# Patient Record
Sex: FEMALE | Race: WHITE | Employment: OTHER | URBAN - METROPOLITAN AREA
[De-identification: names, ages, dates, MRNs, and addresses within clinical notes are randomized per-mention and may not be internally consistent; named-entity substitution may affect disease eponyms.]

---

## 2017-01-03 RX ORDER — ATORVASTATIN CALCIUM 40 MG/1
TABLET, FILM COATED ORAL
Qty: 90 TABLET | Refills: 3 | Status: SHIPPED | OUTPATIENT
Start: 2017-01-03 | End: 2017-12-27

## 2017-01-05 ENCOUNTER — NURSE ONLY (OUTPATIENT)
Dept: INTERNAL MEDICINE CLINIC | Facility: CLINIC | Age: 72
End: 2017-01-05

## 2017-01-05 DIAGNOSIS — E53.8 VITAMIN B 12 DEFICIENCY: Primary | ICD-10-CM

## 2017-01-05 PROCEDURE — 96372 THER/PROPH/DIAG INJ SC/IM: CPT | Performed by: INTERNAL MEDICINE

## 2017-01-05 RX ORDER — CYANOCOBALAMIN 1000 UG/ML
1000 INJECTION INTRAMUSCULAR; SUBCUTANEOUS ONCE
Status: COMPLETED | OUTPATIENT
Start: 2017-01-05 | End: 2017-01-05

## 2017-01-05 RX ADMIN — CYANOCOBALAMIN 1000 MCG: 1000 INJECTION INTRAMUSCULAR; SUBCUTANEOUS at 09:33:00

## 2017-02-06 ENCOUNTER — NURSE ONLY (OUTPATIENT)
Dept: INTERNAL MEDICINE CLINIC | Facility: CLINIC | Age: 72
End: 2017-02-06

## 2017-02-06 DIAGNOSIS — E53.8 VITAMIN B 12 DEFICIENCY: Primary | ICD-10-CM

## 2017-02-06 PROCEDURE — 96372 THER/PROPH/DIAG INJ SC/IM: CPT | Performed by: INTERNAL MEDICINE

## 2017-02-06 RX ADMIN — CYANOCOBALAMIN 1000 MCG: 1000 INJECTION INTRAMUSCULAR; SUBCUTANEOUS at 09:14:00

## 2017-02-06 NOTE — PROGRESS NOTES
Name and  verified. B12 administered Right deltoid. Patient tolerated w/o complaint. No adverse reactions noted.

## 2017-03-08 ENCOUNTER — NURSE ONLY (OUTPATIENT)
Dept: INTERNAL MEDICINE CLINIC | Facility: CLINIC | Age: 72
End: 2017-03-08

## 2017-03-08 ENCOUNTER — TELEPHONE (OUTPATIENT)
Dept: INTERNAL MEDICINE CLINIC | Facility: CLINIC | Age: 72
End: 2017-03-08

## 2017-03-08 DIAGNOSIS — M48.061 SPINAL STENOSIS OF LUMBAR REGION: Primary | ICD-10-CM

## 2017-03-08 PROCEDURE — 96372 THER/PROPH/DIAG INJ SC/IM: CPT | Performed by: INTERNAL MEDICINE

## 2017-03-08 RX ADMIN — CYANOCOBALAMIN 1000 MCG: 1000 INJECTION INTRAMUSCULAR; SUBCUTANEOUS at 09:29:00

## 2017-03-08 NOTE — TELEPHONE ENCOUNTER
Routed to Dr. Kierra Snyder. Referral started below but needs additional information and diagnosis. I also don't know how to back date referral date. Do you?

## 2017-03-08 NOTE — TELEPHONE ENCOUNTER
Needs new referral for physical therapy [facility] in [facility]   - would need back dated to 3/7/17 as patient had appt yesterday & advised then referral had   (Dr Bryan Walton has referred before)  Send to Attn:  Sonya Floyd PT fax# [phone]

## 2017-03-08 NOTE — TELEPHONE ENCOUNTER
Faxed referral to [address] [place], [place] [address]. Attention: Gareth Hawley (number below.)

## 2017-03-09 RX ORDER — CHLORTHALIDONE 25 MG/1
TABLET ORAL
Qty: 18 TABLET | Refills: 0 | Status: SHIPPED | OUTPATIENT
Start: 2017-03-09 | End: 2017-05-31

## 2017-03-09 NOTE — TELEPHONE ENCOUNTER
Spoke to pt---we did have a follow up conversation about her BPs---she has not checked them in a while so she will check for 2 weeks and email a copy and we will talk again

## 2017-03-17 RX ORDER — BUTALBITAL, ASPIRIN, AND CAFFEINE 50; 325; 40 MG/1; MG/1; MG/1
CAPSULE ORAL
Qty: 60 CAPSULE | Refills: 1 | COMMUNITY
Start: 2017-03-17 | End: 2017-08-14

## 2017-04-10 ENCOUNTER — NURSE ONLY (OUTPATIENT)
Dept: INTERNAL MEDICINE CLINIC | Facility: CLINIC | Age: 72
End: 2017-04-10

## 2017-04-10 DIAGNOSIS — E53.8 VITAMIN B 12 DEFICIENCY: Primary | ICD-10-CM

## 2017-04-10 PROCEDURE — 96372 THER/PROPH/DIAG INJ SC/IM: CPT | Performed by: INTERNAL MEDICINE

## 2017-04-10 RX ORDER — CYANOCOBALAMIN 1000 UG/ML
1000 INJECTION INTRAMUSCULAR; SUBCUTANEOUS ONCE
Status: COMPLETED | OUTPATIENT
Start: 2017-04-10 | End: 2017-04-10

## 2017-04-10 RX ADMIN — CYANOCOBALAMIN 1000 MCG: 1000 INJECTION INTRAMUSCULAR; SUBCUTANEOUS at 08:59:00

## 2017-05-09 ENCOUNTER — TELEPHONE (OUTPATIENT)
Dept: INTERNAL MEDICINE CLINIC | Facility: CLINIC | Age: 72
End: 2017-05-09

## 2017-05-09 ENCOUNTER — NURSE ONLY (OUTPATIENT)
Dept: INTERNAL MEDICINE CLINIC | Facility: CLINIC | Age: 72
End: 2017-05-09

## 2017-05-09 DIAGNOSIS — E53.8 VITAMIN B 12 DEFICIENCY: Primary | ICD-10-CM

## 2017-05-09 PROCEDURE — 96372 THER/PROPH/DIAG INJ SC/IM: CPT | Performed by: INTERNAL MEDICINE

## 2017-05-09 RX ORDER — CYANOCOBALAMIN 1000 UG/ML
1000 INJECTION INTRAMUSCULAR; SUBCUTANEOUS ONCE
Status: COMPLETED | OUTPATIENT
Start: 2017-05-09 | End: 2017-05-09

## 2017-05-09 RX ADMIN — CYANOCOBALAMIN 1000 MCG: 1000 INJECTION INTRAMUSCULAR; SUBCUTANEOUS at 09:38:00

## 2017-05-09 NOTE — TELEPHONE ENCOUNTER
Pt. Drooped off BP results for [employer]   Ph. # [phone]    Routed to [facility]

## 2017-05-10 NOTE — TELEPHONE ENCOUNTER
Sent goActt - these readings are from 12/2016;  Would like to see BPs since March prior to Dr.K SMITH

## 2017-05-15 NOTE — TELEPHONE ENCOUNTER
[phone]  Pt returning [facility] call.  Pt aware she isn't in today and will return Tuesday  To clinical

## 2017-05-15 NOTE — TELEPHONE ENCOUNTER
Advised pt of Gretchen's [employer] message sent on 5/10 as pt had not read it--verbalized understanding. Pt will bring more recent BP readings to appt with Dr. Nadya Riley this week.

## 2017-05-16 ENCOUNTER — HOSPITAL ENCOUNTER (OUTPATIENT)
Dept: MAMMOGRAPHY | Facility: HOSPITAL | Age: 72
Discharge: HOME | End: 2017-05-16
Attending: SURGERY
Payer: MEDICARE

## 2017-05-16 DIAGNOSIS — R92.2 DENSE BREAST: ICD-10-CM

## 2017-05-16 DIAGNOSIS — N64.4 MASTODYNIA: ICD-10-CM

## 2017-05-16 PROCEDURE — 77062 BREAST TOMOSYNTHESIS BI: CPT | Performed by: SURGERY

## 2017-05-16 PROCEDURE — 77066 DX MAMMO INCL CAD BI: CPT | Performed by: SURGERY

## 2017-05-18 ENCOUNTER — OFFICE VISIT (OUTPATIENT)
Dept: INTERNAL MEDICINE CLINIC | Facility: CLINIC | Age: 72
End: 2017-05-18

## 2017-05-18 ENCOUNTER — TELEPHONE (OUTPATIENT)
Dept: INTERNAL MEDICINE CLINIC | Facility: CLINIC | Age: 72
End: 2017-05-18

## 2017-05-18 VITALS
SYSTOLIC BLOOD PRESSURE: 122 MMHG | TEMPERATURE: 100 F | WEIGHT: 143.81 LBS | HEART RATE: 78 BPM | DIASTOLIC BLOOD PRESSURE: 60 MMHG | BODY MASS INDEX: 26 KG/M2

## 2017-05-18 DIAGNOSIS — I10 ESSENTIAL HYPERTENSION WITH GOAL BLOOD PRESSURE LESS THAN 140/90: ICD-10-CM

## 2017-05-18 DIAGNOSIS — E78.5 HYPERLIPIDEMIA, UNSPECIFIED HYPERLIPIDEMIA TYPE: ICD-10-CM

## 2017-05-18 DIAGNOSIS — N64.4 BREAST PAIN, LEFT: ICD-10-CM

## 2017-05-18 DIAGNOSIS — Z85.42 HISTORY OF UTERINE CANCER: ICD-10-CM

## 2017-05-18 DIAGNOSIS — Z00.00 MEDICARE ANNUAL WELLNESS VISIT, INITIAL: Primary | ICD-10-CM

## 2017-05-18 DIAGNOSIS — K51.211 ULCERATIVE PROCTITIS WITH RECTAL BLEEDING (HCC): ICD-10-CM

## 2017-05-18 DIAGNOSIS — Z00.00 ROUTINE HEALTH MAINTENANCE: ICD-10-CM

## 2017-05-18 DIAGNOSIS — Z86.718 HISTORY OF DVT (DEEP VEIN THROMBOSIS): ICD-10-CM

## 2017-05-18 DIAGNOSIS — M85.80 OSTEOPENIA, UNSPECIFIED LOCATION: ICD-10-CM

## 2017-05-18 PROCEDURE — G0463 HOSPITAL OUTPT CLINIC VISIT: HCPCS | Performed by: INTERNAL MEDICINE

## 2017-05-18 PROCEDURE — 99214 OFFICE O/P EST MOD 30 MIN: CPT | Performed by: INTERNAL MEDICINE

## 2017-05-18 PROCEDURE — G0438 PPPS, INITIAL VISIT: HCPCS | Performed by: INTERNAL MEDICINE

## 2017-05-18 RX ORDER — ALPRAZOLAM 0.25 MG/1
0.25 TABLET ORAL DAILY PRN
Qty: 30 TABLET | Refills: 0 | Status: SHIPPED
Start: 2017-05-18 | End: 2020-02-06

## 2017-05-18 RX ORDER — RANITIDINE 150 MG/1
150 CAPSULE ORAL AS NEEDED
COMMUNITY
End: 2018-11-16 | Stop reason: ALTCHOICE

## 2017-05-18 NOTE — TELEPHONE ENCOUNTER
Dr. Kierra Snyder wrote a written prescription for alprazolam 0.25mg #30 zero refills. Take 1 tablet by mouth daily as needed. Medication [facility] preferred [facility] Patient called and made aware.

## 2017-05-18 NOTE — PROGRESS NOTES
HPI:   Scott Kaufman is a 66year old female presents with the following problems. Patient has hypertension. On therapy. Blood pressure controlled.   She did have a blood pressure in the 124Q systolic range and another blood pressure 305 systolic ra Take 150 mg by mouth as needed for Heartburn. Disp:  Rfl:    ALPRAZolam (XANAX) 0.25 MG Oral Tab Take 1 tablet (0.25 mg total) by mouth daily as needed.  Disp: 30 tablet Rfl: 0   BUTALBITAL-ASPIRIN-CAFFEINE -40 MG Oral Cap TAKE ONE CAPSULE BY MOUTH EV As Directed. Every 5 days. Disp:  Rfl:       Past Medical History   Diagnosis Date   • [facility]   • Other ill-defined conditions(799.89)      TAHBSO MGMT.    • Hyperparathyroidism ([facility] 2004     PAIR OF PARATHYROID REMOVED   • Pneumonia 12/05/2016 02:57 PM                                [facility]                                                         Pathologist:           Law Gutiérrez MD                                                        Specimens:   A) - Colon righ submitted in formalin labeled Joseph, left colon biopsy and   consists of multiple fragments of pink-tan tissue measuring 1.2 x 1.0 x   0.4 cm in aggregate. Submitted entirely in a single cassette B.    Specimen C is submitted in formalin labeled Joseph, r organomegaly or tenderness   RECTAL: Colonoscopy December 2016. Mild to moderate active colitis/proctitis in the rectum biopsy. Managed by gastroneurology. Dr. Mark Pereyra. EXTREMITIES:  no cyanosis, clubbing or edema. NEURO:  Awake and aware.        General Scoring: 3          Depression Screening (PHQ-2/PHQ-9): Over the LAST 2 WEEKS   Little interest or pleasure in doing things (over the last two weeks)?: Not at all    Feeling down, depressed, or hopeless (over the last two weeks)?: Not at all    PHQ-2 SCORE of the week is this?: Correct    What month is it?: Correct    What year is it?: Correct    Recall \"Ball\": Correct    Recall \"Flag\": Correct    Recall \"Tree\":  Incorrect          Kimo Cobos's SCREENING SCHEDULE   Tests on this list are recommend Influenza No orders found for this or any previous visit.  Update Immunization Activity if applicable    Pneumococcal   Orders placed or performed in visit on 09/15/15  -PNEUMOCOCCAL VACC, 13 ROWENA IM    Update Immunization Activity if applicable    Hepat [facility])  On therapy. Followed by gastroenterology. 5. History of uterine cancer  Felt to be without disease. Followed by DR. PALACIOS    6. Osteopenia, unspecified location  On Fosamax. Calcium and vitamin D. Followed by endocrinology at [facility].     7. R

## 2017-05-31 ENCOUNTER — LAB ENCOUNTER (OUTPATIENT)
Dept: LAB | Age: 72
End: 2017-05-31
Attending: INTERNAL MEDICINE
Payer: MEDICARE

## 2017-05-31 DIAGNOSIS — I10 ESSENTIAL HYPERTENSION WITH GOAL BLOOD PRESSURE LESS THAN 140/90: ICD-10-CM

## 2017-05-31 PROCEDURE — 80048 BASIC METABOLIC PNL TOTAL CA: CPT

## 2017-05-31 PROCEDURE — 85025 COMPLETE CBC W/AUTO DIFF WBC: CPT

## 2017-05-31 PROCEDURE — 80061 LIPID PANEL: CPT

## 2017-05-31 PROCEDURE — 36415 COLL VENOUS BLD VENIPUNCTURE: CPT

## 2017-06-01 RX ORDER — CHLORTHALIDONE 25 MG/1
TABLET ORAL
Qty: 18 TABLET | Refills: 3 | Status: SHIPPED | OUTPATIENT
Start: 2017-06-01 | End: 2017-08-15

## 2017-06-06 ENCOUNTER — TELEPHONE (OUTPATIENT)
Dept: INTERNAL MEDICINE CLINIC | Facility: CLINIC | Age: 72
End: 2017-06-06

## 2017-06-06 DIAGNOSIS — R73.9 ELEVATED BLOOD SUGAR: Primary | ICD-10-CM

## 2017-06-06 DIAGNOSIS — D64.9 ANEMIA, UNSPECIFIED: ICD-10-CM

## 2017-06-06 NOTE — TELEPHONE ENCOUNTER
Relayed MD's message to patient---verbalized understanding  Pt states she has had very little rectal bleeding    To Dr. Gary Ratliff

## 2017-06-06 NOTE — TELEPHONE ENCOUNTER
Please notify patient that her labs show that she has a mild anemia. Not bad but hemoglobin a little bit low. This may be the result of some bleeding that she may have from her ulcerative colitis.   Please ask her if she has had any recent rectal bleeding

## 2017-06-07 ENCOUNTER — LAB ENCOUNTER (OUTPATIENT)
Dept: LAB | Age: 72
End: 2017-06-07
Attending: INTERNAL MEDICINE
Payer: MEDICARE

## 2017-06-07 DIAGNOSIS — D64.9 ANEMIA, UNSPECIFIED: ICD-10-CM

## 2017-06-07 DIAGNOSIS — R73.9 ELEVATED BLOOD SUGAR: ICD-10-CM

## 2017-06-07 PROCEDURE — 83540 ASSAY OF IRON: CPT

## 2017-06-07 PROCEDURE — 84466 ASSAY OF TRANSFERRIN: CPT

## 2017-06-07 PROCEDURE — 83036 HEMOGLOBIN GLYCOSYLATED A1C: CPT

## 2017-06-07 PROCEDURE — 36415 COLL VENOUS BLD VENIPUNCTURE: CPT

## 2017-06-07 PROCEDURE — 85025 COMPLETE CBC W/AUTO DIFF WBC: CPT

## 2017-06-07 PROCEDURE — 82728 ASSAY OF FERRITIN: CPT

## 2017-06-07 PROCEDURE — 82607 VITAMIN B-12: CPT

## 2017-06-09 ENCOUNTER — NURSE ONLY (OUTPATIENT)
Dept: INTERNAL MEDICINE CLINIC | Facility: CLINIC | Age: 72
End: 2017-06-09

## 2017-06-09 ENCOUNTER — TELEPHONE (OUTPATIENT)
Dept: INTERNAL MEDICINE CLINIC | Facility: CLINIC | Age: 72
End: 2017-06-09

## 2017-06-09 DIAGNOSIS — E53.8 VITAMIN B12 DEFICIENCY: Primary | ICD-10-CM

## 2017-06-09 DIAGNOSIS — D64.9 ANEMIA, UNSPECIFIED: Primary | ICD-10-CM

## 2017-06-09 PROCEDURE — 96372 THER/PROPH/DIAG INJ SC/IM: CPT | Performed by: INTERNAL MEDICINE

## 2017-06-09 RX ADMIN — CYANOCOBALAMIN 1000 MCG: 1000 INJECTION INTRAMUSCULAR; SUBCUTANEOUS at 09:18:00

## 2017-06-09 NOTE — PROGRESS NOTES
Pt presents for vitamin B12 injection. Full name and date of birth verified. Vitamin B12 1000mcg administered IM to right deltoid. Tolerated well.

## 2017-07-18 ENCOUNTER — NURSE ONLY (OUTPATIENT)
Dept: INTERNAL MEDICINE CLINIC | Facility: CLINIC | Age: 72
End: 2017-07-18

## 2017-07-18 ENCOUNTER — LAB ENCOUNTER (OUTPATIENT)
Dept: LAB | Age: 72
End: 2017-07-18
Attending: INTERNAL MEDICINE
Payer: MEDICARE

## 2017-07-18 DIAGNOSIS — D64.9 ANEMIA, UNSPECIFIED: ICD-10-CM

## 2017-07-18 LAB
BASOPHILS # BLD: 0 K/UL (ref 0–0.2)
BASOPHILS NFR BLD: 1 %
EOSINOPHIL # BLD: 0.3 K/UL (ref 0–0.7)
EOSINOPHIL NFR BLD: 5 %
ERYTHROCYTE [DISTWIDTH] IN BLOOD BY AUTOMATED COUNT: 12.6 % (ref 11–15)
HCT VFR BLD AUTO: 35.4 % (ref 35–48)
HGB BLD-MCNC: 12 G/DL (ref 12–16)
LYMPHOCYTES # BLD: 1.4 K/UL (ref 1–4)
LYMPHOCYTES NFR BLD: 22 %
MCH RBC QN AUTO: 33.6 PG (ref 27–32)
MCHC RBC AUTO-ENTMCNC: 33.9 G/DL (ref 32–37)
MCV RBC AUTO: 99 FL (ref 80–100)
MONOCYTES # BLD: 0.8 K/UL (ref 0–1)
MONOCYTES NFR BLD: 13 %
NEUTROPHILS # BLD AUTO: 3.8 K/UL (ref 1.8–7.7)
NEUTROPHILS NFR BLD: 60 %
PLATELET # BLD AUTO: 212 K/UL (ref 140–400)
PMV BLD AUTO: 8.3 FL (ref 7.4–10.3)
RBC # BLD AUTO: 3.57 M/UL (ref 3.7–5.4)
WBC # BLD AUTO: 6.3 K/UL (ref 4–11)

## 2017-07-18 PROCEDURE — 85025 COMPLETE CBC W/AUTO DIFF WBC: CPT

## 2017-07-18 PROCEDURE — 96372 THER/PROPH/DIAG INJ SC/IM: CPT | Performed by: INTERNAL MEDICINE

## 2017-07-18 PROCEDURE — 36415 COLL VENOUS BLD VENIPUNCTURE: CPT

## 2017-07-18 RX ADMIN — CYANOCOBALAMIN 1000 MCG: 1000 INJECTION INTRAMUSCULAR; SUBCUTANEOUS at 09:09:00

## 2017-07-20 ENCOUNTER — TELEPHONE (OUTPATIENT)
Dept: INTERNAL MEDICINE CLINIC | Facility: CLINIC | Age: 72
End: 2017-07-20

## 2017-07-20 NOTE — TELEPHONE ENCOUNTER
Pt notified of results and recommendations. Verbalizes understanding and denies questions at this time.

## 2017-08-10 ENCOUNTER — OFFICE VISIT (OUTPATIENT)
Dept: PULMONOLOGY | Facility: CLINIC | Age: 72
End: 2017-08-10

## 2017-08-10 VITALS
RESPIRATION RATE: 18 BRPM | HEIGHT: 62.5 IN | HEART RATE: 56 BPM | WEIGHT: 144.63 LBS | SYSTOLIC BLOOD PRESSURE: 159 MMHG | OXYGEN SATURATION: 98 % | BODY MASS INDEX: 25.95 KG/M2 | DIASTOLIC BLOOD PRESSURE: 80 MMHG

## 2017-08-10 DIAGNOSIS — J47.9 BRONCHIECTASIS WITHOUT COMPLICATION (HCC): Primary | ICD-10-CM

## 2017-08-10 PROCEDURE — G0463 HOSPITAL OUTPT CLINIC VISIT: HCPCS | Performed by: INTERNAL MEDICINE

## 2017-08-10 PROCEDURE — 99213 OFFICE O/P EST LOW 20 MIN: CPT | Performed by: INTERNAL MEDICINE

## 2017-08-10 RX ORDER — ALBUTEROL SULFATE 90 UG/1
2 AEROSOL, METERED RESPIRATORY (INHALATION) EVERY 6 HOURS PRN
Qty: 1 INHALER | Refills: 5 | Status: SHIPPED | OUTPATIENT
Start: 2017-08-10 | End: 2018-08-10

## 2017-08-10 NOTE — PROGRESS NOTES
The patient is 22-year-old female who I know well from prior evaluation of comes in now for follow-up. In general, she is doing well although she has mild cough. She has bronchiectasis.   She has recently completed a course of cefadroxil for left toe infe

## 2017-08-14 ENCOUNTER — APPOINTMENT (OUTPATIENT)
Dept: LAB | Age: 72
End: 2017-08-14
Attending: INTERNAL MEDICINE
Payer: MEDICARE

## 2017-08-14 ENCOUNTER — OFFICE VISIT (OUTPATIENT)
Dept: INTERNAL MEDICINE CLINIC | Facility: CLINIC | Age: 72
End: 2017-08-14

## 2017-08-14 VITALS
TEMPERATURE: 98 F | HEIGHT: 62.5 IN | BODY MASS INDEX: 25.69 KG/M2 | HEART RATE: 56 BPM | DIASTOLIC BLOOD PRESSURE: 68 MMHG | WEIGHT: 143.19 LBS | OXYGEN SATURATION: 96 % | SYSTOLIC BLOOD PRESSURE: 176 MMHG

## 2017-08-14 DIAGNOSIS — E53.8 B12 DEFICIENCY: ICD-10-CM

## 2017-08-14 DIAGNOSIS — R00.1 BRADYCARDIA: ICD-10-CM

## 2017-08-14 DIAGNOSIS — Z96.9 PRESENCE OF RETAINED HARDWARE: ICD-10-CM

## 2017-08-14 DIAGNOSIS — I10 ESSENTIAL HYPERTENSION WITH GOAL BLOOD PRESSURE LESS THAN 140/90: ICD-10-CM

## 2017-08-14 DIAGNOSIS — I10 ESSENTIAL HYPERTENSION WITH GOAL BLOOD PRESSURE LESS THAN 140/90: Primary | ICD-10-CM

## 2017-08-14 LAB
ALBUMIN SERPL BCP-MCNC: 4.4 G/DL (ref 3.5–4.8)
ALBUMIN/GLOB SERPL: 1.4 {RATIO} (ref 1–2)
ALP SERPL-CCNC: 85 U/L (ref 32–100)
ALT SERPL-CCNC: 21 U/L (ref 14–54)
ANION GAP SERPL CALC-SCNC: 9 MMOL/L (ref 0–18)
AST SERPL-CCNC: 27 U/L (ref 15–41)
BILIRUB SERPL-MCNC: 0.8 MG/DL (ref 0.3–1.2)
BUN SERPL-MCNC: 15 MG/DL (ref 8–20)
BUN/CREAT SERPL: 18.8 (ref 10–20)
CALCIUM SERPL-MCNC: 9.9 MG/DL (ref 8.5–10.5)
CHLORIDE SERPL-SCNC: 99 MMOL/L (ref 95–110)
CO2 SERPL-SCNC: 24 MMOL/L (ref 22–32)
CREAT SERPL-MCNC: 0.8 MG/DL (ref 0.5–1.5)
GLOBULIN PLAS-MCNC: 3.2 G/DL (ref 2.5–3.7)
GLUCOSE SERPL-MCNC: 97 MG/DL (ref 70–99)
OSMOLALITY UR CALC.SUM OF ELEC: 275 MOSM/KG (ref 275–295)
POTASSIUM SERPL-SCNC: 4.1 MMOL/L (ref 3.3–5.1)
PROT SERPL-MCNC: 7.6 G/DL (ref 5.9–8.4)
SODIUM SERPL-SCNC: 132 MMOL/L (ref 136–144)

## 2017-08-14 PROCEDURE — 99214 OFFICE O/P EST MOD 30 MIN: CPT | Performed by: INTERNAL MEDICINE

## 2017-08-14 PROCEDURE — G0463 HOSPITAL OUTPT CLINIC VISIT: HCPCS | Performed by: INTERNAL MEDICINE

## 2017-08-14 PROCEDURE — 80053 COMPREHEN METABOLIC PANEL: CPT

## 2017-08-14 PROCEDURE — 96372 THER/PROPH/DIAG INJ SC/IM: CPT | Performed by: INTERNAL MEDICINE

## 2017-08-14 PROCEDURE — 36415 COLL VENOUS BLD VENIPUNCTURE: CPT

## 2017-08-14 RX ORDER — BUTALBITAL, ASPIRIN, AND CAFFEINE 50; 325; 40 MG/1; MG/1; MG/1
CAPSULE ORAL
Qty: 60 CAPSULE | Refills: 1 | OUTPATIENT
Start: 2017-08-14 | End: 2017-08-14

## 2017-08-14 RX ORDER — BUTALBITAL, ASPIRIN, AND CAFFEINE 50; 325; 40 MG/1; MG/1; MG/1
CAPSULE ORAL
Qty: 60 CAPSULE | Refills: 1 | Status: SHIPPED | OUTPATIENT
Start: 2017-08-14 | End: 2017-09-19

## 2017-08-14 RX ADMIN — CYANOCOBALAMIN 1000 MCG: 1000 INJECTION INTRAMUSCULAR; SUBCUTANEOUS at 12:45:00

## 2017-08-14 NOTE — PROGRESS NOTES
HPI:   Jw Li is a 74year old female presents with the following problems. Patient here for preop physical.  She will have the surgery for hardware removal first metatarsal left foot under local sedation by Dr. Cleveland Daniel.   Patient relat anticoagulation. Wt Readings from Last 3 Encounters:  08/14/17 : 143 lb 3.2 oz (65 kg)  08/10/17 : 144 lb 9.6 oz (65.6 kg)  06/06/17 : 143 lb (64.9 kg)    Body mass index is 25.77 kg/m².        Current Outpatient Prescriptions:  butalbital-aspirin-caffei Multiple Vitamin (MULTI-VITAMINS) Oral Tab Take 1 tablet by mouth daily with breakfast. Disp:  Rfl:    sulfaSALAzine (AZULFIDINE) 500 MG Oral Tab Take 2 tablets by mouth 3 (three) times daily.    Disp:  Rfl:       Past Medical History:   Diagnosis Date orders placed or performed in visit on 07/18/17  -CBC W/ DIFFERENTIAL   Result Value Ref Range   WBC 6.3 4.0 - 11.0 K/UL   RBC 3.57 (L) 3.70 - 5.40 M/UL   HGB 12.0 12.0 - 16.0 g/dL   HCT 35.4 35.0 - 48.0 %   MCV 99.0 80.0 - 100.0 fL   MCH 33.6 (H) 27.0 - 3 2017 benign. LUNGS:  clear to auscultation. Effort normal.  Nonlabored. Respiratory rate about 14. CARDIO:  RRR without murmur.    S1 and S2 normal  GI:  good BS's. no masses, organomegaly or tenderness   MUSCULOSKELETAL:  back is not tender, no joint sw

## 2017-08-15 ENCOUNTER — TELEPHONE (OUTPATIENT)
Dept: INTERNAL MEDICINE CLINIC | Facility: CLINIC | Age: 72
End: 2017-08-15

## 2017-08-15 DIAGNOSIS — I10 ESSENTIAL HYPERTENSION: Primary | ICD-10-CM

## 2017-08-15 RX ORDER — CHLORTHALIDONE 25 MG/1
TABLET ORAL
Qty: 18 TABLET | Refills: 3 | COMMUNITY
Start: 2017-08-15 | End: 2017-09-19

## 2017-08-15 NOTE — TELEPHONE ENCOUNTER
Please let patient know that her history is from yesterday showed that her sodium count is just a little bit low. This may be contributed to by the chlorthalidone. Please ask her if she drinks any more than 2 quarts of water a day.   Sometimes this amount

## 2017-08-15 NOTE — TELEPHONE ENCOUNTER
Called patient with Dr Paulino Estrada message. Patient expressed understanding. She gave me her blood pressure readings. Last night 170/70 and this morning 162/67. To Dr. Milton Bernstein.

## 2017-08-15 NOTE — TELEPHONE ENCOUNTER
Discussed with patient. I feel patient is at low risk for her proposed foot surgery under local.  Reviewed blood pressures. Patient's pulse last night was 68 this morning 57. We will go ahead and send Dr. Kwong Job clearance.   I have asked patient violeta

## 2017-09-01 ENCOUNTER — APPOINTMENT (OUTPATIENT)
Dept: LAB | Age: 72
End: 2017-09-01
Attending: INTERNAL MEDICINE
Payer: MEDICARE

## 2017-09-01 ENCOUNTER — NURSE ONLY (OUTPATIENT)
Dept: INTERNAL MEDICINE CLINIC | Facility: CLINIC | Age: 72
End: 2017-09-01

## 2017-09-01 VITALS — SYSTOLIC BLOOD PRESSURE: 158 MMHG | DIASTOLIC BLOOD PRESSURE: 72 MMHG | HEART RATE: 64 BPM

## 2017-09-01 DIAGNOSIS — I10 ESSENTIAL HYPERTENSION: Primary | ICD-10-CM

## 2017-09-01 DIAGNOSIS — I10 ESSENTIAL HYPERTENSION: ICD-10-CM

## 2017-09-01 LAB
ANION GAP SERPL CALC-SCNC: 9 MMOL/L (ref 0–18)
BUN SERPL-MCNC: 18 MG/DL (ref 8–20)
BUN/CREAT SERPL: 19.8 (ref 10–20)
CALCIUM SERPL-MCNC: 9.3 MG/DL (ref 8.5–10.5)
CHLORIDE SERPL-SCNC: 99 MMOL/L (ref 95–110)
CO2 SERPL-SCNC: 24 MMOL/L (ref 22–32)
CREAT SERPL-MCNC: 0.91 MG/DL (ref 0.5–1.5)
GLUCOSE SERPL-MCNC: 99 MG/DL (ref 70–99)
OSMOLALITY UR CALC.SUM OF ELEC: 276 MOSM/KG (ref 275–295)
POTASSIUM SERPL-SCNC: 3.9 MMOL/L (ref 3.3–5.1)
SODIUM SERPL-SCNC: 132 MMOL/L (ref 136–144)

## 2017-09-01 PROCEDURE — G0463 HOSPITAL OUTPT CLINIC VISIT: HCPCS

## 2017-09-01 PROCEDURE — 36415 COLL VENOUS BLD VENIPUNCTURE: CPT

## 2017-09-01 PROCEDURE — 80048 BASIC METABOLIC PNL TOTAL CA: CPT

## 2017-09-05 ENCOUNTER — TELEPHONE (OUTPATIENT)
Dept: INTERNAL MEDICINE CLINIC | Facility: CLINIC | Age: 72
End: 2017-09-05

## 2017-09-05 RX ORDER — AMLODIPINE BESYLATE 5 MG/1
5 TABLET ORAL DAILY
Qty: 30 TABLET | Refills: 11 | Status: SHIPPED | OUTPATIENT
Start: 2017-09-05 | End: 2017-11-27

## 2017-09-05 NOTE — TELEPHONE ENCOUNTER
Relayed MD message to patient. Verified patients pharmacy. FU appt scheduled for 9/19/2017. Patient repeated medication changed back, patient verbalized understanding.  Amlodipine sent to [place].

## 2017-09-05 NOTE — TELEPHONE ENCOUNTER
Please notify patient that her electrolytes came out fairly good. Sodium is still little bit low. This is not due to her sodium intake orally but he has something to do with how her kidneys concentrate urine.   What I would like to do is have her go back

## 2017-09-19 ENCOUNTER — APPOINTMENT (OUTPATIENT)
Dept: LAB | Age: 72
End: 2017-09-19
Attending: INTERNAL MEDICINE
Payer: MEDICARE

## 2017-09-19 ENCOUNTER — OFFICE VISIT (OUTPATIENT)
Dept: INTERNAL MEDICINE CLINIC | Facility: CLINIC | Age: 72
End: 2017-09-19

## 2017-09-19 ENCOUNTER — TELEPHONE (OUTPATIENT)
Dept: INTERNAL MEDICINE CLINIC | Facility: CLINIC | Age: 72
End: 2017-09-19

## 2017-09-19 VITALS
DIASTOLIC BLOOD PRESSURE: 64 MMHG | BODY MASS INDEX: 26.09 KG/M2 | TEMPERATURE: 99 F | HEIGHT: 62 IN | WEIGHT: 141.81 LBS | SYSTOLIC BLOOD PRESSURE: 126 MMHG | HEART RATE: 60 BPM

## 2017-09-19 DIAGNOSIS — I10 ESSENTIAL HYPERTENSION: Primary | ICD-10-CM

## 2017-09-19 DIAGNOSIS — Z23 FLU VACCINE NEED: ICD-10-CM

## 2017-09-19 DIAGNOSIS — I10 ESSENTIAL HYPERTENSION: ICD-10-CM

## 2017-09-19 DIAGNOSIS — E55.9 VITAMIN D DEFICIENCY: ICD-10-CM

## 2017-09-19 DIAGNOSIS — E87.1 HYPONATREMIA: ICD-10-CM

## 2017-09-19 DIAGNOSIS — Z00.00 ROUTINE HEALTH MAINTENANCE: ICD-10-CM

## 2017-09-19 PROCEDURE — 36415 COLL VENOUS BLD VENIPUNCTURE: CPT

## 2017-09-19 PROCEDURE — 80048 BASIC METABOLIC PNL TOTAL CA: CPT

## 2017-09-19 PROCEDURE — G0008 ADMIN INFLUENZA VIRUS VAC: HCPCS | Performed by: INTERNAL MEDICINE

## 2017-09-19 PROCEDURE — 99214 OFFICE O/P EST MOD 30 MIN: CPT | Performed by: INTERNAL MEDICINE

## 2017-09-19 PROCEDURE — 96372 THER/PROPH/DIAG INJ SC/IM: CPT | Performed by: INTERNAL MEDICINE

## 2017-09-19 PROCEDURE — G0463 HOSPITAL OUTPT CLINIC VISIT: HCPCS | Performed by: INTERNAL MEDICINE

## 2017-09-19 PROCEDURE — 90653 IIV ADJUVANT VACCINE IM: CPT | Performed by: INTERNAL MEDICINE

## 2017-09-19 RX ADMIN — CYANOCOBALAMIN 1000 MCG: 1000 INJECTION INTRAMUSCULAR; SUBCUTANEOUS at 09:23:00

## 2017-09-19 NOTE — PROGRESS NOTES
HPI:   Aaron Russo is a 74year old female presents with the following problems. Patient feels well. She went through her foot surgery very well. This was done by Dr. Lisette Sauceda. She is tolerating the amlodipine well.   Her repeat blood pressure wa External Cream Apply 1 Application topically as needed. Disp:  Rfl: 1   Alendronate Sodium (FOSAMAX) 70 MG Oral Tab Take 1 tablet by mouth once a week. Disp:  Rfl: 3   aspirin 81 MG Oral Tab Take 1 tablet by mouth daily.  Disp:  Rfl:    Calcium Citrate-Vi HOSPITALIZED   • Spinal stenosis    • Spinal stenosis 4/19/2016   • Tonsillitis    • [facility]   • Uterine cancer [facility])       Past Surgical History:  No date: APPENDECTOMY  12/5/2016: COLONOSCOPY N/A      Comment: Procedure: COLONOSCOPY denies dysuria or frequency  MUSCULOSKELETAL: Occasional back pain for which he takes ibuprofen intermittently. NEURO:  denies headaches or dizziness.   Occasional headache for which she takes Fiorinal.  PSYCHE:  denies depression or anxiety      EXAM:   B

## 2017-09-19 NOTE — TELEPHONE ENCOUNTER
Called patient and relayed DR. MARK message - verbalized understanding.  Transferred to [address] [place] [address] to schedule sabrina with Bhavani Reyna in 3 weeks for BP check

## 2017-09-19 NOTE — TELEPHONE ENCOUNTER
Please let patient know that her electrolytes from today show sodium 133. Improved. Normal is around 135 and greater. I think based on today's blood pressure she can stop the chlorthalidone.   Please ask her to seek [facility] for blood pressure check in 3 w

## 2017-10-12 ENCOUNTER — TELEPHONE (OUTPATIENT)
Dept: INTERNAL MEDICINE CLINIC | Facility: CLINIC | Age: 72
End: 2017-10-12

## 2017-10-12 ENCOUNTER — APPOINTMENT (OUTPATIENT)
Dept: LAB | Age: 72
End: 2017-10-12
Attending: INTERNAL MEDICINE
Payer: MEDICARE

## 2017-10-12 ENCOUNTER — NURSE ONLY (OUTPATIENT)
Dept: INTERNAL MEDICINE CLINIC | Facility: CLINIC | Age: 72
End: 2017-10-12

## 2017-10-12 DIAGNOSIS — I10 ESSENTIAL HYPERTENSION: Primary | ICD-10-CM

## 2017-10-12 DIAGNOSIS — I10 ESSENTIAL HYPERTENSION: ICD-10-CM

## 2017-10-12 PROCEDURE — 80048 BASIC METABOLIC PNL TOTAL CA: CPT

## 2017-10-12 PROCEDURE — 36415 COLL VENOUS BLD VENIPUNCTURE: CPT

## 2017-10-12 NOTE — PROGRESS NOTES
Pt here for follow up BP check  She has not monitored her BP since discontinuing chlorthalidone;   She has resumed going to the gym MWF for weights/aerobic exercise  She feels well today    BP as charted  TLC - exercising  ROS neg c/o HA,CP,SOB, GI, (no c

## 2017-10-19 ENCOUNTER — NURSE ONLY (OUTPATIENT)
Dept: INTERNAL MEDICINE CLINIC | Facility: CLINIC | Age: 72
End: 2017-10-19

## 2017-10-19 ENCOUNTER — TELEPHONE (OUTPATIENT)
Dept: INTERNAL MEDICINE CLINIC | Facility: CLINIC | Age: 72
End: 2017-10-19

## 2017-10-19 DIAGNOSIS — M48.061 SPINAL STENOSIS OF LUMBAR REGION WITHOUT NEUROGENIC CLAUDICATION: Primary | ICD-10-CM

## 2017-10-19 PROCEDURE — 96372 THER/PROPH/DIAG INJ SC/IM: CPT | Performed by: INTERNAL MEDICINE

## 2017-10-19 RX ADMIN — CYANOCOBALAMIN 1000 MCG: 1000 INJECTION INTRAMUSCULAR; SUBCUTANEOUS at 10:56:00

## 2017-10-30 RX ORDER — LOSARTAN POTASSIUM 100 MG/1
TABLET ORAL
Qty: 90 TABLET | Refills: 3 | Status: SHIPPED | OUTPATIENT
Start: 2017-10-30 | End: 2018-10-15

## 2017-11-02 RX ORDER — PROPRANOLOL HYDROCHLORIDE 80 MG/1
TABLET ORAL
Qty: 180 TABLET | Refills: 3 | Status: SHIPPED | OUTPATIENT
Start: 2017-11-02 | End: 2018-11-28

## 2017-11-09 ENCOUNTER — TELEPHONE (OUTPATIENT)
Dept: INTERNAL MEDICINE CLINIC | Facility: CLINIC | Age: 72
End: 2017-11-09

## 2017-11-09 DIAGNOSIS — M48.00 SPINAL STENOSIS, UNSPECIFIED SPINAL REGION: Primary | ICD-10-CM

## 2017-11-09 NOTE — TELEPHONE ENCOUNTER
Referral faxed to [facility]-fax confirmation received. Called patient and notified.

## 2017-11-09 NOTE — TELEPHONE ENCOUNTER
Pt called - was seen yesterday for physical therapy & is due for a new referral   Please send to the [facility]#[phone]

## 2017-11-14 ENCOUNTER — OFFICE VISIT (OUTPATIENT)
Dept: INTERNAL MEDICINE CLINIC | Facility: CLINIC | Age: 72
End: 2017-11-14

## 2017-11-14 VITALS
BODY MASS INDEX: 26.46 KG/M2 | TEMPERATURE: 98 F | HEART RATE: 59 BPM | SYSTOLIC BLOOD PRESSURE: 152 MMHG | OXYGEN SATURATION: 98 % | DIASTOLIC BLOOD PRESSURE: 70 MMHG | WEIGHT: 143.81 LBS | HEIGHT: 62 IN

## 2017-11-14 DIAGNOSIS — I10 ESSENTIAL HYPERTENSION: Primary | ICD-10-CM

## 2017-11-14 DIAGNOSIS — E87.1 HYPONATREMIA: ICD-10-CM

## 2017-11-14 DIAGNOSIS — Z00.00 ROUTINE HEALTH MAINTENANCE: ICD-10-CM

## 2017-11-14 PROCEDURE — G0463 HOSPITAL OUTPT CLINIC VISIT: HCPCS | Performed by: INTERNAL MEDICINE

## 2017-11-14 PROCEDURE — 99214 OFFICE O/P EST MOD 30 MIN: CPT | Performed by: INTERNAL MEDICINE

## 2017-11-14 RX ORDER — AMLODIPINE BESYLATE 10 MG/1
10 TABLET ORAL DAILY
Qty: 30 TABLET | Refills: 12 | Status: SHIPPED | OUTPATIENT
Start: 2017-11-14 | End: 2018-12-12

## 2017-11-14 NOTE — PROGRESS NOTES
Kusum Alexis is a 74year old female. HPI:   Patient presents with: Follow - Up: 6 month visit        Patient feels well. She has no complaints. No chest pain or shortness of breath. She is here for blood pressure check.   Blood pressure repeat 1 Application topically as needed. Disp:  Rfl: 1   Alendronate Sodium (FOSAMAX) 70 MG Oral Tab Take 1 tablet by mouth once a week. Disp:  Rfl: 3   aspirin 81 MG Oral Tab Take 1 tablet by mouth daily.  Disp:  Rfl:    Calcium Citrate-Vitamin D (CALCIUM CITRAT stenosis    • Spinal stenosis 4/19/2016   • Tonsillitis    • Ulcerative colitis ([facility] 1976   • [facility]       Social History:  Smoking status: Never Smoker                                                              Smokeless tobacco: Never Us plan.    The patient is asked to call Thursday with her blood pressure results. We will then decide on next steps. Lorin Wallace MD  11/14/2017  12:30 PM

## 2017-11-16 ENCOUNTER — TELEPHONE (OUTPATIENT)
Dept: INTERNAL MEDICINE CLINIC | Facility: CLINIC | Age: 72
End: 2017-11-16

## 2017-11-16 NOTE — TELEPHONE ENCOUNTER
Called patient and Relayed MD's message to patient---verbalized understanding. An RX for amlodipine 10mg was already sent via eRx on 11/14/17, patient confirmed that she received a call from pharmacy that RX was ready for .  She will use up her 5mg t

## 2017-11-16 NOTE — TELEPHONE ENCOUNTER
Please notify patient that I received her blood pressure readings. I think as we discussed in our office visit recently she should increase the amlodipine from 5 mg to 10 mg. She can take 2 tablets in the morning.   Please make an appointment to see [facility]

## 2017-11-16 NOTE — TELEPHONE ENCOUNTER
[phone]  Pt was told by Dr Matias Armas to call in 3 readings  BP readings   153/70  158/72  149/67  To clinical

## 2017-11-20 ENCOUNTER — NURSE ONLY (OUTPATIENT)
Dept: INTERNAL MEDICINE CLINIC | Facility: CLINIC | Age: 72
End: 2017-11-20

## 2017-11-20 PROCEDURE — 96372 THER/PROPH/DIAG INJ SC/IM: CPT | Performed by: INTERNAL MEDICINE

## 2017-11-20 RX ADMIN — CYANOCOBALAMIN 1000 MCG: 1000 INJECTION INTRAMUSCULAR; SUBCUTANEOUS at 09:13:00

## 2017-11-20 NOTE — PROGRESS NOTES
Patient presents for Vitamin B12 injection. Patient's full name and  verified. Order verified. Vitamin B12 administered to right deltoid per patient's preference. Patient tolerated the procedure well. No adverse reactions noted at this time.

## 2017-11-27 ENCOUNTER — NURSE ONLY (OUTPATIENT)
Dept: INTERNAL MEDICINE CLINIC | Facility: CLINIC | Age: 72
End: 2017-11-27

## 2017-11-27 VITALS — DIASTOLIC BLOOD PRESSURE: 58 MMHG | HEART RATE: 57 BPM | SYSTOLIC BLOOD PRESSURE: 138 MMHG

## 2017-11-27 DIAGNOSIS — I10 ESSENTIAL HYPERTENSION: Primary | ICD-10-CM

## 2017-11-27 PROCEDURE — G0463 HOSPITAL OUTPT CLINIC VISIT: HCPCS | Performed by: INTERNAL MEDICINE

## 2017-11-27 NOTE — PROGRESS NOTES
Pt here for BP follow up;   Amlodipine dose was increased to 10 mg daily  Pt feels well, no complaints at this time with the dose increase.   Ankle is sore as she fell this weekend putting up holiday decorations    BP second check 138/58  ROS neg c/o HA,CP,

## 2017-12-13 ENCOUNTER — TELEPHONE (OUTPATIENT)
Dept: PULMONOLOGY | Facility: CLINIC | Age: 72
End: 2017-12-13

## 2017-12-13 RX ORDER — LEVOFLOXACIN 500 MG/1
500 TABLET, FILM COATED ORAL DAILY
Qty: 10 TABLET | Refills: 0 | Status: SHIPPED | OUTPATIENT
Start: 2017-12-13 | End: 2018-03-06

## 2017-12-13 NOTE — TELEPHONE ENCOUNTER
Pts /Basil calling for pt 100 plus fever started today along with a cough and having trouble breathing, just started the inhaler. Pls call /Basil at:  [phone], or pt at:[phone], thanks.

## 2017-12-13 NOTE — TELEPHONE ENCOUNTER
Pt c/o productive cough (color of sputum unknown) for a couple months now worsening, a little SOB when coughing, fever 100.1. Pt denies any other symptoms. Pt is able to speak in clear and complete sentences.   Telephone order from Dr. Ferdinand Beckwith with read eugene

## 2017-12-18 ENCOUNTER — OFFICE VISIT (OUTPATIENT)
Dept: INTERNAL MEDICINE CLINIC | Facility: CLINIC | Age: 72
End: 2017-12-18

## 2017-12-18 ENCOUNTER — LAB ENCOUNTER (OUTPATIENT)
Dept: LAB | Facility: HOSPITAL | Age: 72
End: 2017-12-18
Attending: INTERNAL MEDICINE
Payer: MEDICARE

## 2017-12-18 ENCOUNTER — HOSPITAL ENCOUNTER (OUTPATIENT)
Dept: GENERAL RADIOLOGY | Facility: HOSPITAL | Age: 72
Discharge: HOME | End: 2017-12-18
Attending: INTERNAL MEDICINE | Admitting: INTERNAL MEDICINE
Payer: MEDICARE

## 2017-12-18 ENCOUNTER — TELEPHONE (OUTPATIENT)
Dept: INTERNAL MEDICINE CLINIC | Facility: CLINIC | Age: 72
End: 2017-12-18

## 2017-12-18 VITALS
WEIGHT: 134 LBS | SYSTOLIC BLOOD PRESSURE: 130 MMHG | HEART RATE: 68 BPM | TEMPERATURE: 98 F | BODY MASS INDEX: 24.66 KG/M2 | DIASTOLIC BLOOD PRESSURE: 66 MMHG | HEIGHT: 62 IN

## 2017-12-18 DIAGNOSIS — R05.9 COUGH: ICD-10-CM

## 2017-12-18 DIAGNOSIS — R21 RASH: ICD-10-CM

## 2017-12-18 DIAGNOSIS — I10 ESSENTIAL HYPERTENSION: ICD-10-CM

## 2017-12-18 DIAGNOSIS — R55 NEAR SYNCOPE: ICD-10-CM

## 2017-12-18 DIAGNOSIS — I10 ESSENTIAL HYPERTENSION: Primary | ICD-10-CM

## 2017-12-18 DIAGNOSIS — R05.9 COUGH: Primary | ICD-10-CM

## 2017-12-18 PROCEDURE — 85025 COMPLETE CBC W/AUTO DIFF WBC: CPT

## 2017-12-18 PROCEDURE — 71020 XR CHEST PA + LAT CHEST (CPT=71020): CPT | Performed by: INTERNAL MEDICINE

## 2017-12-18 PROCEDURE — 80048 BASIC METABOLIC PNL TOTAL CA: CPT

## 2017-12-18 PROCEDURE — G0463 HOSPITAL OUTPT CLINIC VISIT: HCPCS | Performed by: INTERNAL MEDICINE

## 2017-12-18 PROCEDURE — 36415 COLL VENOUS BLD VENIPUNCTURE: CPT

## 2017-12-18 PROCEDURE — 99214 OFFICE O/P EST MOD 30 MIN: CPT | Performed by: INTERNAL MEDICINE

## 2017-12-18 RX ORDER — PREDNISONE 20 MG/1
TABLET ORAL
Qty: 8 TABLET | Refills: 0 | Status: SHIPPED | OUTPATIENT
Start: 2017-12-18 | End: 2018-03-06

## 2017-12-18 NOTE — TELEPHONE ENCOUNTER
Please notify patient that I communicated with Dr. Baljit Vargas. We can certainly continue stop the Levaquin. Take Benadryl 25 mg every 6 hours as needed for itchiness from the rash.   If the cough is troublesome which I know it is we can call in a short course

## 2017-12-18 NOTE — TELEPHONE ENCOUNTER
Called patient and relayed DR. MARK message - verbalized understanding . Patient will stay with OTC  Cough syrup.  RX for prednisone sent to pharmacy

## 2017-12-18 NOTE — PROGRESS NOTES
Ute Unger is a 74year old female. HPI:   Patient presents with:  Rash: has a uri was started on levaquin by Dr. Monse Singleton and last night broke out in rash       Last Wednesday patient started to have a cough. Productive.   She had a fever of about 100 Heartburn. Take two tablets every day  Disp:  Rfl:    ALPRAZolam (XANAX) 0.25 MG Oral Tab Take 1 tablet (0.25 mg total) by mouth daily as needed. Disp: 30 tablet Rfl: 0   ATORVASTATIN CALCIUM 40 MG Oral Tab TAKE 1 TABLET BY MOUTH NIGHTLY.  Disp: 90 tablet R MIGRAINE    • Neuroma 2007    2 LT, HALLUX RIGIDUS LT, PER. NG.   • Other ill-defined conditions(799.89)     TAHBSO MGMT.    • Other ill-defined conditions(799.89)     CHRONIC LEFT CYSTIC PELVIC MASS   • Ovarian cyst     REMOVED PER NG.   • Painful breasts steps.  She does have albuterol inhaler that was also given last Wednesday. - XR CHEST PA + LAT CHEST (CPT=69885); Future  - CBC WITH DIFFERENTIAL WITH PLATELET; Future  - BASIC METABOLIC PANEL (8); Future    2.  Essential hypertension  Blood pressure unde

## 2017-12-18 NOTE — TELEPHONE ENCOUNTER
Please let patient know that her chest x-ray did not show any pneumonia. Her electrolytes show maybe a little bit of dehydration from not eating.   Nothing critical.

## 2017-12-18 NOTE — TELEPHONE ENCOUNTER
Kailey Castrejonugh, your approach certainly seems reasonable to me. If the cough is really bothering her, could use some Tussionex, short course prednisone or if syndrome lingers could substitute azithromycin.

## 2017-12-19 NOTE — TELEPHONE ENCOUNTER
This is a follow-up message from yesterday. There were 2 telephone dosages. Please  asked patient to go for repeat electrolytes sometime next week. As per prior message electrolytes showed a little bit of dehydration from not eating.   I do not think [facility]

## 2017-12-21 ENCOUNTER — NURSE ONLY (OUTPATIENT)
Dept: INTERNAL MEDICINE CLINIC | Facility: CLINIC | Age: 72
End: 2017-12-21

## 2017-12-21 DIAGNOSIS — E53.8 VITAMIN B 12 DEFICIENCY: Primary | ICD-10-CM

## 2017-12-21 PROCEDURE — 96372 THER/PROPH/DIAG INJ SC/IM: CPT | Performed by: INTERNAL MEDICINE

## 2017-12-21 RX ADMIN — CYANOCOBALAMIN 1000 MCG: 1000 INJECTION INTRAMUSCULAR; SUBCUTANEOUS at 09:27:00

## 2017-12-21 NOTE — TELEPHONE ENCOUNTER
Please notify patient  I am happy to hear that. She can go for repeat BMP that I placed to recheck her electrolytes that were a little bit off sometime next week. I do not think she has to do at this week.

## 2017-12-28 RX ORDER — ATORVASTATIN CALCIUM 40 MG/1
TABLET, FILM COATED ORAL
Qty: 90 TABLET | Refills: 3 | Status: SHIPPED | OUTPATIENT
Start: 2017-12-28 | End: 2019-01-03

## 2018-01-02 ENCOUNTER — TELEPHONE (OUTPATIENT)
Dept: INTERNAL MEDICINE CLINIC | Facility: CLINIC | Age: 73
End: 2018-01-02

## 2018-01-02 ENCOUNTER — APPOINTMENT (OUTPATIENT)
Dept: LAB | Age: 73
End: 2018-01-02
Attending: INTERNAL MEDICINE
Payer: MEDICARE

## 2018-01-02 DIAGNOSIS — I10 ESSENTIAL HYPERTENSION: ICD-10-CM

## 2018-01-02 LAB
ANION GAP SERPL CALC-SCNC: 8 MMOL/L (ref 0–18)
BUN SERPL-MCNC: 12 MG/DL (ref 8–20)
BUN/CREAT SERPL: 12.6 (ref 10–20)
CALCIUM SERPL-MCNC: 9.2 MG/DL (ref 8.5–10.5)
CHLORIDE SERPL-SCNC: 105 MMOL/L (ref 95–110)
CO2 SERPL-SCNC: 25 MMOL/L (ref 22–32)
CREAT SERPL-MCNC: 0.95 MG/DL (ref 0.5–1.5)
GLUCOSE SERPL-MCNC: 83 MG/DL (ref 70–99)
OSMOLALITY UR CALC.SUM OF ELEC: 285 MOSM/KG (ref 275–295)
POTASSIUM SERPL-SCNC: 4.1 MMOL/L (ref 3.3–5.1)
SODIUM SERPL-SCNC: 138 MMOL/L (ref 136–144)

## 2018-01-02 PROCEDURE — 80048 BASIC METABOLIC PNL TOTAL CA: CPT

## 2018-01-02 PROCEDURE — 36415 COLL VENOUS BLD VENIPUNCTURE: CPT

## 2018-01-02 NOTE — TELEPHONE ENCOUNTER
Please let patient know that I am very happy with her recent electrolytes. Sodium now normal.  Kidney function acceptable. Continue current medications. Will repeat labs with her next routine visit.

## 2018-01-05 NOTE — TELEPHONE ENCOUNTER
Pt notified labs good  - electrolytes  Sodium  Kidney function acceptable   CPM  Will repeat at next routine visit/ DR MARK

## 2018-01-23 ENCOUNTER — NURSE ONLY (OUTPATIENT)
Dept: INTERNAL MEDICINE CLINIC | Facility: CLINIC | Age: 73
End: 2018-01-23

## 2018-01-23 DIAGNOSIS — E53.8 VITAMIN B 12 DEFICIENCY: Primary | ICD-10-CM

## 2018-01-23 PROCEDURE — 96372 THER/PROPH/DIAG INJ SC/IM: CPT | Performed by: INTERNAL MEDICINE

## 2018-01-23 RX ADMIN — CYANOCOBALAMIN 1000 MCG: 1000 INJECTION INTRAMUSCULAR; SUBCUTANEOUS at 09:43:00

## 2018-02-23 ENCOUNTER — NURSE ONLY (OUTPATIENT)
Dept: INTERNAL MEDICINE CLINIC | Facility: CLINIC | Age: 73
End: 2018-02-23

## 2018-02-23 DIAGNOSIS — E53.8 VITAMIN B12 DEFICIENCY: Primary | ICD-10-CM

## 2018-02-23 PROCEDURE — 96372 THER/PROPH/DIAG INJ SC/IM: CPT | Performed by: INTERNAL MEDICINE

## 2018-02-23 RX ADMIN — CYANOCOBALAMIN 1000 MCG: 1000 INJECTION INTRAMUSCULAR; SUBCUTANEOUS at 09:05:00

## 2018-02-23 NOTE — PROGRESS NOTES
Pt presents for Vitamin B12 injection. Full name and  verified. Vitamin B12 1000mcg administered IM to right deltoid. Tolerated well.

## 2018-03-06 ENCOUNTER — TELEPHONE (OUTPATIENT)
Dept: INTERNAL MEDICINE CLINIC | Facility: CLINIC | Age: 73
End: 2018-03-06

## 2018-03-06 ENCOUNTER — OFFICE VISIT (OUTPATIENT)
Dept: INTERNAL MEDICINE CLINIC | Facility: CLINIC | Age: 73
End: 2018-03-06

## 2018-03-06 ENCOUNTER — LAB ENCOUNTER (OUTPATIENT)
Dept: LAB | Age: 73
End: 2018-03-06
Attending: INTERNAL MEDICINE
Payer: MEDICARE

## 2018-03-06 ENCOUNTER — HOSPITAL ENCOUNTER (OUTPATIENT)
Dept: GENERAL RADIOLOGY | Age: 73
Discharge: HOME | End: 2018-03-06
Attending: INTERNAL MEDICINE | Admitting: INTERNAL MEDICINE
Payer: MEDICARE

## 2018-03-06 VITALS
BODY MASS INDEX: 25.8 KG/M2 | DIASTOLIC BLOOD PRESSURE: 82 MMHG | OXYGEN SATURATION: 97 % | HEART RATE: 78 BPM | TEMPERATURE: 98 F | HEIGHT: 62 IN | WEIGHT: 140.19 LBS | SYSTOLIC BLOOD PRESSURE: 142 MMHG

## 2018-03-06 DIAGNOSIS — I10 ESSENTIAL HYPERTENSION: ICD-10-CM

## 2018-03-06 DIAGNOSIS — I10 ESSENTIAL HYPERTENSION: Primary | ICD-10-CM

## 2018-03-06 DIAGNOSIS — R05.9 COUGH: ICD-10-CM

## 2018-03-06 DIAGNOSIS — E78.5 HYPERLIPIDEMIA, UNSPECIFIED HYPERLIPIDEMIA TYPE: ICD-10-CM

## 2018-03-06 DIAGNOSIS — Z00.00 ROUTINE HEALTH MAINTENANCE: ICD-10-CM

## 2018-03-06 DIAGNOSIS — N64.4 BREAST PAIN, LEFT: ICD-10-CM

## 2018-03-06 LAB
ALBUMIN SERPL BCP-MCNC: 4.1 G/DL (ref 3.5–4.8)
ALBUMIN/GLOB SERPL: 1.4 {RATIO} (ref 1–2)
ALP SERPL-CCNC: 72 U/L (ref 32–100)
ALT SERPL-CCNC: 24 U/L (ref 14–54)
ANION GAP SERPL CALC-SCNC: 9 MMOL/L (ref 0–18)
AST SERPL-CCNC: 28 U/L (ref 15–41)
BASOPHILS # BLD: 0 K/UL (ref 0–0.2)
BASOPHILS NFR BLD: 1 %
BILIRUB SERPL-MCNC: 0.6 MG/DL (ref 0.3–1.2)
BUN SERPL-MCNC: 14 MG/DL (ref 8–20)
BUN/CREAT SERPL: 16.9 (ref 10–20)
CALCIUM SERPL-MCNC: 9.4 MG/DL (ref 8.5–10.5)
CHLORIDE SERPL-SCNC: 102 MMOL/L (ref 95–110)
CHOLEST SERPL-MCNC: 154 MG/DL (ref 110–200)
CO2 SERPL-SCNC: 25 MMOL/L (ref 22–32)
CREAT SERPL-MCNC: 0.83 MG/DL (ref 0.5–1.5)
EOSINOPHIL # BLD: 0.3 K/UL (ref 0–0.7)
EOSINOPHIL NFR BLD: 6 %
ERYTHROCYTE [DISTWIDTH] IN BLOOD BY AUTOMATED COUNT: 13 % (ref 11–15)
GLOBULIN PLAS-MCNC: 2.9 G/DL (ref 2.5–3.7)
GLUCOSE SERPL-MCNC: 93 MG/DL (ref 70–99)
HCT VFR BLD AUTO: 35 % (ref 35–48)
HDLC SERPL-MCNC: 56 MG/DL
HGB BLD-MCNC: 11.9 G/DL (ref 12–16)
LDLC SERPL CALC-MCNC: 79 MG/DL (ref 0–99)
LYMPHOCYTES # BLD: 1.3 K/UL (ref 1–4)
LYMPHOCYTES NFR BLD: 25 %
MCH RBC QN AUTO: 33 PG (ref 27–32)
MCHC RBC AUTO-ENTMCNC: 34 G/DL (ref 32–37)
MCV RBC AUTO: 97.2 FL (ref 80–100)
MONOCYTES # BLD: 0.5 K/UL (ref 0–1)
MONOCYTES NFR BLD: 9 %
NEUTROPHILS # BLD AUTO: 3 K/UL (ref 1.8–7.7)
NEUTROPHILS NFR BLD: 59 %
NONHDLC SERPL-MCNC: 98 MG/DL
OSMOLALITY UR CALC.SUM OF ELEC: 282 MOSM/KG (ref 275–295)
PATIENT FASTING: YES
PLATELET # BLD AUTO: 191 K/UL (ref 140–400)
PMV BLD AUTO: 8.5 FL (ref 7.4–10.3)
POTASSIUM SERPL-SCNC: 4.3 MMOL/L (ref 3.3–5.1)
PROT SERPL-MCNC: 7 G/DL (ref 5.9–8.4)
RBC # BLD AUTO: 3.6 M/UL (ref 3.7–5.4)
SODIUM SERPL-SCNC: 136 MMOL/L (ref 136–144)
TRIGL SERPL-MCNC: 95 MG/DL (ref 1–149)
TSH SERPL-ACNC: 2.09 UIU/ML (ref 0.45–5.33)
WBC # BLD AUTO: 5 K/UL (ref 4–11)

## 2018-03-06 PROCEDURE — 84443 ASSAY THYROID STIM HORMONE: CPT

## 2018-03-06 PROCEDURE — 80061 LIPID PANEL: CPT

## 2018-03-06 PROCEDURE — 85025 COMPLETE CBC W/AUTO DIFF WBC: CPT

## 2018-03-06 PROCEDURE — 80053 COMPREHEN METABOLIC PANEL: CPT

## 2018-03-06 PROCEDURE — 36415 COLL VENOUS BLD VENIPUNCTURE: CPT

## 2018-03-06 PROCEDURE — G0463 HOSPITAL OUTPT CLINIC VISIT: HCPCS | Performed by: INTERNAL MEDICINE

## 2018-03-06 PROCEDURE — 71046 X-RAY EXAM CHEST 2 VIEWS: CPT | Performed by: INTERNAL MEDICINE

## 2018-03-06 PROCEDURE — 99214 OFFICE O/P EST MOD 30 MIN: CPT | Performed by: INTERNAL MEDICINE

## 2018-03-06 RX ORDER — BUTALBITAL, ASPIRIN, AND CAFFEINE 50; 325; 40 MG/1; MG/1; MG/1
1 CAPSULE ORAL AS NEEDED
Refills: 1 | COMMUNITY
Start: 2018-02-01 | End: 2018-06-18

## 2018-03-06 RX ORDER — AZITHROMYCIN 250 MG/1
TABLET, FILM COATED ORAL
Qty: 6 TABLET | Refills: 0 | Status: SHIPPED | OUTPATIENT
Start: 2018-03-06 | End: 2018-09-04

## 2018-03-06 NOTE — PROGRESS NOTES
HPI:   Heladio Koo is a 74year old female presents with the following problems. Patient generally feels well. She has had a cough since December. It never really went away. Her cough is congested.   Maybe a little bit a yellow tinge with mucus kg)    Body mass index is 25.64 kg/m². Current Outpatient Prescriptions:  azithromycin 250 MG Oral Tab Take two tablets by mouth today, then one daily. Disp: 6 tablet Rfl: 0   ATORVASTATIN 40 MG Oral Tab TAKE 1 TABLET BY MOUTH NIGHTLY.  Disp: 90 table Disp:  Rfl:    butalbital-aspirin-caffeine -40 MG Oral Cap Take 1 capsule by mouth as needed.  Disp:  Rfl: 1      Past Medical History:   Diagnosis Date   • Arthritis    • Back pain    • Cough 9/12/2011   • Essential hypertension    • Family history Results for orders placed or performed in visit on 13/41/55  -BASIC METABOLIC PANEL (8)   Result Value Ref Range   Glucose 83 70 - 99 mg/dL   Sodium 138 136 - 144 mmol/L   Potassium 4.1 3.3 - 5.1 mmol/L   Chloride 105 95 - 110 mmol/L   CO2 25 22 - 32 Carotid pulses 1+ B/L. S1 and S2 normal  GI:  good BS's. no masses, organomegaly or tenderness   RECTAL: His colonoscopy December 2016  EXTREMITIES:  no cyanosis, clubbing or edema. NEURO:  Awake and aware. ASSESSMENT AND PLAN:         1.  Essential h

## 2018-03-07 NOTE — TELEPHONE ENCOUNTER
Please notify patient that her chest x-ray came out clear. No pneumonia. Also her labs came out satisfactory. Sodium count good. Hemoglobin count just attentive appoint low which I do not think is very significant. We will recheck this in 6 months.   I

## 2018-03-16 RX ORDER — ALBUTEROL SULFATE 90 UG/1
2 AEROSOL, METERED RESPIRATORY (INHALATION) DAILY
Qty: 1 INHALER | Refills: 0 | Status: SHIPPED | OUTPATIENT
Start: 2018-03-16

## 2018-03-16 NOTE — TELEPHONE ENCOUNTER
Pt reported \"I don't feel badly at all. I just still have this cough\". Reported ongoing cough and stated \"sometimes I cough up a little bit of clearish phlegm and sometimes it's a little yellow\" . . . \"Sometimes I don't cough anything up at all\".  Kale Wasserman

## 2018-03-16 NOTE — TELEPHONE ENCOUNTER
Message relayed to patient who verbalized understanding. Nothing further at this time.       High Drug Interaction Alert:  Drug/Drug   High  Drug-Drug: Propranolol HCl and Albuterol Sulfate HFA   Pharmacologic effects of Sympathomimetics may be decreased by

## 2018-03-16 NOTE — TELEPHONE ENCOUNTER
Pt. Is calling to follow up with Dr. Hussein Harris she still has a cough pt.  Finished her z-pack  Please advise  Ph. # [phone]    Routed to clinical

## 2018-03-16 NOTE — TELEPHONE ENCOUNTER
Looked up some information from the past and I see that Dr. Fiordaliza Monte at one time ordered some albuterol inhaler. This may not be that idea. We can call in Albuterol inhaler. HFA.   She can take 2 puffs daily to see if that can get up and open up some of the

## 2018-03-22 ENCOUNTER — NURSE ONLY (OUTPATIENT)
Dept: INTERNAL MEDICINE CLINIC | Facility: CLINIC | Age: 73
End: 2018-03-22

## 2018-03-22 DIAGNOSIS — E53.9 VITAMIN B DEFICIENCY: Primary | ICD-10-CM

## 2018-03-22 PROCEDURE — 96372 THER/PROPH/DIAG INJ SC/IM: CPT | Performed by: INTERNAL MEDICINE

## 2018-03-22 RX ADMIN — CYANOCOBALAMIN 1000 MCG: 1000 INJECTION INTRAMUSCULAR; SUBCUTANEOUS at 09:30:00

## 2018-04-24 ENCOUNTER — NURSE ONLY (OUTPATIENT)
Dept: INTERNAL MEDICINE CLINIC | Facility: CLINIC | Age: 73
End: 2018-04-24

## 2018-04-24 ENCOUNTER — TELEPHONE (OUTPATIENT)
Dept: INTERNAL MEDICINE CLINIC | Facility: CLINIC | Age: 73
End: 2018-04-24

## 2018-04-24 DIAGNOSIS — M48.061 SPINAL STENOSIS OF LUMBAR REGION WITHOUT NEUROGENIC CLAUDICATION: Primary | ICD-10-CM

## 2018-04-24 DIAGNOSIS — E53.9 VITAMIN B DEFICIENCY: Primary | ICD-10-CM

## 2018-04-24 PROCEDURE — 96372 THER/PROPH/DIAG INJ SC/IM: CPT | Performed by: INTERNAL MEDICINE

## 2018-04-24 RX ADMIN — CYANOCOBALAMIN 1000 MCG: 1000 INJECTION INTRAMUSCULAR; SUBCUTANEOUS at 09:48:00

## 2018-04-24 NOTE — TELEPHONE ENCOUNTER
Pt in office for B12. Pt requested new order for PT. Received VO from Dr. Edi Boyle given to patient.

## 2018-05-21 NOTE — H&P
[facility]    PATIENT'S NAME: Cely Jah   ATTENDING PHYSICIAN: Velma Davidson MD   PATIENT ACCOUNT#:   [id number]    LOCATION:    MEDICAL RECORD #:   [id number]       YOB: 1945  ADMISSION DATE:       05/24/2018    HISTORY A

## 2018-05-22 ENCOUNTER — NURSE ONLY (OUTPATIENT)
Dept: INTERNAL MEDICINE CLINIC | Facility: CLINIC | Age: 73
End: 2018-05-22

## 2018-05-22 DIAGNOSIS — E53.8 VITAMIN B12 DEFICIENCY: Primary | ICD-10-CM

## 2018-05-22 PROCEDURE — 96372 THER/PROPH/DIAG INJ SC/IM: CPT | Performed by: INTERNAL MEDICINE

## 2018-05-22 RX ADMIN — CYANOCOBALAMIN 1000 MCG: 1000 INJECTION INTRAMUSCULAR; SUBCUTANEOUS at 09:39:00

## 2018-05-24 ENCOUNTER — ANESTHESIA (OUTPATIENT)
Dept: ENDOSCOPY | Facility: HOSPITAL | Age: 73
End: 2018-05-24
Payer: MEDICARE

## 2018-05-24 ENCOUNTER — HOSPITAL ENCOUNTER (OUTPATIENT)
Facility: HOSPITAL | Age: 73
Setting detail: HOSPITAL OUTPATIENT SURGERY
Discharge: HOME | End: 2018-05-24
Attending: SPECIALIST | Admitting: SPECIALIST
Payer: MEDICARE

## 2018-05-24 ENCOUNTER — SURGERY (OUTPATIENT)
Age: 73
End: 2018-05-24

## 2018-05-24 ENCOUNTER — ANESTHESIA EVENT (OUTPATIENT)
Dept: ENDOSCOPY | Facility: HOSPITAL | Age: 73
End: 2018-05-24
Payer: MEDICARE

## 2018-05-24 VITALS
HEIGHT: 62 IN | OXYGEN SATURATION: 97 % | DIASTOLIC BLOOD PRESSURE: 55 MMHG | WEIGHT: 135 LBS | HEART RATE: 58 BPM | RESPIRATION RATE: 15 BRPM | BODY MASS INDEX: 24.84 KG/M2 | SYSTOLIC BLOOD PRESSURE: 111 MMHG

## 2018-05-24 DIAGNOSIS — Z80.0 FAMILY HX OF COLON CANCER: ICD-10-CM

## 2018-05-24 DIAGNOSIS — K51.00 ULCERATIVE COLITIS, UNIVERSAL (HCC): ICD-10-CM

## 2018-05-24 PROCEDURE — 0DBE8ZX EXCISION OF LARGE INTESTINE, VIA NATURAL OR ARTIFICIAL OPENING ENDOSCOPIC, DIAGNOSTIC: ICD-10-PCS | Performed by: SPECIALIST

## 2018-05-24 PROCEDURE — 0DBN8ZX EXCISION OF SIGMOID COLON, VIA NATURAL OR ARTIFICIAL OPENING ENDOSCOPIC, DIAGNOSTIC: ICD-10-PCS | Performed by: SPECIALIST

## 2018-05-24 PROCEDURE — 88305 TISSUE EXAM BY PATHOLOGIST: CPT | Performed by: SPECIALIST

## 2018-05-24 RX ORDER — SODIUM CHLORIDE, SODIUM LACTATE, POTASSIUM CHLORIDE, CALCIUM CHLORIDE 600; 310; 30; 20 MG/100ML; MG/100ML; MG/100ML; MG/100ML
INJECTION, SOLUTION INTRAVENOUS CONTINUOUS
Status: DISCONTINUED | OUTPATIENT
Start: 2018-05-24 | End: 2018-05-24

## 2018-05-24 RX ORDER — LIDOCAINE HYDROCHLORIDE 10 MG/ML
INJECTION, SOLUTION EPIDURAL; INFILTRATION; INTRACAUDAL; PERINEURAL AS NEEDED
Status: DISCONTINUED | OUTPATIENT
Start: 2018-05-24 | End: 2018-05-24 | Stop reason: SURG

## 2018-05-24 RX ORDER — NALOXONE HYDROCHLORIDE 0.4 MG/ML
80 INJECTION, SOLUTION INTRAMUSCULAR; INTRAVENOUS; SUBCUTANEOUS AS NEEDED
Status: DISCONTINUED | OUTPATIENT
Start: 2018-05-24 | End: 2018-05-24

## 2018-05-24 RX ADMIN — SODIUM CHLORIDE, SODIUM LACTATE, POTASSIUM CHLORIDE, CALCIUM CHLORIDE: 600; 310; 30; 20 INJECTION, SOLUTION INTRAVENOUS at 08:38:00

## 2018-05-24 RX ADMIN — SODIUM CHLORIDE, SODIUM LACTATE, POTASSIUM CHLORIDE, CALCIUM CHLORIDE: 600; 310; 30; 20 INJECTION, SOLUTION INTRAVENOUS at 08:03:00

## 2018-05-24 RX ADMIN — LIDOCAINE HYDROCHLORIDE 50 MG: 10 INJECTION, SOLUTION EPIDURAL; INFILTRATION; INTRACAUDAL; PERINEURAL at 08:05:00

## 2018-05-24 NOTE — OPERATIVE REPORT
[facility]    PATIENT'S NAME: Shanda Babatunde   ATTENDING PHYSICIAN: Velma Parekh MD   OPERATING PHYSICIAN: Ace Carlisle.  Tereza Parekh MD   PATIENT ACCOUNT#:   [id number]    LOCATION:  [facility] ENDO POOL ROOM 7 [facility] 10  MEDICAL RECORD #:   [id number]

## 2018-05-24 NOTE — ANESTHESIA PREPROCEDURE EVALUATION
Anesthesia PreOp Note    HPI:     Aaron Russo is a 74year old female who presents for preoperative consultation requested by: Marcin Higgins MD    Date of Surgery: 5/24/2018    Procedure(s):  COLONOSCOPY  Indication: Ulcerative colitis, univers • Pneumonia due to organism    • Pulmonary embolism [facility])    • Spinal stenosis    • Spinal stenosis 4/19/2016   • Tonsillitis    • Ulcerative colitis ([facility]   • Uterine cancer [facility])        Past Surgical History:  No date: APPENDECTOMY  12/5/2016: COL Linda Poor) 0.25 MG Oral Tab Take 1 tablet (0.25 mg total) by mouth daily as needed. Disp: 30 tablet Rfl: 0 5/7/2018   triamcinolone acetonide 0.1 % External Cream Apply 1 Application topically as needed.    Disp:  Rfl: 1 Taking   Alendronate Sodium (FOSAMAX) 7 History  Social History   Marital status: Single  Spouse name: N/A    Years of education: N/A  Number of children: N/A     Occupational History  None on file     Social History Main Topics   Smoking status: Never Smoker    Smokeless tobacco: Never Used analgesics  Informed Consent Plan and Risks Discussed With:  Patient and spouse  Discussed plan with:  CRNA      I have informed Heladio Koo  of the nature of the anesthetic plan, benefits, risks, major complications, and any alternative forms of anes

## 2018-05-24 NOTE — ANESTHESIA POSTPROCEDURE EVALUATION
Patient: Kody La    Procedure Summary     Date:  05/24/18 Room / Location:  [facility] ENDOSCOPY 01 / [facility] ENDOSCOPY    Anesthesia Start:  0803 Anesthesia Stop:  1537    Procedure:  COLONOSCOPY (N/A ) Diagnosis:       Ul[facility], universal [facility]

## 2018-05-25 ENCOUNTER — HOSPITAL ENCOUNTER (OUTPATIENT)
Dept: MAMMOGRAPHY | Facility: HOSPITAL | Age: 73
Discharge: HOME | End: 2018-05-25
Attending: SURGERY
Payer: MEDICARE

## 2018-05-25 DIAGNOSIS — N64.4 MASTODYNIA: ICD-10-CM

## 2018-05-25 DIAGNOSIS — R92.2 DENSE BREAST: ICD-10-CM

## 2018-05-25 PROCEDURE — 77066 DX MAMMO INCL CAD BI: CPT | Performed by: SURGERY

## 2018-05-25 PROCEDURE — 77062 BREAST TOMOSYNTHESIS BI: CPT | Performed by: SURGERY

## 2018-06-18 RX ORDER — BUTALBITAL, ASPIRIN, AND CAFFEINE 50; 325; 40 MG/1; MG/1; MG/1
1 CAPSULE ORAL EVERY 6 HOURS PRN
Qty: 30 CAPSULE | Refills: 1 | COMMUNITY
Start: 2018-06-18 | End: 2018-09-04

## 2018-06-18 NOTE — TELEPHONE ENCOUNTER
Pt is asking for refill on butalbital-aspirin-caffeine -40 MG Oral Cap. Pt states that the pharmacy said they called last week to get the refill, but there are no encounters for such a call.  Pt has been out of the medication for a week and a half now

## 2018-06-18 NOTE — TELEPHONE ENCOUNTER
Okay to refill medications as requested. Quantity #30.  1 refill. 1 every 6 hours as needed for headache.

## 2018-06-21 ENCOUNTER — NURSE ONLY (OUTPATIENT)
Dept: INTERNAL MEDICINE CLINIC | Facility: CLINIC | Age: 73
End: 2018-06-21

## 2018-06-21 DIAGNOSIS — E53.8 VITAMIN B12 DEFICIENCY: Primary | ICD-10-CM

## 2018-06-21 PROCEDURE — 96372 THER/PROPH/DIAG INJ SC/IM: CPT | Performed by: INTERNAL MEDICINE

## 2018-06-21 RX ADMIN — CYANOCOBALAMIN 1000 MCG: 1000 INJECTION INTRAMUSCULAR; SUBCUTANEOUS at 09:44:00

## 2018-06-21 NOTE — PROGRESS NOTES
Patient presents today for monthly Vitamin B 12 injection. Patient's full name and  verified. Order verified. Vitamin b12 administered to patient's Right deltoid per patient's preference. Patient tolerated the procedure well.  No adverse reactions note

## 2018-07-24 ENCOUNTER — NURSE ONLY (OUTPATIENT)
Dept: INTERNAL MEDICINE CLINIC | Facility: CLINIC | Age: 73
End: 2018-07-24
Payer: MEDICARE

## 2018-07-24 DIAGNOSIS — E53.8 VITAMIN B12 DEFICIENCY: Primary | ICD-10-CM

## 2018-07-24 PROCEDURE — 96372 THER/PROPH/DIAG INJ SC/IM: CPT | Performed by: INTERNAL MEDICINE

## 2018-07-24 RX ADMIN — CYANOCOBALAMIN 1000 MCG: 1000 INJECTION INTRAMUSCULAR; SUBCUTANEOUS at 09:58:00

## 2018-07-24 NOTE — PROGRESS NOTES
Name and  verified. B12 administered right deltoid. Pt tolerated w/o complaint. No adverse reactions noted.

## 2018-08-09 ENCOUNTER — OFFICE VISIT (OUTPATIENT)
Dept: PULMONOLOGY | Facility: CLINIC | Age: 73
End: 2018-08-09
Payer: MEDICARE

## 2018-08-09 VITALS
HEIGHT: 60 IN | RESPIRATION RATE: 18 BRPM | BODY MASS INDEX: 28.07 KG/M2 | WEIGHT: 143 LBS | SYSTOLIC BLOOD PRESSURE: 136 MMHG | HEART RATE: 57 BPM | OXYGEN SATURATION: 97 % | DIASTOLIC BLOOD PRESSURE: 85 MMHG

## 2018-08-09 DIAGNOSIS — J47.9 BRONCHIECTASIS WITHOUT COMPLICATION (HCC): Primary | ICD-10-CM

## 2018-08-09 PROCEDURE — 99213 OFFICE O/P EST LOW 20 MIN: CPT | Performed by: INTERNAL MEDICINE

## 2018-08-09 PROCEDURE — G0463 HOSPITAL OUTPT CLINIC VISIT: HCPCS | Performed by: INTERNAL MEDICINE

## 2018-08-09 NOTE — PROGRESS NOTES
The patient is 58-year-old female who I know well from prior evaluation comes in now for follow-up. In general, she is doing well. She notes that her cough is minimal.  Her blood pressure is better controlled.   She does have some acid reflux and we discu

## 2018-08-23 ENCOUNTER — NURSE ONLY (OUTPATIENT)
Dept: INTERNAL MEDICINE CLINIC | Facility: CLINIC | Age: 73
End: 2018-08-23
Payer: MEDICARE

## 2018-08-23 PROCEDURE — 96372 THER/PROPH/DIAG INJ SC/IM: CPT | Performed by: INTERNAL MEDICINE

## 2018-08-23 RX ADMIN — CYANOCOBALAMIN 1000 MCG: 1000 INJECTION INTRAMUSCULAR; SUBCUTANEOUS at 09:33:00

## 2018-09-04 ENCOUNTER — OFFICE VISIT (OUTPATIENT)
Dept: INTERNAL MEDICINE CLINIC | Facility: CLINIC | Age: 73
End: 2018-09-04
Payer: MEDICARE

## 2018-09-04 ENCOUNTER — TELEPHONE (OUTPATIENT)
Dept: INTERNAL MEDICINE CLINIC | Facility: CLINIC | Age: 73
End: 2018-09-04

## 2018-09-04 ENCOUNTER — LAB ENCOUNTER (OUTPATIENT)
Dept: LAB | Age: 73
End: 2018-09-04
Attending: INTERNAL MEDICINE
Payer: MEDICARE

## 2018-09-04 VITALS
HEIGHT: 62 IN | OXYGEN SATURATION: 99 % | DIASTOLIC BLOOD PRESSURE: 60 MMHG | TEMPERATURE: 99 F | HEART RATE: 59 BPM | SYSTOLIC BLOOD PRESSURE: 144 MMHG | WEIGHT: 142 LBS | BODY MASS INDEX: 26.13 KG/M2

## 2018-09-04 DIAGNOSIS — E78.5 HYPERLIPIDEMIA, UNSPECIFIED HYPERLIPIDEMIA TYPE: ICD-10-CM

## 2018-09-04 DIAGNOSIS — R07.89 CHEST DISCOMFORT: ICD-10-CM

## 2018-09-04 DIAGNOSIS — Z87.19 HISTORY OF ULCERATIVE COLITIS: ICD-10-CM

## 2018-09-04 DIAGNOSIS — I10 ESSENTIAL HYPERTENSION: Primary | ICD-10-CM

## 2018-09-04 DIAGNOSIS — R12 HEARTBURN: ICD-10-CM

## 2018-09-04 DIAGNOSIS — D64.9 ANEMIA, UNSPECIFIED TYPE: ICD-10-CM

## 2018-09-04 DIAGNOSIS — Z00.00 ROUTINE HEALTH MAINTENANCE: ICD-10-CM

## 2018-09-04 LAB
ANION GAP SERPL CALC-SCNC: 8 MMOL/L (ref 0–18)
BASOPHILS # BLD: 0 K/UL (ref 0–0.2)
BASOPHILS NFR BLD: 1 %
BUN SERPL-MCNC: 15 MG/DL (ref 8–20)
BUN/CREAT SERPL: 19.2 (ref 10–20)
CALCIUM SERPL-MCNC: 9.8 MG/DL (ref 8.5–10.5)
CHLORIDE SERPL-SCNC: 104 MMOL/L (ref 95–110)
CO2 SERPL-SCNC: 26 MMOL/L (ref 22–32)
CREAT SERPL-MCNC: 0.78 MG/DL (ref 0.5–1.5)
EOSINOPHIL # BLD: 0.3 K/UL (ref 0–0.7)
EOSINOPHIL NFR BLD: 5 %
ERYTHROCYTE [DISTWIDTH] IN BLOOD BY AUTOMATED COUNT: 12.4 % (ref 11–15)
FERRITIN SERPL IA-MCNC: 96 NG/ML (ref 11–307)
GLUCOSE SERPL-MCNC: 93 MG/DL (ref 70–99)
HCT VFR BLD AUTO: 38.1 % (ref 35–48)
HGB BLD-MCNC: 12.4 G/DL (ref 12–16)
IRON SATN MFR SERPL: 27 % (ref 15–50)
IRON SERPL-MCNC: 93 MCG/DL (ref 28–170)
LYMPHOCYTES # BLD: 1.3 K/UL (ref 1–4)
LYMPHOCYTES NFR BLD: 24 %
MCH RBC QN AUTO: 32.1 PG (ref 27–32)
MCHC RBC AUTO-ENTMCNC: 32.5 G/DL (ref 32–37)
MCV RBC AUTO: 98.6 FL (ref 80–100)
MONOCYTES # BLD: 0.6 K/UL (ref 0–1)
MONOCYTES NFR BLD: 11 %
NEUTROPHILS # BLD AUTO: 3.2 K/UL (ref 1.8–7.7)
NEUTROPHILS NFR BLD: 59 %
OSMOLALITY UR CALC.SUM OF ELEC: 287 MOSM/KG (ref 275–295)
PLATELET # BLD AUTO: 188 K/UL (ref 140–400)
PMV BLD AUTO: 8.7 FL (ref 7.4–10.3)
POTASSIUM SERPL-SCNC: 4.3 MMOL/L (ref 3.3–5.1)
RBC # BLD AUTO: 3.86 M/UL (ref 3.7–5.4)
SODIUM SERPL-SCNC: 138 MMOL/L (ref 136–144)
TIBC SERPL-MCNC: 348 MCG/DL (ref 228–428)
TRANSFERRIN SERPL-MCNC: 264 MG/DL (ref 192–382)
VIT B12 SERPL-MCNC: 908 PG/ML (ref 181–914)
WBC # BLD AUTO: 5.4 K/UL (ref 4–11)

## 2018-09-04 PROCEDURE — 83540 ASSAY OF IRON: CPT

## 2018-09-04 PROCEDURE — G0463 HOSPITAL OUTPT CLINIC VISIT: HCPCS | Performed by: INTERNAL MEDICINE

## 2018-09-04 PROCEDURE — 84466 ASSAY OF TRANSFERRIN: CPT

## 2018-09-04 PROCEDURE — 36415 COLL VENOUS BLD VENIPUNCTURE: CPT

## 2018-09-04 PROCEDURE — 93010 ELECTROCARDIOGRAM REPORT: CPT | Performed by: INTERNAL MEDICINE

## 2018-09-04 PROCEDURE — 82728 ASSAY OF FERRITIN: CPT

## 2018-09-04 PROCEDURE — 85025 COMPLETE CBC W/AUTO DIFF WBC: CPT

## 2018-09-04 PROCEDURE — 93005 ELECTROCARDIOGRAM TRACING: CPT | Performed by: INTERNAL MEDICINE

## 2018-09-04 PROCEDURE — 99214 OFFICE O/P EST MOD 30 MIN: CPT | Performed by: INTERNAL MEDICINE

## 2018-09-04 PROCEDURE — 80048 BASIC METABOLIC PNL TOTAL CA: CPT

## 2018-09-04 PROCEDURE — 82607 VITAMIN B-12: CPT

## 2018-09-04 RX ORDER — OMEPRAZOLE 20 MG/1
20 CAPSULE, DELAYED RELEASE ORAL
Refills: 0 | COMMUNITY
Start: 2018-09-04

## 2018-09-04 RX ORDER — BUTALBITAL, ASPIRIN, AND CAFFEINE 50; 325; 40 MG/1; MG/1; MG/1
1 CAPSULE ORAL EVERY 6 HOURS PRN
Qty: 30 CAPSULE | Refills: 1 | Status: SHIPPED | OUTPATIENT
Start: 2018-09-04 | End: 2019-05-17

## 2018-09-04 NOTE — PROGRESS NOTES
HPI:   Jonathan George is a 74year old female presents with the following problems. Patient generally feels well. She has hypertension. Blood pressure a little bit elevated when I retook her blood pressure.   She has been having some extra salt ove needs any cardiac workup. Wt Readings from Last 3 Encounters:  09/04/18 : 142 lb (64.4 kg)  08/09/18 : 143 lb (64.9 kg)  06/05/18 : 135 lb (61.2 kg)    Body mass index is 25.97 kg/m².        Current Outpatient Prescriptions:  butalbital-aspirin-caffeine sulfaSALAzine (AZULFIDINE) 500 MG Oral Tab Take 2 tablets by mouth 3 (three) times daily.    Disp:  Rfl:       Past Medical History:   Diagnosis Date   • Arthritis    • Back pain    • Cough 9/12/2011   • Essential hypertension    • Family history of colon Problem Relation Age of Onset   • Cancer Father 68     COLON, CAUSE OF DEATH   • Cancer Mother 59     COLON, 2nd primary 80   • Cancer Maternal Cousin Male 61     prostate/patient denies    • Cancer Self 62     uterine   • Prostate Cancer Maternal Uncle predominantly preservation of the glandular architecture, scattered   circumscribed mildly hyperplastic lymphoid cell aggregates, and focal   minimal acute inflammatory infiltrates present within the superficial   lamina propria.   · No evidence of fungal o submitted in cassette B1. (al)   Juan J Izquierdo M.D./[facility] REMOVED]    Interpretation Benign       Social History:   Smoking status: Never Smoker                                                              Smokeless tobacco: Never Used will recheck CBC along with getting vitamin B12 and iron levels. - CBC WITH DIFFERENTIAL WITH PLATELET; Future  - BASIC METABOLIC PANEL (8); Future  - FERRITIN; Future  - IRON AND TIBC; Future  - VITAMIN B12 WITH REFLEX TO MMA; Future    3.  Chest discomfo

## 2018-09-21 ENCOUNTER — TELEPHONE (OUTPATIENT)
Dept: INTERNAL MEDICINE CLINIC | Facility: CLINIC | Age: 73
End: 2018-09-21

## 2018-09-21 RX ORDER — BUTALBITAL, ASPIRIN, AND CAFFEINE 50; 325; 40 MG/1; MG/1; MG/1
1 CAPSULE ORAL EVERY 6 HOURS PRN
Qty: 30 CAPSULE | Refills: 1 | Status: CANCELLED | OUTPATIENT
Start: 2018-09-21

## 2018-09-21 NOTE — TELEPHONE ENCOUNTER
[employer] faxed a PA for: [employer]     Fax.  # [phone]    Routed to Rx placed in Purple folder

## 2018-09-24 ENCOUNTER — HOSPITAL ENCOUNTER (OUTPATIENT)
Dept: CT IMAGING | Age: 73
Discharge: HOME | End: 2018-09-24
Attending: INTERNAL MEDICINE

## 2018-09-24 ENCOUNTER — TELEPHONE (OUTPATIENT)
Dept: INTERNAL MEDICINE CLINIC | Facility: CLINIC | Age: 73
End: 2018-09-24

## 2018-09-24 DIAGNOSIS — Z13.9 SPECIAL SCREENING: ICD-10-CM

## 2018-09-24 NOTE — TELEPHONE ENCOUNTER
Pt. schedule her appt.  For a heart screening and wants to know if he is having a cholesterol screening as well please advise  Ph. # [phone]   Routed to clinical

## 2018-09-24 NOTE — TELEPHONE ENCOUNTER
Called patient to get more information. She states she scheduled her heart screening today (CT calcium scoring) and they called her back to ask if she also wanted the cholesterol screening. They told her they usually do it together.   She is not quite sure

## 2018-09-24 NOTE — PROGRESS NOTES
Pt seen at [facility], [facility] for CTHS:  PRELIMINARY SCORE=86.3  LZ=102/68    Cholestec labs as follows:  QR=548  HDL=56  LDL=86  UJ=679  GLUCOSE=90; non-fasting    All results and risk factors discussed with patient; all questions and concerns addressed.   Edu

## 2018-09-25 ENCOUNTER — OFFICE VISIT (OUTPATIENT)
Dept: INTERNAL MEDICINE CLINIC | Facility: CLINIC | Age: 73
End: 2018-09-25
Payer: MEDICARE

## 2018-09-25 VITALS
RESPIRATION RATE: 14 BRPM | HEIGHT: 62 IN | BODY MASS INDEX: 26.5 KG/M2 | DIASTOLIC BLOOD PRESSURE: 80 MMHG | SYSTOLIC BLOOD PRESSURE: 144 MMHG | HEART RATE: 80 BPM | WEIGHT: 144 LBS

## 2018-09-25 DIAGNOSIS — I25.84 CORONARY ARTERY CALCIFICATION: ICD-10-CM

## 2018-09-25 DIAGNOSIS — I25.10 CORONARY ARTERY CALCIFICATION: ICD-10-CM

## 2018-09-25 DIAGNOSIS — E78.5 HYPERLIPIDEMIA, UNSPECIFIED HYPERLIPIDEMIA TYPE: ICD-10-CM

## 2018-09-25 DIAGNOSIS — Z00.00 ROUTINE HEALTH MAINTENANCE: ICD-10-CM

## 2018-09-25 DIAGNOSIS — R12 HEARTBURN: ICD-10-CM

## 2018-09-25 DIAGNOSIS — I10 ESSENTIAL HYPERTENSION: Primary | ICD-10-CM

## 2018-09-25 DIAGNOSIS — I70.0 AORTIC CALCIFICATION (HCC): ICD-10-CM

## 2018-09-25 PROCEDURE — 96372 THER/PROPH/DIAG INJ SC/IM: CPT | Performed by: INTERNAL MEDICINE

## 2018-09-25 PROCEDURE — G0008 ADMIN INFLUENZA VIRUS VAC: HCPCS | Performed by: INTERNAL MEDICINE

## 2018-09-25 PROCEDURE — 90653 IIV ADJUVANT VACCINE IM: CPT | Performed by: INTERNAL MEDICINE

## 2018-09-25 PROCEDURE — 99214 OFFICE O/P EST MOD 30 MIN: CPT | Performed by: INTERNAL MEDICINE

## 2018-09-25 PROCEDURE — G0463 HOSPITAL OUTPT CLINIC VISIT: HCPCS | Performed by: INTERNAL MEDICINE

## 2018-09-25 RX ORDER — HYDROCHLOROTHIAZIDE 12.5 MG/1
TABLET ORAL
Qty: 30 TABLET | Refills: 3 | Status: SHIPPED | OUTPATIENT
Start: 2018-09-25 | End: 2019-05-24

## 2018-09-25 RX ADMIN — CYANOCOBALAMIN 1000 MCG: 1000 INJECTION INTRAMUSCULAR; SUBCUTANEOUS at 10:51:00

## 2018-09-25 NOTE — PROGRESS NOTES
HPI:   Nguyễn Medrano is a 74year old female presents with the following problems. Patient feels well. She did recently have her coronary artery calcium score. Her score was in the 80s. I discussed this with her.   Goal will be to get her blood pre (PRILOSEC) 20 MG Oral Capsule Delayed Release Take 1 capsule (20 mg total) by mouth every morning before breakfast. Disp:  Rfl: 0   Albuterol Sulfate  (90 Base) MCG/ACT Inhalation Aero Soln Inhale 2 puffs into the lungs daily.  Disp: 1 Inhaler Rfl: 0 Family history of colon cancer  No date:  0      Comment:  PARA ZERO  0863,[id number]: H/O foot surgery      Comment:  LEFT FOOT SURGERY, PINS PLACED IN TOE  2013: H/O oral surgery      Comment:  GUM/MOUTH SURGERY  2009: Hallux rigidus      Comment:  LEFT HYSTERECTOMY  2004?: OTHER SURGICAL HISTORY      Comment:  Parathyroid removal  08/2017: OTHER SURGICAL HISTORY      Comment:  left foot surgery plate removed  No date: TONSILLECTOMY   Family History   Problem Relation Age of Onset   • Cancer Father 68 3. 2 1.8 - 7.7 K/UL    Lymphocyte Absolute 1.3 1.0 - 4.0 K/UL    Monocyte Absolute 0.6 0.0 - 1.0 K/UL    Eosinophil Absolute 0.3 0.0 - 0.7 K/UL    Basophil Absolute 0.0 0.0 - 0.2 K/UL      Social History:   Social History    Tobacco Use      Smoking status: Routine health maintenance  Flu vaccine given today. Patient waiting for official recommendation in regards to Shingrix and people on immunosuppressive therapy. 6. Heartburn  Much better on Prilosec. Asymptomatic. Will take Prilosec for 2 months.

## 2018-10-08 RX ORDER — ATORVASTATIN CALCIUM 40 MG/1
40 TABLET, FILM COATED ORAL
Qty: 90 TABLET | Refills: 3 | OUTPATIENT
Start: 2018-10-08

## 2018-10-16 ENCOUNTER — APPOINTMENT (OUTPATIENT)
Dept: LAB | Age: 73
End: 2018-10-16
Attending: INTERNAL MEDICINE
Payer: MEDICARE

## 2018-10-16 ENCOUNTER — OFFICE VISIT (OUTPATIENT)
Dept: INTERNAL MEDICINE CLINIC | Facility: CLINIC | Age: 73
End: 2018-10-16
Payer: MEDICARE

## 2018-10-16 ENCOUNTER — TELEPHONE (OUTPATIENT)
Dept: INTERNAL MEDICINE CLINIC | Facility: CLINIC | Age: 73
End: 2018-10-16

## 2018-10-16 VITALS
DIASTOLIC BLOOD PRESSURE: 84 MMHG | TEMPERATURE: 98 F | BODY MASS INDEX: 26.9 KG/M2 | HEIGHT: 62 IN | HEART RATE: 67 BPM | SYSTOLIC BLOOD PRESSURE: 150 MMHG | OXYGEN SATURATION: 98 % | WEIGHT: 146.19 LBS

## 2018-10-16 DIAGNOSIS — I70.0 AORTIC CALCIFICATION (HCC): ICD-10-CM

## 2018-10-16 DIAGNOSIS — Z00.00 ROUTINE HEALTH MAINTENANCE: ICD-10-CM

## 2018-10-16 DIAGNOSIS — I10 ESSENTIAL HYPERTENSION: Primary | ICD-10-CM

## 2018-10-16 DIAGNOSIS — I25.84 CORONARY ARTERY CALCIFICATION: ICD-10-CM

## 2018-10-16 DIAGNOSIS — I10 ESSENTIAL HYPERTENSION: ICD-10-CM

## 2018-10-16 DIAGNOSIS — I25.10 CORONARY ARTERY CALCIFICATION: ICD-10-CM

## 2018-10-16 PROCEDURE — G0463 HOSPITAL OUTPT CLINIC VISIT: HCPCS | Performed by: INTERNAL MEDICINE

## 2018-10-16 PROCEDURE — 99214 OFFICE O/P EST MOD 30 MIN: CPT | Performed by: INTERNAL MEDICINE

## 2018-10-16 PROCEDURE — 36415 COLL VENOUS BLD VENIPUNCTURE: CPT

## 2018-10-16 PROCEDURE — 80048 BASIC METABOLIC PNL TOTAL CA: CPT

## 2018-10-16 RX ORDER — LOSARTAN POTASSIUM 100 MG/1
100 TABLET ORAL
Qty: 90 TABLET | Refills: 3 | Status: SHIPPED | OUTPATIENT
Start: 2018-10-16 | End: 2019-10-01 | Stop reason: RX

## 2018-10-16 NOTE — TELEPHONE ENCOUNTER
Please let patient know her electrolytes came out fairly decent. Sodium count just a little bit low but nothing to worry about.   However rather than make any changes in her blood pressure medication I would advise that we just wait till she sees Dr. Joel Almeida

## 2018-10-16 NOTE — PROGRESS NOTES
HPI:   Joselito Iglesias is a 74year old female presents with the following problems. Patient feels well. She has no acute complaints. Blood pressure still is remaining in the 301G-447T systolic range.   She is on a beta-blocker, amlodipine, losartan Oral Cap Take 150 mg by mouth as needed for Heartburn. Take two tablets every day  Disp:  Rfl:    ALPRAZolam (XANAX) 0.25 MG Oral Tab Take 1 tablet (0.25 mg total) by mouth daily as needed.  Disp: 30 tablet Rfl: 0   triamcinolone acetonide 0.1 % External Cr MIGRAINE    • Neuroma 2007    2 LT, HALLUX RIGIDUS LT, PER. NG.   • Osteoarthritis    • Other ill-defined conditions(799.89)     TAHBSO MGMT. • Other ill-defined conditions(799.89)     CHRONIC LEFT CYSTIC PELVIC MASS   • Ovarian cyst     REMOVED PER NG. - 18 mmol/L    Calculated Osmolality 287 275 - 295 mOsm/kg    GFR, Non- >60 >=60    GFR, -American >60 >=60   FERRITIN   Result Value Ref Range    Ferritin 96 11 - 307 ng/mL   IRON AND TIBC   Result Value Ref Range    Iron 93 28 - 17 (Oral)   Ht 5' 2\" (1.575 m)   Wt 146 lb 3.2 oz (66.3 kg)   SpO2 98%   BMI 26.74 kg/m²     GENERAL:  well developed, well nourished. in no apparent distress  SKIN:  no rashes. no suspicious lesions in areas examined.   HEENT:  Atraumatic.  pharynx without

## 2018-10-17 NOTE — TELEPHONE ENCOUNTER
To DR MARK  Pt notified that electrolytes came out  Fairly decent  Na a little low but nothing to worry about  Would rather not make any changes in b/p meds until pt sees Dr Gauri Muir and she can determine next steps in tx of b/p     Pt states has appt with DR Rodolfo Ramirez

## 2018-10-19 ENCOUNTER — HOSPITAL ENCOUNTER (OUTPATIENT)
Dept: ULTRASOUND IMAGING | Age: 73
Discharge: HOME | End: 2018-10-19
Attending: INTERNAL MEDICINE

## 2018-10-19 ENCOUNTER — TELEPHONE (OUTPATIENT)
Dept: INTERNAL MEDICINE CLINIC | Facility: CLINIC | Age: 73
End: 2018-10-19

## 2018-10-19 DIAGNOSIS — I65.23 CAROTID STENOSIS, BILATERAL: Primary | ICD-10-CM

## 2018-10-19 DIAGNOSIS — E04.2 MULTIPLE THYROID NODULES: ICD-10-CM

## 2018-10-19 DIAGNOSIS — Z13.9 SCREENING PROCEDURE: ICD-10-CM

## 2018-10-19 NOTE — PROGRESS NOTES
Pt seen at [facility], [facility] for [address] PRELIMINARY SCORE= AAA screening=normal, Carotids R=1(normal), L=2 (<50%)  PQ=767/60  Cholestec labs as follows:declined.  Had labs 9/24/18  TC=  HDL=  LDL=  TG=  GLUCOSE=  All results and risk factors discussed

## 2018-10-19 NOTE — TELEPHONE ENCOUNTER
Please let patient know that her testing that she had on her carotid arteries and abdominal aortic came out fairly good. I copied her result and placed on cart. Her internal carotid arteries have some mild plaque present.   Also her aorta has some mild pl

## 2018-10-22 NOTE — TELEPHONE ENCOUNTER
Patient called back. Relayed Dr Kathy Avalos message to her. She verbalized understanding. Gave her the phone number for central scheduling.

## 2018-10-25 ENCOUNTER — NURSE ONLY (OUTPATIENT)
Dept: INTERNAL MEDICINE CLINIC | Facility: CLINIC | Age: 73
End: 2018-10-25
Payer: MEDICARE

## 2018-10-25 DIAGNOSIS — E53.8 B12 DEFICIENCY: Primary | ICD-10-CM

## 2018-10-25 PROCEDURE — 96372 THER/PROPH/DIAG INJ SC/IM: CPT | Performed by: INTERNAL MEDICINE

## 2018-10-25 RX ADMIN — CYANOCOBALAMIN 1000 MCG: 1000 INJECTION INTRAMUSCULAR; SUBCUTANEOUS at 09:34:00

## 2018-10-29 ENCOUNTER — HOSPITAL ENCOUNTER (OUTPATIENT)
Dept: ULTRASOUND IMAGING | Facility: HOSPITAL | Age: 73
Discharge: HOME | End: 2018-10-29
Attending: INTERNAL MEDICINE
Payer: MEDICARE

## 2018-10-29 DIAGNOSIS — I65.23 CAROTID STENOSIS, BILATERAL: ICD-10-CM

## 2018-10-29 DIAGNOSIS — E04.2 MULTIPLE THYROID NODULES: ICD-10-CM

## 2018-10-29 PROCEDURE — 76536 US EXAM OF HEAD AND NECK: CPT | Performed by: INTERNAL MEDICINE

## 2018-10-29 PROCEDURE — 93880 EXTRACRANIAL BILAT STUDY: CPT | Performed by: INTERNAL MEDICINE

## 2018-11-01 ENCOUNTER — TELEPHONE (OUTPATIENT)
Dept: INTERNAL MEDICINE CLINIC | Facility: CLINIC | Age: 73
End: 2018-11-01

## 2018-11-01 NOTE — TELEPHONE ENCOUNTER
Please let patient know that her thyroid ultrasound showed 2 nodules in her right thyroid lobe. One looks benign. The other one is solid but relatively small and the radiologist is recommending a six-month follow-up.   On the left side there are 2 nodules

## 2018-11-02 NOTE — TELEPHONE ENCOUNTER
Called and Relayed MD's message to patient. She verbalized back understanding. She will call Dr. Temo Jackman and make a f.u appt. Copy of results mailed to patient.

## 2018-11-08 NOTE — TELEPHONE ENCOUNTER
Faxed copy o eport to Dr. Melissa Hoffmann. Advised patient we do not have pictures. She was advised to call hospital for copies of disk.

## 2018-11-16 ENCOUNTER — OFFICE VISIT (OUTPATIENT)
Dept: INTERNAL MEDICINE CLINIC | Facility: CLINIC | Age: 73
End: 2018-11-16
Payer: MEDICARE

## 2018-11-16 VITALS
HEIGHT: 62 IN | TEMPERATURE: 98 F | SYSTOLIC BLOOD PRESSURE: 122 MMHG | WEIGHT: 139.13 LBS | HEART RATE: 58 BPM | BODY MASS INDEX: 25.6 KG/M2 | DIASTOLIC BLOOD PRESSURE: 70 MMHG | OXYGEN SATURATION: 97 %

## 2018-11-16 DIAGNOSIS — I25.84 CORONARY ARTERY CALCIFICATION: ICD-10-CM

## 2018-11-16 DIAGNOSIS — I65.23 CAROTID STENOSIS, BILATERAL: ICD-10-CM

## 2018-11-16 DIAGNOSIS — I25.10 CORONARY ARTERY CALCIFICATION: ICD-10-CM

## 2018-11-16 DIAGNOSIS — I10 ESSENTIAL HYPERTENSION: Primary | ICD-10-CM

## 2018-11-16 DIAGNOSIS — Z00.00 ROUTINE HEALTH MAINTENANCE: ICD-10-CM

## 2018-11-16 DIAGNOSIS — E04.2 MULTIPLE THYROID NODULES: ICD-10-CM

## 2018-11-16 DIAGNOSIS — E78.5 HYPERLIPIDEMIA, UNSPECIFIED HYPERLIPIDEMIA TYPE: ICD-10-CM

## 2018-11-16 PROBLEM — R93.1 AGATSTON CORONARY ARTERY CALCIUM SCORE LESS THAN 100: Status: ACTIVE | Noted: 2018-11-16

## 2018-11-16 PROBLEM — I65.22 LEFT CAROTID ARTERY STENOSIS: Status: ACTIVE | Noted: 2018-11-16

## 2018-11-16 PROCEDURE — 99214 OFFICE O/P EST MOD 30 MIN: CPT | Performed by: INTERNAL MEDICINE

## 2018-11-16 PROCEDURE — G0463 HOSPITAL OUTPT CLINIC VISIT: HCPCS | Performed by: INTERNAL MEDICINE

## 2018-11-16 NOTE — PROGRESS NOTES
HPI:   Erick Deleon is a 74year old female presents with the following problems. Patient has hypertension. Blood pressure under good control now today.   Patient will see Dr. Mikal Wills for her opinion on proper treatment of her hypertension since it h Disp: 30 capsule Rfl: 1   omeprazole (PRILOSEC) 20 MG Oral Capsule Delayed Release Take 1 capsule (20 mg total) by mouth every morning before breakfast. Disp:  Rfl: 0   Albuterol Sulfate  (90 Base) MCG/ACT Inhalation Aero Soln Inhale 2 puffs into th GUM/MOUTH SURGERY   • Hallux rigidus 2009    LEFT FOOT, YURY PROCEDURE, 1ST LEFT METARSOPHALANGEAL JOINT   • Heart palpitations 2012    HEART MONITOR AUGUST 2012   • High blood pressure    • High cholesterol    • History of DVT (deep vein thrombosis) (L) 136 - 144 mmol/L    Potassium 4.3 3.3 - 5.1 mmol/L    Chloride 102 95 - 110 mmol/L    CO2 24 22 - 32 mmol/L    BUN 15 8 - 20 mg/dL    Creatinine 0.84 0.50 - 1.50 mg/dL    Calcium, Total 9.1 8.5 - 10.5 mg/dL    BUN/CREA Ratio 17.9 10.0 - 20.0    Anion G artery stenosis. Will follow. On aspirin and statin. Will repeat in 1 year. 3. Multiple thyroid nodules  Awaiting Dr. Fowler Nurse recommendations. Patient had thyroid ultrasound at [address] [place] [address] yesterday.     4. Coronary artery calcification  Patient with co

## 2018-11-26 ENCOUNTER — TELEPHONE (OUTPATIENT)
Dept: INTERNAL MEDICINE CLINIC | Facility: CLINIC | Age: 73
End: 2018-11-26

## 2018-11-26 NOTE — TELEPHONE ENCOUNTER
Please let patient know that I researched the Shingrix vaccine for her. There seems to be no contraindications for her. I printed a information sheet and placed on cart that we can mail to her unless she is Heladio gotten up from a prior office visit.   On

## 2018-11-27 ENCOUNTER — NURSE ONLY (OUTPATIENT)
Dept: INTERNAL MEDICINE CLINIC | Facility: CLINIC | Age: 73
End: 2018-11-27
Payer: MEDICARE

## 2018-11-27 DIAGNOSIS — D51.0 PERNICIOUS ANEMIA: ICD-10-CM

## 2018-11-27 PROCEDURE — 96372 THER/PROPH/DIAG INJ SC/IM: CPT | Performed by: INTERNAL MEDICINE

## 2018-11-27 RX ADMIN — CYANOCOBALAMIN 1000 MCG: 1000 INJECTION INTRAMUSCULAR; SUBCUTANEOUS at 09:42:00

## 2018-11-29 RX ORDER — PROPRANOLOL HYDROCHLORIDE 80 MG/1
TABLET ORAL
Qty: 180 TABLET | Refills: 1 | Status: SHIPPED | OUTPATIENT
Start: 2018-11-29 | End: 2019-05-24

## 2018-12-12 RX ORDER — AMLODIPINE BESYLATE 10 MG/1
TABLET ORAL
Qty: 90 TABLET | Refills: 3 | Status: SHIPPED | OUTPATIENT
Start: 2018-12-12 | End: 2019-11-21

## 2018-12-12 NOTE — TELEPHONE ENCOUNTER
Per records BP has been continuously elevated. Per lov \"1. Essential hypertension  Blood pressure under good control today. Will await Dr. Ely Machado recommendations\".     To Dr. Altaf Bustillos: okay to refill amlodipine?

## 2018-12-12 NOTE — TELEPHONE ENCOUNTER
Okay to refill as requested with prn refills.  She did see Dr. Brandy Mike who agreed with all your medications

## 2018-12-28 ENCOUNTER — NURSE ONLY (OUTPATIENT)
Dept: INTERNAL MEDICINE CLINIC | Facility: CLINIC | Age: 73
End: 2018-12-28
Payer: MEDICARE

## 2018-12-28 DIAGNOSIS — D51.0 PERNICIOUS ANEMIA: Primary | ICD-10-CM

## 2018-12-28 DIAGNOSIS — E53.8 VITAMIN B12 DEFICIENCY: ICD-10-CM

## 2018-12-28 PROCEDURE — 96372 THER/PROPH/DIAG INJ SC/IM: CPT | Performed by: INTERNAL MEDICINE

## 2018-12-28 RX ADMIN — CYANOCOBALAMIN 1000 MCG: 1000 INJECTION INTRAMUSCULAR; SUBCUTANEOUS at 09:53:00

## 2019-01-04 RX ORDER — ATORVASTATIN CALCIUM 40 MG/1
TABLET, FILM COATED ORAL
Qty: 90 TABLET | Refills: 3 | Status: SHIPPED | OUTPATIENT
Start: 2019-01-04 | End: 2019-12-23

## 2019-02-05 ENCOUNTER — NURSE ONLY (OUTPATIENT)
Dept: INTERNAL MEDICINE CLINIC | Facility: CLINIC | Age: 74
End: 2019-02-05
Payer: MEDICARE

## 2019-02-05 DIAGNOSIS — E53.8 VITAMIN B12 DEFICIENCY: Primary | ICD-10-CM

## 2019-02-05 PROCEDURE — 96372 THER/PROPH/DIAG INJ SC/IM: CPT | Performed by: INTERNAL MEDICINE

## 2019-02-05 RX ADMIN — CYANOCOBALAMIN 1000 MCG: 1000 INJECTION INTRAMUSCULAR; SUBCUTANEOUS at 09:31:00

## 2019-03-07 ENCOUNTER — NURSE ONLY (OUTPATIENT)
Dept: INTERNAL MEDICINE CLINIC | Facility: CLINIC | Age: 74
End: 2019-03-07
Payer: MEDICARE

## 2019-03-07 DIAGNOSIS — E53.8 VITAMIN B12 DEFICIENCY: Primary | ICD-10-CM

## 2019-03-07 PROCEDURE — 96372 THER/PROPH/DIAG INJ SC/IM: CPT | Performed by: INTERNAL MEDICINE

## 2019-03-07 RX ADMIN — CYANOCOBALAMIN 1000 MCG: 1000 INJECTION INTRAMUSCULAR; SUBCUTANEOUS at 09:42:00

## 2019-04-09 ENCOUNTER — NURSE ONLY (OUTPATIENT)
Dept: INTERNAL MEDICINE CLINIC | Facility: CLINIC | Age: 74
End: 2019-04-09
Payer: MEDICARE

## 2019-04-09 DIAGNOSIS — E53.8 B12 DEFICIENCY: Primary | ICD-10-CM

## 2019-04-09 PROCEDURE — 96372 THER/PROPH/DIAG INJ SC/IM: CPT | Performed by: INTERNAL MEDICINE

## 2019-04-09 RX ADMIN — CYANOCOBALAMIN 1000 MCG: 1000 INJECTION INTRAMUSCULAR; SUBCUTANEOUS at 09:52:00

## 2019-05-09 ENCOUNTER — NURSE ONLY (OUTPATIENT)
Dept: INTERNAL MEDICINE CLINIC | Facility: CLINIC | Age: 74
End: 2019-05-09
Payer: MEDICARE

## 2019-05-09 ENCOUNTER — LAB ENCOUNTER (OUTPATIENT)
Dept: LAB | Age: 74
End: 2019-05-09
Attending: INTERNAL MEDICINE
Payer: MEDICARE

## 2019-05-09 DIAGNOSIS — K51.90: Primary | ICD-10-CM

## 2019-05-09 DIAGNOSIS — E53.9 B-COMPLEX DEFICIENCY: ICD-10-CM

## 2019-05-09 PROCEDURE — 36415 COLL VENOUS BLD VENIPUNCTURE: CPT

## 2019-05-09 PROCEDURE — 85025 COMPLETE CBC W/AUTO DIFF WBC: CPT

## 2019-05-09 PROCEDURE — 80053 COMPREHEN METABOLIC PANEL: CPT

## 2019-05-09 PROCEDURE — 96372 THER/PROPH/DIAG INJ SC/IM: CPT | Performed by: INTERNAL MEDICINE

## 2019-05-09 RX ADMIN — CYANOCOBALAMIN 1000 MCG: 1000 INJECTION INTRAMUSCULAR; SUBCUTANEOUS at 09:42:00

## 2019-05-09 NOTE — PROGRESS NOTES
Patient presents for monthly vitamin b12 injection. Vitamin b12 administered IM to right deltoid. Patient tolerated injection well.

## 2019-05-17 ENCOUNTER — LAB ENCOUNTER (OUTPATIENT)
Dept: LAB | Age: 74
End: 2019-05-17
Attending: INTERNAL MEDICINE
Payer: MEDICARE

## 2019-05-17 ENCOUNTER — TELEPHONE (OUTPATIENT)
Dept: INTERNAL MEDICINE CLINIC | Facility: CLINIC | Age: 74
End: 2019-05-17

## 2019-05-17 ENCOUNTER — OFFICE VISIT (OUTPATIENT)
Dept: INTERNAL MEDICINE CLINIC | Facility: CLINIC | Age: 74
End: 2019-05-17
Payer: MEDICARE

## 2019-05-17 VITALS
WEIGHT: 140 LBS | HEIGHT: 62 IN | TEMPERATURE: 97 F | OXYGEN SATURATION: 97 % | BODY MASS INDEX: 25.76 KG/M2 | HEART RATE: 56 BPM | SYSTOLIC BLOOD PRESSURE: 128 MMHG | DIASTOLIC BLOOD PRESSURE: 72 MMHG

## 2019-05-17 DIAGNOSIS — M81.8 OTHER OSTEOPOROSIS WITHOUT CURRENT PATHOLOGICAL FRACTURE: ICD-10-CM

## 2019-05-17 DIAGNOSIS — I25.10 CORONARY ARTERY CALCIFICATION: ICD-10-CM

## 2019-05-17 DIAGNOSIS — I65.23 CAROTID STENOSIS, BILATERAL: ICD-10-CM

## 2019-05-17 DIAGNOSIS — I10 ESSENTIAL HYPERTENSION: Primary | ICD-10-CM

## 2019-05-17 DIAGNOSIS — E78.5 HYPERLIPIDEMIA, UNSPECIFIED HYPERLIPIDEMIA TYPE: ICD-10-CM

## 2019-05-17 DIAGNOSIS — E55.9 VITAMIN D DEFICIENCY: ICD-10-CM

## 2019-05-17 DIAGNOSIS — E04.2 MULTIPLE THYROID NODULES: ICD-10-CM

## 2019-05-17 DIAGNOSIS — I10 ESSENTIAL HYPERTENSION: ICD-10-CM

## 2019-05-17 DIAGNOSIS — D64.9 ANEMIA, UNSPECIFIED TYPE: ICD-10-CM

## 2019-05-17 DIAGNOSIS — I25.84 CORONARY ARTERY CALCIFICATION: ICD-10-CM

## 2019-05-17 PROCEDURE — 99214 OFFICE O/P EST MOD 30 MIN: CPT | Performed by: INTERNAL MEDICINE

## 2019-05-17 PROCEDURE — G0463 HOSPITAL OUTPT CLINIC VISIT: HCPCS | Performed by: INTERNAL MEDICINE

## 2019-05-17 PROCEDURE — 81003 URINALYSIS AUTO W/O SCOPE: CPT | Performed by: INTERNAL MEDICINE

## 2019-05-17 PROCEDURE — 82306 VITAMIN D 25 HYDROXY: CPT

## 2019-05-17 PROCEDURE — 36415 COLL VENOUS BLD VENIPUNCTURE: CPT

## 2019-05-17 PROCEDURE — 80061 LIPID PANEL: CPT

## 2019-05-17 PROCEDURE — 85025 COMPLETE CBC W/AUTO DIFF WBC: CPT

## 2019-05-17 RX ORDER — BUTALBITAL, ASPIRIN, AND CAFFEINE 50; 325; 40 MG/1; MG/1; MG/1
1 CAPSULE ORAL EVERY 6 HOURS PRN
Qty: 30 CAPSULE | Refills: 1 | Status: SHIPPED | OUTPATIENT
Start: 2019-05-17 | End: 2019-11-12

## 2019-05-17 NOTE — TELEPHONE ENCOUNTER
Pt notified that labs are good / Dr Carole CHAUDHARI pending but anticipate that it will be normal  Will call if it is not

## 2019-05-17 NOTE — PROGRESS NOTES
HPI:   Joselito Iglesias is a 74year old female presents with the following problems. Patient feels generally well. For about 1 week she has had some intermittent left upper quadrant abdominal pain. Not very severe. Comes and goes.   Not related to 25.61 kg/m². Current Outpatient Medications:  hydrocortisone 2.5 % External Ointment Apply 1 Application topically. Disp:  Rfl: 0   AQUAPHOR External Ointment Apply topically as needed for Dry Skin.  Disp:  Rfl:    butalbital-aspirin-caffeine -4 Tab Take 1 tablet by mouth daily. Disp:  Rfl:    Multiple Vitamin (MULTI-VITAMINS) Oral Tab Take 1 tablet by mouth daily with breakfast. Disp:  Rfl:    sulfaSALAzine (AZULFIDINE) 500 MG Oral Tab Take 2 tablets by mouth 3 (three) times daily.    Disp:  Rfl: SURGICAL HISTORY  2004?     Parathyroid removal   • OTHER SURGICAL HISTORY  08/2017    left foot surgery plate removed   • TONSILLECTOMY        Family History   Problem Relation Age of Onset   • Cancer Father 68        COLON, CAUSE OF DEATH   • Cancer Mothe x10(3) uL    Immature Granulocyte Absolute 0.02 0.00 - 1.00 x10(3) uL    Neutrophil % 60.3 %    Lymphocyte % 21.2 %    Monocyte % 11.4 %    Eosinophil % 5.9 %    Basophil % 0.8 %    Immature Granulocyte % 0.4 %      Social History:   Social History    [facility] aware.     ASSESSMENT AND PLAN:         1. Essential hypertension  Blood pressure under good control. Follow-up in 6 months.   - CBC WITH DIFFERENTIAL WITH PLATELET; Future  - LIPID PANEL;  Future  - URINALYSIS WITH CULTURE REFLEX  - VITAMIN D, 25-HYDROXY;

## 2019-05-17 NOTE — TELEPHONE ENCOUNTER
Please let patient know that her blood count now is good. Hemoglobin good. Cholesterol good. Vitamin D still pending and anticipate that will be normal.  We will call if it is not.

## 2019-05-24 RX ORDER — PROPRANOLOL HYDROCHLORIDE 80 MG/1
TABLET ORAL
Qty: 180 TABLET | Refills: 3 | Status: SHIPPED | OUTPATIENT
Start: 2019-05-24 | End: 2020-05-18

## 2019-05-24 RX ORDER — HYDROCHLOROTHIAZIDE 12.5 MG/1
TABLET ORAL
Qty: 30 TABLET | Refills: 3 | Status: SHIPPED | OUTPATIENT
Start: 2019-05-24 | End: 2020-03-20

## 2019-05-24 NOTE — TELEPHONE ENCOUNTER
To Dr. Wilian Tesfaye-----    High drug warning with propanolol and albuterol. Please advise. rx's pended.

## 2019-06-17 ENCOUNTER — NURSE ONLY (OUTPATIENT)
Dept: INTERNAL MEDICINE CLINIC | Facility: CLINIC | Age: 74
End: 2019-06-17
Payer: MEDICARE

## 2019-06-17 DIAGNOSIS — E53.9 B-COMPLEX DEFICIENCY: ICD-10-CM

## 2019-06-17 PROCEDURE — 96372 THER/PROPH/DIAG INJ SC/IM: CPT | Performed by: INTERNAL MEDICINE

## 2019-06-17 RX ADMIN — CYANOCOBALAMIN 1000 MCG: 1000 INJECTION INTRAMUSCULAR; SUBCUTANEOUS at 09:25:00

## 2019-06-18 ENCOUNTER — HOSPITAL ENCOUNTER (OUTPATIENT)
Dept: MAMMOGRAPHY | Facility: HOSPITAL | Age: 74
Discharge: HOME | End: 2019-06-18
Attending: SURGERY
Payer: MEDICARE

## 2019-06-18 DIAGNOSIS — Z12.39 SCREENING BREAST EXAMINATION: ICD-10-CM

## 2019-06-18 PROCEDURE — 77063 BREAST TOMOSYNTHESIS BI: CPT | Performed by: SURGERY

## 2019-06-18 PROCEDURE — 77067 SCR MAMMO BI INCL CAD: CPT | Performed by: SURGERY

## 2019-06-25 PROBLEM — M25.471 RIGHT ANKLE SWELLING: Status: ACTIVE | Noted: 2019-06-25

## 2019-07-17 ENCOUNTER — NURSE ONLY (OUTPATIENT)
Dept: INTERNAL MEDICINE CLINIC | Facility: CLINIC | Age: 74
End: 2019-07-17
Payer: MEDICARE

## 2019-07-17 ENCOUNTER — TELEPHONE (OUTPATIENT)
Dept: INTERNAL MEDICINE CLINIC | Facility: CLINIC | Age: 74
End: 2019-07-17

## 2019-07-17 DIAGNOSIS — I72.3 ILIAC ARTERY ANEURYSM (HCC): ICD-10-CM

## 2019-07-17 DIAGNOSIS — R19.03 RIGHT LOWER QUADRANT ABDOMINAL MASS: Primary | ICD-10-CM

## 2019-07-17 DIAGNOSIS — E53.8 VITAMIN B12 DEFICIENCY: ICD-10-CM

## 2019-07-17 PROCEDURE — 96372 THER/PROPH/DIAG INJ SC/IM: CPT | Performed by: INTERNAL MEDICINE

## 2019-07-17 RX ADMIN — CYANOCOBALAMIN 1000 MCG: 1000 INJECTION INTRAMUSCULAR; SUBCUTANEOUS at 09:14:00

## 2019-07-17 NOTE — TELEPHONE ENCOUNTER
Patient presented today for a B12 injection with nurse. At this visit, patient mentioned that she had x-rays of the lumbar spine done recently at [facility]. She did bring a copy of the report with her.  She states that the doctor who read the exam,

## 2019-07-17 NOTE — PROGRESS NOTES
Patient presents for monthly b12 injection. Patient name and  verified. Order active in [facility], verified last dose given 19. Patient tolerated injection well.

## 2019-07-19 NOTE — TELEPHONE ENCOUNTER
Please let patient know that I did get the reports she left off about the calcified mass in the pelvis that could represent a fibroid or an iliac aneurysm.   I did check with radiology for proper test and it would be a CT of pelvis with contrast.  I placed

## 2019-07-22 NOTE — TELEPHONE ENCOUNTER
I spoke with patient and relayed Dr. Lugenia Cowden message. She verbalized understanding. I provided her with the name of the test and the number for central scheduling. She verbalized understanding.

## 2019-07-30 ENCOUNTER — TELEPHONE (OUTPATIENT)
Dept: INTERNAL MEDICINE CLINIC | Facility: CLINIC | Age: 74
End: 2019-07-30

## 2019-07-30 ENCOUNTER — HOSPITAL ENCOUNTER (OUTPATIENT)
Dept: CT IMAGING | Facility: HOSPITAL | Age: 74
Discharge: HOME | End: 2019-07-30
Attending: INTERNAL MEDICINE
Payer: MEDICARE

## 2019-07-30 DIAGNOSIS — R19.03 RIGHT LOWER QUADRANT ABDOMINAL MASS: ICD-10-CM

## 2019-07-30 DIAGNOSIS — I72.3 ILIAC ARTERY ANEURYSM (HCC): ICD-10-CM

## 2019-07-30 LAB — CREAT BLD-MCNC: 0.8 MG/DL (ref 0.55–1.02)

## 2019-07-30 PROCEDURE — 82565 ASSAY OF CREATININE: CPT

## 2019-07-30 PROCEDURE — 74177 CT ABD & PELVIS W/CONTRAST: CPT | Performed by: INTERNAL MEDICINE

## 2019-07-30 NOTE — TELEPHONE ENCOUNTER
Discussed with patient results of CT abdomen. Has a benign cystic structure that has been there since 2012.   Also it has been felt by Dr. Nena Jerome patient's gynecological oncologist.  Polo Deleon to have started after patient had her hysterectomy for her uterine

## 2019-08-02 ENCOUNTER — TELEPHONE (OUTPATIENT)
Dept: INTERNAL MEDICINE CLINIC | Facility: CLINIC | Age: 74
End: 2019-08-02

## 2019-08-02 NOTE — TELEPHONE ENCOUNTER
Pt wants to see ENT because right ear bothers her sometimes. Sometimes when she sleeps on the right side she notices that she can't hear out of that ear well for a few hours. Denies pain or tenderness. Noted that this problems seems to come and go.  Dr. Chaim Castleman h

## 2019-08-02 NOTE — TELEPHONE ENCOUNTER
Called patient and relayed DRParrish Message - address and # given for DR. Ramirez Brochure [phone] [address] [place] [address]- verbalized understanding

## 2019-08-08 ENCOUNTER — OFFICE VISIT (OUTPATIENT)
Dept: PULMONOLOGY | Facility: CLINIC | Age: 74
End: 2019-08-08
Payer: MEDICARE

## 2019-08-08 VITALS
BODY MASS INDEX: 26 KG/M2 | WEIGHT: 140 LBS | SYSTOLIC BLOOD PRESSURE: 148 MMHG | RESPIRATION RATE: 18 BRPM | OXYGEN SATURATION: 99 % | DIASTOLIC BLOOD PRESSURE: 72 MMHG | HEART RATE: 54 BPM

## 2019-08-08 DIAGNOSIS — J47.9 BRONCHIECTASIS WITHOUT COMPLICATION (HCC): Primary | ICD-10-CM

## 2019-08-08 PROCEDURE — 99213 OFFICE O/P EST LOW 20 MIN: CPT | Performed by: INTERNAL MEDICINE

## 2019-08-08 PROCEDURE — G0463 HOSPITAL OUTPT CLINIC VISIT: HCPCS | Performed by: INTERNAL MEDICINE

## 2019-08-08 NOTE — PROGRESS NOTES
The patient is 77-year-old female who I know well from prior evaluation of comes in now for follow-up. She has mild chronic cough with bronchiectasis but otherwise is doing okay.   Her acid reflux is good and her blood pressure is just borderline today but

## 2019-08-15 ENCOUNTER — NURSE ONLY (OUTPATIENT)
Dept: INTERNAL MEDICINE CLINIC | Facility: CLINIC | Age: 74
End: 2019-08-15
Payer: MEDICARE

## 2019-08-15 DIAGNOSIS — E53.8 VITAMIN B12 DEFICIENCY: ICD-10-CM

## 2019-08-15 PROCEDURE — 96372 THER/PROPH/DIAG INJ SC/IM: CPT | Performed by: INTERNAL MEDICINE

## 2019-08-15 RX ADMIN — CYANOCOBALAMIN 1000 MCG: 1000 INJECTION INTRAMUSCULAR; SUBCUTANEOUS at 09:09:00

## 2019-08-15 NOTE — PROGRESS NOTES
Patient presents for monthly b12 injection. Patient name and  verified. Order active in [facility], last given 19. Patient tolerated injection well.

## 2019-09-16 ENCOUNTER — NURSE ONLY (OUTPATIENT)
Dept: INTERNAL MEDICINE CLINIC | Facility: CLINIC | Age: 74
End: 2019-09-16
Payer: MEDICARE

## 2019-09-16 DIAGNOSIS — E53.8 VITAMIN B12 DEFICIENCY: Primary | ICD-10-CM

## 2019-09-16 PROCEDURE — 96372 THER/PROPH/DIAG INJ SC/IM: CPT | Performed by: INTERNAL MEDICINE

## 2019-09-16 RX ADMIN — CYANOCOBALAMIN 1000 MCG: 1000 INJECTION INTRAMUSCULAR; SUBCUTANEOUS at 09:18:00

## 2019-10-01 ENCOUNTER — TELEPHONE (OUTPATIENT)
Dept: INTERNAL MEDICINE CLINIC | Facility: CLINIC | Age: 74
End: 2019-10-01

## 2019-10-01 RX ORDER — TELMISARTAN 80 MG/1
80 TABLET ORAL DAILY
Qty: 30 TABLET | Refills: 11 | Status: SHIPPED | OUTPATIENT
Start: 2019-10-01 | End: 2019-11-07 | Stop reason: RX

## 2019-10-01 NOTE — TELEPHONE ENCOUNTER
Pt is calling the pharmacy called her and told her to contact Dr Granados July to get a replacement for Losartan because the medication is being recalled  Please call pt [phone]      Tasked to nursing

## 2019-10-01 NOTE — TELEPHONE ENCOUNTER
Spoke to pt - we discussed recall;  Pt does have dc product; She is willing to trial telmisartan 80 mg daily; Pt will send BP journal to us in [employer] in several weeks so we can check efficacy; She will call if she has any ADRs.

## 2019-10-16 ENCOUNTER — NURSE ONLY (OUTPATIENT)
Dept: INTERNAL MEDICINE CLINIC | Facility: CLINIC | Age: 74
End: 2019-10-16
Payer: MEDICARE

## 2019-10-16 DIAGNOSIS — E53.8 VITAMIN B12 DEFICIENCY: Primary | ICD-10-CM

## 2019-10-16 PROCEDURE — 96372 THER/PROPH/DIAG INJ SC/IM: CPT | Performed by: INTERNAL MEDICINE

## 2019-10-16 RX ADMIN — CYANOCOBALAMIN 1000 MCG: 1000 INJECTION INTRAMUSCULAR; SUBCUTANEOUS at 09:12:00

## 2019-10-16 NOTE — PROGRESS NOTES
Patient presents for monthly vit B12. Name/ and order verified. Injection administered to RT deltoid, IM. Tolerated well.

## 2019-10-17 RX ORDER — LOSARTAN POTASSIUM 100 MG/1
TABLET ORAL
Qty: 90 TABLET | Refills: 2 | OUTPATIENT
Start: 2019-10-17

## 2019-10-17 NOTE — TELEPHONE ENCOUNTER
Refill request has failed the Ambulatory Medication Refill Standing Order and is routed to the primary physician to review the following:    Requested Prescriptions     Refused Prescriptions Disp Refills   • LOSARTAN 100 MG Oral Tab [Pharmacy Med Name: [facility]

## 2019-11-07 ENCOUNTER — TELEPHONE (OUTPATIENT)
Dept: INTERNAL MEDICINE CLINIC | Facility: CLINIC | Age: 74
End: 2019-11-07

## 2019-11-07 RX ORDER — LOSARTAN POTASSIUM 100 MG/1
100 TABLET ORAL DAILY
Qty: 90 TABLET | Refills: 3 | Status: SHIPPED | OUTPATIENT
Start: 2019-11-07 | End: 2020-02-13

## 2019-11-07 NOTE — TELEPHONE ENCOUNTER
Torey Gordon requesting refill of Losartan 100 mg  Losartan is no longer on backorder pt wants to switch back    Tasked to rx

## 2019-11-12 ENCOUNTER — LAB ENCOUNTER (OUTPATIENT)
Dept: LAB | Age: 74
End: 2019-11-12
Attending: INTERNAL MEDICINE
Payer: MEDICARE

## 2019-11-12 ENCOUNTER — OFFICE VISIT (OUTPATIENT)
Dept: INTERNAL MEDICINE CLINIC | Facility: CLINIC | Age: 74
End: 2019-11-12
Payer: MEDICARE

## 2019-11-12 ENCOUNTER — TELEPHONE (OUTPATIENT)
Dept: INTERNAL MEDICINE CLINIC | Facility: CLINIC | Age: 74
End: 2019-11-12

## 2019-11-12 VITALS
DIASTOLIC BLOOD PRESSURE: 70 MMHG | TEMPERATURE: 98 F | HEART RATE: 57 BPM | OXYGEN SATURATION: 97 % | WEIGHT: 144.19 LBS | SYSTOLIC BLOOD PRESSURE: 124 MMHG | BODY MASS INDEX: 26 KG/M2

## 2019-11-12 DIAGNOSIS — R51.9 HEADACHE DISORDER: ICD-10-CM

## 2019-11-12 DIAGNOSIS — R10.12 CHRONIC LUQ PAIN: ICD-10-CM

## 2019-11-12 DIAGNOSIS — I10 ESSENTIAL HYPERTENSION: Primary | ICD-10-CM

## 2019-11-12 DIAGNOSIS — E04.2 MULTIPLE THYROID NODULES: ICD-10-CM

## 2019-11-12 DIAGNOSIS — I73.00 RAYNAUD'S PHENOMENON WITHOUT GANGRENE: ICD-10-CM

## 2019-11-12 DIAGNOSIS — I65.23 CAROTID STENOSIS, BILATERAL: ICD-10-CM

## 2019-11-12 DIAGNOSIS — M81.8 OTHER OSTEOPOROSIS WITHOUT CURRENT PATHOLOGICAL FRACTURE: ICD-10-CM

## 2019-11-12 DIAGNOSIS — G89.29 CHRONIC LUQ PAIN: ICD-10-CM

## 2019-11-12 DIAGNOSIS — I10 ESSENTIAL HYPERTENSION: ICD-10-CM

## 2019-11-12 DIAGNOSIS — Z23 FLU VACCINE NEED: ICD-10-CM

## 2019-11-12 PROCEDURE — 85025 COMPLETE CBC W/AUTO DIFF WBC: CPT

## 2019-11-12 PROCEDURE — 99215 OFFICE O/P EST HI 40 MIN: CPT | Performed by: INTERNAL MEDICINE

## 2019-11-12 PROCEDURE — 36415 COLL VENOUS BLD VENIPUNCTURE: CPT

## 2019-11-12 PROCEDURE — 80053 COMPREHEN METABOLIC PANEL: CPT

## 2019-11-12 PROCEDURE — G0463 HOSPITAL OUTPT CLINIC VISIT: HCPCS | Performed by: INTERNAL MEDICINE

## 2019-11-12 PROCEDURE — 96372 THER/PROPH/DIAG INJ SC/IM: CPT | Performed by: INTERNAL MEDICINE

## 2019-11-12 RX ORDER — BUTALBITAL, ASPIRIN, AND CAFFEINE 50; 325; 40 MG/1; MG/1; MG/1
1 CAPSULE ORAL EVERY 6 HOURS PRN
Qty: 30 CAPSULE | Refills: 1 | Status: SHIPPED | OUTPATIENT
Start: 2019-11-12 | End: 2020-08-14

## 2019-11-12 RX ADMIN — CYANOCOBALAMIN 1000 MCG: 1000 INJECTION INTRAMUSCULAR; SUBCUTANEOUS at 10:14:00

## 2019-11-12 NOTE — PROGRESS NOTES
HPI:   Clark Haas is a 66year old female presents with the following problems. Patient generally feels well. Her blood pressure is well controlled. She did see Dr. Aleja Real June 25, 2019. Breast exam was done.   She had a mammogram June 18, 2 MOUTH TWICE DAILY 180 tablet 3   • hydrocortisone 2.5 % External Ointment Apply 1 Application topically. 0   • AQUAPHOR External Ointment Apply topically as needed for Dry Skin.      • ATORVASTATIN 40 MG Oral Tab TAKE ONE TABLET BY MOUTH AT BEDTIME  90 tab Essential hypertension    • Family history of colon cancer 10/21/2014   •  0     PARA ZERO   • H/O foot surgery ,    LEFT FOOT SURGERY, PINS PLACED IN TOE   • H/O oral surgery     GUM/MOUTH SURGERY   • Hallux rigidus 2009    LEFT FOOT, V • Kidney Disease Neg         UROLITHIASIS     Results for orders placed or performed during the hospital encounter of 07/30/19   POCT CREATININE   Result Value Ref Range    ISTAT Creatinine 0.80 0.55 - 1.02 mg/dL    GFR, African-American 83 >=60    GFR, discomfort on palpation left upper quadrant. RECTAL: Last colonoscopy May 24, 2018  MUSCULOSKELETAL:  back is not tender, no joint swelling  EXTREMITIES:  no cyanosis, clubbing or edema. NEURO:  Awake and aware. ASSESSMENT AND PLAN:         1.  Lucinda

## 2019-11-12 NOTE — TELEPHONE ENCOUNTER
Please let patient know that I thought her labs look good. One kidney number was just minimally low. I do not think that is very significant for now. We will see what her CT of abdomen and pelvis shows when results are available.   Continue current medic

## 2019-11-21 RX ORDER — AMLODIPINE BESYLATE 10 MG/1
TABLET ORAL
Qty: 90 TABLET | Refills: 3 | Status: SHIPPED | OUTPATIENT
Start: 2019-11-21 | End: 2020-11-09

## 2019-12-03 ENCOUNTER — HOSPITAL ENCOUNTER (OUTPATIENT)
Dept: CT IMAGING | Facility: HOSPITAL | Age: 74
Discharge: HOME | End: 2019-12-03
Attending: INTERNAL MEDICINE
Payer: MEDICARE

## 2019-12-03 ENCOUNTER — HOSPITAL ENCOUNTER (OUTPATIENT)
Dept: ULTRASOUND IMAGING | Facility: HOSPITAL | Age: 74
Discharge: HOME | End: 2019-12-03
Attending: INTERNAL MEDICINE
Payer: MEDICARE

## 2019-12-03 ENCOUNTER — TELEPHONE (OUTPATIENT)
Dept: INTERNAL MEDICINE CLINIC | Facility: CLINIC | Age: 74
End: 2019-12-03

## 2019-12-03 DIAGNOSIS — R10.12 CHRONIC LUQ PAIN: ICD-10-CM

## 2019-12-03 DIAGNOSIS — I65.23 CAROTID STENOSIS, BILATERAL: ICD-10-CM

## 2019-12-03 DIAGNOSIS — G89.29 CHRONIC LUQ PAIN: ICD-10-CM

## 2019-12-03 PROCEDURE — 74177 CT ABD & PELVIS W/CONTRAST: CPT | Performed by: INTERNAL MEDICINE

## 2019-12-03 PROCEDURE — 93880 EXTRACRANIAL BILAT STUDY: CPT | Performed by: INTERNAL MEDICINE

## 2019-12-04 NOTE — TELEPHONE ENCOUNTER
Please let patient know that I thought her carotid Doppler test came out satisfactory. She has a narrowing of about 50 to 69% on the left carotid artery area. She had this in the past October 2018 and it looks to be the same.   For now nothing else needs

## 2019-12-05 NOTE — TELEPHONE ENCOUNTER
To Dr. Lucrecia Guzman - your message relayed to pt who verbalized understanding. Dr. Nataliya Sparks has retired - [facility] for pt to see Dr. Gisele Serrano?

## 2019-12-05 NOTE — TELEPHONE ENCOUNTER
Spoke to patient and relayed MD message. Patient verbalized understanding and agrees with plan. Contact info provided:  Corey Pitts M.D.   Gastroenterology  [address]  Phone: [phone]

## 2019-12-17 ENCOUNTER — NURSE ONLY (OUTPATIENT)
Dept: INTERNAL MEDICINE CLINIC | Facility: CLINIC | Age: 74
End: 2019-12-17
Payer: MEDICARE

## 2019-12-17 DIAGNOSIS — E53.8 VITAMIN B12 DEFICIENCY: Primary | ICD-10-CM

## 2019-12-17 PROCEDURE — 96372 THER/PROPH/DIAG INJ SC/IM: CPT | Performed by: INTERNAL MEDICINE

## 2019-12-17 RX ADMIN — CYANOCOBALAMIN 1000 MCG: 1000 INJECTION INTRAMUSCULAR; SUBCUTANEOUS at 09:38:00

## 2019-12-17 NOTE — PROGRESS NOTES
Patient present for Vitamin B12 injection. Patient's name and date of birth verified. Injection well tolerated to right deltoid with no adverse reactions noted.

## 2019-12-21 RX ORDER — SULFASALAZINE 500 MG/1
TABLET ORAL
Qty: 180 TABLET | Refills: 1 | Status: SHIPPED | OUTPATIENT
Start: 2019-12-21 | End: 2020-02-27

## 2019-12-21 NOTE — TELEPHONE ENCOUNTER
Please discuss with patient I refilled her sulfasalazine. Ask her to follow up with a new gastroenterologist since Dr. Merribeth Cowden retired. I would refer to Dr. Mago Skinner. He can review treatment and decide if current treatment should be continued.

## 2019-12-23 RX ORDER — ATORVASTATIN CALCIUM 40 MG/1
TABLET, FILM COATED ORAL
Qty: 90 TABLET | Refills: 3 | Status: SHIPPED | OUTPATIENT
Start: 2019-12-23 | End: 2020-12-24

## 2019-12-23 NOTE — TELEPHONE ENCOUNTER
Called and Relayed MD's message to patient---verbalized understanding  Contact info provided for Dr. Jorge Hamilton. Pt also has Dr. Branyd Slater in mind. She will f/u for future refills.

## 2020-01-16 ENCOUNTER — NURSE ONLY (OUTPATIENT)
Dept: INTERNAL MEDICINE CLINIC | Facility: CLINIC | Age: 75
End: 2020-01-16
Payer: MEDICARE

## 2020-01-16 DIAGNOSIS — E53.8 VITAMIN B12 DEFICIENCY: Primary | ICD-10-CM

## 2020-01-16 PROCEDURE — 96372 THER/PROPH/DIAG INJ SC/IM: CPT | Performed by: INTERNAL MEDICINE

## 2020-01-16 RX ADMIN — CYANOCOBALAMIN 1000 MCG: 1000 INJECTION INTRAMUSCULAR; SUBCUTANEOUS at 09:39:00

## 2020-01-16 NOTE — PROGRESS NOTES
Patient presents for monthly vitamin B12 IM injection. Name/ and order verified. Injection administered to R deltoid, tolerated well.

## 2020-01-20 ENCOUNTER — TELEPHONE (OUTPATIENT)
Dept: INTERNAL MEDICINE CLINIC | Facility: CLINIC | Age: 75
End: 2020-01-20

## 2020-01-20 ENCOUNTER — OFFICE VISIT (OUTPATIENT)
Dept: ORTHOPEDICS CLINIC | Facility: CLINIC | Age: 75
End: 2020-01-20
Payer: MEDICARE

## 2020-01-20 ENCOUNTER — HOSPITAL ENCOUNTER (OUTPATIENT)
Dept: GENERAL RADIOLOGY | Facility: HOSPITAL | Age: 75
Discharge: HOME | End: 2020-01-20
Attending: ORTHOPAEDIC SURGERY
Payer: MEDICARE

## 2020-01-20 ENCOUNTER — TELEPHONE (OUTPATIENT)
Dept: ORTHOPEDICS CLINIC | Facility: CLINIC | Age: 75
End: 2020-01-20

## 2020-01-20 ENCOUNTER — HOSPITAL ENCOUNTER (OUTPATIENT)
Dept: GENERAL RADIOLOGY | Facility: HOSPITAL | Age: 75
Discharge: HOME | End: 2020-01-20
Attending: ORTHOPAEDIC SURGERY | Admitting: ORTHOPAEDIC SURGERY
Payer: MEDICARE

## 2020-01-20 DIAGNOSIS — Z47.89 ORTHOPEDIC AFTERCARE: Primary | ICD-10-CM

## 2020-01-20 DIAGNOSIS — Z47.89 ORTHOPEDIC AFTERCARE: ICD-10-CM

## 2020-01-20 DIAGNOSIS — S60.222A CONTUSION OF LEFT HAND, INITIAL ENCOUNTER: ICD-10-CM

## 2020-01-20 DIAGNOSIS — S82.034A CLOSED NONDISPLACED TRANSVERSE FRACTURE OF RIGHT PATELLA, INITIAL ENCOUNTER: ICD-10-CM

## 2020-01-20 DIAGNOSIS — M25.532 LEFT WRIST PAIN: ICD-10-CM

## 2020-01-20 PROCEDURE — 73130 X-RAY EXAM OF HAND: CPT | Performed by: ORTHOPAEDIC SURGERY

## 2020-01-20 PROCEDURE — 73560 X-RAY EXAM OF KNEE 1 OR 2: CPT | Performed by: ORTHOPAEDIC SURGERY

## 2020-01-20 PROCEDURE — 27520 TREAT KNEECAP FRACTURE: CPT | Performed by: ORTHOPAEDIC SURGERY

## 2020-01-20 PROCEDURE — L3908 WHO COCK-UP NONMOLDE PRE OTS: HCPCS | Performed by: ORTHOPAEDIC SURGERY

## 2020-01-20 PROCEDURE — 99205 OFFICE O/P NEW HI 60 MIN: CPT | Performed by: ORTHOPAEDIC SURGERY

## 2020-01-20 RX ORDER — HYDROCODONE BITARTRATE AND ACETAMINOPHEN 5; 325 MG/1; MG/1
1 TABLET ORAL
COMMUNITY
Start: 2020-01-17 | End: 2020-01-20

## 2020-01-20 NOTE — TELEPHONE ENCOUNTER
Patient was in [place] when this occurred  Patient fracture of right patella, closed non displaced transverse fractor. Also closed fractures of left hand. Looking for an ortho recommendation. Please call spouse, Jona Ortiz at [phone].

## 2020-01-20 NOTE — TELEPHONE ENCOUNTER
I would refer her to Dr. Federica Trejo, Dr. Dimitrios Sultana and Dr. Deja Pettit. They are all in the same group. Please ask her when this happened. What was the circumstances. What hospital did she go to.   Is she now in a cast of some sort for her p

## 2020-01-20 NOTE — TELEPHONE ENCOUNTER
Please make follow-up call to see if she is able to make arrangements to see orthopedics. Also it would be good to get her records from her hospital visit as it relates to her fractures.   She can call the hospital and discussed with medical records and ha

## 2020-01-20 NOTE — TELEPHONE ENCOUNTER
As FYI to DR. MARK - called spouse per hipaa and relayed DR. MARK message ( names and number for ortho given)  It happened 1/17 /20 at 10 am , patient was walking on side walk , her foot got caught and she fell on sidewalk.  She has temporary splint on knee and and

## 2020-01-20 NOTE — TELEPHONE ENCOUNTER
Per spouse pt has patella fracture and hand fractures, was seen out [place] state ED, spouse asking for appt today or tomorrow. Please advise thank you.

## 2020-01-20 NOTE — TELEPHONE ENCOUNTER
Spoke with spouse, Ila verified. Pt fell on a sidewalk and injured her hand and knee. Cast to arm/wrist and knee immobilizer in place. Acute visit booked today. Pt does not have imaging discs, only paper reports. Will bring all paperwork provided.

## 2020-01-21 NOTE — TELEPHONE ENCOUNTER
LMTCB for   Also attempted to reach patient on cell, no answer    Upon review of [facility], patient did have appt today with Dr Mccall Killer

## 2020-01-21 NOTE — H&P
NURSING INTAKE COMMENTS: Patient presents with:  Consult: Stts that she tripped on a cobblestone and fell friday 1/17. Was seen at an ER in [facility]) xray of left hand and right knee were taken but they were given no CDs or reports.   Noti Migraine     OCCULAR MIGRAINE    • Neuroma 2007    2 LT, HALLUX RIGIDUS LT, PER. NG.   • Osteoarthritis    • Other ill-defined conditions(799.89)     TAHBSO MGMT.    • Other ill-defined conditions(799.89)     CHRONIC LEFT CYSTIC PELVIC MASS   • Ovarian cyst morning before breakfast.  0   • Albuterol Sulfate  (90 Base) MCG/ACT Inhalation Aero Soln Inhale 2 puffs into the lungs daily. (Patient taking differently: Inhale 2 puffs into the lungs daily as needed.  ) 1 Inhaler 0   • triamcinolone acetonide 0. DVT/PE    Social History    Occupational History      Not on file    Tobacco Use      Smoking status: Never Smoker      Smokeless tobacco: Never Used    Substance and Sexual Activity      Alcohol use:  Yes        Alcohol/week: 4.0 standard drinks        Typ metacarpals. She no tenderness to the ulnar styloid distal radius. She is able to make a fist and extend all digits. Neurological: Sensation intact light touch throughout left hand and right lower extremity.     Imaging: X-rays of the left hand and right x-rays of the right knee. Advised patient if there is any displacement of her patella fracture that may require surgery. Follow Up: Return in about 1 week (around 1/27/2020).     Patient seen and evaluated initially by Alex Carrillo PA-C and then with

## 2020-01-24 NOTE — TELEPHONE ENCOUNTER
Noted. Patient no showed 6/4 follow up for anxiety.   RICHARD-7 SCORE 5/18/2015 2/5/2018 4/10/2019   Total Score 4 - -   Total Score - 1 7     Please advise on refill.     Radhika Katz, RN, BSN, PHN

## 2020-01-24 NOTE — TELEPHONE ENCOUNTER
Relayed MD's message to patient---verbalized understanding. She states she will call medical records at [place] [address]. Pt states she saw Dr. Caridad Combs on Monday. He confirmed everything. Brace for R kneecap and L hand.  F/u appt to take XR again this Tuesday 1/28

## 2020-01-28 ENCOUNTER — HOSPITAL ENCOUNTER (OUTPATIENT)
Dept: GENERAL RADIOLOGY | Facility: HOSPITAL | Age: 75
Discharge: HOME | End: 2020-01-28
Attending: ORTHOPAEDIC SURGERY
Payer: MEDICARE

## 2020-01-28 ENCOUNTER — OFFICE VISIT (OUTPATIENT)
Dept: ORTHOPEDICS CLINIC | Facility: CLINIC | Age: 75
End: 2020-01-28
Payer: MEDICARE

## 2020-01-28 DIAGNOSIS — M48.061 SPINAL STENOSIS OF LUMBAR REGION, UNSPECIFIED WHETHER NEUROGENIC CLAUDICATION PRESENT: ICD-10-CM

## 2020-01-28 DIAGNOSIS — Z47.89 ORTHOPEDIC AFTERCARE: ICD-10-CM

## 2020-01-28 DIAGNOSIS — S60.222A CONTUSION OF LEFT HAND, INITIAL ENCOUNTER: ICD-10-CM

## 2020-01-28 DIAGNOSIS — S82.034A CLOSED NONDISPLACED TRANSVERSE FRACTURE OF RIGHT PATELLA, INITIAL ENCOUNTER: ICD-10-CM

## 2020-01-28 DIAGNOSIS — Z47.89 ORTHOPEDIC AFTERCARE: Primary | ICD-10-CM

## 2020-01-28 PROCEDURE — 73560 X-RAY EXAM OF KNEE 1 OR 2: CPT | Performed by: ORTHOPAEDIC SURGERY

## 2020-01-28 PROCEDURE — G0463 HOSPITAL OUTPT CLINIC VISIT: HCPCS | Performed by: ORTHOPAEDIC SURGERY

## 2020-01-28 PROCEDURE — 73130 X-RAY EXAM OF HAND: CPT | Performed by: ORTHOPAEDIC SURGERY

## 2020-01-28 PROCEDURE — 99024 POSTOP FOLLOW-UP VISIT: CPT | Performed by: ORTHOPAEDIC SURGERY

## 2020-01-28 RX ORDER — TELMISARTAN 80 MG/1
80 TABLET ORAL DAILY
COMMUNITY
Start: 2020-01-26 | End: 2020-09-16

## 2020-01-28 NOTE — PROGRESS NOTES
NURSING INTAKE COMMENTS: Patient presents with: Follow - Up: left hand and right knee -- Rates pain 3/10 in hand and 5/10 in knee. Hand has some numbness and tingling. Right handed mostly.        HPI: This 66year old female presents today with her  • Migraine 2012    OCCULAR MIGRAINE   • Migraine     OCCULAR MIGRAINE    • Neuroma 2007    2 LT, HALLUX RIGIDUS LT, PER. NG.   • Osteoarthritis    • Other ill-defined conditions(799.89)     TAHBSO MGMT.    • Other ill-defined conditions(799.89)     CHRONI MG Oral Capsule Delayed Release Take 1 capsule (20 mg total) by mouth every morning before breakfast.  0   • Albuterol Sulfate  (90 Base) MCG/ACT Inhalation Aero Soln Inhale 2 puffs into the lungs daily.  (Patient taking differently: Inhale 2 puffs i History      Not on file    Tobacco Use      Smoking status: Never Smoker      Smokeless tobacco: Never Used    Substance and Sexual Activity      Alcohol use:  Yes        Alcohol/week: 4.0 standard drinks        Types: 4 Glasses of wine per week        Com the [facility]. She was normal to motor and sensory function of the fingers of the left hand.     Imaging: X-rays of the left hand showed nondisplaced fracture left fifth metacarpal and I did not appreciate fractures of the fourth and third despite her tendernes osseous structures are demineralized.   The base of the 5th metacarpal is irregular in contour with suspicion for an underlying nondisplaced transverse fracture, which is difficult to evaluate due to overlapping osseous structures on this exam.   A similar Value Date    GLU 88 11/12/2019    BUN 26 (H) 11/12/2019    CREATSERUM 0.97 11/12/2019    GFRNAA 57 (L) 11/12/2019    GFRAA 66 11/12/2019        Assessment and Plan:  Diagnoses and all orders for this visit:    Orthopedic aftercare  -     XR HAND (MIN 3 VI

## 2020-01-30 ENCOUNTER — HOSPITAL ENCOUNTER (OUTPATIENT)
Dept: ULTRASOUND IMAGING | Facility: HOSPITAL | Age: 75
Discharge: HOME | End: 2020-01-30
Attending: INTERNAL MEDICINE
Payer: MEDICARE

## 2020-01-30 ENCOUNTER — TELEPHONE (OUTPATIENT)
Dept: INTERNAL MEDICINE CLINIC | Facility: CLINIC | Age: 75
End: 2020-01-30

## 2020-01-30 DIAGNOSIS — M79.89 PAIN AND SWELLING OF RIGHT LOWER LEG: ICD-10-CM

## 2020-01-30 DIAGNOSIS — M79.89 PAIN AND SWELLING OF RIGHT LOWER LEG: Primary | ICD-10-CM

## 2020-01-30 DIAGNOSIS — M79.661 PAIN AND SWELLING OF RIGHT LOWER LEG: Primary | ICD-10-CM

## 2020-01-30 DIAGNOSIS — M79.661 PAIN AND SWELLING OF RIGHT LOWER LEG: ICD-10-CM

## 2020-01-30 PROCEDURE — 93971 EXTREMITY STUDY: CPT | Performed by: INTERNAL MEDICINE

## 2020-01-30 NOTE — TELEPHONE ENCOUNTER
Discussed with Dr. Cathy Pena early this morning. Discussed with patient. She is walking around even though she has a knee immobilizer.   For this reason Dr. Cathy Pena felt DVT prophylaxis was not indicated since patient is up and around and she was recently

## 2020-01-31 NOTE — TELEPHONE ENCOUNTER
Additional note. Patient has negative venous Doppler 10 days after being placed in immobilizer. Patient is up and about. Per Dr. Emery Lawson DVT prophylaxis usually not indicated with this type of injury. Patient is weightbearing.   I did discuss with cassie

## 2020-01-31 NOTE — TELEPHONE ENCOUNTER
Spoke to patient and relayed MD message. Patient verbalized understanding. Patient denies any inciting event prior to developing blood clot in lung (does not recall to be sitting for long period of time, etc).

## 2020-01-31 NOTE — TELEPHONE ENCOUNTER
Please call patient and let her know that I forgot one question to ask her yesterday when we are talking about her venous Doppler results that came out very good and her blood clot issue that she had back in 1976.   I forgot to ask her if back when she did

## 2020-02-06 ENCOUNTER — LAB ENCOUNTER (OUTPATIENT)
Dept: LAB | Age: 75
End: 2020-02-06
Attending: INTERNAL MEDICINE
Payer: MEDICARE

## 2020-02-06 ENCOUNTER — HOSPITAL ENCOUNTER (OUTPATIENT)
Dept: ULTRASOUND IMAGING | Facility: HOSPITAL | Age: 75
Discharge: HOME | End: 2020-02-06
Attending: INTERNAL MEDICINE | Admitting: INTERNAL MEDICINE
Payer: MEDICARE

## 2020-02-06 ENCOUNTER — OFFICE VISIT (OUTPATIENT)
Dept: INTERNAL MEDICINE CLINIC | Facility: CLINIC | Age: 75
End: 2020-02-06
Payer: MEDICARE

## 2020-02-06 VITALS
WEIGHT: 141 LBS | HEIGHT: 62 IN | HEART RATE: 50 BPM | RESPIRATION RATE: 16 BRPM | TEMPERATURE: 98 F | SYSTOLIC BLOOD PRESSURE: 132 MMHG | OXYGEN SATURATION: 98 % | DIASTOLIC BLOOD PRESSURE: 68 MMHG | BODY MASS INDEX: 25.95 KG/M2

## 2020-02-06 DIAGNOSIS — M79.661 PAIN AND SWELLING OF LOWER LEG, RIGHT: ICD-10-CM

## 2020-02-06 DIAGNOSIS — M79.89 PAIN AND SWELLING OF LOWER LEG, RIGHT: ICD-10-CM

## 2020-02-06 DIAGNOSIS — S62.307A CLOSED FRACTURE OF FIFTH METACARPAL BONE OF LEFT HAND, UNSPECIFIED FRACTURE MORPHOLOGY, INITIAL ENCOUNTER: ICD-10-CM

## 2020-02-06 DIAGNOSIS — I10 ESSENTIAL HYPERTENSION: ICD-10-CM

## 2020-02-06 DIAGNOSIS — S82.001A CLOSED NONDISPLACED FRACTURE OF RIGHT PATELLA, UNSPECIFIED FRACTURE MORPHOLOGY, INITIAL ENCOUNTER: Primary | ICD-10-CM

## 2020-02-06 DIAGNOSIS — M54.41 CHRONIC MIDLINE LOW BACK PAIN WITH RIGHT-SIDED SCIATICA: ICD-10-CM

## 2020-02-06 DIAGNOSIS — R00.1 BRADYCARDIA: ICD-10-CM

## 2020-02-06 DIAGNOSIS — Z86.711 HISTORY OF PULMONARY EMBOLUS (PE): ICD-10-CM

## 2020-02-06 DIAGNOSIS — G89.29 CHRONIC MIDLINE LOW BACK PAIN WITH RIGHT-SIDED SCIATICA: ICD-10-CM

## 2020-02-06 LAB
ANION GAP SERPL CALC-SCNC: 6 MMOL/L (ref 0–18)
BASOPHILS # BLD AUTO: 0.03 X10(3) UL (ref 0–0.2)
BASOPHILS NFR BLD AUTO: 0.5 %
BUN BLD-MCNC: 35 MG/DL (ref 7–18)
BUN/CREAT SERPL: 28.9 (ref 10–20)
CALCIUM BLD-MCNC: 10.3 MG/DL (ref 8.5–10.1)
CHLORIDE SERPL-SCNC: 107 MMOL/L (ref 98–112)
CO2 SERPL-SCNC: 24 MMOL/L (ref 21–32)
CREAT BLD-MCNC: 1.21 MG/DL (ref 0.55–1.02)
DEPRECATED RDW RBC AUTO: 44.7 FL (ref 35.1–46.3)
EOSINOPHIL # BLD AUTO: 0.35 X10(3) UL (ref 0–0.7)
EOSINOPHIL NFR BLD AUTO: 5.6 %
ERYTHROCYTE [DISTWIDTH] IN BLOOD BY AUTOMATED COUNT: 12.3 % (ref 11–15)
GLUCOSE BLD-MCNC: 111 MG/DL (ref 70–99)
HCT VFR BLD AUTO: 34.5 % (ref 35–48)
HGB BLD-MCNC: 11.3 G/DL (ref 12–16)
IMM GRANULOCYTES # BLD AUTO: 0.02 X10(3) UL (ref 0–1)
IMM GRANULOCYTES NFR BLD: 0.3 %
LYMPHOCYTES # BLD AUTO: 1.28 X10(3) UL (ref 1–4)
LYMPHOCYTES NFR BLD AUTO: 20.6 %
MCH RBC QN AUTO: 32.2 PG (ref 26–34)
MCHC RBC AUTO-ENTMCNC: 32.8 G/DL (ref 31–37)
MCV RBC AUTO: 98.3 FL (ref 80–100)
MONOCYTES # BLD AUTO: 0.81 X10(3) UL (ref 0.1–1)
MONOCYTES NFR BLD AUTO: 13 %
NEUTROPHILS # BLD AUTO: 3.72 X10 (3) UL (ref 1.5–7.7)
NEUTROPHILS # BLD AUTO: 3.72 X10(3) UL (ref 1.5–7.7)
NEUTROPHILS NFR BLD AUTO: 60 %
OSMOLALITY SERPL CALC.SUM OF ELEC: 293 MOSM/KG (ref 275–295)
PATIENT FASTING Y/N/NP: YES
PLATELET # BLD AUTO: 230 10(3)UL (ref 150–450)
POTASSIUM SERPL-SCNC: 4.8 MMOL/L (ref 3.5–5.1)
RBC # BLD AUTO: 3.51 X10(6)UL (ref 3.8–5.3)
SODIUM SERPL-SCNC: 137 MMOL/L (ref 136–145)
WBC # BLD AUTO: 6.2 X10(3) UL (ref 4–11)

## 2020-02-06 PROCEDURE — G0463 HOSPITAL OUTPT CLINIC VISIT: HCPCS | Performed by: INTERNAL MEDICINE

## 2020-02-06 PROCEDURE — 93971 EXTREMITY STUDY: CPT | Performed by: INTERNAL MEDICINE

## 2020-02-06 PROCEDURE — 80048 BASIC METABOLIC PNL TOTAL CA: CPT

## 2020-02-06 PROCEDURE — 36415 COLL VENOUS BLD VENIPUNCTURE: CPT

## 2020-02-06 PROCEDURE — 85025 COMPLETE CBC W/AUTO DIFF WBC: CPT

## 2020-02-06 PROCEDURE — 99215 OFFICE O/P EST HI 40 MIN: CPT | Performed by: INTERNAL MEDICINE

## 2020-02-06 NOTE — PROGRESS NOTES
Eulene Aase is a 66year old female. HPI:   Patient presents with:  ER F/U: 1/17/20 at [facility] for fracture to left hand and right patella.  Pain 2/10 to right leg, left hand, and back     Patient fell in [facility] on a [place] tablets three times a day 180 tablet 1   • AMLODIPINE BESYLATE 10 MG Oral Tab TAKE ONE TABLET BY MOUTH ONE TIME DAILY  90 tablet 3   • butalbital-aspirin-caffeine -40 MG Oral Cap Take 1 capsule by mouth every 6 (six) hours as needed.  30 capsule 1   •  0     PARA ZERO   • H/O foot surgery ,    LEFT FOOT SURGERY, PINS PLACED IN TOE   • H/O oral surgery 2013    GUM/MOUTH SURGERY   • Hallux rigidus 2009    LEFT FOOT, YURY PROCEDURE, 1ST LEFT METARSOPHALANGEAL JOINT   • Heart palpitations nourished, in no apparent distress  SKIN:  no rashes , no suspicious lesions  HEENT: atraumatic. Pharynx normal without exudate. EYES:  PERRL. Sclera anicteric. NECK:  Supple,  no adenopathy,  thyroid normal  LUNGS:  clear to auscultation.   Effort nor lower leg, right  We will recheck venous Doppler.  - US VENOUS DOPPLER LEG RIGHT - DIAG IMG (CPT=93971); Future    5.  Closed fracture of fifth metacarpal bone of left hand, unspecified fracture morphology, initial encounter  Patient in a left wrist and noguera

## 2020-02-07 ENCOUNTER — TELEPHONE (OUTPATIENT)
Dept: INTERNAL MEDICINE CLINIC | Facility: CLINIC | Age: 75
End: 2020-02-07

## 2020-02-07 NOTE — TELEPHONE ENCOUNTER
Please advise - called patient who states insurance does not cover Eliquis  ( at the dosage) pharmacist told patient that MD needs to call insurance . So patient did not get it , what should she do now ? Can something else be prescribed ?

## 2020-02-07 NOTE — TELEPHONE ENCOUNTER
Pt is calling to speak with a nurse, Pt states Dr Savannah Sanchez has prescribed her some medication and the pharmacy is informing her that the insurance is not covering the medication.      Best call back: [phone]

## 2020-02-07 NOTE — TELEPHONE ENCOUNTER
Spoke to [facility] and pt - pt has deductible;  Med is covered;   She will start Eliquis today

## 2020-02-07 NOTE — TELEPHONE ENCOUNTER
Ayad Godinez, please advise on next steps. We could use Xarelto 15 mg twice a day for 21 days then thereafter 20 mg.

## 2020-02-10 ENCOUNTER — APPOINTMENT (OUTPATIENT)
Dept: GENERAL RADIOLOGY | Facility: HOSPITAL | Age: 75
End: 2020-02-10
Attending: EMERGENCY MEDICINE
Payer: MEDICARE

## 2020-02-10 ENCOUNTER — HOSPITAL ENCOUNTER (EMERGENCY)
Facility: HOSPITAL | Age: 75
Discharge: HOME | End: 2020-02-10
Attending: EMERGENCY MEDICINE
Payer: MEDICARE

## 2020-02-10 VITALS
OXYGEN SATURATION: 96 % | SYSTOLIC BLOOD PRESSURE: 160 MMHG | BODY MASS INDEX: 23.92 KG/M2 | TEMPERATURE: 97 F | DIASTOLIC BLOOD PRESSURE: 64 MMHG | HEART RATE: 56 BPM | WEIGHT: 130 LBS | RESPIRATION RATE: 17 BRPM | HEIGHT: 62 IN

## 2020-02-10 DIAGNOSIS — S42.215A CLOSED NONDISPLACED FRACTURE OF SURGICAL NECK OF LEFT HUMERUS, UNSPECIFIED FRACTURE MORPHOLOGY, INITIAL ENCOUNTER: ICD-10-CM

## 2020-02-10 DIAGNOSIS — S42.034A CLOSED NONDISPLACED FRACTURE OF ACROMIAL END OF RIGHT CLAVICLE, INITIAL ENCOUNTER: Primary | ICD-10-CM

## 2020-02-10 PROCEDURE — 23500 CLTX CLAVICULAR FX W/O MNPJ: CPT

## 2020-02-10 PROCEDURE — 73030 X-RAY EXAM OF SHOULDER: CPT | Performed by: EMERGENCY MEDICINE

## 2020-02-10 PROCEDURE — 99284 EMERGENCY DEPT VISIT MOD MDM: CPT

## 2020-02-10 RX ORDER — HYDROCODONE BITARTRATE AND ACETAMINOPHEN 5; 325 MG/1; MG/1
1 TABLET ORAL ONCE
Status: DISCONTINUED | OUTPATIENT
Start: 2020-02-10 | End: 2020-02-10

## 2020-02-10 RX ORDER — HYDROCODONE BITARTRATE AND ACETAMINOPHEN 5; 325 MG/1; MG/1
1 TABLET ORAL ONCE
Status: COMPLETED | OUTPATIENT
Start: 2020-02-10 | End: 2020-02-10

## 2020-02-10 RX ORDER — HYDROCODONE BITARTRATE AND ACETAMINOPHEN 5; 325 MG/1; MG/1
1-2 TABLET ORAL EVERY 6 HOURS PRN
Qty: 16 TABLET | Refills: 0 | Status: SHIPPED | OUTPATIENT
Start: 2020-02-10 | End: 2020-03-12 | Stop reason: ALTCHOICE

## 2020-02-10 NOTE — ED PROVIDER NOTES
Patient Seen in: [facility] AND [facility] Emergency Department      History   Patient presents with:  Fall    Stated Complaint:     HPI    66year old female with multiple medical problems including hypertension, hyperparathyroidism, history of DVT with acute D • Other ill-defined conditions(799.89)     CHRONIC LEFT CYSTIC PELVIC MASS   • Ovarian cyst     REMOVED PER NG.   • Painful breasts 10/21/2014   • Pneumonia 2011    HOSPITALIZED   • Pneumonia due to organism    • Pulmonary embolism [facility])    • Spinal sten and reactive to light. Neck:      Musculoskeletal: Full passive range of motion without pain, normal range of motion and neck supple. Normal range of motion. No neck rigidity. Cardiovascular:      Rate and Rhythm: Normal rate and regular rhythm.       H 2/10/2020  CONCLUSION:  1. Acute appearing mildly displaced comminuted fracture of the distal clavicle with a nondisplaced vertical component laterally, and mildly displaced horizontal component of the distal 1/3 as discussed above.   Mild degenerative narr fracture of acromial end of right clavicle, initial encounter  (primary encounter diagnosis)  Closed nondisplaced fracture of surgical neck of left humerus, unspecified fracture morphology, initial encounter    Disposition:  Discharge  2/10/2020  6:19 pm

## 2020-02-10 NOTE — CM/SW NOTE
[facility] liaison called referral placed. Pt. Lives alone with  who cares for her. Private duty caregiver list also given PRN. Please call patient's  Doroteo Kraus for first appointment [phone].

## 2020-02-11 ENCOUNTER — HOSPITAL ENCOUNTER (OUTPATIENT)
Dept: GENERAL RADIOLOGY | Facility: HOSPITAL | Age: 75
Discharge: HOME | End: 2020-02-11
Attending: ORTHOPAEDIC SURGERY
Payer: MEDICARE

## 2020-02-11 ENCOUNTER — OFFICE VISIT (OUTPATIENT)
Dept: ORTHOPEDICS CLINIC | Facility: CLINIC | Age: 75
End: 2020-02-11
Payer: MEDICARE

## 2020-02-11 VITALS — WEIGHT: 130 LBS | BODY MASS INDEX: 23.92 KG/M2 | HEIGHT: 62 IN

## 2020-02-11 DIAGNOSIS — S42.034A NONDISPLACED FRACTURE OF LATERAL END OF RIGHT CLAVICLE, INITIAL ENCOUNTER FOR CLOSED FRACTURE: ICD-10-CM
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
## 2020-02-11 DIAGNOSIS — Z47.89 ORTHOPEDIC AFTERCARE: ICD-10-CM
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
## 2020-02-11 DIAGNOSIS — S42.202A CLOSED FRACTURE OF PROXIMAL END OF LEFT HUMERUS, UNSPECIFIED FRACTURE MORPHOLOGY, INITIAL ENCOUNTER: ICD-10-CM

## 2020-02-11 DIAGNOSIS — S60.222A CONTUSION OF LEFT HAND, INITIAL ENCOUNTER: ICD-10-CM

## 2020-02-11 DIAGNOSIS — Z47.89 ORTHOPEDIC AFTERCARE: Primary | ICD-10-CM

## 2020-02-11 DIAGNOSIS — S82.034A CLOSED NONDISPLACED TRANSVERSE FRACTURE OF RIGHT PATELLA, INITIAL ENCOUNTER: ICD-10-CM

## 2020-02-11 PROCEDURE — 23600 CLTX PROX HUMRL FX W/O MNPJ: CPT | Performed by: ORTHOPAEDIC SURGERY

## 2020-02-11 PROCEDURE — 73110 X-RAY EXAM OF WRIST: CPT | Performed by: ORTHOPAEDIC SURGERY

## 2020-02-11 PROCEDURE — 73560 X-RAY EXAM OF KNEE 1 OR 2: CPT | Performed by: ORTHOPAEDIC SURGERY

## 2020-02-11 PROCEDURE — 23500 CLTX CLAVICULAR FX W/O MNPJ: CPT | Performed by: ORTHOPAEDIC SURGERY

## 2020-02-11 NOTE — HOME CARE LIAISON
Confirmed with patient and  spouse, patient is agreeable to [address] [place] brochure provided with contact information. All questions addressed and answered.

## 2020-02-11 NOTE — PROGRESS NOTES
NURSING INTAKE COMMENTS: Patient presents with:  Fracture: R knee and L wrist f/u - she fell yesterday in the house while commimg down the stairs - she was in ER and has x-rays in the system - was told she has fx in bilateral humerus - she has pain all ove ill-defined conditions(799.89)     CHRONIC LEFT CYSTIC PELVIC MASS   • Ovarian cyst     REMOVED PER NG.   • Painful breasts 10/21/2014   • Pneumonia 2011    HOSPITALIZED   • Pneumonia due to organism    • Pulmonary embolism [facility])    • Spinal stenosis    • Skin.     • omeprazole (PRILOSEC) 20 MG Oral Capsule Delayed Release Take 1 capsule (20 mg total) by mouth every morning before breakfast.  0   • Albuterol Sulfate  (90 Base) MCG/ACT Inhalation Aero Soln Inhale 2 puffs into the lungs daily.  (Patient Smokeless tobacco: Never Used    Substance and Sexual Activity      Alcohol use:  Yes        Alcohol/week: 4.0 standard drinks        Types: 4 Glasses of wine per week        Comment: 4 glass of wine per week      Drug use: No      Sexual activity: Not on and lower extremities. No numbness or tingling. Imaging: X-ray of the right shoulder shows the non-displaced oblique fracture of the distal clavicle. X-ray of the left shoulder shows the nondisplaced surgical neck humerus fracture.     X-ray of the l is mild quadriceps enthesopathy. The bones and joint spaces are otherwise intact. There is no dislocation. SOFT TISSUES: There is mild soft tissue swelling anterior to the patella. EFFUSION: There is a small knee joint effusion. OTHER: Negative. suspicion for an underlying nondisplaced transverse fracture, which is difficult to evaluate due to overlapping osseous structures on this exam.   A similar finding was described on the outside radiographs. There is no associated periosteal reaction.   The views were obtained. FINDINGS:  BONES: Minimally impacted slightly comminuted nondisplaced fracture of the surgical neck of the proximal left humerus. Intact glenohumeral joint. Mild degenerative narrowing of the AC joint. SOFT TISSUES: Negative.  No vi 2/6/2020  PROCEDURE: US VENOUS DOPPLER LEG RIGHT-DIAG IMG (FMD=98361)  COMPARISON: [address] LEG RIGHT-DIAG IMG (ZXI=84598), 1/30/2020, 11:08.   INDICATIONS: Pain in right lower leg  TECHNIQUE: Color duplex Doppler venous Visualized portions of the great and small saphenous, posterior tibial, and peroneal veins appear normal.   THROMBI: None visible. COMPRESSIBILITY: Normal. OTHER: Negative. CONCLUSION: Normal examination.      Dictated by (CST): MD jcarlos Ricketts will follow-up in 2 to 3 weeks for x-rays of all 4 fractures. We may be able to stop the knee immobilizer and hand splint at that point.     Follow Up: Return in about 2 weeks (around 2/25/2020) for xrays right clavicle, left sholder, left hand and right p

## 2020-02-12 ENCOUNTER — TELEPHONE (OUTPATIENT)
Dept: ORTHOPEDICS CLINIC | Facility: CLINIC | Age: 75
End: 2020-02-12

## 2020-02-12 ENCOUNTER — TELEPHONE (OUTPATIENT)
Dept: INTERNAL MEDICINE CLINIC | Facility: CLINIC | Age: 75
End: 2020-02-12

## 2020-02-12 NOTE — TELEPHONE ENCOUNTER
Goldy @  :    [facility]. PT, OT , HH aid with start seeing pt.      Best call back: [phone]

## 2020-02-13 ENCOUNTER — LAB ENCOUNTER (OUTPATIENT)
Dept: LAB | Age: 75
End: 2020-02-13
Attending: INTERNAL MEDICINE
Payer: MEDICARE

## 2020-02-13 ENCOUNTER — OFFICE VISIT (OUTPATIENT)
Dept: INTERNAL MEDICINE CLINIC | Facility: CLINIC | Age: 75
End: 2020-02-13
Payer: MEDICARE

## 2020-02-13 VITALS
DIASTOLIC BLOOD PRESSURE: 70 MMHG | SYSTOLIC BLOOD PRESSURE: 140 MMHG | RESPIRATION RATE: 16 BRPM | TEMPERATURE: 98 F | OXYGEN SATURATION: 98 % | WEIGHT: 139 LBS | BODY MASS INDEX: 25.58 KG/M2 | HEART RATE: 56 BPM | HEIGHT: 62 IN

## 2020-02-13 DIAGNOSIS — R94.4 DECREASED GFR: ICD-10-CM

## 2020-02-13 DIAGNOSIS — D64.9 ANEMIA, UNSPECIFIED TYPE: ICD-10-CM

## 2020-02-13 DIAGNOSIS — S42.002A FRACTURE OF UNSPECIFIED PART OF LEFT CLAVICLE, INITIAL ENCOUNTER FOR CLOSED FRACTURE: ICD-10-CM

## 2020-02-13 DIAGNOSIS — I10 ESSENTIAL HYPERTENSION: ICD-10-CM

## 2020-02-13 DIAGNOSIS — I82.4Z1 ACUTE DEEP VEIN THROMBOSIS (DVT) OF DISTAL END OF RIGHT LOWER EXTREMITY (HCC): ICD-10-CM

## 2020-02-13 DIAGNOSIS — S42.215A CLOSED NONDISPLACED FRACTURE OF SURGICAL NECK OF LEFT HUMERUS, UNSPECIFIED FRACTURE MORPHOLOGY, INITIAL ENCOUNTER: Primary | ICD-10-CM

## 2020-02-13 DIAGNOSIS — Z86.711 HISTORY OF PULMONARY EMBOLUS (PE): ICD-10-CM

## 2020-02-13 LAB
ANION GAP SERPL CALC-SCNC: 5 MMOL/L (ref 0–18)
BASOPHILS # BLD AUTO: 0.04 X10(3) UL (ref 0–0.2)
BASOPHILS NFR BLD AUTO: 0.4 %
BUN BLD-MCNC: 23 MG/DL (ref 7–18)
BUN/CREAT SERPL: 19.7 (ref 10–20)
CALCIUM BLD-MCNC: 9.9 MG/DL (ref 8.5–10.1)
CHLORIDE SERPL-SCNC: 104 MMOL/L (ref 98–112)
CO2 SERPL-SCNC: 25 MMOL/L (ref 21–32)
CREAT BLD-MCNC: 1.17 MG/DL (ref 0.55–1.02)
DEPRECATED RDW RBC AUTO: 45.1 FL (ref 35.1–46.3)
EOSINOPHIL # BLD AUTO: 0.3 X10(3) UL (ref 0–0.7)
EOSINOPHIL NFR BLD AUTO: 3.3 %
ERYTHROCYTE [DISTWIDTH] IN BLOOD BY AUTOMATED COUNT: 12.6 % (ref 11–15)
GLUCOSE BLD-MCNC: 114 MG/DL (ref 70–99)
HCT VFR BLD AUTO: 33.2 % (ref 35–48)
HGB BLD-MCNC: 11 G/DL (ref 12–16)
IMM GRANULOCYTES # BLD AUTO: 0.02 X10(3) UL (ref 0–1)
IMM GRANULOCYTES NFR BLD: 0.2 %
LYMPHOCYTES # BLD AUTO: 1.19 X10(3) UL (ref 1–4)
LYMPHOCYTES NFR BLD AUTO: 13.3 %
MCH RBC QN AUTO: 32.4 PG (ref 26–34)
MCHC RBC AUTO-ENTMCNC: 33.1 G/DL (ref 31–37)
MCV RBC AUTO: 97.9 FL (ref 80–100)
MONOCYTES # BLD AUTO: 0.93 X10(3) UL (ref 0.1–1)
MONOCYTES NFR BLD AUTO: 10.4 %
NEUTROPHILS # BLD AUTO: 6.5 X10 (3) UL (ref 1.5–7.7)
NEUTROPHILS # BLD AUTO: 6.5 X10(3) UL (ref 1.5–7.7)
NEUTROPHILS NFR BLD AUTO: 72.4 %
OSMOLALITY SERPL CALC.SUM OF ELEC: 283 MOSM/KG (ref 275–295)
PATIENT FASTING Y/N/NP: YES
PLATELET # BLD AUTO: 253 10(3)UL (ref 150–450)
POTASSIUM SERPL-SCNC: 4.5 MMOL/L (ref 3.5–5.1)
RBC # BLD AUTO: 3.39 X10(6)UL (ref 3.8–5.3)
SODIUM SERPL-SCNC: 134 MMOL/L (ref 136–145)
WBC # BLD AUTO: 9 X10(3) UL (ref 4–11)

## 2020-02-13 PROCEDURE — 80048 BASIC METABOLIC PNL TOTAL CA: CPT

## 2020-02-13 PROCEDURE — 85025 COMPLETE CBC W/AUTO DIFF WBC: CPT

## 2020-02-13 PROCEDURE — 36415 COLL VENOUS BLD VENIPUNCTURE: CPT

## 2020-02-13 PROCEDURE — G0463 HOSPITAL OUTPT CLINIC VISIT: HCPCS | Performed by: INTERNAL MEDICINE

## 2020-02-13 PROCEDURE — 99215 OFFICE O/P EST HI 40 MIN: CPT | Performed by: INTERNAL MEDICINE

## 2020-02-13 NOTE — PROGRESS NOTES
Heladio Koo is a 66year old female. HPI:   Patient presents with:  ER F/U: 2/10/20 for fracture of right clavicle and left humerus. Pain 7/10 to shoulders. Patient was at home. She was walking down some stairs.   Her stairs when about 13-14 sta of breath. She does have hypertension. She does have osteoporosis. She is up-to-date with her visit with Dr. Michelle Leigh. She saw Dr. Michelle Leigh October 20 9019. She has a history of osteopenia and osteoporosis.   Status post subtotal parathyroidectomy fo breakfast.  0   • Albuterol Sulfate  (90 Base) MCG/ACT Inhalation Aero Soln Inhale 2 puffs into the lungs daily.  (Patient taking differently: Inhale 2 puffs into the lungs daily as needed.  ) 1 Inhaler 0   • triamcinolone acetonide 0.1 % External Cr PER. NG.   • Osteoarthritis    • Other ill-defined conditions(799.89)     TAHBSO MGMT.    • Other ill-defined conditions(799.89)     CHRONIC LEFT CYSTIC PELVIC MASS   • Ovarian cyst     REMOVED PER NG.   • Painful breasts 10/21/2014   • Pneumonia 2011    HO surgical neck of left humerus, unspecified fracture morphology, initial encounter  Patient in a sling. She follows with Dr. Hans Alvarado. 2. Fracture of unspecified part of left clavicle, initial encounter for closed fracture  Patient in a sling.   She foll

## 2020-02-13 NOTE — TELEPHONE ENCOUNTER
S/w Harrison Mccullough, relayed Dr Oriana Persaud msg. She clarified that Dr Hans Alvarado was not even the Dr that ordered the PT/OT. She taught the pt's  how to help with home care and navigating stairs in the home.    I read over Dr Oriana Persaud last office note, and PT

## 2020-02-14 ENCOUNTER — TELEPHONE (OUTPATIENT)
Dept: INTERNAL MEDICINE CLINIC | Facility: CLINIC | Age: 75
End: 2020-02-14

## 2020-02-14 NOTE — TELEPHONE ENCOUNTER
Noted-- spoke with Raji Way and informed her no changes to parameters- still call if HR is below 60 so we can assess consistency of this, even if patient is asymptomatic, she verbalizes understanding and agrees to do so.

## 2020-02-14 NOTE — TELEPHONE ENCOUNTER
Message noted. I am satisfied with those readings. I am satisfied with the pulse rate at 56. Blood pressure 120/62. That is okay for Ruchi Sheridan. As long as she is asymptomatic. I would leave her current medications the same. Even the propranolol.

## 2020-02-14 NOTE — TELEPHONE ENCOUNTER
Chapin Martinez OT from [facility] calling to inform Dr. Frank Van that patient pulse 56 today     Best number to contact Chapin Martinez at  [phone]

## 2020-02-14 NOTE — TELEPHONE ENCOUNTER
Spoke with Genet Garcia OT from [facility] and gave OK for plan of care per protocol, Genet Garcia verbalized understanding.      FYI to Dr. Cat South

## 2020-02-14 NOTE — TELEPHONE ENCOUNTER
Keesha/OT/[facility] requesting verbal order for start of OT services  Beg today  1 week 1, 3 week 2, 2 week 1  Tasked to nursing

## 2020-02-14 NOTE — TELEPHONE ENCOUNTER
Message noted. I think if heart rate is less than 53 consistently I would consider cutting back on patient's propranolol.

## 2020-02-14 NOTE — TELEPHONE ENCOUNTER
I spoke with OT Crow Gonsalez and relayed Dr. Carissa Aguilar message. She verbalized understanding.  She asks if Dr. Adeola Portillo can give her a new parameter like call if HR is less than 52 bpm.

## 2020-02-14 NOTE — TELEPHONE ENCOUNTER
Spoke with Cristiano Light OT from [facility], patient's heart rate today was 56 BP was 128/62. Cristiano Light reports patient was asymptomatic-- no fatigue, dizziness or lightheadedness.  I reviewed in chart that patient is on propranolol and it looks like from past OV patie

## 2020-02-17 ENCOUNTER — TELEPHONE (OUTPATIENT)
Dept: ORTHOPEDICS CLINIC | Facility: CLINIC | Age: 75
End: 2020-02-17

## 2020-02-17 DIAGNOSIS — S82.034A CLOSED NONDISPLACED TRANSVERSE FRACTURE OF RIGHT PATELLA, INITIAL ENCOUNTER: Primary | ICD-10-CM

## 2020-02-17 NOTE — TELEPHONE ENCOUNTER
Per pt's , needing another immobilizer for right leg. The one that is currently being used, Velcro is not working. Please advise.

## 2020-02-17 NOTE — TELEPHONE ENCOUNTER
Pt's  called [employer] but their computer were down. Back logged for one week. They were unable to find the order. Pt is using immobilizer given in [place]. Is pt able to go elsewhere or does pt need to wait.   Please call to advise

## 2020-02-17 NOTE — TELEPHONE ENCOUNTER
Sathish Wheatley requesting call back, asking if active range of motion for either R or L shoulder is ok?  if not active range motion is passive ok? Is there any limitation on active range of motion for elbows or below?  Also needs clarification on slings for arm, i

## 2020-02-17 NOTE — TELEPHONE ENCOUNTER
S/w Lorenza Bender and informed him  placed order for knee immobilizer and will fax order to [facility] in [place]. Gave him phone # to call and schedule appt to get. He verbalized understanding. Order , demo, and insurance faxed.

## 2020-02-17 NOTE — TELEPHONE ENCOUNTER
Estevan Garvin RN and her VM was confidential so left detailed VM per [facility] orders.  Advised to CB if any q's

## 2020-02-17 NOTE — TELEPHONE ENCOUNTER
Aury Villareal and asked if something other than the slings can be used to support the patients shoulders to the trunk, but allow her elbows to be free to help her with ROM and adaptive equipment. S/w Dr. Ginger Simon and he doesn't want to change at this time.  Sh

## 2020-02-17 NOTE — TELEPHONE ENCOUNTER
Advise no ROM to bilateral shoulders. She may do acive ROM to bilateral elbows and wrist.  Slings on when up out of bed or chair. In the bed and chair she should be in beach-chair position with pillows supporting bilateral arms under the elbow.

## 2020-02-18 ENCOUNTER — NURSE ONLY (OUTPATIENT)
Dept: INTERNAL MEDICINE CLINIC | Facility: CLINIC | Age: 75
End: 2020-02-18
Payer: MEDICARE

## 2020-02-18 DIAGNOSIS — D51.0 PERNICIOUS ANEMIA: ICD-10-CM

## 2020-02-18 PROCEDURE — 96372 THER/PROPH/DIAG INJ SC/IM: CPT | Performed by: INTERNAL MEDICINE

## 2020-02-18 RX ADMIN — CYANOCOBALAMIN 1000 MCG: 1000 INJECTION INTRAMUSCULAR; SUBCUTANEOUS at 09:13:00

## 2020-02-20 RX ORDER — SULFASALAZINE 500 MG/1
TABLET ORAL
Qty: 180 TABLET | Refills: 0 | OUTPATIENT
Start: 2020-02-20

## 2020-02-20 NOTE — TELEPHONE ENCOUNTER
Current refill request refused due to refill is either a duplicate request or has active refills at the pharmacy. Check previous templates.     Requested Prescriptions     Refused Prescriptions Disp Refills   • sulfaSALAzine 500 MG Oral Tab [Pharmacy Med N

## 2020-02-25 ENCOUNTER — TELEPHONE (OUTPATIENT)
Dept: ORTHOPEDICS CLINIC | Facility: CLINIC | Age: 75
End: 2020-02-25

## 2020-02-25 ENCOUNTER — TELEPHONE (OUTPATIENT)
Dept: INTERNAL MEDICINE CLINIC | Facility: CLINIC | Age: 75
End: 2020-02-25

## 2020-02-25 RX ORDER — FOLIC ACID 1 MG/1
1 TABLET ORAL DAILY
Qty: 90 TABLET | Refills: 3 | Status: SHIPPED | OUTPATIENT
Start: 2020-02-25 | End: 2020-02-27

## 2020-02-25 NOTE — TELEPHONE ENCOUNTER
Called angelique and informed raimundo we rc'd fax but RH out of office til next wk. Will be faxed back once he is back in office.

## 2020-02-25 NOTE — TELEPHONE ENCOUNTER
Per Ren Costa to get in contact with Dr. Duane Cristobal regarding order. . States they have faxed over letter of medial necessity, is needing it signed by doctor to be able to order knee brace.   Fax number [phone]

## 2020-02-25 NOTE — TELEPHONE ENCOUNTER
Pt has no refills left on folic acid 1mg   -  Previously prescribed by Dr Isauro Robertson who is no longer in practice   - pt has not established with Dr Darrell Colunga yet & needs refill of her folic acid   Pt does not have any left & finished the 3 day courtesy supply - please refill today

## 2020-02-27 ENCOUNTER — LAB ENCOUNTER (OUTPATIENT)
Dept: LAB | Age: 75
End: 2020-02-27
Attending: INTERNAL MEDICINE
Payer: MEDICARE

## 2020-02-27 ENCOUNTER — OFFICE VISIT (OUTPATIENT)
Dept: INTERNAL MEDICINE CLINIC | Facility: CLINIC | Age: 75
End: 2020-02-27
Payer: MEDICARE

## 2020-02-27 VITALS
HEIGHT: 62 IN | WEIGHT: 139 LBS | OXYGEN SATURATION: 98 % | TEMPERATURE: 98 F | HEART RATE: 62 BPM | SYSTOLIC BLOOD PRESSURE: 160 MMHG | DIASTOLIC BLOOD PRESSURE: 80 MMHG | BODY MASS INDEX: 25.58 KG/M2

## 2020-02-27 DIAGNOSIS — S82.001A CLOSED NONDISPLACED FRACTURE OF RIGHT PATELLA, UNSPECIFIED FRACTURE MORPHOLOGY, INITIAL ENCOUNTER: ICD-10-CM

## 2020-02-27 DIAGNOSIS — R94.4 DECREASED GFR: ICD-10-CM

## 2020-02-27 DIAGNOSIS — S42.001D CLOSED DISPLACED FRACTURE OF RIGHT CLAVICLE WITH ROUTINE HEALING, UNSPECIFIED PART OF CLAVICLE, SUBSEQUENT ENCOUNTER: ICD-10-CM

## 2020-02-27 DIAGNOSIS — S42.215A CLOSED NONDISPLACED FRACTURE OF SURGICAL NECK OF LEFT HUMERUS, UNSPECIFIED FRACTURE MORPHOLOGY, INITIAL ENCOUNTER: Primary | ICD-10-CM

## 2020-02-27 DIAGNOSIS — F41.9 ANXIETY: ICD-10-CM

## 2020-02-27 DIAGNOSIS — D64.9 ANEMIA, UNSPECIFIED TYPE: ICD-10-CM

## 2020-02-27 DIAGNOSIS — I10 ESSENTIAL HYPERTENSION: ICD-10-CM

## 2020-02-27 DIAGNOSIS — I82.4Z1 ACUTE DEEP VEIN THROMBOSIS (DVT) OF DISTAL END OF RIGHT LOWER EXTREMITY (HCC): ICD-10-CM

## 2020-02-27 DIAGNOSIS — Z87.19 HISTORY OF CHRONIC ULCERATIVE COLITIS: ICD-10-CM

## 2020-02-27 LAB
ANION GAP SERPL CALC-SCNC: 5 MMOL/L (ref 0–18)
BASOPHILS # BLD AUTO: 0.04 X10(3) UL (ref 0–0.2)
BASOPHILS NFR BLD AUTO: 0.4 %
BUN BLD-MCNC: 33 MG/DL (ref 7–18)
BUN/CREAT SERPL: 35.1 (ref 10–20)
CALCIUM BLD-MCNC: 9.9 MG/DL (ref 8.5–10.1)
CHLORIDE SERPL-SCNC: 106 MMOL/L (ref 98–112)
CO2 SERPL-SCNC: 27 MMOL/L (ref 21–32)
CREAT BLD-MCNC: 0.94 MG/DL (ref 0.55–1.02)
DEPRECATED HBV CORE AB SER IA-ACNC: 129.3 NG/ML (ref 18–340)
DEPRECATED RDW RBC AUTO: 45.3 FL (ref 35.1–46.3)
EOSINOPHIL # BLD AUTO: 0.37 X10(3) UL (ref 0–0.7)
EOSINOPHIL NFR BLD AUTO: 3.7 %
ERYTHROCYTE [DISTWIDTH] IN BLOOD BY AUTOMATED COUNT: 12.6 % (ref 11–15)
GLUCOSE BLD-MCNC: 100 MG/DL (ref 70–99)
HCT VFR BLD AUTO: 34.1 % (ref 35–48)
HGB BLD-MCNC: 11.2 G/DL (ref 12–16)
IMM GRANULOCYTES # BLD AUTO: 0.05 X10(3) UL (ref 0–1)
IMM GRANULOCYTES NFR BLD: 0.5 %
IRON SATURATION: 14 % (ref 15–50)
IRON SERPL-MCNC: 61 UG/DL (ref 50–170)
LYMPHOCYTES # BLD AUTO: 1.43 X10(3) UL (ref 1–4)
LYMPHOCYTES NFR BLD AUTO: 14.1 %
MCH RBC QN AUTO: 32.6 PG (ref 26–34)
MCHC RBC AUTO-ENTMCNC: 32.8 G/DL (ref 31–37)
MCV RBC AUTO: 99.1 FL (ref 80–100)
MONOCYTES # BLD AUTO: 0.87 X10(3) UL (ref 0.1–1)
MONOCYTES NFR BLD AUTO: 8.6 %
NEUTROPHILS # BLD AUTO: 7.35 X10 (3) UL (ref 1.5–7.7)
NEUTROPHILS # BLD AUTO: 7.35 X10(3) UL (ref 1.5–7.7)
NEUTROPHILS NFR BLD AUTO: 72.7 %
OSMOLALITY SERPL CALC.SUM OF ELEC: 293 MOSM/KG (ref 275–295)
PATIENT FASTING Y/N/NP: YES
PLATELET # BLD AUTO: 295 10(3)UL (ref 150–450)
POTASSIUM SERPL-SCNC: 4.6 MMOL/L (ref 3.5–5.1)
RBC # BLD AUTO: 3.44 X10(6)UL (ref 3.8–5.3)
SODIUM SERPL-SCNC: 138 MMOL/L (ref 136–145)
TOTAL IRON BINDING CAPACITY: 422 UG/DL (ref 240–450)
TRANSFERRIN SERPL-MCNC: 283 MG/DL (ref 200–360)
VIT B12 SERPL-MCNC: 1333 PG/ML (ref 193–986)
WBC # BLD AUTO: 10.1 X10(3) UL (ref 4–11)

## 2020-02-27 PROCEDURE — 85025 COMPLETE CBC W/AUTO DIFF WBC: CPT

## 2020-02-27 PROCEDURE — 82607 VITAMIN B-12: CPT

## 2020-02-27 PROCEDURE — 82728 ASSAY OF FERRITIN: CPT

## 2020-02-27 PROCEDURE — 80048 BASIC METABOLIC PNL TOTAL CA: CPT

## 2020-02-27 PROCEDURE — 83540 ASSAY OF IRON: CPT

## 2020-02-27 PROCEDURE — 36415 COLL VENOUS BLD VENIPUNCTURE: CPT

## 2020-02-27 PROCEDURE — 99214 OFFICE O/P EST MOD 30 MIN: CPT | Performed by: INTERNAL MEDICINE

## 2020-02-27 PROCEDURE — 84466 ASSAY OF TRANSFERRIN: CPT

## 2020-02-27 PROCEDURE — G0463 HOSPITAL OUTPT CLINIC VISIT: HCPCS | Performed by: INTERNAL MEDICINE

## 2020-02-27 RX ORDER — FOLIC ACID 1 MG/1
1 TABLET ORAL DAILY
Qty: 90 TABLET | Refills: 3 | Status: SHIPPED | OUTPATIENT
Start: 2020-02-27 | End: 2020-04-08

## 2020-02-27 RX ORDER — ALPRAZOLAM 0.25 MG/1
TABLET ORAL
Qty: 20 TABLET | Refills: 1 | Status: SHIPPED | OUTPATIENT
Start: 2020-02-27 | End: 2020-08-14

## 2020-02-27 RX ORDER — SULFASALAZINE 500 MG/1
TABLET ORAL
Qty: 180 TABLET | Refills: 1 | Status: SHIPPED | OUTPATIENT
Start: 2020-02-27 | End: 2020-04-08

## 2020-02-27 NOTE — PROGRESS NOTES
Kody La is a 66year old female. HPI:   Patient presents with: Follow - Up: Falls,      Patient returns for follow-up. She has both arms in a sling.   She seems to be recovering fairly well from her right clavicle fracture and left humerus fra Doppler at that time to make sure that the below the knee DVT is resolved. I will then decide how long patient should be on Eliquis.       Current Outpatient Medications   Medication Sig Dispense Refill   • sulfaSALAzine (AZULFIDINE) 500 MG Oral Tab Take t days.     • Multiple Vitamin (MULTI-VITAMINS) Oral Tab Take 1 tablet by mouth daily with breakfast.     • HYDROcodone-acetaminophen 5-325 MG Oral Tab Take 1-2 tablets by mouth every 6 (six) hours as needed for Pain.  (Patient not taking: Reported on 2/27/20 Used    Alcohol use:  Yes      Alcohol/week: 4.0 standard drinks      Types: 4 Glasses of wine per week      Comment: 4 glass of wine per week    Drug use: No       REVIEW OF SYSTEMS:   GENERAL HEALTH:  feels well otherwise  RESPIRATORY:  Voices no shortnes Decreased GFR  We will update labs. - BASIC METABOLIC PANEL (8); Future    6. Essential hypertension  Blood pressure at home seems to be at goal.  Will continue current medication    7.  Closed nondisplaced fracture of right patella, unspecified fracture m

## 2020-02-28 ENCOUNTER — TELEPHONE (OUTPATIENT)
Dept: INTERNAL MEDICINE CLINIC | Facility: CLINIC | Age: 75
End: 2020-02-28

## 2020-02-28 RX ORDER — SULFASALAZINE 500 MG/1
TABLET ORAL
Qty: 180 TABLET | Refills: 0 | OUTPATIENT
Start: 2020-02-28

## 2020-02-28 NOTE — TELEPHONE ENCOUNTER
[facility] Message Below to Dr. Lela Florez

## 2020-02-28 NOTE — TELEPHONE ENCOUNTER
Please let patient know that I thought her labs came out good. Her kidney function is relatively stable. I am hoping that she can keep up with her fluids and have at least 1-1/2 quarts up to 2 quarts of fluid a day to keep her self hydrated.   Her iron le

## 2020-02-28 NOTE — TELEPHONE ENCOUNTER
Belem Boothe / Cindi  Calling for verbal order to for OT  2 sessions per week for 2 weeks  Please call with preferred way to receive B/P Readings, [phone]

## 2020-02-28 NOTE — TELEPHONE ENCOUNTER
Keesha/occupational therapist/[facility] called    Vitals out of parameters     Pulse was 58   No other symptoms     - Dr Henry Simon wanted to be notified   when under 61

## 2020-03-02 ENCOUNTER — HOSPITAL ENCOUNTER (OUTPATIENT)
Dept: GENERAL RADIOLOGY | Facility: HOSPITAL | Age: 75
Discharge: HOME | End: 2020-03-02
Attending: ORTHOPAEDIC SURGERY
Payer: MEDICARE

## 2020-03-02 ENCOUNTER — OFFICE VISIT (OUTPATIENT)
Dept: ORTHOPEDICS CLINIC | Facility: CLINIC | Age: 75
End: 2020-03-02
Payer: MEDICARE

## 2020-03-02 VITALS — BODY MASS INDEX: 25.58 KG/M2 | HEIGHT: 62 IN | WEIGHT: 139 LBS

## 2020-03-02 DIAGNOSIS — S42.202D CLOSED FRACTURE OF PROXIMAL END OF LEFT HUMERUS WITH ROUTINE HEALING, UNSPECIFIED FRACTURE MORPHOLOGY, SUBSEQUENT ENCOUNTER: ICD-10-CM

## 2020-03-02 DIAGNOSIS — Z47.89 ORTHOPEDIC AFTERCARE: ICD-10-CM

## 2020-03-02 DIAGNOSIS — S82.034D CLOSED NONDISPLACED TRANSVERSE FRACTURE OF RIGHT PATELLA WITH ROUTINE HEALING, SUBSEQUENT ENCOUNTER: ICD-10-CM

## 2020-03-02 DIAGNOSIS — Z47.89 ORTHOPEDIC AFTERCARE: Primary | ICD-10-CM

## 2020-03-02 DIAGNOSIS — S42.034D CLOSED NONDISPLACED FRACTURE OF ACROMIAL END OF RIGHT CLAVICLE WITH ROUTINE HEALING, SUBSEQUENT ENCOUNTER: ICD-10-CM

## 2020-03-02 DIAGNOSIS — S60.222D CONTUSION OF LEFT HAND, SUBSEQUENT ENCOUNTER: ICD-10-CM

## 2020-03-02 PROCEDURE — 73130 X-RAY EXAM OF HAND: CPT | Performed by: ORTHOPAEDIC SURGERY

## 2020-03-02 PROCEDURE — 73000 X-RAY EXAM OF COLLAR BONE: CPT | Performed by: ORTHOPAEDIC SURGERY

## 2020-03-02 PROCEDURE — 99024 POSTOP FOLLOW-UP VISIT: CPT | Performed by: PHYSICIAN ASSISTANT

## 2020-03-02 PROCEDURE — 99214 OFFICE O/P EST MOD 30 MIN: CPT | Performed by: ORTHOPAEDIC SURGERY

## 2020-03-02 PROCEDURE — G0463 HOSPITAL OUTPT CLINIC VISIT: HCPCS | Performed by: PHYSICIAN ASSISTANT

## 2020-03-02 PROCEDURE — 73030 X-RAY EXAM OF SHOULDER: CPT | Performed by: ORTHOPAEDIC SURGERY

## 2020-03-02 PROCEDURE — 73560 X-RAY EXAM OF KNEE 1 OR 2: CPT | Performed by: ORTHOPAEDIC SURGERY

## 2020-03-02 PROCEDURE — G0463 HOSPITAL OUTPT CLINIC VISIT: HCPCS | Performed by: ORTHOPAEDIC SURGERY

## 2020-03-02 NOTE — PROGRESS NOTES
NURSING INTAKE COMMENTS: Patient presents with:  Fracture: L shoulder, L hand, R patella  and R clavicle f/u - states she is okay - has pain rated as 4-10/10 on and off , no numbness or tingling       HPI: This 66year old female presents today for follow- • Migraine 2012    OCCULAR MIGRAINE   • Migraine     OCCULAR MIGRAINE    • Neuroma 2007    2 LT, HALLUX RIGIDUS LT, PER. NG.   • Osteoarthritis    • Other ill-defined conditions(799.89)     TAHBSO MGMT.    • Other ill-defined conditions(799.89)     CHRON 6 (six) hours as needed.  (Patient not taking: Reported on 2/27/2020 ) 30 capsule 1   • hydrochlorothiazide 12.5 MG Oral Tab take 1 tABLET BY MOUTH ON MONDAY, WEDNESDAY AND FRIDAY 30 tablet 3   • PROPRANOLOL HCL 80 MG Oral Tab TAKE ONE TABLET BY MOUTH TWICE denies    • Cancer Self 60        uterine   • Prostate Cancer Maternal Uncle         age unknown/ at 80   • Kidney Disease Neg         UROLITHIASIS     No family Hx of DVT/PE    Social History    Occupational History      Not on file    Tobacco Use Right knee she has some slight discomfort to the patella. However she is able to actively flex and extend the knee without difficulty. Neurological: Neurologically and Vascularly intact throughout bilateral upper extremities and right lower extremity. 2/10/2020  PROCEDURE: XR SHOULDER, COMPLETE (MIN 2 VIEWS), RIGHT (CPT=73030)  COMPARISON: [facility], XR CHEST PA + LAT CHEST (MZX=74853), 3/06/2018, 10:26. INDICATIONS: Post fall today, bilateral shoulder pain  TECHNIQUE: 3 views were obtained.    FINDINGS:  BON 2/11/2020 at 9:55     Approved by (CST):  Khadar Huertas MD on 2/11/2020 at 9:57          Us Venous Doppler Leg Right - Diag Img (cpt=93971)    Result Date: 2/6/2020  PROCEDURE: US VENOUS DOPPLER LEG RIGHT-DIAG IMG (CPT=93971)  COMPARISON: [facility]  -     Cancel: XR PATELLA RIGHT  (CPT=73560); Future  -     XR CLAVICLE, COMPLETE, RIGHT (CPT=73000); Future  -     XR HAND (MIN 3 VIEWS), LEFT (CPT=73130); Future  -     XR SHOULDER, COMPLETE (MIN 2 VIEWS), LEFT (CPT=73030);  Future    Closed non

## 2020-03-04 RX ORDER — HYDROCHLOROTHIAZIDE 12.5 MG/1
TABLET ORAL
Qty: 30 TABLET | Refills: 3 | OUTPATIENT
Start: 2020-03-04

## 2020-03-04 NOTE — TELEPHONE ENCOUNTER
1 year prescribed 5/24/19    Current refill request refused due to refill is either a duplicate request or has active refills at the pharmacy. Check previous templates.     Requested Prescriptions     Refused Prescriptions Disp Refills   • hydrochlorothiaz

## 2020-03-12 ENCOUNTER — OFFICE VISIT (OUTPATIENT)
Dept: INTERNAL MEDICINE CLINIC | Facility: CLINIC | Age: 75
End: 2020-03-12
Payer: MEDICARE

## 2020-03-12 ENCOUNTER — LAB ENCOUNTER (OUTPATIENT)
Dept: LAB | Age: 75
End: 2020-03-12
Attending: INTERNAL MEDICINE
Payer: MEDICARE

## 2020-03-12 VITALS
HEART RATE: 58 BPM | SYSTOLIC BLOOD PRESSURE: 106 MMHG | DIASTOLIC BLOOD PRESSURE: 50 MMHG | TEMPERATURE: 98 F | OXYGEN SATURATION: 98 %

## 2020-03-12 DIAGNOSIS — I82.4Z1 ACUTE DEEP VEIN THROMBOSIS (DVT) OF DISTAL END OF RIGHT LOWER EXTREMITY (HCC): ICD-10-CM

## 2020-03-12 DIAGNOSIS — S42.001D CLOSED DISPLACED FRACTURE OF RIGHT CLAVICLE WITH ROUTINE HEALING, UNSPECIFIED PART OF CLAVICLE, SUBSEQUENT ENCOUNTER: ICD-10-CM

## 2020-03-12 DIAGNOSIS — I10 ESSENTIAL HYPERTENSION: ICD-10-CM

## 2020-03-12 DIAGNOSIS — S42.215A CLOSED NONDISPLACED FRACTURE OF SURGICAL NECK OF LEFT HUMERUS, UNSPECIFIED FRACTURE MORPHOLOGY, INITIAL ENCOUNTER: Primary | ICD-10-CM

## 2020-03-12 DIAGNOSIS — D64.9 ANEMIA, UNSPECIFIED TYPE: ICD-10-CM

## 2020-03-12 DIAGNOSIS — R94.4 DECREASED GFR: ICD-10-CM

## 2020-03-12 LAB
ANION GAP SERPL CALC-SCNC: 7 MMOL/L (ref 0–18)
BASOPHILS # BLD AUTO: 0.04 X10(3) UL (ref 0–0.2)
BASOPHILS NFR BLD AUTO: 0.5 %
BUN BLD-MCNC: 31 MG/DL (ref 7–18)
BUN/CREAT SERPL: 28.4 (ref 10–20)
CALCIUM BLD-MCNC: 9.9 MG/DL (ref 8.5–10.1)
CHLORIDE SERPL-SCNC: 106 MMOL/L (ref 98–112)
CO2 SERPL-SCNC: 23 MMOL/L (ref 21–32)
CREAT BLD-MCNC: 1.09 MG/DL (ref 0.55–1.02)
DEPRECATED RDW RBC AUTO: 46.5 FL (ref 35.1–46.3)
EOSINOPHIL # BLD AUTO: 0.39 X10(3) UL (ref 0–0.7)
EOSINOPHIL NFR BLD AUTO: 4.5 %
ERYTHROCYTE [DISTWIDTH] IN BLOOD BY AUTOMATED COUNT: 12.6 % (ref 11–15)
GLUCOSE BLD-MCNC: 81 MG/DL (ref 70–99)
HCT VFR BLD AUTO: 36.6 % (ref 35–48)
HGB BLD-MCNC: 11.7 G/DL (ref 12–16)
IMM GRANULOCYTES # BLD AUTO: 0.04 X10(3) UL (ref 0–1)
IMM GRANULOCYTES NFR BLD: 0.5 %
LYMPHOCYTES # BLD AUTO: 1.55 X10(3) UL (ref 1–4)
LYMPHOCYTES NFR BLD AUTO: 18 %
MCH RBC QN AUTO: 32.1 PG (ref 26–34)
MCHC RBC AUTO-ENTMCNC: 32 G/DL (ref 31–37)
MCV RBC AUTO: 100.3 FL (ref 80–100)
MONOCYTES # BLD AUTO: 1.04 X10(3) UL (ref 0.1–1)
MONOCYTES NFR BLD AUTO: 12.1 %
NEUTROPHILS # BLD AUTO: 5.56 X10 (3) UL (ref 1.5–7.7)
NEUTROPHILS # BLD AUTO: 5.56 X10(3) UL (ref 1.5–7.7)
NEUTROPHILS NFR BLD AUTO: 64.4 %
OSMOLALITY SERPL CALC.SUM OF ELEC: 288 MOSM/KG (ref 275–295)
PATIENT FASTING Y/N/NP: NO
PLATELET # BLD AUTO: 294 10(3)UL (ref 150–450)
POTASSIUM SERPL-SCNC: 4.2 MMOL/L (ref 3.5–5.1)
RBC # BLD AUTO: 3.65 X10(6)UL (ref 3.8–5.3)
SODIUM SERPL-SCNC: 136 MMOL/L (ref 136–145)
WBC # BLD AUTO: 8.6 X10(3) UL (ref 4–11)

## 2020-03-12 PROCEDURE — G0463 HOSPITAL OUTPT CLINIC VISIT: HCPCS | Performed by: INTERNAL MEDICINE

## 2020-03-12 PROCEDURE — 36415 COLL VENOUS BLD VENIPUNCTURE: CPT

## 2020-03-12 PROCEDURE — 80048 BASIC METABOLIC PNL TOTAL CA: CPT

## 2020-03-12 PROCEDURE — 85025 COMPLETE CBC W/AUTO DIFF WBC: CPT

## 2020-03-12 PROCEDURE — 99214 OFFICE O/P EST MOD 30 MIN: CPT | Performed by: INTERNAL MEDICINE

## 2020-03-12 NOTE — PROGRESS NOTES
Christy Toledo is a 66year old female. HPI:   Patient presents with:  Checkup: 2 week     Patient doing well. She will be seeing Dr. Cathy Pena next week. She has no new musculoskeletal complaints. No lower extremity swelling.   She is known to have a ONE TIME DAILY  90 tablet 3   • butalbital-aspirin-caffeine -40 MG Oral Cap Take 1 capsule by mouth every 6 (six) hours as needed.  30 capsule 1   • hydrochlorothiazide 12.5 MG Oral Tab take 1 tABLET BY MOUTH ON MONDAY, WEDNESDAY AND FRIDAY 30 tablet High cholesterol    • History of DVT (deep vein thrombosis)    • Hypercholesterolemia    • Hyperparathyroidism ([address] 2004    PAIR OF PARATHYROID REMOVED   • Hyperparathyroidism ([address]     PAIR OF PARATHYROID REMOVED    • Hypertension    • Migraine 2012    O tenderness  EXTREMITIES : no cyanosis, clubbing or edema. Right lower extremity and an immobilizer. Calf nontender. ASSESSMENT AND PLAN:     1.  Closed nondisplaced fracture of surgical neck of left humerus, unspecified fracture morphology, initial enc

## 2020-03-13 ENCOUNTER — TELEPHONE (OUTPATIENT)
Dept: INTERNAL MEDICINE CLINIC | Facility: CLINIC | Age: 75
End: 2020-03-13

## 2020-03-13 NOTE — TELEPHONE ENCOUNTER
Please notify patient that her blood count is good. Just minimal anemia. Her kidney level is still a little bit low may be just a very minimal amount lower than last labs but in range of what she has been before.    With her blood pressure being so good y

## 2020-03-13 NOTE — TELEPHONE ENCOUNTER
Dr Vlad Kelly, patient states occupation therapy was there this morning and took B/P 132/70 prior to her therapy as an [place]. Patient verbalized understanding of your message with no further questions noted.

## 2020-03-17 ENCOUNTER — HOSPITAL ENCOUNTER (OUTPATIENT)
Dept: GENERAL RADIOLOGY | Facility: HOSPITAL | Age: 75
Discharge: HOME | End: 2020-03-17
Attending: ORTHOPAEDIC SURGERY
Payer: MEDICARE

## 2020-03-17 ENCOUNTER — TELEPHONE (OUTPATIENT)
Dept: INTERNAL MEDICINE CLINIC | Facility: CLINIC | Age: 75
End: 2020-03-17

## 2020-03-17 ENCOUNTER — OFFICE VISIT (OUTPATIENT)
Dept: ORTHOPEDICS CLINIC | Facility: CLINIC | Age: 75
End: 2020-03-17
Payer: MEDICARE

## 2020-03-17 DIAGNOSIS — Z47.89 ORTHOPEDIC AFTERCARE: ICD-10-CM

## 2020-03-17 DIAGNOSIS — S42.202D CLOSED FRACTURE OF PROXIMAL END OF LEFT HUMERUS WITH ROUTINE HEALING, UNSPECIFIED FRACTURE MORPHOLOGY, SUBSEQUENT ENCOUNTER: ICD-10-CM

## 2020-03-17 DIAGNOSIS — S42.034D CLOSED NONDISPLACED FRACTURE OF ACROMIAL END OF RIGHT CLAVICLE WITH ROUTINE HEALING, SUBSEQUENT ENCOUNTER: ICD-10-CM

## 2020-03-17 DIAGNOSIS — S60.222D CONTUSION OF LEFT HAND, SUBSEQUENT ENCOUNTER: ICD-10-CM

## 2020-03-17 DIAGNOSIS — S82.034D CLOSED NONDISPLACED TRANSVERSE FRACTURE OF RIGHT PATELLA WITH ROUTINE HEALING, SUBSEQUENT ENCOUNTER: Primary | ICD-10-CM

## 2020-03-17 PROCEDURE — 73000 X-RAY EXAM OF COLLAR BONE: CPT | Performed by: ORTHOPAEDIC SURGERY

## 2020-03-17 PROCEDURE — G0463 HOSPITAL OUTPT CLINIC VISIT: HCPCS | Performed by: ORTHOPAEDIC SURGERY

## 2020-03-17 PROCEDURE — 99024 POSTOP FOLLOW-UP VISIT: CPT | Performed by: ORTHOPAEDIC SURGERY

## 2020-03-17 PROCEDURE — 73030 X-RAY EXAM OF SHOULDER: CPT | Performed by: ORTHOPAEDIC SURGERY

## 2020-03-17 PROCEDURE — 73560 X-RAY EXAM OF KNEE 1 OR 2: CPT | Performed by: ORTHOPAEDIC SURGERY

## 2020-03-17 PROCEDURE — L1832 KO ADJ JNT POS R SUP PRE CST: HCPCS | Performed by: ORTHOPAEDIC SURGERY

## 2020-03-17 NOTE — PROGRESS NOTES
NURSING INTAKE COMMENTS: Patient presents with:  Fracture: L shoulder, L hand, R patella, R clavicle f/u - she still has discomfort with certain movements       HPI: This 66year old female presents today about 2 months after right patella and left hand fr Ulcerative colitis ([facility] [address] 1976   • Uterine cancer ([facility] [address] 03/2002     Past Surgical History:   Procedure Laterality Date   • APPENDECTOMY     • COLONOSCOPY N/A 5/24/2018    Performed by Maddy Franz MD at [address] [place] [address] ENDOSCOPY   • COLONOSCOPY N/A 12/5/2016 needed.  ) 1 Inhaler 0   • triamcinolone acetonide 0.1 % External Cream Apply 1 Application topically as needed. 1   • Alendronate Sodium (FOSAMAX) 70 MG Oral Tab Take 1 tablet by mouth once a week.   3   • Calcium Citrate-Vitamin D (CALCIUM CITRATE + D3 vision  HEENT: denies new nasal congestion, sinus pain or ST  LUNGS: denies shortness of breath  CARDIOVASCULAR: denies chest pain  GI: no hematemesis, no worsening heartburn, no diarrhea  : no dysuria, no blood in urine, no difficulty urinating, no inco patella fracture appear to be completely healed. The left proximal humerus fracture also appear to be completely healed. There was abundant callus for the right distal clavicle and the and no x-rays were taken of the hand today.       Xr Knee (1 Or 2 View (euc=[id number])    Result Date: 3/2/2020  PROCEDURE: XR HAND (MIN 3 VIEWS), LEFT (CPT=73130)  COMPARISON: [facility], [facility] 2nd Floor, XR HAND (MIN 3 VIEWS), LEFT (CPT=73130), 1/28/2020, 8:35.   INDICATIONS: Follow up left hand 5th MC fracture Arvin Montague MD on 3/02/2020 at 10:26     Approved by (CST): Arvin Montague MD on 3/02/2020 at 10:29             Lab Results   Component Value Date    WBC 8.6 03/12/2020    HGB 11.7 (L) 03/12/2020    .0 03/12/2020      Lab Results   Component Valu

## 2020-03-17 NOTE — TELEPHONE ENCOUNTER
Noted. We can tell her I was able to read Dr. Manfred Gardner consult note and know she does not have to wear her arm sleeves. It does appear she still needs her brace but is able to flex her leg 90%. So she still should continue the Eliquis she is on.

## 2020-03-18 ENCOUNTER — TELEPHONE (OUTPATIENT)
Dept: INTERNAL MEDICINE CLINIC | Facility: CLINIC | Age: 75
End: 2020-03-18

## 2020-03-18 NOTE — TELEPHONE ENCOUNTER
Message noted. I think that now her arm slings are off, I am wondering if she can take 3 BP'S over next three days and call in results. We can then use those numbers to decide next steps.

## 2020-03-18 NOTE — TELEPHONE ENCOUNTER
Estevan Garvin from [facility] and relayed DR. MARK message - she verbalized understanding and will relay this message to patient

## 2020-03-18 NOTE — TELEPHONE ENCOUNTER
[facility] from [facility]. is calling to inform Dr. Moe Skinner that pt.'s systolic BP is out of parameter 146/68 pt.  Has no symptoms ph. # [phone] routed to clinical

## 2020-03-20 RX ORDER — HYDROCHLOROTHIAZIDE 12.5 MG/1
TABLET ORAL
Qty: 30 TABLET | Refills: 3 | Status: SHIPPED | OUTPATIENT
Start: 2020-03-20 | End: 2020-03-20

## 2020-03-20 RX ORDER — HYDROCHLOROTHIAZIDE 12.5 MG/1
TABLET ORAL
Qty: 30 TABLET | Refills: 3 | Status: SHIPPED | OUTPATIENT
Start: 2020-03-20 | End: 2020-12-06

## 2020-03-20 NOTE — TELEPHONE ENCOUNTER
Patient calling back as her BP's have been increasing and was advised to restart Hctz per Dr. Bayron Walton    Refill request is for a maintenance medication and has met the criteria specified in the Ambulatory Medication Refill Standing Order for eligibility, vi

## 2020-03-20 NOTE — TELEPHONE ENCOUNTER
Per TE 3/13/2020    \"With her blood pressure being so good yesterday in the office I think it might be wise to stop her hydrochlorothiazide.   I believe there is some him to stop the hydrochlorothiazide thinking that her blood pressure will not increase as

## 2020-03-24 NOTE — TELEPHONE ENCOUNTER
Patient is calling with B/P readings    3/18  146/68  3/20  136/70  3/21  150/73 1 minute later 140/69  3/22  127/61  3/24  175/72 30 min later  156/72    Tasked to nursing

## 2020-04-08 PROBLEM — Z80.0 FAMILY HISTORY OF COLON CANCER: Status: ACTIVE | Noted: 2020-04-08

## 2020-04-11 ENCOUNTER — TELEPHONE (OUTPATIENT)
Dept: INTERNAL MEDICINE CLINIC | Facility: CLINIC | Age: 75
End: 2020-04-11

## 2020-04-11 NOTE — TELEPHONE ENCOUNTER
Contacted pt. For screening. She prefers not to come in at this time. She has agreed and consented to a telephone visit on Monday in [place] of an office visit.

## 2020-04-12 NOTE — TELEPHONE ENCOUNTER
[address] desk. I spoke to patient. We discussed telephone consultation versus video visit. I told her it might be beneficial to do a video visit. She will download my [facility] from [facility]. I told her we would call her may be around 8:00 or so to do the registration process and then nursing can call her 15 minutes before her 10:00 appointment. Thank you.

## 2020-04-13 ENCOUNTER — TELEPHONE (OUTPATIENT)
Dept: INTERNAL MEDICINE CLINIC | Facility: CLINIC | Age: 75
End: 2020-04-13

## 2020-04-13 DIAGNOSIS — I10 ESSENTIAL HYPERTENSION: Primary | ICD-10-CM

## 2020-04-13 DIAGNOSIS — D64.9 ANEMIA, UNSPECIFIED TYPE: ICD-10-CM

## 2020-04-13 DIAGNOSIS — R94.4 DECREASED GFR: ICD-10-CM

## 2020-04-13 DIAGNOSIS — I82.4Z1 ACUTE DEEP VEIN THROMBOSIS (DVT) OF DISTAL END OF RIGHT LOWER EXTREMITY (HCC): ICD-10-CM

## 2020-04-13 DIAGNOSIS — Z87.19 HISTORY OF CHRONIC ULCERATIVE COLITIS: ICD-10-CM

## 2020-04-13 DIAGNOSIS — M81.8 OTHER OSTEOPOROSIS WITHOUT CURRENT PATHOLOGICAL FRACTURE: ICD-10-CM

## 2020-04-13 PROCEDURE — 99442 PHONE E/M BY PHYS 11-20 MIN: CPT | Performed by: INTERNAL MEDICINE

## 2020-04-13 NOTE — TELEPHONE ENCOUNTER
Virtual Telephone Check-In    Hector Mcknight verbally consents to a Virtual/Telephone Check-In visit on 04/13/20. Patient understands and accepts financial responsibility for any deductible, co-insurance and/or co-pays associated with this service. eventually also repeat electrolytes for her decreased GFR and CBC for her anemia. Impression:  Hypertension–controlled    History of ulcerative colitis. Also family history of colon cancer.   Mother had colon cancer on 2 separate occasions in 2 separate

## 2020-04-13 NOTE — TELEPHONE ENCOUNTER
Please schedule regular office visit appointment for 1 month.   ( I completed telephone consultation today)

## 2020-04-20 ENCOUNTER — HOSPITAL ENCOUNTER (OUTPATIENT)
Dept: GENERAL RADIOLOGY | Facility: HOSPITAL | Age: 75
Discharge: HOME | End: 2020-04-20
Attending: ORTHOPAEDIC SURGERY
Payer: MEDICARE

## 2020-04-20 ENCOUNTER — OFFICE VISIT (OUTPATIENT)
Dept: ORTHOPEDICS CLINIC | Facility: CLINIC | Age: 75
End: 2020-04-20
Payer: MEDICARE

## 2020-04-20 VITALS — BODY MASS INDEX: 25.58 KG/M2 | WEIGHT: 139 LBS | HEIGHT: 62 IN

## 2020-04-20 DIAGNOSIS — S82.034D CLOSED NONDISPLACED TRANSVERSE FRACTURE OF RIGHT PATELLA WITH ROUTINE HEALING, SUBSEQUENT ENCOUNTER: ICD-10-CM

## 2020-04-20 DIAGNOSIS — Z47.89 ORTHOPEDIC AFTERCARE: ICD-10-CM

## 2020-04-20 DIAGNOSIS — Z47.89 ORTHOPEDIC AFTERCARE: Primary | ICD-10-CM

## 2020-04-20 DIAGNOSIS — S42.202D CLOSED FRACTURE OF PROXIMAL END OF LEFT HUMERUS WITH ROUTINE HEALING, UNSPECIFIED FRACTURE MORPHOLOGY, SUBSEQUENT ENCOUNTER: ICD-10-CM

## 2020-04-20 DIAGNOSIS — S42.034D CLOSED NONDISPLACED FRACTURE OF ACROMIAL END OF RIGHT CLAVICLE WITH ROUTINE HEALING, SUBSEQUENT ENCOUNTER: ICD-10-CM

## 2020-04-20 DIAGNOSIS — S60.222D CONTUSION OF LEFT HAND, SUBSEQUENT ENCOUNTER: ICD-10-CM

## 2020-04-20 PROCEDURE — G0463 HOSPITAL OUTPT CLINIC VISIT: HCPCS | Performed by: ORTHOPAEDIC SURGERY

## 2020-04-20 PROCEDURE — 73030 X-RAY EXAM OF SHOULDER: CPT | Performed by: ORTHOPAEDIC SURGERY

## 2020-04-20 PROCEDURE — G0463 HOSPITAL OUTPT CLINIC VISIT: HCPCS | Performed by: PHYSICIAN ASSISTANT

## 2020-04-20 PROCEDURE — 99214 OFFICE O/P EST MOD 30 MIN: CPT | Performed by: ORTHOPAEDIC SURGERY

## 2020-04-20 PROCEDURE — 73130 X-RAY EXAM OF HAND: CPT | Performed by: ORTHOPAEDIC SURGERY

## 2020-04-20 PROCEDURE — 99024 POSTOP FOLLOW-UP VISIT: CPT | Performed by: PHYSICIAN ASSISTANT

## 2020-04-20 PROCEDURE — 73560 X-RAY EXAM OF KNEE 1 OR 2: CPT | Performed by: ORTHOPAEDIC SURGERY

## 2020-04-20 NOTE — PROGRESS NOTES
NURSING INTAKE COMMENTS: Patient presents with:  Fracture: L shoulder, L hand, R patella, R Clavicle F/u. Per patient reporsts some discomfort on L shoulder and R clavicle.        HPI: This 66year old female presents today for follow-up 3 months after righ Hypercholesterolemia    • Hyperparathyroidism ([facility] [address] 2004    PAIR OF PARATHYROID REMOVED   • Hyperparathyroidism ([facility] [address]     PAIR OF PARATHYROID REMOVED    • Hypertension    • Migraine 2012    OCCULAR MIGRAINE   • Migraine     OCCULAR MIGRAINE    • Neuroma 20 TIME DAILY  90 tablet 3   • butalbital-aspirin-caffeine -40 MG Oral Cap Take 1 capsule by mouth every 6 (six) hours as needed.  30 capsule 1   • PROPRANOLOL HCL 80 MG Oral Tab TAKE ONE TABLET BY MOUTH TWICE DAILY 180 tablet 3   • hydrocortisone 2.5 % UROLITHIASIS     No family Hx of DVT/PE    Social History    Occupational History      Not on file    Tobacco Use      Smoking status: Never Smoker      Smokeless tobacco: Never Used    Substance and Sexual Activity      Alcohol use:  Yes        Alcohol motion was from 0 to 95 degrees. There is no crepitus    Right shoulder had full forward flexion full abduction full internal rotation. There is no clicking or pain along the [facility] joint.   She no tenderness along the clavicle both to the lateral and medial pain    Plan: Advised patient continue to work on range of motion activities to bilateral upper extremities. She may do light strengthening. For the right knee opened her brace from 0 to 130 degrees.   Advised patient may slowly discontinue the use of her

## 2020-04-21 ENCOUNTER — TELEPHONE (OUTPATIENT)
Dept: INTERNAL MEDICINE CLINIC | Facility: CLINIC | Age: 75
End: 2020-04-21

## 2020-04-21 DIAGNOSIS — S62.307A CLOSED FRACTURE OF FIFTH METACARPAL BONE OF LEFT HAND, UNSPECIFIED FRACTURE MORPHOLOGY, INITIAL ENCOUNTER: Primary | ICD-10-CM

## 2020-04-21 DIAGNOSIS — I82.4Z1 ACUTE DEEP VEIN THROMBOSIS (DVT) OF DISTAL END OF RIGHT LOWER EXTREMITY (HCC): Primary | ICD-10-CM

## 2020-04-21 DIAGNOSIS — I10 ESSENTIAL HYPERTENSION: ICD-10-CM

## 2020-04-21 NOTE — TELEPHONE ENCOUNTER
Hi Dr. Radha Avila. This is a general question about management of a distal DVT and not an official consultation for [employer] fractured her kneecap and has been under the care of orthopedics.   Her fracture occurred January 17 when she was seen at an o little bit more mobile another month or so of the Eliquis may be reasonable and a venous Doppler more closer to the end of a 4-month treatment.   There may not be any specific recommendation other than a definite period of 3-month treatment but I thought I

## 2020-04-21 NOTE — TELEPHONE ENCOUNTER
Discussed with patient. Considering options I have asked her to get a venous Doppler May 4. This will be a couple days before her 3-month anniversary of being on Eliquis May 6. This way we can make sure the distal DVT is resolved.   Patient is more mobil

## 2020-04-21 NOTE — TELEPHONE ENCOUNTER
MD Joyce Sandhu MD   Caller: Unspecified (Today, 10:11 AM)             Dr Luz Maria Welsh,   3 mo is usually what I do unless there is a good reason to extent it further. If duplex neg, I would stop at 3 mo and encourage more mobility.  IF mobility i

## 2020-04-21 NOTE — TELEPHONE ENCOUNTER
Patient saw Dr Abimael Payne yesterday   Calling to give update to Dr Paula Plasencia    Re: her knee  Pt can stop wearing brace but if she does any amount of walking she should wear it  Possibility of a torn meniscus on the inside to the left of her knee cap - if

## 2020-04-23 NOTE — TELEPHONE ENCOUNTER
Please let patient know that when she goes for her leg U/S I also place order for her labs to check her blood count and kidney numbers. I neglected to tell her this when I spoke to her earlier this week.

## 2020-05-04 ENCOUNTER — LAB ENCOUNTER (OUTPATIENT)
Dept: LAB | Facility: HOSPITAL | Age: 75
End: 2020-05-04
Attending: INTERNAL MEDICINE
Payer: MEDICARE

## 2020-05-04 ENCOUNTER — HOSPITAL ENCOUNTER (OUTPATIENT)
Dept: ULTRASOUND IMAGING | Facility: HOSPITAL | Age: 75
Discharge: HOME | End: 2020-05-04
Attending: INTERNAL MEDICINE
Payer: MEDICARE

## 2020-05-04 DIAGNOSIS — I10 ESSENTIAL HYPERTENSION: ICD-10-CM

## 2020-05-04 DIAGNOSIS — I82.4Z1 ACUTE DEEP VEIN THROMBOSIS (DVT) OF DISTAL END OF RIGHT LOWER EXTREMITY (HCC): ICD-10-CM

## 2020-05-04 PROCEDURE — 85025 COMPLETE CBC W/AUTO DIFF WBC: CPT

## 2020-05-04 PROCEDURE — 80048 BASIC METABOLIC PNL TOTAL CA: CPT

## 2020-05-04 PROCEDURE — 93971 EXTREMITY STUDY: CPT | Performed by: INTERNAL MEDICINE

## 2020-05-04 PROCEDURE — 36415 COLL VENOUS BLD VENIPUNCTURE: CPT

## 2020-05-12 ENCOUNTER — OFFICE VISIT (OUTPATIENT)
Dept: INTERNAL MEDICINE CLINIC | Facility: CLINIC | Age: 75
End: 2020-05-12
Payer: MEDICARE

## 2020-05-12 VITALS
HEART RATE: 60 BPM | BODY MASS INDEX: 25.95 KG/M2 | HEIGHT: 62 IN | DIASTOLIC BLOOD PRESSURE: 70 MMHG | OXYGEN SATURATION: 98 % | WEIGHT: 141 LBS | TEMPERATURE: 98 F | SYSTOLIC BLOOD PRESSURE: 138 MMHG

## 2020-05-12 DIAGNOSIS — I82.4Z1 ACUTE DEEP VEIN THROMBOSIS (DVT) OF DISTAL END OF RIGHT LOWER EXTREMITY (HCC): ICD-10-CM

## 2020-05-12 DIAGNOSIS — Z87.19 HISTORY OF CHRONIC ULCERATIVE COLITIS: ICD-10-CM

## 2020-05-12 DIAGNOSIS — S42.215A CLOSED NONDISPLACED FRACTURE OF SURGICAL NECK OF LEFT HUMERUS, UNSPECIFIED FRACTURE MORPHOLOGY, INITIAL ENCOUNTER: ICD-10-CM

## 2020-05-12 DIAGNOSIS — D64.9 ANEMIA, UNSPECIFIED TYPE: ICD-10-CM

## 2020-05-12 DIAGNOSIS — R94.4 DECREASED GFR: ICD-10-CM

## 2020-05-12 DIAGNOSIS — S82.001A CLOSED NONDISPLACED FRACTURE OF RIGHT PATELLA, UNSPECIFIED FRACTURE MORPHOLOGY, INITIAL ENCOUNTER: ICD-10-CM

## 2020-05-12 DIAGNOSIS — I10 ESSENTIAL HYPERTENSION: Primary | ICD-10-CM

## 2020-05-12 DIAGNOSIS — S42.001D CLOSED DISPLACED FRACTURE OF RIGHT CLAVICLE WITH ROUTINE HEALING, UNSPECIFIED PART OF CLAVICLE, SUBSEQUENT ENCOUNTER: ICD-10-CM

## 2020-05-12 PROCEDURE — 99214 OFFICE O/P EST MOD 30 MIN: CPT | Performed by: INTERNAL MEDICINE

## 2020-05-12 PROCEDURE — G0463 HOSPITAL OUTPT CLINIC VISIT: HCPCS | Performed by: INTERNAL MEDICINE

## 2020-05-12 NOTE — PROGRESS NOTES
Vinicio Gómez is a 66year old female. HPI:   Patient presents with: Follow - Up: 1 month F/U  Medication Question: vitamin B 12 patient took B 12 1000mcg daily instead injection     Patient doing well.   She has no unusual complaints for today's visit hysterectomy for history of uterine cancer.   Current Outpatient Medications   Medication Sig Dispense Refill   • sulfaSALAzine (AZULFIDINE) 500 MG Oral Tab Take two tablets three times a day 180 tablet 11   • folic acid 1 MG Oral Tab Take 1 tablet (1 mg to 1      Past Medical History:   Diagnosis Date   • Arthritis    • Back pain    • Cough 2011   • Essential hypertension    • Family history of colon cancer 10/21/2014   •  0     PARA ZERO   • H/O foot surgery ,    LEFT FOOT SURGERY, PI /70 (BP Location: Right arm, Patient Position: Sitting, Cuff Size: adult)   Pulse 60   Temp 97.8 °F (36.6 °C) (Oral)   Ht 5' 2\" (1.575 m)   Wt 141 lb (64 kg)   SpO2 98%   BMI 25.79 kg/m²     GENERAL:  well developed, well nourished, in no apparent Patient follows with orthopedics. She is now more mobile. Follow-up in 3 months.   Patient will continue to take her oral vitamin B12 to limit her coming into the medical offices every month for her vitamin B12 injection which she has had for many years

## 2020-05-18 RX ORDER — PROPRANOLOL HYDROCHLORIDE 80 MG/1
TABLET ORAL
Qty: 180 TABLET | Refills: 3 | Status: SHIPPED | OUTPATIENT
Start: 2020-05-18 | End: 2021-05-12

## 2020-05-26 RX ORDER — ASPIRIN 81 MG/1
81 TABLET ORAL DAILY
Qty: 1 TABLET | Refills: 0 | COMMUNITY
Start: 2020-05-12 | End: 2021-04-08

## 2020-05-26 RX ORDER — APIXABAN 5 MG/1
TABLET, FILM COATED ORAL
Qty: 60 TABLET | Refills: 0 | OUTPATIENT
Start: 2020-05-26

## 2020-05-26 NOTE — TELEPHONE ENCOUNTER
Per 5/12/20 office visit note:  Acute deep vein thrombosis (DVT) of distal end of right lower extremity ([address]  Resolved. Patient to look for any signs of recurrent DVT.   She had a distal DVT and therefore completed 3 months of anticoagulation with Eliquis

## 2020-05-27 ENCOUNTER — OFFICE VISIT (OUTPATIENT)
Dept: PHYSICAL THERAPY | Facility: HOSPITAL | Age: 75
End: 2020-05-27
Attending: PHYSICIAN ASSISTANT
Payer: MEDICARE

## 2020-05-27 DIAGNOSIS — S60.222D CONTUSION OF LEFT HAND, SUBSEQUENT ENCOUNTER: ICD-10-CM

## 2020-05-27 DIAGNOSIS — S42.034D CLOSED NONDISPLACED FRACTURE OF ACROMIAL END OF RIGHT CLAVICLE WITH ROUTINE HEALING, SUBSEQUENT ENCOUNTER: ICD-10-CM

## 2020-05-27 DIAGNOSIS — S42.202D CLOSED FRACTURE OF PROXIMAL END OF LEFT HUMERUS WITH ROUTINE HEALING, UNSPECIFIED FRACTURE MORPHOLOGY, SUBSEQUENT ENCOUNTER: ICD-10-CM

## 2020-05-27 DIAGNOSIS — S82.034D CLOSED NONDISPLACED TRANSVERSE FRACTURE OF RIGHT PATELLA WITH ROUTINE HEALING, SUBSEQUENT ENCOUNTER: ICD-10-CM

## 2020-05-27 PROCEDURE — 97163 PT EVAL HIGH COMPLEX 45 MIN: CPT

## 2020-05-27 PROCEDURE — 97110 THERAPEUTIC EXERCISES: CPT

## 2020-05-27 NOTE — PROGRESS NOTES
SHOULDER EVALUATION:   Referring Physician: Dr. Trena Melendez  Diagnosis: Closed nondisplaced transverse fracture of right patella with routine healing, subsequent encounter (T41.198V)  Closed nondisplaced fracture of acromial end of right clavicle with routi month ago as well so that she could start bending her knee. Pt is allowed to lift 8# (gallon of milk). Pt doesn't do that much right now though because it hurts her shoulder/clavicle and hand. Pt is R-hand dominant, but she always eats with her L hand.  Pt shoulder pain, R knee pain and L hand pain. The results of the objective tests and measures show limited ROM due to fractures as well as limited overall function.   Functional deficits include but are not limited to difficulty with ADL's, difficulty with am Response:   Pt education was provided on exam findings, treatment diagnosis, treatment plan, expectations, and prognosis.  Pt was also provided recommendations for possible soreness after evaluation, modalities as needed [ice/heat] and importance of remaini limitation: None  Rehab Potential:good      Patient/Family/Caregiver was advised of these findings, precautions, and treatment options and has agreed to actively participate in planning and for this course of care.     Thank you for your referral. Please co

## 2020-05-29 NOTE — TELEPHONE ENCOUNTER
----- Message from Rubén Zeng PT sent at 5/29/2020 12:08 PM CDT -----  Regarding: RE: OT order  [id number] [address]. Yes we would need a referral for OT. Thank you and have a great weekend!   Lynette Spears  ----- Message -----  From: Kayla Dyer PA-C  Se

## 2020-06-02 ENCOUNTER — TELEPHONE (OUTPATIENT)
Dept: PHYSICAL THERAPY | Facility: HOSPITAL | Age: 75
End: 2020-06-02

## 2020-06-03 ENCOUNTER — OFFICE VISIT (OUTPATIENT)
Dept: PHYSICAL THERAPY | Facility: HOSPITAL | Age: 75
End: 2020-06-03
Attending: PHYSICIAN ASSISTANT
Payer: MEDICARE

## 2020-06-03 PROCEDURE — 97110 THERAPEUTIC EXERCISES: CPT

## 2020-06-03 PROCEDURE — 97140 MANUAL THERAPY 1/> REGIONS: CPT

## 2020-06-03 NOTE — PROGRESS NOTES
Dx: Closed nondisplaced transverse fracture of right patella with routine healing, subsequent encounter (C77.165O)  Closed nondisplaced fracture of acromial end of right clavicle with routine healing, subsequent encounter (S42.034D)  Contusion of left hand knee to improve ROM and strengthening  Date: 6/3/2020  TX#: 2/16 Date:                 TX#: 3/ Date:                 TX#: 4/ Date:                 TX#: 5/ Date:    Tx#: 6/   Manual:  STM to R knee and surrounding tissues  Gentle ROM with end range stretchin

## 2020-06-05 ENCOUNTER — OFFICE VISIT (OUTPATIENT)
Dept: PHYSICAL THERAPY | Facility: HOSPITAL | Age: 75
End: 2020-06-05
Attending: PHYSICIAN ASSISTANT
Payer: MEDICARE

## 2020-06-05 PROCEDURE — 97140 MANUAL THERAPY 1/> REGIONS: CPT

## 2020-06-05 PROCEDURE — 97110 THERAPEUTIC EXERCISES: CPT

## 2020-06-05 NOTE — PROGRESS NOTES
Dx: Closed nondisplaced transverse fracture of right patella with routine healing, subsequent encounter (B45.989M)  Closed nondisplaced fracture of acromial end of right clavicle with routine healing, subsequent encounter (S42.034D)  Contusion of left hand PT for B shoulders and R knee to improve ROM and strengthening  Date: 6/3/2020  TX#: 2/16 Date: 6/5/2020               TX#: 3/16 Date:                 TX#: 4/ Date:                 TX#: 5/ Date:    Tx#: 6/   Manual:  STM to R knee and surrounding tissues  G

## 2020-06-09 ENCOUNTER — OFFICE VISIT (OUTPATIENT)
Dept: PHYSICAL THERAPY | Facility: HOSPITAL | Age: 75
End: 2020-06-09
Attending: PHYSICIAN ASSISTANT
Payer: MEDICARE

## 2020-06-09 PROCEDURE — 97110 THERAPEUTIC EXERCISES: CPT

## 2020-06-09 PROCEDURE — 97140 MANUAL THERAPY 1/> REGIONS: CPT

## 2020-06-09 NOTE — PROGRESS NOTES
Dx: Closed nondisplaced transverse fracture of right patella with routine healing, subsequent encounter (K95.066X)  Closed nondisplaced fracture of acromial end of right clavicle with routine healing, subsequent encounter (S42.034D)  Contusion of left hand display improved balance and be able to perform SLS for 30 seconds on B LE's to assist with ADL's.      Plan: Cont PT for B shoulders and R knee to improve ROM and strengthening  Date: 6/3/2020  TX#: 2/16 Date: 6/5/2020               TX#: 3/16 Date: 6/9/202

## 2020-06-12 ENCOUNTER — OFFICE VISIT (OUTPATIENT)
Dept: PHYSICAL THERAPY | Facility: HOSPITAL | Age: 75
End: 2020-06-12
Attending: PHYSICIAN ASSISTANT
Payer: MEDICARE

## 2020-06-12 PROCEDURE — 97110 THERAPEUTIC EXERCISES: CPT

## 2020-06-12 PROCEDURE — 97140 MANUAL THERAPY 1/> REGIONS: CPT

## 2020-06-12 NOTE — PROGRESS NOTES
Dx: Closed nondisplaced transverse fracture of right patella with routine healing, subsequent encounter (X05.378A)  Closed nondisplaced fracture of acromial end of right clavicle with routine healing, subsequent encounter (S42.804D)  Contusion of left hand strengthening  Date: 6/3/2020  TX#: 2/16 Date: 6/5/2020               TX#: 3/16 Date: 6/9/2020               TX#: 4/16 Date: 6/12/2020                TX#: 5/16 Date:    Tx#: 6/   Manual:  STM to R knee and surrounding tissues  Gentle ROM with end range stre

## 2020-06-16 ENCOUNTER — OFFICE VISIT (OUTPATIENT)
Dept: OCCUPATIONAL MEDICINE | Facility: HOSPITAL | Age: 75
End: 2020-06-16
Attending: PHYSICIAN ASSISTANT
Payer: MEDICARE

## 2020-06-16 ENCOUNTER — OFFICE VISIT (OUTPATIENT)
Dept: PHYSICAL THERAPY | Facility: HOSPITAL | Age: 75
End: 2020-06-16
Attending: PHYSICIAN ASSISTANT
Payer: MEDICARE

## 2020-06-16 DIAGNOSIS — S62.307A CLOSED FRACTURE OF FIFTH METACARPAL BONE OF LEFT HAND, UNSPECIFIED FRACTURE MORPHOLOGY, INITIAL ENCOUNTER: ICD-10-CM

## 2020-06-16 PROCEDURE — 97110 THERAPEUTIC EXERCISES: CPT

## 2020-06-16 PROCEDURE — 97166 OT EVAL MOD COMPLEX 45 MIN: CPT | Performed by: OCCUPATIONAL THERAPIST

## 2020-06-16 PROCEDURE — 97140 MANUAL THERAPY 1/> REGIONS: CPT

## 2020-06-16 PROCEDURE — 97110 THERAPEUTIC EXERCISES: CPT | Performed by: OCCUPATIONAL THERAPIST

## 2020-06-16 NOTE — PROGRESS NOTES
OCCUPATIONAL THERAPY UPPER EXTREMITY EVALUATION:   Referring Physician: Dr. Ignacio Ellsworth  Date of onset: 01/17/20  Diagnosis: Left hand fifth MCP closed fracture Date of Service: 1/4/2019       PATIENT SUMMARY:   Ceh Hwang is a 66year old y/o female wh decreased MMT in shoulder abduction and wrist flexion/extension. Digit AROM is WNL however she complains of some tightness. She has some tenderness along distal ECU tendon. Left  and pinch strength is weak as well. Given the above noted deficits in ROM, 3          Average   : 42 42 47 45    27 33 32 30      2 pt Pinch:                 3 pt Pinch: 14       10         Lateral Pinch: 12       10             Today's Treatment: Patient education provided on diagnosis, POC and HEP.  Pt was instructed in treatment and while under my care.         X______________________________________________ Date________________  Certification From: 90/52/81       To: 08/16/20

## 2020-06-16 NOTE — PROGRESS NOTES
Dx: Closed nondisplaced transverse fracture of right patella with routine healing, subsequent encounter (B80.131F)  Closed nondisplaced fracture of acromial end of right clavicle with routine healing, subsequent encounter (S42.824D)  Contusion of left hand good balance and proper gait mechanics with min difficulty. Pt will be able to lift light-mod weights/objects with proper mechanics and min to no pain within 6 weeks.    Pt will display improved balance and be able to perform SLS for 30 seconds on B LE's

## 2020-06-19 ENCOUNTER — OFFICE VISIT (OUTPATIENT)
Dept: PHYSICAL THERAPY | Facility: HOSPITAL | Age: 75
End: 2020-06-19
Attending: PHYSICIAN ASSISTANT
Payer: MEDICARE

## 2020-06-19 PROCEDURE — 97140 MANUAL THERAPY 1/> REGIONS: CPT

## 2020-06-19 PROCEDURE — 97110 THERAPEUTIC EXERCISES: CPT

## 2020-06-19 NOTE — PROGRESS NOTES
Dx: Closed nondisplaced transverse fracture of right patella with routine healing, subsequent encounter (T18.828R)  Closed nondisplaced fracture of acromial end of right clavicle with routine healing, subsequent encounter (S42.034D)  Contusion of left hand and be able to perform SLS for 30 seconds on B LE's to assist with ADL's.      Plan: Cont PT for B shoulders and R knee to improve ROM and strengthening  Date: 6/9/2020               TX#: 4/16 Date: 6/12/2020           TX#: 5/16 Date: 6/16/2020  Tx#: 6/16 6

## 2020-06-24 ENCOUNTER — OFFICE VISIT (OUTPATIENT)
Dept: OCCUPATIONAL MEDICINE | Facility: HOSPITAL | Age: 75
End: 2020-06-24
Attending: PHYSICIAN ASSISTANT
Payer: MEDICARE

## 2020-06-24 ENCOUNTER — OFFICE VISIT (OUTPATIENT)
Dept: PHYSICAL THERAPY | Facility: HOSPITAL | Age: 75
End: 2020-06-24
Attending: PHYSICIAN ASSISTANT
Payer: MEDICARE

## 2020-06-24 PROCEDURE — 97110 THERAPEUTIC EXERCISES: CPT

## 2020-06-24 PROCEDURE — 97140 MANUAL THERAPY 1/> REGIONS: CPT

## 2020-06-24 PROCEDURE — 97140 MANUAL THERAPY 1/> REGIONS: CPT | Performed by: OCCUPATIONAL THERAPIST

## 2020-06-24 PROCEDURE — 97110 THERAPEUTIC EXERCISES: CPT | Performed by: OCCUPATIONAL THERAPIST

## 2020-06-24 NOTE — PROGRESS NOTES
.Dx:      Left hand fifth MCP closed fracture     Authorized # of Visits:  8         Next MD visit: none scheduled  Fall Risk: standard         Precautions: n/a           Medication Changes since last visit?: No  Subjective: Patient reports no change in progress achieved in OT. Patient will demonstrate increase in left  strength to at least 35 lbs for ease in vacuuming. Patient will demonstrate increase in left three point pinch to at least 13 lbs for ease in opening jars.   Patient will demonstrate

## 2020-06-24 NOTE — PROGRESS NOTES
Dx: Closed nondisplaced transverse fracture of right patella with routine healing, subsequent encounter (C04.948N)  Closed nondisplaced fracture of acromial end of right clavicle with routine healing, subsequent encounter (S42.380D)  Contusion of left hand B LE's to assist with ADL's.      Plan: Cont PT for B shoulders and R knee to improve ROM and strengthening  Date: 6/12/2020           TX#: 5/16 Date: 6/16/2020  Tx#: 6/16 6/19/2020  Tx: 7/16 6/24/2020  Tx: 8/16   Manual:  STM to biceps B  GH distraction wi

## 2020-06-25 ENCOUNTER — OFFICE VISIT (OUTPATIENT)
Dept: PHYSICAL THERAPY | Facility: HOSPITAL | Age: 75
End: 2020-06-25
Attending: PHYSICIAN ASSISTANT
Payer: MEDICARE

## 2020-06-25 PROCEDURE — 97140 MANUAL THERAPY 1/> REGIONS: CPT

## 2020-06-25 PROCEDURE — 97110 THERAPEUTIC EXERCISES: CPT

## 2020-06-25 NOTE — PROGRESS NOTES
Dx: Closed nondisplaced transverse fracture of right patella with routine healing, subsequent encounter (E31.255Q)  Closed nondisplaced fracture of acromial end of right clavicle with routine healing, subsequent encounter (S42.034D)  Contusion of left hand assist with ADL's.      Plan: Cont PT for B shoulders and R knee to improve ROM and strengthening  Date: 6/16/2020  Tx#: 6/16 6/19/2020  Tx: 7/16 6/24/2020  Tx: 8/16 6/25/2020  Tx: 9/16   Manual:  STM to R knee and surrounding tissues  Gentle ROM with end r

## 2020-06-29 ENCOUNTER — OFFICE VISIT (OUTPATIENT)
Dept: OCCUPATIONAL MEDICINE | Facility: HOSPITAL | Age: 75
End: 2020-06-29
Attending: PHYSICIAN ASSISTANT
Payer: MEDICARE

## 2020-06-29 ENCOUNTER — OFFICE VISIT (OUTPATIENT)
Dept: PHYSICAL THERAPY | Facility: HOSPITAL | Age: 75
End: 2020-06-29
Attending: PHYSICIAN ASSISTANT
Payer: MEDICARE

## 2020-06-29 PROCEDURE — 97110 THERAPEUTIC EXERCISES: CPT

## 2020-06-29 PROCEDURE — 97110 THERAPEUTIC EXERCISES: CPT | Performed by: OCCUPATIONAL THERAPIST

## 2020-06-29 PROCEDURE — 97140 MANUAL THERAPY 1/> REGIONS: CPT | Performed by: OCCUPATIONAL THERAPIST

## 2020-06-29 PROCEDURE — 97140 MANUAL THERAPY 1/> REGIONS: CPT

## 2020-06-29 NOTE — PROGRESS NOTES
.Dx:      Left hand fifth MCP closed fracture     Authorized # of Visits:  8         Next MD visit: none scheduled  Fall Risk: standard         Precautions: n/a           Medication Changes since last visit?: No  Subjective: \"I'm able to use my left noguera least 13 lbs for ease in opening jars. Patient will demonstrate increase in left wrist FALCON by at least 15 degrees for ease in dressing. Patient will demonstrate increase in left wrist flexion/extension to 5/5 strength.       Plan:Continue to focus on pain

## 2020-06-29 NOTE — PROGRESS NOTES
Dx: Closed nondisplaced transverse fracture of right patella with routine healing, subsequent encounter (A33.819G)  Closed nondisplaced fracture of acromial end of right clavicle with routine healing, subsequent encounter (S42.034D)  Contusion of left hand able to lift light-mod weights/objects with proper mechanics and min to no pain within 6 weeks. Pt will display improved balance and be able to perform SLS for 30 seconds on B LE's to assist with ADL's.      Plan: Cont PT for B shoulders and R knee to imp

## 2020-07-02 ENCOUNTER — OFFICE VISIT (OUTPATIENT)
Dept: PHYSICAL THERAPY | Facility: HOSPITAL | Age: 75
End: 2020-07-02
Attending: PHYSICIAN ASSISTANT
Payer: MEDICARE

## 2020-07-02 ENCOUNTER — OFFICE VISIT (OUTPATIENT)
Dept: OCCUPATIONAL MEDICINE | Facility: HOSPITAL | Age: 75
End: 2020-07-02
Attending: PHYSICIAN ASSISTANT
Payer: MEDICARE

## 2020-07-02 PROCEDURE — 97110 THERAPEUTIC EXERCISES: CPT | Performed by: OCCUPATIONAL THERAPIST

## 2020-07-02 PROCEDURE — 97140 MANUAL THERAPY 1/> REGIONS: CPT

## 2020-07-02 PROCEDURE — 97110 THERAPEUTIC EXERCISES: CPT

## 2020-07-02 PROCEDURE — 97140 MANUAL THERAPY 1/> REGIONS: CPT | Performed by: OCCUPATIONAL THERAPIST

## 2020-07-02 NOTE — PROGRESS NOTES
Dx: Closed nondisplaced transverse fracture of right patella with routine healing, subsequent encounter (R97.559H)  Closed nondisplaced fracture of acromial end of right clavicle with routine healing, subsequent encounter (S42.034D)  Contusion of left hand Cont PT for B shoulders and R knee to improve ROM and strengthening  6/24/2020  Tx: 8/16 6/25/2020  Tx: 9/16 6/29/2020  Tx: 10/16 7/2/2020  Tx: 11/16   Manual:  STM to R knee and surrounding tissues  Gentle ROM with end range stretching mostly into flexion

## 2020-07-02 NOTE — PROGRESS NOTES
.Dx:      Left hand fifth MCP closed fracture     Authorized # of Visits:  8         Next MD visit: none scheduled  Fall Risk: standard         Precautions: n/a           Medication Changes since last visit?: No  Subjective: Patient reports increase pain session. Goals:   Pt complaints of pain in left hand will decrease at worst to 1/10. Pt will be independent and compliant with comprehensive HEP to maintain progress achieved in OT.   Patient will demonstrate increase in left  strength to at least

## 2020-07-06 ENCOUNTER — OFFICE VISIT (OUTPATIENT)
Dept: OCCUPATIONAL MEDICINE | Facility: HOSPITAL | Age: 75
End: 2020-07-06
Attending: PHYSICIAN ASSISTANT
Payer: MEDICARE

## 2020-07-06 ENCOUNTER — HOSPITAL ENCOUNTER (OUTPATIENT)
Dept: MAMMOGRAPHY | Facility: HOSPITAL | Age: 75
Discharge: HOME | End: 2020-07-06
Attending: SURGERY
Payer: MEDICARE

## 2020-07-06 ENCOUNTER — OFFICE VISIT (OUTPATIENT)
Dept: PHYSICAL THERAPY | Facility: HOSPITAL | Age: 75
End: 2020-07-06
Attending: PHYSICIAN ASSISTANT
Payer: MEDICARE

## 2020-07-06 DIAGNOSIS — Z12.31 SCREENING MAMMOGRAM FOR HIGH-RISK PATIENT: ICD-10-CM

## 2020-07-06 PROCEDURE — 77063 BREAST TOMOSYNTHESIS BI: CPT | Performed by: SURGERY

## 2020-07-06 PROCEDURE — 77067 SCR MAMMO BI INCL CAD: CPT | Performed by: SURGERY

## 2020-07-06 PROCEDURE — 97110 THERAPEUTIC EXERCISES: CPT | Performed by: OCCUPATIONAL THERAPIST

## 2020-07-06 PROCEDURE — 97140 MANUAL THERAPY 1/> REGIONS: CPT | Performed by: OCCUPATIONAL THERAPIST

## 2020-07-06 PROCEDURE — 97140 MANUAL THERAPY 1/> REGIONS: CPT

## 2020-07-06 PROCEDURE — 97110 THERAPEUTIC EXERCISES: CPT

## 2020-07-06 NOTE — PROGRESS NOTES
Dx: Closed nondisplaced transverse fracture of right patella with routine healing, subsequent encounter (U02.348P)  Closed nondisplaced fracture of acromial end of right clavicle with routine healing, subsequent encounter (S42.034D)  Contusion of left hand able to perform SLS for 30 seconds on B LE's to assist with ADL's.      Plan: Cont PT for B shoulders and R knee to improve ROM and strengthening  6/25/2020  Tx: 9/16 6/29/2020  Tx: 10/16 7/2/2020  Tx: 11/16 7/6/2020  Tx: 12/16   Manual:  STM to biceps B  G

## 2020-07-06 NOTE — PROGRESS NOTES
.Dx:      Left hand fifth MCP closed fracture     Authorized # of Visits:  8         Next MD visit: none scheduled  Fall Risk: standard         Precautions: n/a           Medication Changes since last visit?: No  Subjective: \"I woke up this morning with Assessment:Patient brought in her therapy putty. Reviewed hand therapy putty exercises. Patient having difficulty with FDS/FDP resistive exercises. Issued as HEP    Goals:   Pt complaints of pain in left hand will decrease at worst to 1/10.   Pt will be ind

## 2020-07-09 ENCOUNTER — OFFICE VISIT (OUTPATIENT)
Dept: PHYSICAL THERAPY | Facility: HOSPITAL | Age: 75
End: 2020-07-09
Attending: PHYSICIAN ASSISTANT
Payer: MEDICARE

## 2020-07-09 ENCOUNTER — OFFICE VISIT (OUTPATIENT)
Dept: OCCUPATIONAL MEDICINE | Facility: HOSPITAL | Age: 75
End: 2020-07-09
Attending: PHYSICIAN ASSISTANT
Payer: MEDICARE

## 2020-07-09 PROCEDURE — 97140 MANUAL THERAPY 1/> REGIONS: CPT

## 2020-07-09 PROCEDURE — 97140 MANUAL THERAPY 1/> REGIONS: CPT | Performed by: OCCUPATIONAL THERAPIST

## 2020-07-09 PROCEDURE — 97110 THERAPEUTIC EXERCISES: CPT

## 2020-07-09 PROCEDURE — 97110 THERAPEUTIC EXERCISES: CPT | Performed by: OCCUPATIONAL THERAPIST

## 2020-07-09 NOTE — PROGRESS NOTES
.Dx:      Left hand fifth MCP closed fracture     Authorized # of Visits:  8         Next MD visit: none scheduled  Fall Risk: standard         Precautions: n/a           Medication Changes since last visit?: No  Subjective: Patient reports pain along ul twist and pull         HEP instruction                                        Therapeutic Activity                                       Neuromuscular Re-education                                                             Modalities                    Mo

## 2020-07-09 NOTE — PROGRESS NOTES
Dx: Closed nondisplaced transverse fracture of right patella with routine healing, subsequent encounter (H91.911F)  Closed nondisplaced fracture of acromial end of right clavicle with routine healing, subsequent encounter (S42.034D)  Contusion of left hand display improved balance and be able to perform SLS for 30 seconds on B LE's to assist with ADL's.      Plan: Cont PT for B shoulders and R knee to improve ROM and strengthening  6/29/2020  Tx: 10/16 7/2/2020  Tx: 11/16 7/6/2020  Tx: 12/16 7/9/2020  Tx: 13/

## 2020-07-13 ENCOUNTER — APPOINTMENT (OUTPATIENT)
Dept: PHYSICAL THERAPY | Facility: HOSPITAL | Age: 75
End: 2020-07-13
Attending: PHYSICIAN ASSISTANT
Payer: MEDICARE

## 2020-07-13 ENCOUNTER — APPOINTMENT (OUTPATIENT)
Dept: OCCUPATIONAL MEDICINE | Facility: HOSPITAL | Age: 75
End: 2020-07-13
Attending: PHYSICIAN ASSISTANT
Payer: MEDICARE

## 2020-07-15 ENCOUNTER — OFFICE VISIT (OUTPATIENT)
Dept: PHYSICAL THERAPY | Facility: HOSPITAL | Age: 75
End: 2020-07-15
Attending: PHYSICIAN ASSISTANT
Payer: MEDICARE

## 2020-07-15 PROCEDURE — 97110 THERAPEUTIC EXERCISES: CPT

## 2020-07-15 PROCEDURE — 97140 MANUAL THERAPY 1/> REGIONS: CPT

## 2020-07-16 ENCOUNTER — OFFICE VISIT (OUTPATIENT)
Dept: PHYSICAL THERAPY | Facility: HOSPITAL | Age: 75
End: 2020-07-16
Attending: PHYSICIAN ASSISTANT
Payer: MEDICARE

## 2020-07-16 ENCOUNTER — OFFICE VISIT (OUTPATIENT)
Dept: OCCUPATIONAL MEDICINE | Facility: HOSPITAL | Age: 75
End: 2020-07-16
Attending: PHYSICIAN ASSISTANT
Payer: MEDICARE

## 2020-07-16 PROCEDURE — 97140 MANUAL THERAPY 1/> REGIONS: CPT | Performed by: OCCUPATIONAL THERAPIST

## 2020-07-16 PROCEDURE — 97140 MANUAL THERAPY 1/> REGIONS: CPT

## 2020-07-16 PROCEDURE — 97110 THERAPEUTIC EXERCISES: CPT | Performed by: OCCUPATIONAL THERAPIST

## 2020-07-16 PROCEDURE — 97110 THERAPEUTIC EXERCISES: CPT

## 2020-07-16 NOTE — PROGRESS NOTES
Dx: Closed nondisplaced transverse fracture of right patella with routine healing, subsequent encounter (W87.070B)  Closed nondisplaced fracture of acromial end of right clavicle with routine healing, subsequent encounter (S42.816D)  Contusion of left hand light-mod weights/objects with proper mechanics and min to no pain within 6 weeks. Pt will display improved balance and be able to perform SLS for 30 seconds on B LE's to assist with ADL's.      Plan: Cont PT for B shoulders and R knee to improve ROM and

## 2020-07-16 NOTE — PROGRESS NOTES
.Dx:      Left hand fifth MCP closed fracture     Authorized # of Visits:  8         Next MD visit: none scheduled  Fall Risk: standard         Precautions: n/a           Medication Changes since last visit?: No  Subjective:\"I was trying to open a jar t 25#, 35 large pegs  Power  25#, 50 large pegs  Green putty, two point pinch, abd/add, DIP/FDP pinches  Green putty, two point pinch, abd/add, DIP/FDP pinches  Green putty, two point pinch, abd/add, DIP/FDP pinches              Yellow Tband wrist flex/e

## 2020-07-23 ENCOUNTER — OFFICE VISIT (OUTPATIENT)
Dept: PHYSICAL THERAPY | Facility: HOSPITAL | Age: 75
End: 2020-07-23
Attending: INTERNAL MEDICINE
Payer: MEDICARE

## 2020-07-23 ENCOUNTER — OFFICE VISIT (OUTPATIENT)
Dept: OCCUPATIONAL MEDICINE | Facility: HOSPITAL | Age: 75
End: 2020-07-23
Attending: PHYSICIAN ASSISTANT
Payer: MEDICARE

## 2020-07-23 PROCEDURE — 97110 THERAPEUTIC EXERCISES: CPT

## 2020-07-23 PROCEDURE — 97110 THERAPEUTIC EXERCISES: CPT | Performed by: OCCUPATIONAL THERAPIST

## 2020-07-23 PROCEDURE — 97018 PARAFFIN BATH THERAPY: CPT | Performed by: OCCUPATIONAL THERAPIST

## 2020-07-23 PROCEDURE — 97140 MANUAL THERAPY 1/> REGIONS: CPT

## 2020-07-23 PROCEDURE — 97140 MANUAL THERAPY 1/> REGIONS: CPT | Performed by: OCCUPATIONAL THERAPIST

## 2020-07-23 NOTE — PROGRESS NOTES
.Dx:      Left hand fifth MCP closed fracture     Authorized # of Visits:  8         Next MD visit: none scheduled  Fall Risk: standard         Precautions: n/a           Medication Changes since last visit?: No  Subjective: \"The pain occurs when I  extension, abd/add  10 min  Power  25#, 35 large pegs  Power  25#, 50 large pegs  Green putty, two point pinch, abd/add, DIP/FDP pinches  Green putty, two point pinch, abd/add, DIP/FDP pinches  Green putty, two point pinch, abd/add, DIP/FDP pinches Left key pinch 10 lbc  Right three point pinch 10 lbs     Left three point pinch 7 lbs      Strength ; MMT  Right wrist  flexion:  5/5 Right wrist extension: 5/5  Left wrist flexion: 5/5  Left wrist         Goals:     Pt complaints of pain in left noguera

## 2020-07-23 NOTE — PROGRESS NOTES
Dx: Closed nondisplaced transverse fracture of right patella with routine healing, subsequent encounter (J54.343G)  Closed nondisplaced fracture of acromial end of right clavicle with routine healing, subsequent encounter (S42.665D)  Contusion of left hand awareness during functional tasks to assist with shoulder mechanics. - met     Pt will be independent with HEP within 6 weeks to assist with pain management and continue to improve function.  - met for current program and pt has good compliance   Pt will de Manual:  STM to biceps B  GH distraction with oscillations B - grd II  Tibiofemoral distraction - grd II Manual:  STM to biceps B  DTM to deltoid and biceps with focus on the L   GH distraction with oscillations B - grd II   TherEx:  Pulleys into flexion -

## 2020-07-27 ENCOUNTER — OFFICE VISIT (OUTPATIENT)
Dept: PHYSICAL THERAPY | Facility: HOSPITAL | Age: 75
End: 2020-07-27
Attending: INTERNAL MEDICINE
Payer: MEDICARE

## 2020-07-27 ENCOUNTER — OFFICE VISIT (OUTPATIENT)
Dept: OCCUPATIONAL MEDICINE | Facility: HOSPITAL | Age: 75
End: 2020-07-27
Attending: PHYSICIAN ASSISTANT
Payer: MEDICARE

## 2020-07-27 PROCEDURE — 97110 THERAPEUTIC EXERCISES: CPT | Performed by: OCCUPATIONAL THERAPIST

## 2020-07-27 PROCEDURE — 97140 MANUAL THERAPY 1/> REGIONS: CPT | Performed by: OCCUPATIONAL THERAPIST

## 2020-07-27 PROCEDURE — 97140 MANUAL THERAPY 1/> REGIONS: CPT

## 2020-07-27 PROCEDURE — 97110 THERAPEUTIC EXERCISES: CPT

## 2020-07-27 NOTE — PROGRESS NOTES
Dx:   Left hand fifth MCP closed fracture        Authorized # of Visits:  4 additional   Next MD visit: none scheduled  Fall Risk: standard         Precautions: n/a           Medication Changes since last visit?: No  Subjective:\" I get the pain in my ha vacuuming. ...(ongoing)  Patient will demonstrate increase in left three point pinch to at least 13 lbs for ease in opening jars. ...(ongoing)  Patient will demonstrate increase in left wrist FALCON by at least 15 degrees for ease in dressing. ...(made progress

## 2020-07-27 NOTE — PROGRESS NOTES
Dx: Closed nondisplaced transverse fracture of right patella with routine healing, subsequent encounter (X66.440Y)  Closed nondisplaced fracture of acromial end of right clavicle with routine healing, subsequent encounter (S42.034D)  Contusion of left hand functional tasks and ADL's within 6 weeks. - progressing  Pt will be able to ambulate on even and uneven surfaces with good balance and proper gait mechanics with min difficulty.  - progressing   Pt will be able to lift light-mod weights/objects with proper hip abd - 12 B    HEP: quad sets, hip abd and add isometrics, saq's, mini squats    Charges: Manual - 1, TherEx - 2     Total Timed Treatment: 45 min    Total Treatment Time: 45 min

## 2020-07-28 ENCOUNTER — OFFICE VISIT (OUTPATIENT)
Dept: PHYSICAL THERAPY | Facility: HOSPITAL | Age: 75
End: 2020-07-28
Attending: PHYSICIAN ASSISTANT
Payer: MEDICARE

## 2020-07-28 PROCEDURE — 97140 MANUAL THERAPY 1/> REGIONS: CPT

## 2020-07-28 PROCEDURE — 97110 THERAPEUTIC EXERCISES: CPT

## 2020-07-28 NOTE — PROGRESS NOTES
Dx: Closed nondisplaced transverse fracture of right patella with routine healing, subsequent encounter (P01.789N)  Closed nondisplaced fracture of acromial end of right clavicle with routine healing, subsequent encounter (S42.034D)  Contusion of left hand even and uneven surfaces with good balance and proper gait mechanics with min difficulty. - progressing   Pt will be able to lift light-mod weights/objects with proper mechanics and min to no pain within 6 weeks.  - progressing  Pt will display improved bal saq's, mini squats    Charges: Manual - 1, TherEx - 2     Total Timed Treatment: 45 min    Total Treatment Time: 45 min

## 2020-08-05 ENCOUNTER — OFFICE VISIT (OUTPATIENT)
Dept: PHYSICAL THERAPY | Facility: HOSPITAL | Age: 75
End: 2020-08-05
Attending: INTERNAL MEDICINE
Payer: MEDICARE

## 2020-08-05 ENCOUNTER — OFFICE VISIT (OUTPATIENT)
Dept: OCCUPATIONAL MEDICINE | Facility: HOSPITAL | Age: 75
End: 2020-08-05
Attending: PHYSICIAN ASSISTANT
Payer: MEDICARE

## 2020-08-05 PROCEDURE — 97110 THERAPEUTIC EXERCISES: CPT | Performed by: OCCUPATIONAL THERAPIST

## 2020-08-05 PROCEDURE — 97140 MANUAL THERAPY 1/> REGIONS: CPT | Performed by: OCCUPATIONAL THERAPIST

## 2020-08-05 PROCEDURE — 97140 MANUAL THERAPY 1/> REGIONS: CPT

## 2020-08-05 PROCEDURE — 97110 THERAPEUTIC EXERCISES: CPT

## 2020-08-05 NOTE — PROGRESS NOTES
Dx:   Left hand fifth MCP closed fracture        Authorized # of Visits:  4 additional   Next MD visit: none scheduled  Fall Risk: standard         Precautions: n/a           Medication Changes since last visit?: No  Subjective:\"My hand has been aching will decrease at worst to 1/10. Emelyn Spar .(made progress toward)  Pt will be independent and compliant with comprehensive HEP to maintain progress achieved in OT. ...(ongoing)  Patient will demonstrate increase in left  strength to at least 35 lbs for ease in va

## 2020-08-05 NOTE — PROGRESS NOTES
Dx: Closed nondisplaced transverse fracture of right patella with routine healing, subsequent encounter (S42.773J)  Closed nondisplaced fracture of acromial end of right clavicle with routine healing, subsequent encounter (S42.215D)  Contusion of left hand light-mod weights/objects with proper mechanics and min to no pain within 6 weeks.  - progressing  Pt will display improved balance and be able to perform SLS for 30 seconds on B LE's to assist with ADL's. - not met      Plan: Continue skilled Physical Ther

## 2020-08-10 ENCOUNTER — OFFICE VISIT (OUTPATIENT)
Dept: PHYSICAL THERAPY | Facility: HOSPITAL | Age: 75
End: 2020-08-10
Attending: INTERNAL MEDICINE
Payer: MEDICARE

## 2020-08-10 ENCOUNTER — OFFICE VISIT (OUTPATIENT)
Dept: OCCUPATIONAL MEDICINE | Facility: HOSPITAL | Age: 75
End: 2020-08-10
Attending: PHYSICIAN ASSISTANT
Payer: MEDICARE

## 2020-08-10 PROCEDURE — 97110 THERAPEUTIC EXERCISES: CPT

## 2020-08-10 PROCEDURE — 97110 THERAPEUTIC EXERCISES: CPT | Performed by: OCCUPATIONAL THERAPIST

## 2020-08-10 PROCEDURE — 97140 MANUAL THERAPY 1/> REGIONS: CPT

## 2020-08-10 PROCEDURE — 97140 MANUAL THERAPY 1/> REGIONS: CPT | Performed by: OCCUPATIONAL THERAPIST

## 2020-08-10 NOTE — PROGRESS NOTES
Dx: Closed nondisplaced transverse fracture of right patella with routine healing, subsequent encounter (Q46.158P)  Closed nondisplaced fracture of acromial end of right clavicle with routine healing, subsequent encounter (S42.174D)  Contusion of left hand progressing  Pt will display improved balance and be able to perform SLS for 30 seconds on B LE's to assist with ADL's. - not met      Plan: Continue skilled Physical Therapy 2 x/week or a total of 10 visits over a 90 day period.  Treatment will include: ma Treatment Time: 45 min

## 2020-08-10 NOTE — PROGRESS NOTES
Dx:   Left hand fifth MCP closed fracture        Authorized # of Visits:  4 additional   Next MD visit: none scheduled  Fall Risk: standard         Precautions: n/a           Medication Changes since last visit?: No  Subjective:Patient reports less pain Assessment: Patient reported minimal pain in ulnar hand and wrist during session today. Goals:   Pt complaints of pain in left hand will decrease at worst to 1/10. Becky Harmon .(made progress toward)  Pt will be independent and compliant with comprehensive H

## 2020-08-13 ENCOUNTER — OFFICE VISIT (OUTPATIENT)
Dept: OCCUPATIONAL MEDICINE | Facility: HOSPITAL | Age: 75
End: 2020-08-13
Attending: PHYSICIAN ASSISTANT
Payer: MEDICARE

## 2020-08-13 ENCOUNTER — APPOINTMENT (OUTPATIENT)
Dept: PHYSICAL THERAPY | Facility: HOSPITAL | Age: 75
End: 2020-08-13
Attending: INTERNAL MEDICINE
Payer: MEDICARE

## 2020-08-13 ENCOUNTER — OFFICE VISIT (OUTPATIENT)
Dept: PULMONOLOGY | Facility: CLINIC | Age: 75
End: 2020-08-13
Payer: MEDICARE

## 2020-08-13 VITALS
BODY MASS INDEX: 26.13 KG/M2 | HEART RATE: 56 BPM | OXYGEN SATURATION: 99 % | WEIGHT: 142 LBS | SYSTOLIC BLOOD PRESSURE: 148 MMHG | HEIGHT: 62 IN | DIASTOLIC BLOOD PRESSURE: 76 MMHG

## 2020-08-13 DIAGNOSIS — J47.9 BRONCHIECTASIS WITHOUT COMPLICATION (HCC): Primary | ICD-10-CM

## 2020-08-13 PROCEDURE — 97110 THERAPEUTIC EXERCISES: CPT

## 2020-08-13 PROCEDURE — 97140 MANUAL THERAPY 1/> REGIONS: CPT

## 2020-08-13 PROCEDURE — 97110 THERAPEUTIC EXERCISES: CPT | Performed by: OCCUPATIONAL THERAPIST

## 2020-08-13 PROCEDURE — 99213 OFFICE O/P EST LOW 20 MIN: CPT | Performed by: INTERNAL MEDICINE

## 2020-08-13 PROCEDURE — 97140 MANUAL THERAPY 1/> REGIONS: CPT | Performed by: OCCUPATIONAL THERAPIST

## 2020-08-13 PROCEDURE — G0463 HOSPITAL OUTPT CLINIC VISIT: HCPCS | Performed by: INTERNAL MEDICINE

## 2020-08-13 NOTE — PROGRESS NOTES
Dx: Closed nondisplaced transverse fracture of right patella with routine healing, subsequent encounter (V15.290G)  Closed nondisplaced fracture of acromial end of right clavicle with routine healing, subsequent encounter (S42.034D)  Contusion of left hand mechanics with min difficulty. - progressing   Pt will be able to lift light-mod weights/objects with proper mechanics and min to no pain within 6 weeks.  - progressing  Pt will display improved balance and be able to perform SLS for 30 seconds on B LE's to in step with use of mirror for feedback - 15   Bridges - 5   HEP: quad sets, hip abd and add isometrics, saq's, mini squats    Charges: Manual - 1, TherEx - 2     Total Timed Treatment: 45 min    Total Treatment Time: 45 min

## 2020-08-13 NOTE — PROGRESS NOTES
The patient is a 64-year-old female who I know well from prior evaluation comes in now for follow-up she notes that she is doing well and she is not coughing and there is no dyspnea. Her blood pressure still borderline elevated.   Uses Prilosec for GERD in

## 2020-08-13 NOTE — PROGRESS NOTES
Dx:   Left hand fifth MCP closed fracture        Authorized # of Visits:  4 additional   Next MD visit: none scheduled  Fall Risk: standard         Precautions: n/a           Medication Changes since last visit?: No  Subjective: \"My wrist was feeling fi lumbricals x 20  ntrinsic hand PRE exercises with rubber band x 20, lumbricals x 20  ntrinsic hand PRE exercises with rubber band x 20, lumbricals x 20           HEP instruction                         intrinsic rubber band exercises x 10, 3 x day    apply objective measurements. Measurements:  Left wrist AROM  Flexion: 75 degrees, Extension: 70 degrees, UD 40 degrees, RD 15 degrees.     Right  strength: 35,32,40  AVG 36 lbs  :    Left  strength: 25,30,31 AV lbs  Right key pinch  14 lbs

## 2020-08-14 ENCOUNTER — OFFICE VISIT (OUTPATIENT)
Dept: INTERNAL MEDICINE CLINIC | Facility: CLINIC | Age: 75
End: 2020-08-14
Payer: MEDICARE

## 2020-08-14 ENCOUNTER — TELEPHONE (OUTPATIENT)
Dept: INTERNAL MEDICINE CLINIC | Facility: CLINIC | Age: 75
End: 2020-08-14

## 2020-08-14 ENCOUNTER — LAB ENCOUNTER (OUTPATIENT)
Dept: LAB | Age: 75
End: 2020-08-14
Attending: INTERNAL MEDICINE
Payer: MEDICARE

## 2020-08-14 VITALS
WEIGHT: 145 LBS | DIASTOLIC BLOOD PRESSURE: 60 MMHG | SYSTOLIC BLOOD PRESSURE: 136 MMHG | HEART RATE: 57 BPM | TEMPERATURE: 98 F | BODY MASS INDEX: 26.68 KG/M2 | OXYGEN SATURATION: 100 % | HEIGHT: 62 IN

## 2020-08-14 DIAGNOSIS — Z87.19 HISTORY OF CHRONIC ULCERATIVE COLITIS: ICD-10-CM

## 2020-08-14 DIAGNOSIS — I10 ESSENTIAL HYPERTENSION: Primary | ICD-10-CM

## 2020-08-14 DIAGNOSIS — M81.0 OSTEOPOROSIS, UNSPECIFIED OSTEOPOROSIS TYPE, UNSPECIFIED PATHOLOGICAL FRACTURE PRESENCE: ICD-10-CM

## 2020-08-14 DIAGNOSIS — M54.50 CHRONIC MIDLINE LOW BACK PAIN, UNSPECIFIED WHETHER SCIATICA PRESENT: ICD-10-CM

## 2020-08-14 DIAGNOSIS — I10 ESSENTIAL HYPERTENSION: ICD-10-CM

## 2020-08-14 DIAGNOSIS — Z00.00 ROUTINE HEALTH MAINTENANCE: ICD-10-CM

## 2020-08-14 DIAGNOSIS — S82.001A CLOSED NONDISPLACED FRACTURE OF RIGHT PATELLA, UNSPECIFIED FRACTURE MORPHOLOGY, INITIAL ENCOUNTER: ICD-10-CM

## 2020-08-14 DIAGNOSIS — S42.001D CLOSED DISPLACED FRACTURE OF RIGHT CLAVICLE WITH ROUTINE HEALING, UNSPECIFIED PART OF CLAVICLE, SUBSEQUENT ENCOUNTER: ICD-10-CM

## 2020-08-14 DIAGNOSIS — G89.29 CHRONIC MIDLINE LOW BACK PAIN, UNSPECIFIED WHETHER SCIATICA PRESENT: ICD-10-CM

## 2020-08-14 DIAGNOSIS — S42.215A CLOSED NONDISPLACED FRACTURE OF SURGICAL NECK OF LEFT HUMERUS, UNSPECIFIED FRACTURE MORPHOLOGY, INITIAL ENCOUNTER: ICD-10-CM

## 2020-08-14 DIAGNOSIS — D64.9 ANEMIA, UNSPECIFIED TYPE: ICD-10-CM

## 2020-08-14 DIAGNOSIS — I82.4Z1 ACUTE DEEP VEIN THROMBOSIS (DVT) OF DISTAL END OF RIGHT LOWER EXTREMITY (HCC): ICD-10-CM

## 2020-08-14 DIAGNOSIS — D64.9 ANEMIA, UNSPECIFIED TYPE: Primary | ICD-10-CM

## 2020-08-14 LAB
ALBUMIN SERPL-MCNC: 3.9 G/DL (ref 3.4–5)
ALBUMIN/GLOB SERPL: 1 {RATIO} (ref 1–2)
ALP LIVER SERPL-CCNC: 90 U/L (ref 55–142)
ALT SERPL-CCNC: 21 U/L (ref 13–56)
ANION GAP SERPL CALC-SCNC: 5 MMOL/L (ref 0–18)
AST SERPL-CCNC: 19 U/L (ref 15–37)
BASOPHILS # BLD AUTO: 0.04 X10(3) UL (ref 0–0.2)
BASOPHILS NFR BLD AUTO: 0.6 %
BILIRUB SERPL-MCNC: 0.4 MG/DL (ref 0.1–2)
BUN BLD-MCNC: 28 MG/DL (ref 7–18)
BUN/CREAT SERPL: 32.9 (ref 10–20)
CALCIUM BLD-MCNC: 9.3 MG/DL (ref 8.5–10.1)
CHLORIDE SERPL-SCNC: 105 MMOL/L (ref 98–112)
CHOLEST SMN-MCNC: 170 MG/DL (ref ?–200)
CO2 SERPL-SCNC: 27 MMOL/L (ref 21–32)
CREAT BLD-MCNC: 0.85 MG/DL (ref 0.55–1.02)
DEPRECATED HBV CORE AB SER IA-ACNC: 83.2 NG/ML (ref 18–340)
DEPRECATED RDW RBC AUTO: 43.6 FL (ref 35.1–46.3)
EOSINOPHIL # BLD AUTO: 0.37 X10(3) UL (ref 0–0.7)
EOSINOPHIL NFR BLD AUTO: 5.2 %
ERYTHROCYTE [DISTWIDTH] IN BLOOD BY AUTOMATED COUNT: 12.1 % (ref 11–15)
GLOBULIN PLAS-MCNC: 3.8 G/DL (ref 2.8–4.4)
GLUCOSE BLD-MCNC: 99 MG/DL (ref 70–99)
HCT VFR BLD AUTO: 34.1 % (ref 35–48)
HDLC SERPL-MCNC: 72 MG/DL (ref 40–59)
HGB BLD-MCNC: 11.1 G/DL (ref 12–16)
IMM GRANULOCYTES # BLD AUTO: 0.01 X10(3) UL (ref 0–1)
IMM GRANULOCYTES NFR BLD: 0.1 %
IRON SATURATION: 24 % (ref 15–50)
IRON SERPL-MCNC: 87 UG/DL (ref 50–170)
LDLC SERPL CALC-MCNC: 79 MG/DL (ref ?–100)
LYMPHOCYTES # BLD AUTO: 1.2 X10(3) UL (ref 1–4)
LYMPHOCYTES NFR BLD AUTO: 17 %
M PROTEIN MFR SERPL ELPH: 7.7 G/DL (ref 6.4–8.2)
MCH RBC QN AUTO: 32.1 PG (ref 26–34)
MCHC RBC AUTO-ENTMCNC: 32.6 G/DL (ref 31–37)
MCV RBC AUTO: 98.6 FL (ref 80–100)
MONOCYTES # BLD AUTO: 0.82 X10(3) UL (ref 0.1–1)
MONOCYTES NFR BLD AUTO: 11.6 %
NEUTROPHILS # BLD AUTO: 4.61 X10 (3) UL (ref 1.5–7.7)
NEUTROPHILS # BLD AUTO: 4.61 X10(3) UL (ref 1.5–7.7)
NEUTROPHILS NFR BLD AUTO: 65.5 %
NONHDLC SERPL-MCNC: 98 MG/DL (ref ?–130)
OSMOLALITY SERPL CALC.SUM OF ELEC: 290 MOSM/KG (ref 275–295)
PATIENT FASTING Y/N/NP: NO
PATIENT FASTING Y/N/NP: NO
PLATELET # BLD AUTO: 221 10(3)UL (ref 150–450)
POTASSIUM SERPL-SCNC: 4.5 MMOL/L (ref 3.5–5.1)
RBC # BLD AUTO: 3.46 X10(6)UL (ref 3.8–5.3)
SODIUM SERPL-SCNC: 137 MMOL/L (ref 136–145)
TOTAL IRON BINDING CAPACITY: 362 UG/DL (ref 240–450)
TRANSFERRIN SERPL-MCNC: 243 MG/DL (ref 200–360)
TRIGL SERPL-MCNC: 93 MG/DL (ref 30–149)
VLDLC SERPL CALC-MCNC: 19 MG/DL (ref 0–30)
WBC # BLD AUTO: 7.1 X10(3) UL (ref 4–11)

## 2020-08-14 PROCEDURE — 80053 COMPREHEN METABOLIC PANEL: CPT

## 2020-08-14 PROCEDURE — 99214 OFFICE O/P EST MOD 30 MIN: CPT | Performed by: INTERNAL MEDICINE

## 2020-08-14 PROCEDURE — 80061 LIPID PANEL: CPT

## 2020-08-14 PROCEDURE — G0463 HOSPITAL OUTPT CLINIC VISIT: HCPCS | Performed by: INTERNAL MEDICINE

## 2020-08-14 PROCEDURE — 83540 ASSAY OF IRON: CPT

## 2020-08-14 PROCEDURE — 85025 COMPLETE CBC W/AUTO DIFF WBC: CPT

## 2020-08-14 PROCEDURE — 84466 ASSAY OF TRANSFERRIN: CPT

## 2020-08-14 PROCEDURE — 82728 ASSAY OF FERRITIN: CPT

## 2020-08-14 PROCEDURE — 36415 COLL VENOUS BLD VENIPUNCTURE: CPT

## 2020-08-14 RX ORDER — BUTALBITAL, ASPIRIN, AND CAFFEINE 50; 325; 40 MG/1; MG/1; MG/1
1 CAPSULE ORAL EVERY 6 HOURS PRN
Qty: 30 CAPSULE | Refills: 1 | Status: SHIPPED | OUTPATIENT
Start: 2020-08-14 | End: 2021-06-22

## 2020-08-14 RX ORDER — ALPRAZOLAM 0.25 MG/1
TABLET ORAL
Qty: 20 TABLET | Refills: 1 | Status: SHIPPED | OUTPATIENT
Start: 2020-08-14 | End: 2021-06-16

## 2020-08-14 NOTE — PROGRESS NOTES
Bipin Best is a 66year old female. HPI:   Patient presents with: Follow - Up: OT and PT     Patient feels well. She has no unusual complaints. She did wish a refill on Fiorinal.  She has been using this for many years.   It is the only medicine times a day 180 tablet 11   • folic acid 1 MG Oral Tab Take 1 tablet (1 mg total) by mouth daily.  90 tablet 3   • hydrochlorothiazide 12.5 MG Oral Tab take 1 tABLET BY MOUTH ON MONDAY, WEDNESDAY AND FRIDAY 30 tablet 3   • Telmisartan 80 MG Oral Tab Take 80 cholesterol    • History of DVT (deep vein thrombosis)    • Hypercholesterolemia    • Hyperparathyroidism ([address] 2004    PAIR OF PARATHYROID REMOVED   • Hyperparathyroidism ([address]     PAIR OF PARATHYROID REMOVED    • Hypertension    • Migraine 2012    Amparo  normal  CARDIO:  RRR without murmur. S1 and S2 normal.  I do not hear any carotid bruits  GI:  good BS's,  no masses,   HSM or tenderness  EXTREMITIES : no cyanosis, clubbing or edema    ASSESSMENT AND PLAN:     1.  Essential hypertension  Blood pressure lb (64 kg)    Body mass index is 26.52 kg/m².      Current Outpatient Medications   Medication Sig Dispense Refill   • ALPRAZolam (XANAX) 0.25 MG Oral Tab Take one a day as needed for anxiety 20 tablet 1   • butalbital-aspirin-caffeine -40 MG Oral Cap Solution Inject 1 mL into the muscle every 30 (thirty) days.         Past Medical History:   Diagnosis Date   • Arthritis    • Back pain    • Cough 2011   • Essential hypertension    • Family history of colon cancer 10/21/2014   •  0     PARA ZE Cancer Maternal Cousin Male 61        prostate/patient denies    • Cancer Self 60        uterine   • Prostate Cancer Maternal Uncle         age unknown/ at 80   • Kidney Disease Neg         UROLITHIASIS   • Breast Cancer Neg    • Ovarian Cancer Neg Drug use: No         REVIEW OF SYSTEMS:       EXAM:   /60 (BP Location: Right arm, Patient Position: Sitting, Cuff Size: adult)   Pulse 57   Temp 98.1 °F (36.7 °C) (Temporal)   Ht 5' 2\" (1.575 m)   Wt 145 lb (65.8 kg)   SpO2 100%   BMI 26.52 kg/m²

## 2020-08-14 NOTE — TELEPHONE ENCOUNTER
Please let patient know that I thought her labs were good. Electrolytes good. Kidney numbers good. Just one number called BUN slightly elevated. I am not concerned about this. She does have a mild anemia but it is very mild.   She has had this intermit

## 2020-08-19 ENCOUNTER — OFFICE VISIT (OUTPATIENT)
Dept: PHYSICAL THERAPY | Facility: HOSPITAL | Age: 75
End: 2020-08-19
Attending: INTERNAL MEDICINE
Payer: MEDICARE

## 2020-08-19 PROCEDURE — 97140 MANUAL THERAPY 1/> REGIONS: CPT

## 2020-08-19 PROCEDURE — 97110 THERAPEUTIC EXERCISES: CPT

## 2020-08-19 NOTE — PROGRESS NOTES
Dx: Closed nondisplaced transverse fracture of right patella with routine healing, subsequent encounter (X83.128C)  Closed nondisplaced fracture of acromial end of right clavicle with routine healing, subsequent encounter (S42.034D)  Contusion of left hand shoulder mechanics. - met     Pt will be independent with HEP within 6 weeks to assist with pain management and continue to improve function.  - met for current program and pt has good compliance   Pt will demonstrate decreased pain to <2/10 during function 3\" holds  Leg press (40#) pain-free range - 3x20  Quadruped child's pose stretch for lats and knee flex - 5x15\"  Step-ups onto 6 in step with use of mirror for feedback - 15   Bridges - 5 TherEx:  UBE, level 2 - 5 mins (fwd/bkwd)  Supine chest press with

## 2020-08-20 ENCOUNTER — APPOINTMENT (OUTPATIENT)
Dept: PHYSICAL THERAPY | Facility: HOSPITAL | Age: 75
End: 2020-08-20
Attending: INTERNAL MEDICINE
Payer: MEDICARE

## 2020-08-21 ENCOUNTER — OFFICE VISIT (OUTPATIENT)
Dept: PHYSICAL THERAPY | Facility: HOSPITAL | Age: 75
End: 2020-08-21
Attending: ORTHOPAEDIC SURGERY
Payer: MEDICARE

## 2020-08-21 PROCEDURE — 97110 THERAPEUTIC EXERCISES: CPT

## 2020-08-21 PROCEDURE — 97140 MANUAL THERAPY 1/> REGIONS: CPT

## 2020-08-21 NOTE — PROGRESS NOTES
Dx: Closed nondisplaced transverse fracture of right patella with routine healing, subsequent encounter (L34.685U)  Closed nondisplaced fracture of acromial end of right clavicle with routine healing, subsequent encounter (S42.034D)  Contusion of left hand weeks. - progressing  Pt will be able to ambulate on even and uneven surfaces with good balance and proper gait mechanics with min difficulty.  - progressing   Pt will be able to lift light-mod weights/objects with proper mechanics and min to no pain within 2x15  Serratus punch with ball on the wall - 10x5\" B  TherEx:  UBE, level 2.5 - 7 mins (fwd)  Supine chest press with 5# weights - 2x15  Bicep curl with punch out and then eccentric lower with 3# weights B shoulders seated - 2x15 B   Push-ups on wall - 2x

## 2020-08-24 ENCOUNTER — OFFICE VISIT (OUTPATIENT)
Dept: PHYSICAL THERAPY | Facility: HOSPITAL | Age: 75
End: 2020-08-24
Attending: INTERNAL MEDICINE
Payer: MEDICARE

## 2020-08-24 PROCEDURE — 97110 THERAPEUTIC EXERCISES: CPT

## 2020-08-24 PROCEDURE — 97140 MANUAL THERAPY 1/> REGIONS: CPT

## 2020-08-24 NOTE — PROGRESS NOTES
Dx: Closed nondisplaced transverse fracture of right patella with routine healing, subsequent encounter (G95.289R)  Closed nondisplaced fracture of acromial end of right clavicle with routine healing, subsequent encounter (S42.034D)  Contusion of left hand uneven surfaces with good balance and proper gait mechanics with min difficulty. - progressing   Pt will be able to lift light-mod weights/objects with proper mechanics and min to no pain within 6 weeks.  - progressing  Pt will display improved balance and mins (fwd)  Supine chest press with 5# weights - 2x15  Bicep curl with punch out and then eccentric lower with 3# weights B shoulders seated - 2x15 B   Push-ups on wall - HEP  Serratus punch with 5# weight supine - 2x15 B   D2 flex with red theraband - 15

## 2020-08-27 ENCOUNTER — OFFICE VISIT (OUTPATIENT)
Dept: PHYSICAL THERAPY | Facility: HOSPITAL | Age: 75
End: 2020-08-27
Attending: INTERNAL MEDICINE
Payer: MEDICARE

## 2020-08-27 PROCEDURE — 97110 THERAPEUTIC EXERCISES: CPT

## 2020-08-27 PROCEDURE — 97140 MANUAL THERAPY 1/> REGIONS: CPT

## 2020-08-27 NOTE — PROGRESS NOTES
Dx: Closed nondisplaced transverse fracture of right patella with routine healing, subsequent encounter (R04.025H)  Closed nondisplaced fracture of acromial end of right clavicle with routine healing, subsequent encounter (S45.182D)  Contusion of left hand mechanics and min to no pain within 6 weeks.  - progressing  Pt will display improved balance and be able to perform SLS for 30 seconds on B LE's to assist with ADL's. - not met      Plan: Continue skilled Physical Therapy 2 x/week or a total of 10 visits o lower with 3# weights B shoulders seated - 2x20 B   Serratus punch with 3# weight supine - 2x20 B   D2 flex with red theraband - 15 B    HEP: pt given handout with shoulder exercises that were performed 8/21    Charges: Manual - 1, TherEx - 2     Total [facility]

## 2020-08-31 ENCOUNTER — OFFICE VISIT (OUTPATIENT)
Dept: PHYSICAL THERAPY | Facility: HOSPITAL | Age: 75
End: 2020-08-31
Attending: INTERNAL MEDICINE
Payer: MEDICARE

## 2020-08-31 PROCEDURE — 97110 THERAPEUTIC EXERCISES: CPT

## 2020-08-31 PROCEDURE — 97112 NEUROMUSCULAR REEDUCATION: CPT

## 2020-08-31 NOTE — PROGRESS NOTES
ProgressSummary  Pt has attended 26 visits in Physical Therapy.      Dx: Closed nondisplaced transverse fracture of right patella with routine healing, subsequent encounter (J69.123K)  Closed nondisplaced fracture of acromial end of right clavicle with ro block. Sitting down helps pain. Occasionally she'll use the heating pad. Advil also helps sometimes, but she doesn't take it often because MD doesn't want her taking it. Bending backwards also helps pain some.      Pain: Current - 3/10, Best - 0/10 (only in weeks to assist with pain management and continue to improve function. - met for current program and pt has good compliance   Pt will demonstrate decreased pain to <2/10 during functional tasks and ADL's within 6 weeks.  - partially met  Pt will be able to focus on the L   GH distraction with oscillations B - grd II Manual:  STM to biceps B  DTM to deltoid and biceps with focus on the L   GH distraction with oscillations B - grd II Manual:  STM to biceps L  DTM to deltoid and biceps with focus on the L   [address]

## 2020-09-03 ENCOUNTER — OFFICE VISIT (OUTPATIENT)
Dept: PHYSICAL THERAPY | Facility: HOSPITAL | Age: 75
End: 2020-09-03
Attending: INTERNAL MEDICINE
Payer: MEDICARE

## 2020-09-03 PROCEDURE — 97112 NEUROMUSCULAR REEDUCATION: CPT

## 2020-09-03 PROCEDURE — 97140 MANUAL THERAPY 1/> REGIONS: CPT

## 2020-09-03 NOTE — PROGRESS NOTES
Dx: Closed nondisplaced transverse fracture of right patella with routine healing, subsequent encounter (O21.227M)  Closed nondisplaced fracture of acromial end of right clavicle with routine healing, subsequent encounter (S42.034D)  Contusion of left hand 8/27/2020  Tx: 25/26 8/31/2020  Tx: 26/36 9/3/2020  Tx: 27/36   Manual:  STM to biceps B  DTM to deltoid and biceps with focus on the L   GH distraction with oscillations B - grd II Manual:  STM to biceps L  DTM to deltoid and biceps with focus on the L

## 2020-09-08 ENCOUNTER — OFFICE VISIT (OUTPATIENT)
Dept: PHYSICAL THERAPY | Facility: HOSPITAL | Age: 75
End: 2020-09-08
Attending: INTERNAL MEDICINE
Payer: MEDICARE

## 2020-09-08 PROCEDURE — 97110 THERAPEUTIC EXERCISES: CPT

## 2020-09-08 PROCEDURE — 97112 NEUROMUSCULAR REEDUCATION: CPT

## 2020-09-08 PROCEDURE — 97140 MANUAL THERAPY 1/> REGIONS: CPT

## 2020-09-08 NOTE — PROGRESS NOTES
Dx: Closed nondisplaced transverse fracture of right patella with routine healing, subsequent encounter (L53.492L)  Closed nondisplaced fracture of acromial end of right clavicle with routine healing, subsequent encounter (S42.034D)  Contusion of left hand 28/36   Manual:  STM to biceps L  DTM to deltoid and biceps with focus on the L   GH distraction with oscillations L - grd II Manual:  None performed this date  Manual:  STM to R side lumbar spine  R sidebending stretch to L side in sidelying - grd II  L l

## 2020-09-15 ENCOUNTER — OFFICE VISIT (OUTPATIENT)
Dept: PHYSICAL THERAPY | Facility: HOSPITAL | Age: 75
End: 2020-09-15
Attending: INTERNAL MEDICINE
Payer: MEDICARE

## 2020-09-15 ENCOUNTER — NURSE ONLY (OUTPATIENT)
Dept: INTERNAL MEDICINE CLINIC | Facility: CLINIC | Age: 75
End: 2020-09-15
Payer: MEDICARE

## 2020-09-15 DIAGNOSIS — D51.0 PERNICIOUS ANEMIA: Primary | ICD-10-CM

## 2020-09-15 PROCEDURE — 97140 MANUAL THERAPY 1/> REGIONS: CPT

## 2020-09-15 PROCEDURE — 97110 THERAPEUTIC EXERCISES: CPT

## 2020-09-15 PROCEDURE — 96372 THER/PROPH/DIAG INJ SC/IM: CPT | Performed by: INTERNAL MEDICINE

## 2020-09-15 PROCEDURE — 97112 NEUROMUSCULAR REEDUCATION: CPT

## 2020-09-15 RX ADMIN — CYANOCOBALAMIN 1000 MCG: 1000 INJECTION INTRAMUSCULAR; SUBCUTANEOUS at 09:25:00

## 2020-09-15 NOTE — PROGRESS NOTES
Dx: Closed nondisplaced transverse fracture of right patella with routine healing, subsequent encounter (Y18.928J)  Closed nondisplaced fracture of acromial end of right clavicle with routine healing, subsequent encounter (S42.795D)  Contusion of left hand 8/31/2020  To:11/29/2020 8/31/2020  Tx: 26/36 9/3/2020  Tx: 27/36 9/8/2020  Tx: 28/36 9/15/2020  Tx: 29/36   Manual:  None performed this date  Manual:  STM to R side lumbar spine  R sidebending stretch to L side in sidelying - grd II  L lateral shift 45 min    Total Treatment Time: 45 min

## 2020-09-15 NOTE — PROGRESS NOTES
Patient presents for monthly vitamin B12 injection. Name/ and order verified. Vitamin B12 administered to RT deltoid, tolerated well.

## 2020-09-16 RX ORDER — TELMISARTAN 80 MG/1
TABLET ORAL
Qty: 90 TABLET | Refills: 3 | Status: SHIPPED | OUTPATIENT
Start: 2020-09-16 | End: 2021-05-20

## 2020-09-18 ENCOUNTER — OFFICE VISIT (OUTPATIENT)
Dept: PHYSICAL THERAPY | Facility: HOSPITAL | Age: 75
End: 2020-09-18
Attending: INTERNAL MEDICINE
Payer: MEDICARE

## 2020-09-18 PROCEDURE — 97110 THERAPEUTIC EXERCISES: CPT

## 2020-09-18 PROCEDURE — 97140 MANUAL THERAPY 1/> REGIONS: CPT

## 2020-09-18 NOTE — PROGRESS NOTES
Dx: Closed nondisplaced transverse fracture of right patella with routine healing, subsequent encounter (S75.257E)  Closed nondisplaced fracture of acromial end of right clavicle with routine healing, subsequent encounter (S42.069D)  Contusion of left hand pain to improved mobility. Pt will be able to stand and perform household activities for longer periods of time with less pain. Pt will display improved balance and be able to perform SLS for 30 seconds on B LE's to assist with ADL's.     Plan: Continue 10x5\"  Core stabilization and ER of hips using stabilizer - 3 mins  Core stabilization with marches using stabilizer - 3 mins   Neuro Jane:  TrA contraction and PPT hooklying using stabilizer - 10x5\"  Core stabilization and ER of hips using stabilizer -

## 2020-09-21 ENCOUNTER — APPOINTMENT (OUTPATIENT)
Dept: PHYSICAL THERAPY | Facility: HOSPITAL | Age: 75
End: 2020-09-21
Attending: INTERNAL MEDICINE
Payer: MEDICARE

## 2020-09-23 ENCOUNTER — APPOINTMENT (OUTPATIENT)
Dept: PHYSICAL THERAPY | Facility: HOSPITAL | Age: 75
End: 2020-09-23
Attending: INTERNAL MEDICINE
Payer: MEDICARE

## 2020-09-28 ENCOUNTER — OFFICE VISIT (OUTPATIENT)
Dept: PHYSICAL THERAPY | Facility: HOSPITAL | Age: 75
End: 2020-09-28
Attending: INTERNAL MEDICINE
Payer: MEDICARE

## 2020-09-28 PROCEDURE — 97110 THERAPEUTIC EXERCISES: CPT

## 2020-09-28 PROCEDURE — 97140 MANUAL THERAPY 1/> REGIONS: CPT

## 2020-09-28 NOTE — PROGRESS NOTES
Dx: Closed nondisplaced transverse fracture of right patella with routine healing, subsequent encounter (U94.397G)  Closed nondisplaced fracture of acromial end of right clavicle with routine healing, subsequent encounter (S42.034D)  Contusion of left hand include: manual, therEx, therAct, neuro Alireza, self care and modalities as needed.         Certification From: 6/66/0259  To:11/29/2020 9/8/2020  Tx: 28/36 9/15/2020  Tx: 29/36 9/18/2020  Tx: 30/36 9/28/2020  Tx: 31/36   Manual:  STM to R side lumbar spi discussed for HEP   HEP: TrA brace and PPT, flexion stretch with theraball     Charges: Manual - 1, TherEx - 2  Total Timed Treatment: 40 min    Total Treatment Time: 47 min

## 2020-09-30 ENCOUNTER — OFFICE VISIT (OUTPATIENT)
Dept: PHYSICAL THERAPY | Facility: HOSPITAL | Age: 75
End: 2020-09-30
Attending: INTERNAL MEDICINE
Payer: MEDICARE

## 2020-09-30 PROCEDURE — 97140 MANUAL THERAPY 1/> REGIONS: CPT

## 2020-09-30 PROCEDURE — 97110 THERAPEUTIC EXERCISES: CPT

## 2020-09-30 NOTE — PROGRESS NOTES
Dx: Closed nondisplaced transverse fracture of right patella with routine healing, subsequent encounter (U67.580J)  Closed nondisplaced fracture of acromial end of right clavicle with routine healing, subsequent encounter (S42.835D)  Contusion of left hand To:11/29/2020     9/15/2020  Tx: 29/36 9/18/2020  Tx: 30/36 9/28/2020  Tx: 31/36 9/30/2020  Tx: 32/36   Manual:  STM to R side lumbar spine  Gentle stretch to R to gap lower lumbar facet joints while L sidelying   R long axis hip distraction - grd II Yahir Manual - 1, TherEx - 2  Total Timed Treatment: 45 min    Total Treatment Time: 47 min

## 2020-10-05 ENCOUNTER — OFFICE VISIT (OUTPATIENT)
Dept: PHYSICAL THERAPY | Facility: HOSPITAL | Age: 75
End: 2020-10-05
Attending: INTERNAL MEDICINE
Payer: MEDICARE

## 2020-10-05 PROCEDURE — 97140 MANUAL THERAPY 1/> REGIONS: CPT

## 2020-10-05 PROCEDURE — 97110 THERAPEUTIC EXERCISES: CPT

## 2020-10-05 NOTE — PROGRESS NOTES
Dx: Closed nondisplaced transverse fracture of right patella with routine healing, subsequent encounter (Z03.068L)  Closed nondisplaced fracture of acromial end of right clavicle with routine healing, subsequent encounter (S42.000D)  Contusion of left hand therEx, therAct, neuro Alireza, self care and modalities as needed.         Certification From: 9/59/1485  To:11/29/2020 9/18/2020  Tx: 30/36 9/28/2020  Tx: 31/36 9/30/2020  Tx: 32/36 10/5/2020  Tx: 33/36   Manual:  STM to R side lumbar spine  Gentle stret flexion stretch with theraball     Charges: Manual - 2, TherEx - 1  Total Timed Treatment: 45 min    Total Treatment Time: 47 min

## 2020-10-07 ENCOUNTER — OFFICE VISIT (OUTPATIENT)
Dept: PHYSICAL THERAPY | Facility: HOSPITAL | Age: 75
End: 2020-10-07
Attending: INTERNAL MEDICINE
Payer: MEDICARE

## 2020-10-07 PROCEDURE — 97110 THERAPEUTIC EXERCISES: CPT

## 2020-10-07 PROCEDURE — 97140 MANUAL THERAPY 1/> REGIONS: CPT

## 2020-10-07 NOTE — PROGRESS NOTES
Dx: Closed nondisplaced transverse fracture of right patella with routine healing, subsequent encounter (T54.356T)  Closed nondisplaced fracture of acromial end of right clavicle with routine healing, subsequent encounter (S42.034D)  Contusion of left hand Therapy 1-2 x/week or a total of 10 visits over a 90 day period. Treatment will include: manual, therEx, therAct, neuro Alireza, self care and modalities as needed.         Certification From: 8/51/7144  To:11/29/2020 9/28/2020  Tx: 31/36 9/30/2020  Tx: 32 PPT, flexion stretch with theraball     Charges: Manual - 2, TherEx - 1  Total Timed Treatment: 45 min    Total Treatment Time: 47 min

## 2020-10-12 ENCOUNTER — OFFICE VISIT (OUTPATIENT)
Dept: PHYSICAL THERAPY | Facility: HOSPITAL | Age: 75
End: 2020-10-12
Attending: INTERNAL MEDICINE
Payer: MEDICARE

## 2020-10-12 PROCEDURE — 97140 MANUAL THERAPY 1/> REGIONS: CPT

## 2020-10-12 PROCEDURE — 97110 THERAPEUTIC EXERCISES: CPT

## 2020-10-12 NOTE — PROGRESS NOTES
Dx: Closed nondisplaced transverse fracture of right patella with routine healing, subsequent encounter (E38.870W)  Closed nondisplaced fracture of acromial end of right clavicle with routine healing, subsequent encounter (S42.034D)  Contusion of left hand manual, therEx, therAct, neuro Alireza, self care and modalities as needed.         Certification From: 2/08/1660  To:11/29/2020 9/30/2020  Tx: 32/36 10/5/2020  Tx: 33/36 10/7/2020  Tx: 34/36 10/12/2020  Tx: 35/36   Manual:  STM to R side lumbar spine  Gen

## 2020-10-14 ENCOUNTER — NURSE ONLY (OUTPATIENT)
Dept: INTERNAL MEDICINE CLINIC | Facility: CLINIC | Age: 75
End: 2020-10-14
Payer: MEDICARE

## 2020-10-14 PROCEDURE — 96372 THER/PROPH/DIAG INJ SC/IM: CPT | Performed by: INTERNAL MEDICINE

## 2020-10-14 RX ADMIN — CYANOCOBALAMIN 1000 MCG: 1000 INJECTION INTRAMUSCULAR; SUBCUTANEOUS at 09:15:00

## 2020-10-23 ENCOUNTER — OFFICE VISIT (OUTPATIENT)
Dept: PHYSICAL THERAPY | Facility: HOSPITAL | Age: 75
End: 2020-10-23
Attending: INTERNAL MEDICINE
Payer: MEDICARE

## 2020-10-23 PROCEDURE — 97140 MANUAL THERAPY 1/> REGIONS: CPT

## 2020-10-23 PROCEDURE — 97110 THERAPEUTIC EXERCISES: CPT

## 2020-10-23 NOTE — PROGRESS NOTES
ProgressSummary  Pt has attended 36 visits in Physical Therapy.      Dx: Closed nondisplaced transverse fracture of right patella with routine healing, subsequent encounter (T95.808F)  Closed nondisplaced fracture of acromial end of right clavicle with ro fall. Plan is to discuss pain at next visit, as long as coccyx pain has improved and determine if pt should continue with therapy. Goals:  Pt will demonstrate improved core stabilization and posture with standing to [facility] with min to no pain.  - partially spine  Gentle stretch to R to gap lower lumbar facet joints while L sidelying   STM/DTM to L biceps   Long axis hip distraction on the R - grd II  Manual:  STM/DTM to L biceps   GH distraction on L - grd I-II   TherEx:  Nustep, level 5 - 6 mins  DKTC with

## 2020-10-26 ENCOUNTER — OFFICE VISIT (OUTPATIENT)
Dept: INTERNAL MEDICINE CLINIC | Facility: CLINIC | Age: 75
End: 2020-10-26
Payer: MEDICARE

## 2020-10-26 ENCOUNTER — TELEPHONE (OUTPATIENT)
Dept: INTERNAL MEDICINE CLINIC | Facility: CLINIC | Age: 75
End: 2020-10-26

## 2020-10-26 VITALS
BODY MASS INDEX: 26.5 KG/M2 | DIASTOLIC BLOOD PRESSURE: 62 MMHG | OXYGEN SATURATION: 96 % | SYSTOLIC BLOOD PRESSURE: 132 MMHG | TEMPERATURE: 98 F | HEART RATE: 58 BPM | HEIGHT: 62 IN | WEIGHT: 144 LBS

## 2020-10-26 DIAGNOSIS — D51.0 PERNICIOUS ANEMIA: ICD-10-CM

## 2020-10-26 DIAGNOSIS — Z23 FLU VACCINE NEED: ICD-10-CM
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
## 2020-10-26 DIAGNOSIS — E61.1 IRON DEFICIENCY: ICD-10-CM

## 2020-10-26 DIAGNOSIS — M54.50 LUMBAR BACK PAIN: ICD-10-CM

## 2020-10-26 DIAGNOSIS — Z00.00 ROUTINE HEALTH MAINTENANCE: ICD-10-CM

## 2020-10-26 DIAGNOSIS — I10 ESSENTIAL HYPERTENSION: Primary | ICD-10-CM

## 2020-10-26 DIAGNOSIS — Z87.19 HISTORY OF CHRONIC ULCERATIVE COLITIS: ICD-10-CM

## 2020-10-26 DIAGNOSIS — E55.9 VITAMIN D DEFICIENCY: ICD-10-CM

## 2020-10-26 DIAGNOSIS — S39.92XA INJURY OF COCCYX, INITIAL ENCOUNTER: ICD-10-CM

## 2020-10-26 DIAGNOSIS — I65.29 STENOSIS OF CAROTID ARTERY, UNSPECIFIED LATERALITY: ICD-10-CM

## 2020-10-26 DIAGNOSIS — M53.3 SACRAL PAIN: ICD-10-CM

## 2020-10-26 PROCEDURE — G0463 HOSPITAL OUTPT CLINIC VISIT: HCPCS | Performed by: INTERNAL MEDICINE

## 2020-10-26 PROCEDURE — 99214 OFFICE O/P EST MOD 30 MIN: CPT | Performed by: INTERNAL MEDICINE

## 2020-10-26 RX ORDER — ALENDRONATE SODIUM 70 MG/1
70 TABLET ORAL WEEKLY
COMMUNITY
Start: 2020-10-20 | End: 2021-11-04

## 2020-10-26 NOTE — PROGRESS NOTES
HPI:   Andrea Mccann is a 66year old female presents with the following problems. Patient is getting over what appears to be a subconjunctival hemorrhage right eye.   She did see her ophthalmologist.    She also saw Dr. Екатерина Vivar about a couple weeks a superior pole measures 0.9 cm, stable. Several other small  hypoechoic nodules or cyst measuring 0.5 cm or less are again noted    The thyroid isthmus measures 0.4 cm in thickness.     Left thyroid lobe:    Measures 1.2 x 4.0 x 1.6 cm (length x transverse x by mouth daily.  1 tablet 0   • PROPRANOLOL HCL 80 MG Oral Tab TAKE ONE TABLET BY MOUTH TWICE DAILY 180 tablet 3   • sulfaSALAzine (AZULFIDINE) 500 MG Oral Tab Take two tablets three times a day 180 tablet 11   • folic acid 1 MG Oral Tab Take 1 tablet (1 mg 2012    HEART MONITOR AUGUST 2012   • High blood pressure    • High cholesterol    • History of DVT (deep vein thrombosis)    • Hypercholesterolemia    • Hyperparathyroidism ([facility] [address] 2004    PAIR OF PARATHYROID REMOVED   • Hyperparathyroidism ([facility] [address]     PAIR O CO2 27.0 21.0 - 32.0 mmol/L    Anion Gap 5 0 - 18 mmol/L    BUN 28 (H) 7 - 18 mg/dL    Creatinine 0.85 0.55 - 1.02 mg/dL    BUN/CREA Ratio 32.9 (H) 10.0 - 20.0    Calcium, Total 9.3 8.5 - 10.1 mg/dL    Calculated Osmolality 290 275 - 295 mOsm/kg    GFR, Social History:   Social History    Tobacco Use      Smoking status: Never Smoker      Smokeless tobacco: Never Used    Alcohol use:  Yes      Alcohol/week: 4.0 standard drinks      Types: 4 Glasses of wine per week      Comment: 4 glass of wine per we anemia  Patient on vitamin B12 injections. 3. History of chronic ulcerative colitis  Asymptomatic. Patient to follow-up with Dr. Jaron Gonzalez. She is a prior patient of     4. Routine health maintenance  Patient has had Prevnar in 2015.   Pneumovax in

## 2020-10-26 NOTE — H&P
[facility], [facility]    History and Physical    Gil Otoole Patient Status:  Outpatient    1945 MRN [id number]   Location [address] [facility] Attending No att. providers found   [facility] COLONOSCOPY N/A 5/24/2018    Performed by Marlyn Williamson MD at [facility] ENDOSCOPY   • COLONOSCOPY N/A 12/5/2016    Performed by Marlyn Williamson MD at [facility] ENDOSCOPY   • HYSTERECTOMY  2002   • OTHER SURGICAL HISTORY  2004?     Parathyroid removal   • OT 08/14/2020     08/14/2020    K 4.5 08/14/2020     08/14/2020    CO2 27.0 08/14/2020    GLU 99 08/14/2020    CA 9.3 08/14/2020    ALB 3.9 08/14/2020    ALKPHO 90 08/14/2020    BILT 0.4 08/14/2020    TP 7.7 08/14/2020    AST 19 08/14/2020    ALT

## 2020-10-30 ENCOUNTER — OFFICE VISIT (OUTPATIENT)
Dept: PHYSICAL THERAPY | Facility: HOSPITAL | Age: 75
End: 2020-10-30
Attending: INTERNAL MEDICINE
Payer: MEDICARE

## 2020-10-30 PROCEDURE — 97140 MANUAL THERAPY 1/> REGIONS: CPT

## 2020-10-30 PROCEDURE — 97110 THERAPEUTIC EXERCISES: CPT

## 2020-10-30 NOTE — PROGRESS NOTES
Dx: Closed nondisplaced transverse fracture of right patella with routine healing, subsequent encounter (H32.877W)  Closed nondisplaced fracture of acromial end of right clavicle with routine healing, subsequent encounter (S42.034D)  Contusion of left noguera Continue skilled Physical Therapy 1 x/week or a total of 6 visits over a 90 day period. Treatment will include: manual, therEx, therAct, neuro Alireza, self care and modalities as needed.           10/7/2020  Tx: 34/36 10/12/2020  Tx: 35/36 10/23/2020  Tx: 36/

## 2020-11-02 ENCOUNTER — HOSPITAL ENCOUNTER (OUTPATIENT)
Dept: GENERAL RADIOLOGY | Facility: HOSPITAL | Age: 75
Discharge: HOME | End: 2020-11-02
Attending: INTERNAL MEDICINE
Payer: MEDICARE

## 2020-11-02 ENCOUNTER — TELEPHONE (OUTPATIENT)
Dept: INTERNAL MEDICINE CLINIC | Facility: CLINIC | Age: 75
End: 2020-11-02

## 2020-11-02 DIAGNOSIS — S39.92XA INJURY OF COCCYX, INITIAL ENCOUNTER: ICD-10-CM

## 2020-11-02 DIAGNOSIS — M53.3 SACRAL PAIN: ICD-10-CM

## 2020-11-02 PROCEDURE — 72220 X-RAY EXAM SACRUM TAILBONE: CPT | Performed by: INTERNAL MEDICINE

## 2020-11-02 PROCEDURE — 72100 X-RAY EXAM L-S SPINE 2/3 VWS: CPT | Performed by: INTERNAL MEDICINE

## 2020-11-02 NOTE — TELEPHONE ENCOUNTER
Please notify patient that her x-rays do not show any fractures. The radiologist did comment that she has got a fair amount of degenerative processes in the lumbar spine that I believe would be due to her arthritis.   Also a little bit of thinning of the b

## 2020-11-04 ENCOUNTER — APPOINTMENT (OUTPATIENT)
Dept: PHYSICAL THERAPY | Facility: HOSPITAL | Age: 75
End: 2020-11-04
Attending: INTERNAL MEDICINE
Payer: MEDICARE

## 2020-11-09 RX ORDER — AMLODIPINE BESYLATE 10 MG/1
10 TABLET ORAL DAILY
Qty: 90 TABLET | Refills: 3 | Status: SHIPPED | OUTPATIENT
Start: 2020-11-09 | End: 2021-11-10

## 2020-11-13 ENCOUNTER — OFFICE VISIT (OUTPATIENT)
Dept: PHYSICAL THERAPY | Facility: HOSPITAL | Age: 75
End: 2020-11-13
Attending: INTERNAL MEDICINE
Payer: MEDICARE

## 2020-11-13 PROCEDURE — 97110 THERAPEUTIC EXERCISES: CPT

## 2020-11-13 PROCEDURE — 97140 MANUAL THERAPY 1/> REGIONS: CPT

## 2020-11-13 NOTE — PROGRESS NOTES
Vadimumkurtis  Pt has attended 45 visits in Physical Therapy.      Dx: Closed nondisplaced transverse fracture of right patella with routine healing, subsequent encounter (Z20.722V)  Closed nondisplaced fracture of acromial end of right clavicle with r assist with pain management at home. Pt encouraged to call if she has any questions or concerns. Goals:  Pt will demonstrate improved core stabilization and posture with standing to [facility] with min to no pain.  - partially met   Pt will be able to ambulate mins  Serratus punches supine - 2x15   Mid rows with green theraband - 2x15   Low rows with green theraband - 2x15   Wall slides into shoulder flexion with focus on upward rotation B scapulae - 10x3\"   Seated B shoulder flex - 15x5\" TherEx:  alaln Murray

## 2020-11-16 ENCOUNTER — LAB ENCOUNTER (OUTPATIENT)
Dept: LAB | Age: 75
End: 2020-11-16
Attending: INTERNAL MEDICINE
Payer: MEDICARE

## 2020-11-16 ENCOUNTER — TELEPHONE (OUTPATIENT)
Dept: INTERNAL MEDICINE CLINIC | Facility: CLINIC | Age: 75
End: 2020-11-16

## 2020-11-16 ENCOUNTER — NURSE ONLY (OUTPATIENT)
Dept: INTERNAL MEDICINE CLINIC | Facility: CLINIC | Age: 75
End: 2020-11-16
Payer: MEDICARE

## 2020-11-16 DIAGNOSIS — I10 ESSENTIAL HYPERTENSION: ICD-10-CM

## 2020-11-16 DIAGNOSIS — E55.9 VITAMIN D DEFICIENCY: ICD-10-CM

## 2020-11-16 DIAGNOSIS — E61.1 IRON DEFICIENCY: ICD-10-CM

## 2020-11-16 PROCEDURE — 96372 THER/PROPH/DIAG INJ SC/IM: CPT | Performed by: INTERNAL MEDICINE

## 2020-11-16 PROCEDURE — 85025 COMPLETE CBC W/AUTO DIFF WBC: CPT

## 2020-11-16 PROCEDURE — 82728 ASSAY OF FERRITIN: CPT

## 2020-11-16 PROCEDURE — 82306 VITAMIN D 25 HYDROXY: CPT

## 2020-11-16 PROCEDURE — 84466 ASSAY OF TRANSFERRIN: CPT

## 2020-11-16 PROCEDURE — 83540 ASSAY OF IRON: CPT

## 2020-11-16 PROCEDURE — 80048 BASIC METABOLIC PNL TOTAL CA: CPT

## 2020-11-16 PROCEDURE — 36415 COLL VENOUS BLD VENIPUNCTURE: CPT

## 2020-11-16 RX ADMIN — CYANOCOBALAMIN 1000 MCG: 1000 INJECTION INTRAMUSCULAR; SUBCUTANEOUS at 10:12:00

## 2020-11-16 NOTE — TELEPHONE ENCOUNTER
Please call patient let her know that I thought her labs came out good. Iron levels are good. No anemia. I am happy with her kidney number. Just came out one-point low but insignificant. Her vitamin D is pending. I suspect that will be good.   I am ve

## 2020-12-06 RX ORDER — HYDROCHLOROTHIAZIDE 12.5 MG/1
TABLET ORAL
Qty: 36 TABLET | Refills: 3 | Status: SHIPPED | OUTPATIENT
Start: 2020-12-06 | End: 2021-05-20

## 2020-12-07 ENCOUNTER — HOSPITAL ENCOUNTER (OUTPATIENT)
Dept: ULTRASOUND IMAGING | Facility: HOSPITAL | Age: 75
Discharge: HOME | End: 2020-12-07
Attending: INTERNAL MEDICINE
Payer: MEDICARE

## 2020-12-07 ENCOUNTER — TELEPHONE (OUTPATIENT)
Dept: INTERNAL MEDICINE CLINIC | Facility: CLINIC | Age: 75
End: 2020-12-07

## 2020-12-07 DIAGNOSIS — I65.29 STENOSIS OF CAROTID ARTERY, UNSPECIFIED LATERALITY: ICD-10-CM

## 2020-12-07 PROCEDURE — 93880 EXTRACRANIAL BILAT STUDY: CPT | Performed by: INTERNAL MEDICINE

## 2020-12-16 ENCOUNTER — NURSE ONLY (OUTPATIENT)
Dept: INTERNAL MEDICINE CLINIC | Facility: CLINIC | Age: 75
End: 2020-12-16
Payer: MEDICARE

## 2020-12-16 DIAGNOSIS — E53.8 VITAMIN B 12 DEFICIENCY: Primary | ICD-10-CM

## 2020-12-16 PROCEDURE — 96372 THER/PROPH/DIAG INJ SC/IM: CPT | Performed by: INTERNAL MEDICINE

## 2020-12-16 RX ADMIN — CYANOCOBALAMIN 1000 MCG: 1000 INJECTION INTRAMUSCULAR; SUBCUTANEOUS at 10:06:00

## 2020-12-24 RX ORDER — ATORVASTATIN CALCIUM 40 MG/1
40 TABLET, FILM COATED ORAL NIGHTLY
Qty: 90 TABLET | Refills: 1 | Status: SHIPPED | OUTPATIENT
Start: 2020-12-24 | End: 2021-06-16

## 2021-01-18 ENCOUNTER — NURSE ONLY (OUTPATIENT)
Dept: INTERNAL MEDICINE CLINIC | Facility: CLINIC | Age: 76
End: 2021-01-18
Payer: MEDICARE

## 2021-01-18 DIAGNOSIS — D51.0 PERNICIOUS ANEMIA: ICD-10-CM

## 2021-01-18 DIAGNOSIS — E53.9 B-COMPLEX DEFICIENCY: ICD-10-CM

## 2021-01-18 PROCEDURE — 96372 THER/PROPH/DIAG INJ SC/IM: CPT | Performed by: INTERNAL MEDICINE

## 2021-01-18 RX ADMIN — CYANOCOBALAMIN 1000 MCG: 1000 INJECTION INTRAMUSCULAR; SUBCUTANEOUS at 09:35:00

## 2021-01-18 NOTE — PROGRESS NOTES
Pt presents for monthly B12 injection. Name and  verified. Order active in [address] [place] [address] Rd. Patient tolerated injection well. No bleeding at injection site noted.

## 2021-02-01 DIAGNOSIS — Z23 NEED FOR VACCINATION: ICD-10-CM

## 2021-02-05 ENCOUNTER — IMMUNIZATION (OUTPATIENT)
Dept: LAB | Age: 76
End: 2021-02-05
Attending: HOSPITALIST
Payer: MEDICARE

## 2021-02-05 DIAGNOSIS — Z23 NEED FOR VACCINATION: Primary | ICD-10-CM

## 2021-02-05 PROCEDURE — 0001A SARSCOV2 VAC 30MCG/0.3ML IM: CPT

## 2021-02-18 ENCOUNTER — NURSE ONLY (OUTPATIENT)
Dept: INTERNAL MEDICINE CLINIC | Facility: CLINIC | Age: 76
End: 2021-02-18
Payer: MEDICARE

## 2021-02-18 DIAGNOSIS — E53.8 VITAMIN B 12 DEFICIENCY: Primary | ICD-10-CM

## 2021-02-18 PROCEDURE — 96372 THER/PROPH/DIAG INJ SC/IM: CPT | Performed by: INTERNAL MEDICINE

## 2021-02-18 RX ADMIN — CYANOCOBALAMIN 1000 MCG: 1000 INJECTION INTRAMUSCULAR; SUBCUTANEOUS at 09:41:00

## 2021-02-18 NOTE — H&P
Patient is here today for Vitamin B12 Injection. Patient has verified purpose of visit, their name and . Injection was drawn up and administered by AADL,LILY. Patient tolerated IM injection (deltoid muscle) well.  Pt is aware they are to return in 1 month f

## 2021-02-26 ENCOUNTER — IMMUNIZATION (OUTPATIENT)
Dept: LAB | Age: 76
End: 2021-02-26
Attending: HOSPITALIST
Payer: MEDICARE

## 2021-02-26 DIAGNOSIS — Z23 NEED FOR VACCINATION: Primary | ICD-10-CM

## 2021-02-26 PROCEDURE — 0002A SARSCOV2 VAC 30MCG/0.3ML IM: CPT

## 2021-03-18 ENCOUNTER — NURSE ONLY (OUTPATIENT)
Dept: INTERNAL MEDICINE CLINIC | Facility: CLINIC | Age: 76
End: 2021-03-18
Payer: MEDICARE

## 2021-03-18 DIAGNOSIS — E53.8 VITAMIN B 12 DEFICIENCY: Primary | ICD-10-CM

## 2021-03-18 PROCEDURE — 96372 THER/PROPH/DIAG INJ SC/IM: CPT | Performed by: INTERNAL MEDICINE

## 2021-03-18 RX ADMIN — CYANOCOBALAMIN 1000 MCG: 1000 INJECTION INTRAMUSCULAR; SUBCUTANEOUS at 08:55:00

## 2021-03-18 NOTE — PROGRESS NOTES
Patient presents for monthly vitamin B12 IM injection. Name/ and order verified. Injection administered IM to left deltoid, tolerated well.

## 2021-03-30 ENCOUNTER — APPOINTMENT (OUTPATIENT)
Dept: GENERAL RADIOLOGY | Age: 76
End: 2021-03-30
Attending: EMERGENCY MEDICINE
Payer: MEDICARE

## 2021-03-30 ENCOUNTER — HOSPITAL ENCOUNTER (OUTPATIENT)
Age: 76
Discharge: HOME | End: 2021-03-30
Attending: EMERGENCY MEDICINE
Payer: MEDICARE

## 2021-03-30 ENCOUNTER — TELEPHONE (OUTPATIENT)
Dept: INTERNAL MEDICINE CLINIC | Facility: CLINIC | Age: 76
End: 2021-03-30

## 2021-03-30 VITALS
HEART RATE: 62 BPM | SYSTOLIC BLOOD PRESSURE: 176 MMHG | DIASTOLIC BLOOD PRESSURE: 65 MMHG | OXYGEN SATURATION: 99 % | TEMPERATURE: 98 F | RESPIRATION RATE: 20 BRPM

## 2021-03-30 DIAGNOSIS — S82.891A CLOSED FRACTURE OF RIGHT ANKLE, INITIAL ENCOUNTER: Primary | ICD-10-CM

## 2021-03-30 DIAGNOSIS — G89.29 CHRONIC MIDLINE LOW BACK PAIN WITHOUT SCIATICA: Primary | ICD-10-CM

## 2021-03-30 DIAGNOSIS — M54.50 CHRONIC MIDLINE LOW BACK PAIN WITHOUT SCIATICA: Primary | ICD-10-CM

## 2021-03-30 PROCEDURE — 73610 X-RAY EXAM OF ANKLE: CPT | Performed by: EMERGENCY MEDICINE

## 2021-03-30 PROCEDURE — 99213 OFFICE O/P EST LOW 20 MIN: CPT

## 2021-03-30 PROCEDURE — 99214 OFFICE O/P EST MOD 30 MIN: CPT

## 2021-03-30 PROCEDURE — 29515 APPLICATION SHORT LEG SPLINT: CPT

## 2021-03-30 PROCEDURE — 73630 X-RAY EXAM OF FOOT: CPT | Performed by: EMERGENCY MEDICINE

## 2021-03-30 NOTE — TELEPHONE ENCOUNTER
Please call patient and let her know that I am sorry to hear that when she went to urgent care she had an ankle fracture. With that in mind his she contacted Dr. Emery Lawson for follow-up. The in urgent care put her in a soft cast of some sort?

## 2021-03-30 NOTE — TELEPHONE ENCOUNTER
Spoke to patient and relayed MD message and instructions, patient verbalizes understanding and agrees with plan. Patient will go to [facility]. Appt for today canceled. Nursing to follow up on UC visit.

## 2021-03-30 NOTE — TELEPHONE ENCOUNTER
I see patient has an appointment today at 11 AM.  Please call patient and let her know that it is probably wise for her to go to immediate care. We do not have x-ray facilities here and I think she probably will need an x-ray.   Please ask her to go to eit

## 2021-03-30 NOTE — ED INITIAL ASSESSMENT (HPI)
PATIENT ARRIVED AMBULATORY TO ROOM. PATIENT STATES SHE SLID AND ACCIDENTALLY FELL 4 DAYS AGO. +ECCHYMOSIS AND SWELLING TO THE RIGHT ANKLE AND FOOT. PATIENT C/O PAIN TO THE FOOT AND ANKLE.  PT DENIES HITTING HER HEAD WITH THE FALL

## 2021-03-30 NOTE — ED PROVIDER NOTES
Patient Seen in: [facility]      History   Patient presents with:  Fall    Stated Complaint: right ankle injury    HPI/Subjective:   HPI    Patient is a 70-year-old female who presents with right ankle pain and swelling x4 days.   She states t HYSTERECTOMY  2002   • OTHER SURGICAL HISTORY  2004?     Parathyroid removal   • OTHER SURGICAL HISTORY  08/2017    left foot surgery plate removed   • TONSILLECTOMY                  Social History    Tobacco Use      Smoking status: Never Smoker      Smoke midfoot. Correlate clinically.     Dictated by (CST): Tatyana Alvarado MD on 3/30/2021 at 11:02 AM     Finalized by (CST): Tatyana Alvarado MD on 3/30/2021 at 11:07 AM          XR FOOT, COMPLETE (MIN 3 VIEWS), RIGHT (CPT=73630)    Result Date: 3/30/2021  CONCL

## 2021-03-30 NOTE — TELEPHONE ENCOUNTER
Spoke to patient and relayed MD message, patient verbalized understanding. Patient has appt with Dr. Sade Menendez on Monday, April 5.  She was placed in a \"soft cast\", she states there is a \"firm thing\" placed under her foot, soft cotton wrapped all the

## 2021-04-05 ENCOUNTER — LAB ENCOUNTER (OUTPATIENT)
Dept: LAB | Facility: HOSPITAL | Age: 76
End: 2021-04-05
Attending: INTERNAL MEDICINE
Payer: MEDICARE

## 2021-04-05 ENCOUNTER — TELEPHONE (OUTPATIENT)
Dept: INTERNAL MEDICINE CLINIC | Facility: CLINIC | Age: 76
End: 2021-04-05

## 2021-04-05 ENCOUNTER — OFFICE VISIT (OUTPATIENT)
Dept: ORTHOPEDICS CLINIC | Facility: CLINIC | Age: 76
End: 2021-04-05
Payer: MEDICARE

## 2021-04-05 VITALS — HEIGHT: 63 IN | WEIGHT: 130 LBS | BODY MASS INDEX: 23.04 KG/M2

## 2021-04-05 DIAGNOSIS — S82.851A CLOSED TRIMALLEOLAR FRACTURE OF RIGHT ANKLE, INITIAL ENCOUNTER: Primary | ICD-10-CM

## 2021-04-05 DIAGNOSIS — D64.9 ANEMIA, UNSPECIFIED TYPE: ICD-10-CM

## 2021-04-05 DIAGNOSIS — I10 ESSENTIAL HYPERTENSION: ICD-10-CM

## 2021-04-05 DIAGNOSIS — M80.00XA AGE-RELATED OSTEOPOROSIS WITH CURRENT PATHOLOGICAL FRACTURE, INITIAL ENCOUNTER: ICD-10-CM

## 2021-04-05 DIAGNOSIS — I10 ESSENTIAL HYPERTENSION: Primary | ICD-10-CM

## 2021-04-05 PROCEDURE — 80048 BASIC METABOLIC PNL TOTAL CA: CPT

## 2021-04-05 PROCEDURE — 99214 OFFICE O/P EST MOD 30 MIN: CPT | Performed by: PHYSICIAN ASSISTANT

## 2021-04-05 PROCEDURE — 84466 ASSAY OF TRANSFERRIN: CPT

## 2021-04-05 PROCEDURE — 27786 TREATMENT OF ANKLE FRACTURE: CPT | Performed by: PHYSICIAN ASSISTANT

## 2021-04-05 PROCEDURE — 36415 COLL VENOUS BLD VENIPUNCTURE: CPT

## 2021-04-05 PROCEDURE — 82728 ASSAY OF FERRITIN: CPT

## 2021-04-05 PROCEDURE — 83540 ASSAY OF IRON: CPT

## 2021-04-05 PROCEDURE — 82607 VITAMIN B-12: CPT

## 2021-04-05 PROCEDURE — 85025 COMPLETE CBC W/AUTO DIFF WBC: CPT

## 2021-04-05 NOTE — TELEPHONE ENCOUNTER
Message noted. 1.  Please let patient know that she is a little bit anemic. I added an iron and B12 to her labs. Results will be available in a day or 2. I will let her know the results Thursday.     2.  Also her kidney level is a little bit low again

## 2021-04-05 NOTE — H&P
NURSING INTAKE COMMENTS: Patient presents with:  Urgent Care F/u: fx of the right ankle ,patient fell on friday 3/26/21,pain can reacha 7/10 at its highest      HPI: This 66year old female presents today with her  with right ankle nondisplaced trim Pneumonia due to organism    • Pulmonary embolism [facility])    • Spinal stenosis    • Spinal stenosis 4/19/2016   • Tonsillitis    • Ulcerative colitis ([facility] [address] 1976   • Uterine cancer ([facility] [address] 03/2002     Past Surgical History:   Procedure Laterality Date   • APPEN before breakfast.  0   • Calcium Citrate-Vitamin D (CALCIUM CITRATE + D3) 250-200 MG-UNIT Oral Tab Take 2 tablets by mouth 2 (two) times daily.      • cyanocobalamin (VITAMIN B12) 1000 MCG/ML Injection Solution Inject 1 mL into the muscle every 30 (thirty) ulcerated or worrisome skin lesions  EYES:denies blurred vision or double vision  HEENT: denies new nasal congestion, sinus pain or ST  LUNGS: denies shortness of breath  CARDIOVASCULAR: denies chest pain  GI: no hematemesis, no worsening heartburn, no carmelina malleolus. Correlate clinically to this site. Small calcaneal plantar spur with Mundo deformity the posterior calcaneus. Mild enthesopathy of the Achilles tendon insertion.   Moderate degenerative narrowing of the right 1st metatarsophalangeal joint, a 11/16/2020    GFRNAA 59 (L) 11/16/2020    GFRAA 68 11/16/2020        Assessment and Plan:  Diagnoses and all orders for this visit:    Closed trimalleolar fracture of right ankle, initial encounter    Age-related osteoporosis with current pathological frac

## 2021-04-05 NOTE — TELEPHONE ENCOUNTER
Pt. Called back to inform Dr. Mann Muniz that she had her labs done and picked up her Rx ph. # [phone]   Routed to clinical

## 2021-04-05 NOTE — TELEPHONE ENCOUNTER
Please place patient on schedule 11 AM Thursday. She already knows. Diagnosis ankle fracture    ( note to self:  Discussed with Dr. Nino Amezcua. Patient in the past has had a below the knee DVT when she fractured her patella.   She received Eliquis for 3 mo

## 2021-04-08 ENCOUNTER — OFFICE VISIT (OUTPATIENT)
Dept: INTERNAL MEDICINE CLINIC | Facility: CLINIC | Age: 76
End: 2021-04-08
Payer: MEDICARE

## 2021-04-08 ENCOUNTER — TELEPHONE (OUTPATIENT)
Dept: INTERNAL MEDICINE CLINIC | Facility: CLINIC | Age: 76
End: 2021-04-08

## 2021-04-08 VITALS
HEART RATE: 63 BPM | SYSTOLIC BLOOD PRESSURE: 106 MMHG | TEMPERATURE: 98 F | BODY MASS INDEX: 23 KG/M2 | OXYGEN SATURATION: 98 % | HEIGHT: 63 IN | DIASTOLIC BLOOD PRESSURE: 64 MMHG

## 2021-04-08 DIAGNOSIS — R94.4 DECREASED GFR: ICD-10-CM

## 2021-04-08 DIAGNOSIS — S82.851A CLOSED TRIMALLEOLAR FRACTURE OF RIGHT ANKLE, INITIAL ENCOUNTER: Primary | ICD-10-CM

## 2021-04-08 DIAGNOSIS — Z86.711 HISTORY OF PULMONARY EMBOLUS (PE): ICD-10-CM

## 2021-04-08 DIAGNOSIS — Z86.718 HISTORY OF DEEP VENOUS THROMBOSIS (DVT) OF DISTAL VEIN OF RIGHT LOWER EXTREMITY: ICD-10-CM

## 2021-04-08 DIAGNOSIS — I10 ESSENTIAL HYPERTENSION: ICD-10-CM

## 2021-04-08 DIAGNOSIS — D64.9 ANEMIA, UNSPECIFIED TYPE: ICD-10-CM

## 2021-04-08 DIAGNOSIS — Z87.19 HISTORY OF CHRONIC ULCERATIVE COLITIS: ICD-10-CM

## 2021-04-08 PROCEDURE — 99214 OFFICE O/P EST MOD 30 MIN: CPT | Performed by: INTERNAL MEDICINE

## 2021-04-08 NOTE — TELEPHONE ENCOUNTER
Please call patient sometime today and let her know that after she left I had another thought regarding her medications.   Since her blood pressure is very good and in addition to stopping the hydrochlorothiazide which she is already done it might be reason

## 2021-04-08 NOTE — PROGRESS NOTES
Armond Tapia is a 66year old female. HPI:   Patient presents with:  Fracture: Patient present for fractured right ankle and discuss kidney. Denies pain. Yanira Crlorena had fallen at home coming down the stairs.   She missed the last step and twisted he mobile. Before giving her Eliquis I did do labs and her hemoglobin is 10.8. Decreased from 12.0 November 2020. GFR 45 decreased from 50 9 November 2020. I learned this I had her stop her hydrochlorothiazide.   She will continue to keep her self well-h mouth every morning before breakfast.  0   • Albuterol Sulfate  (90 Base) MCG/ACT Inhalation Aero Soln Inhale 2 puffs into the lungs daily.  (Patient taking differently: Inhale 2 puffs into the lungs daily as needed.  ) 1 Inhaler 0   • Calcium Citrat HOSPITALIZED   • Pneumonia due to organism    • Pulmonary embolism [facility])    • Spinal stenosis    • Spinal stenosis 4/19/2016   • Tonsillitis    • Ulcerative colitis ([facility] [address] 1976   • Uterine cancer ([facility] 03/2002      Social History:  Social History    Tobacco DVT  Now on prophylactics Eliquis. 5. History of chronic ulcerative colitis  History of ulcerative colitis. She does say that she is got an appoint with Dr. Jonnie Martinez. She is asymptomatic.     6. History of pulmonary embolus (PE)  Remote history of pulmona

## 2021-04-08 NOTE — TELEPHONE ENCOUNTER
Patient called and relayed Dr Eun Davis message. Patient verbalized understanding with no further questions noted.

## 2021-04-13 ENCOUNTER — HOSPITAL ENCOUNTER (OUTPATIENT)
Dept: GENERAL RADIOLOGY | Facility: HOSPITAL | Age: 76
Discharge: HOME | End: 2021-04-13
Attending: ORTHOPAEDIC SURGERY
Payer: MEDICARE

## 2021-04-13 ENCOUNTER — OFFICE VISIT (OUTPATIENT)
Dept: ORTHOPEDICS CLINIC | Facility: CLINIC | Age: 76
End: 2021-04-13
Payer: MEDICARE

## 2021-04-13 VITALS — BODY MASS INDEX: 23 KG/M2 | WEIGHT: 130 LBS

## 2021-04-13 DIAGNOSIS — Z47.89 ORTHOPEDIC AFTERCARE: ICD-10-CM

## 2021-04-13 DIAGNOSIS — M80.00XA AGE-RELATED OSTEOPOROSIS WITH CURRENT PATHOLOGICAL FRACTURE, INITIAL ENCOUNTER: ICD-10-CM

## 2021-04-13 DIAGNOSIS — S82.851D CLOSED TRIMALLEOLAR FRACTURE OF RIGHT ANKLE WITH ROUTINE HEALING, SUBSEQUENT ENCOUNTER: Primary | ICD-10-CM

## 2021-04-13 PROCEDURE — 99024 POSTOP FOLLOW-UP VISIT: CPT | Performed by: ORTHOPAEDIC SURGERY

## 2021-04-13 PROCEDURE — 73610 X-RAY EXAM OF ANKLE: CPT | Performed by: ORTHOPAEDIC SURGERY

## 2021-04-13 RX ORDER — CYANOCOBALAMIN (VITAMIN B-12) 1000 MCG
1 TABLET, EXTENDED RELEASE ORAL DAILY
COMMUNITY

## 2021-04-13 NOTE — PROGRESS NOTES
NURSING INTAKE COMMENTS: Patient presents with: Follow - Up: FX of the right ankle ,patient has an occ pain  4/10 at times      HPI: This 66year old female presents today for follow-up with right trimalleolar ankle fracture 1 week ago.   Patient states th Laterality Date   • APPENDECTOMY     • COLONOSCOPY N/A 5/24/2018    Performed by Deb Nelson MD at [address] [place] [address] ENDOSCOPY   • COLONOSCOPY N/A 12/5/2016    Performed by Deb Nelson MD at [address] [place] [address] ENDOSCOPY   • HYSTERECTOMY  2002   • OTHER SURGICAL HISTO cyanocobalamin (VITAMIN B12) 1000 MCG/ML Injection Solution Inject 1 mL into the muscle every 30 (thirty) days. • Vitamin D, Ergocalciferol, (DRISDOL) 56718 UNITS Oral Cap Take 1 capsule by mouth As Directed. Every 5 days.      • Multiple Vitamin (MULTI breath  CARDIOVASCULAR: denies chest pain  GI: no hematemesis, no worsening heartburn, no diarrhea  : no dysuria, no blood in urine, no difficulty urinating, no incontinence  MUSCULOSKELETAL: no other musculoskeletal complaints other than in HPI  NEURO: acute appearing oblique fracture of the posterior malleolus. Correlate clinically to this site. Small calcaneal plantar spur with Mundo deformity the posterior calcaneus. Mild enthesopathy of the Achilles tendon insertion.   Moderate degenerative narro (H) 04/05/2021    BUN 30 (H) 04/05/2021    CREATSERUM 1.17 (H) 04/05/2021    GFRNAA 45 (L) 04/05/2021    GFRAA 52 (L) 04/05/2021        Assessment and Plan:  Diagnoses and all orders for this visit:    Orthopedic aftercare  -     XR ANKLE (MIN 3 VIEWS), RI

## 2021-04-19 ENCOUNTER — TELEPHONE (OUTPATIENT)
Dept: INTERNAL MEDICINE CLINIC | Facility: CLINIC | Age: 76
End: 2021-04-19

## 2021-04-19 ENCOUNTER — LAB ENCOUNTER (OUTPATIENT)
Dept: LAB | Age: 76
End: 2021-04-19
Attending: INTERNAL MEDICINE
Payer: MEDICARE

## 2021-04-19 ENCOUNTER — OFFICE VISIT (OUTPATIENT)
Dept: INTERNAL MEDICINE CLINIC | Facility: CLINIC | Age: 76
End: 2021-04-19
Payer: MEDICARE

## 2021-04-19 VITALS
WEIGHT: 130 LBS | SYSTOLIC BLOOD PRESSURE: 120 MMHG | DIASTOLIC BLOOD PRESSURE: 60 MMHG | OXYGEN SATURATION: 98 % | TEMPERATURE: 99 F | HEART RATE: 58 BPM | HEIGHT: 63 IN | BODY MASS INDEX: 23.04 KG/M2

## 2021-04-19 DIAGNOSIS — D64.9 ANEMIA, UNSPECIFIED TYPE: ICD-10-CM

## 2021-04-19 DIAGNOSIS — S82.851A CLOSED TRIMALLEOLAR FRACTURE OF RIGHT ANKLE, INITIAL ENCOUNTER: Primary | ICD-10-CM

## 2021-04-19 DIAGNOSIS — I10 ESSENTIAL HYPERTENSION: ICD-10-CM

## 2021-04-19 DIAGNOSIS — E53.8 VITAMIN B 12 DEFICIENCY: ICD-10-CM

## 2021-04-19 DIAGNOSIS — R94.4 DECREASED GFR: ICD-10-CM

## 2021-04-19 DIAGNOSIS — Z86.718 HISTORY OF DEEP VENOUS THROMBOSIS (DVT) OF DISTAL VEIN OF RIGHT LOWER EXTREMITY: ICD-10-CM

## 2021-04-19 PROBLEM — S82.851D CLOSED TRIMALLEOLAR FRACTURE OF RIGHT ANKLE WITH ROUTINE HEALING: Status: ACTIVE | Noted: 2021-04-19

## 2021-04-19 PROCEDURE — 96372 THER/PROPH/DIAG INJ SC/IM: CPT | Performed by: INTERNAL MEDICINE

## 2021-04-19 PROCEDURE — 99214 OFFICE O/P EST MOD 30 MIN: CPT | Performed by: INTERNAL MEDICINE

## 2021-04-19 PROCEDURE — 36415 COLL VENOUS BLD VENIPUNCTURE: CPT

## 2021-04-19 PROCEDURE — 85025 COMPLETE CBC W/AUTO DIFF WBC: CPT

## 2021-04-19 PROCEDURE — 80048 BASIC METABOLIC PNL TOTAL CA: CPT

## 2021-04-19 RX ADMIN — CYANOCOBALAMIN 1000 MCG: 1000 INJECTION INTRAMUSCULAR; SUBCUTANEOUS at 10:09:00

## 2021-04-19 NOTE — TELEPHONE ENCOUNTER
Please let patient know that her labs are improved. Anemia improved. She just has a little bit of anemia but better than before. Also her kidney numbers are improved. I am satisfied with the results. I will see her in a month.

## 2021-04-19 NOTE — PROGRESS NOTES
Adams Reid is a 66year old female. HPI:   Patient presents with: Follow - Up: Fall 3/26   Medication Question: Prolia ? Ren Raza comes in for follow-up. She is actively following with Dr. Rekha Esparza for her trimalleolar right ankle fracture. mouth nightly. 90 tablet 1   • amLODIPine Besylate 10 MG Oral Tab Take 1 tablet (10 mg total) by mouth daily. 90 tablet 3   • alendronate 70 MG Oral Tab Take 70 mg by mouth once a week.      • TELMISARTAN 80 MG Oral Tab TAKE ONE TABLET BY MOUTH ONE TIME ROSALVA foot surgery 2011,2012    LEFT FOOT SURGERY, PINS PLACED IN TOE   • H/O oral surgery 2013    GUM/MOUTH SURGERY   • Hallux rigidus 2009    LEFT FOOT, YURY PROCEDURE, 1ST LEFT METARSOPHALANGEAL JOINT   • Heart palpitations 2012    HEART MONITOR AUGUST 201 nourished, in no apparent distress  EYES:  PERRL. Sclera anicteric  LUNGS:  clear to auscultation. Effort normal  CARDIO:  RRR without murmur.    S1 and S2 normal  GI:  good BS's,  no masses,   HSM or tenderness  EXTREMITIES : no cyanosis, clubbing or mary

## 2021-04-27 ENCOUNTER — OFFICE VISIT (OUTPATIENT)
Dept: ORTHOPEDICS CLINIC | Facility: CLINIC | Age: 76
End: 2021-04-27
Payer: MEDICARE

## 2021-04-27 ENCOUNTER — HOSPITAL ENCOUNTER (OUTPATIENT)
Dept: GENERAL RADIOLOGY | Facility: HOSPITAL | Age: 76
Discharge: HOME | End: 2021-04-27
Attending: ORTHOPAEDIC SURGERY
Payer: MEDICARE

## 2021-04-27 DIAGNOSIS — Z47.89 ORTHOPEDIC AFTERCARE: ICD-10-CM

## 2021-04-27 DIAGNOSIS — M80.00XA AGE-RELATED OSTEOPOROSIS WITH CURRENT PATHOLOGICAL FRACTURE, INITIAL ENCOUNTER: ICD-10-CM

## 2021-04-27 DIAGNOSIS — S82.851D CLOSED TRIMALLEOLAR FRACTURE OF RIGHT ANKLE WITH ROUTINE HEALING, SUBSEQUENT ENCOUNTER: Primary | ICD-10-CM

## 2021-04-27 PROCEDURE — 99212 OFFICE O/P EST SF 10 MIN: CPT | Performed by: ORTHOPAEDIC SURGERY

## 2021-04-27 PROCEDURE — 73610 X-RAY EXAM OF ANKLE: CPT | Performed by: ORTHOPAEDIC SURGERY

## 2021-04-27 NOTE — PROGRESS NOTES
NURSING INTAKE COMMENTS: Patient presents with:   Follow - Up: f/u visit- getting new xrays with cast on- no swelling- no pain 0/10-       HPI: This 66year old female presents today just over 4 weeks status post right ankle trimalleolar fracture being tania COLONOSCOPY N/A 12/5/2016    Procedure: COLONOSCOPY;  Surgeon: Lady Flores MD;  Location: [facility] ENDOSCOPY   • COLONOSCOPY N/A 5/24/2018    Procedure: COLONOSCOPY;  Surgeon: Lady Flores MD;  Location: [facility]   • HYSTERECTOMY  2002   • before breakfast.  0   • Albuterol Sulfate  (90 Base) MCG/ACT Inhalation Aero Soln Inhale 2 puffs into the lungs daily.  (Patient taking differently: Inhale 2 puffs into the lungs daily as needed.  ) 1 Inhaler 0   • cyanocobalamin (VITAMIN B12) 1000 new nasal congestion, sinus pain or ST  LUNGS: denies shortness of breath  CARDIOVASCULAR: denies chest pain  GI: no hematemesis, no worsening heartburn, no diarrhea  : no dysuria, no blood in urine, no difficulty urinating, no incontinence  MUSCULOSKELE seen in the posterior malleolus. There is stable alignment at the fracture sites without callus formation. A subcentimeter chronic nonunited avulsion fracture is again seen along the distal tip of the medial malleolus.   A moderate plantar calcaneal spur SOFT TISSUES: Moderate bimalleolar soft tissue swelling. Mild soft tissue swelling the dorsal aspect of the midfoot. Correlate clinically. EFFUSION: None visible. OTHER: Negative. CONCLUSION:  1.  Minimally displaced oblique fracture of the distal encounter        Assessment: Left ankle trimalleolar fracture as well as age-related osteoporosis with current pathologic fracture. Plan: For the ankle fracture continue nonweightbearing to the left lower extremity.   For the age-related osteoporosis I r

## 2021-05-11 ENCOUNTER — OFFICE VISIT (OUTPATIENT)
Dept: ORTHOPEDICS CLINIC | Facility: CLINIC | Age: 76
End: 2021-05-11
Payer: MEDICARE

## 2021-05-11 ENCOUNTER — HOSPITAL ENCOUNTER (OUTPATIENT)
Dept: GENERAL RADIOLOGY | Facility: HOSPITAL | Age: 76
Discharge: HOME | End: 2021-05-11
Attending: ORTHOPAEDIC SURGERY
Payer: MEDICARE

## 2021-05-11 DIAGNOSIS — S82.851D CLOSED TRIMALLEOLAR FRACTURE OF RIGHT ANKLE WITH ROUTINE HEALING, SUBSEQUENT ENCOUNTER: Primary | ICD-10-CM

## 2021-05-11 DIAGNOSIS — Z47.89 ORTHOPEDIC AFTERCARE: ICD-10-CM

## 2021-05-11 DIAGNOSIS — M80.00XA AGE-RELATED OSTEOPOROSIS WITH CURRENT PATHOLOGICAL FRACTURE, INITIAL ENCOUNTER: ICD-10-CM

## 2021-05-11 PROCEDURE — 73610 X-RAY EXAM OF ANKLE: CPT | Performed by: ORTHOPAEDIC SURGERY

## 2021-05-11 PROCEDURE — L4386 NON-PNEUM WALK BOOT PRE CST: HCPCS | Performed by: ORTHOPAEDIC SURGERY

## 2021-05-11 PROCEDURE — 99212 OFFICE O/P EST SF 10 MIN: CPT | Performed by: ORTHOPAEDIC SURGERY

## 2021-05-11 NOTE — PROGRESS NOTES
NURSING INTAKE COMMENTS: Patient presents with:  Fracture: R ankle f/u - has discomfort in the same spots as before rated as 4/10 on and off       HPI: This 66year old female presents today 6 weeks out from a trimalleolar fracture being treated closed.   Eduar De La Rosa COLONOSCOPY;  Surgeon: Patric Aguilar MD;  Location: [address] [place] [address] ENDOSCOPY   • HYSTERECTOMY  2002   • OTHER SURGICAL HISTORY  2004?     Parathyroid removal   • OTHER SURGICAL HISTORY  08/2017    left foot surgery plate removed   • TONSILLECTOMY       Current Inhale 2 puffs into the lungs daily as needed.  ) 1 Inhaler 0   • cyanocobalamin (VITAMIN B12) 1000 MCG/ML Injection Solution Inject 1 mL into the muscle every 30 (thirty) days.      • Vitamin D, Ergocalciferol, (DRISDOL) 83223 UNITS Oral Cap Take 1 capsule diarrhea  : no dysuria, no blood in urine, no difficulty urinating, no incontinence  MUSCULOSKELETAL: no other musculoskeletal complaints other than in HPI  NEURO: no numbness or tingling, no weakness or balance disorder  PSYCHE: no depression or anxiety spur again noted. SOFT TISSUES: Negative. No visible soft tissue swelling. EFFUSION: None visible. OTHER: Negative. CONCLUSION:  1. Stable post reduction appearance of right ankle.     Dictated by (CST): Earline Hernandez MD on 4/27/2021 at 12 Component Value Date    GLU 95 04/19/2021    BUN 26 (H) 04/19/2021    CREATSERUM 0.77 04/19/2021    GFRNAA 75 04/19/2021    GFRAA 86 04/19/2021        Assessment and Plan:  Diagnoses and all orders for this visit:    Closed trimalleolar fracture of right

## 2021-05-13 RX ORDER — PROPRANOLOL HYDROCHLORIDE 80 MG/1
80 TABLET ORAL 2 TIMES DAILY
Qty: 180 TABLET | Refills: 3 | Status: SHIPPED | OUTPATIENT
Start: 2021-05-13 | End: 2022-05-12

## 2021-05-13 NOTE — TELEPHONE ENCOUNTER
Message noted. I think heart rates in the 50s are stable for her. She is not told me of any dizziness or passing out. Also it is beneficial for her blood pressure I think if we cut down or stop that her blood pressure will elevate.   Therefore I refilled the propranolol as requested

## 2021-05-13 NOTE — TELEPHONE ENCOUNTER
Pt is calling back per nurse with blood pressure numbers      5/10    161 over 65    5/6      137 over 67    No date given but around 5/10    159 over 69    No date   143  Over  67 4/28    145 over   64    4/30    163  overr  70

## 2021-05-13 NOTE — TELEPHONE ENCOUNTER
Pt scheduled for 1 month f/u appt on 5/20/2021.   4/19/21 Heart rate documented as 58. Called patient to have pt provide recent heart rate readings- pt was unable to locate log of her BP/pulse and states she will call back with this information. Awaiting call back.

## 2021-05-13 NOTE — TELEPHONE ENCOUNTER
To Dr. Emily Quispe to please advise on propanolol refill (Rx pended):  Noted, called patient for log of heart rates:  Pt provided the following heart rate readings over the past week:  4/21: 57 bpm  4/28: 55 bpm  4/30: 55 bpm  approx may 1-3: 63 bpm  5/6: 71 bpm  5/10: 57 bpm  Today: 57 bpm    Readings are from pt's home electronic BP monitor.

## 2021-05-20 ENCOUNTER — OFFICE VISIT (OUTPATIENT)
Dept: INTERNAL MEDICINE CLINIC | Facility: CLINIC | Age: 76
End: 2021-05-20
Payer: MEDICARE

## 2021-05-20 VITALS
DIASTOLIC BLOOD PRESSURE: 56 MMHG | HEIGHT: 62 IN | BODY MASS INDEX: 24.84 KG/M2 | HEART RATE: 58 BPM | SYSTOLIC BLOOD PRESSURE: 110 MMHG | TEMPERATURE: 98 F | WEIGHT: 135 LBS | OXYGEN SATURATION: 98 %

## 2021-05-20 DIAGNOSIS — S82.851A CLOSED TRIMALLEOLAR FRACTURE OF RIGHT ANKLE, INITIAL ENCOUNTER: Primary | ICD-10-CM

## 2021-05-20 DIAGNOSIS — Z86.718 HISTORY OF DEEP VENOUS THROMBOSIS (DVT) OF DISTAL VEIN OF RIGHT LOWER EXTREMITY: ICD-10-CM

## 2021-05-20 DIAGNOSIS — D64.9 ANEMIA, UNSPECIFIED TYPE: ICD-10-CM

## 2021-05-20 DIAGNOSIS — Z29.9 DVT PROPHYLAXIS: ICD-10-CM

## 2021-05-20 DIAGNOSIS — R94.4 DECREASED GFR: ICD-10-CM

## 2021-05-20 PROBLEM — I82.4Z1 DVT, LOWER EXTREMITY, DISTAL, ACUTE, RIGHT (HCC): Status: ACTIVE | Noted: 2021-05-20

## 2021-05-20 PROCEDURE — 96372 THER/PROPH/DIAG INJ SC/IM: CPT | Performed by: INTERNAL MEDICINE

## 2021-05-20 PROCEDURE — 99214 OFFICE O/P EST MOD 30 MIN: CPT | Performed by: INTERNAL MEDICINE

## 2021-05-20 RX ORDER — TELMISARTAN 80 MG/1
TABLET ORAL
Qty: 90 TABLET | Refills: 3 | COMMUNITY
Start: 2021-05-20 | End: 2021-09-13

## 2021-05-20 RX ADMIN — CYANOCOBALAMIN 1000 MCG: 1000 INJECTION INTRAMUSCULAR; SUBCUTANEOUS at 10:35:00

## 2021-05-20 NOTE — PROGRESS NOTES
Hussain Tejada is a 66year old female. HPI:   Patient presents with:  Checkup: 1 month     Pollo Viveros comes in for follow-up. She is following with Dr. Herlinda Singh for her trimalleolar fracture right ankle. She is now in a boot. She is doing well.   No atorvastatin 40 MG Oral Tab Take 1 tablet (40 mg total) by mouth nightly. 90 tablet 1   • amLODIPine Besylate 10 MG Oral Tab Take 1 tablet (10 mg total) by mouth daily. 90 tablet 3   • alendronate 70 MG Oral Tab Take 70 mg by mouth once a week.      • TELMI Heart palpitations 2012    HEART MONITOR AUGUST 2012   • High blood pressure    • High cholesterol    • History of DVT (deep vein thrombosis)    • Hypercholesterolemia    • Hyperparathyroidism ([facility] [address] 2004    PAIR OF PARATHYROID REMOVED   • Hyperparathyroidi normal  LUNGS:  clear to auscultation. Effort normal  CARDIO:  RRR without murmur. S1 and S2 normal  GI:  good BS's,  no masses,   HSM or tenderness  EXTREMITIES : no cyanosis, clubbing or edema    ASSESSMENT AND PLAN:     1.  Closed trimalleolar fractur

## 2021-06-08 ENCOUNTER — HOSPITAL ENCOUNTER (OUTPATIENT)
Dept: GENERAL RADIOLOGY | Facility: HOSPITAL | Age: 76
Discharge: HOME | End: 2021-06-08
Attending: ORTHOPAEDIC SURGERY
Payer: MEDICARE

## 2021-06-08 ENCOUNTER — OFFICE VISIT (OUTPATIENT)
Dept: ORTHOPEDICS CLINIC | Facility: CLINIC | Age: 76
End: 2021-06-08
Payer: MEDICARE

## 2021-06-08 DIAGNOSIS — M80.00XA AGE-RELATED OSTEOPOROSIS WITH CURRENT PATHOLOGICAL FRACTURE, INITIAL ENCOUNTER: ICD-10-CM

## 2021-06-08 DIAGNOSIS — S82.851D CLOSED TRIMALLEOLAR FRACTURE OF RIGHT ANKLE WITH ROUTINE HEALING, SUBSEQUENT ENCOUNTER: ICD-10-CM

## 2021-06-08 DIAGNOSIS — Z47.89 ORTHOPEDIC AFTERCARE: ICD-10-CM

## 2021-06-08 DIAGNOSIS — Z47.89 ORTHOPEDIC AFTERCARE: Primary | ICD-10-CM

## 2021-06-08 PROCEDURE — 73610 X-RAY EXAM OF ANKLE: CPT | Performed by: ORTHOPAEDIC SURGERY

## 2021-06-08 PROCEDURE — 99024 POSTOP FOLLOW-UP VISIT: CPT | Performed by: ORTHOPAEDIC SURGERY

## 2021-06-08 RX ORDER — HYDROCHLOROTHIAZIDE 12.5 MG/1
12.5 TABLET ORAL
COMMUNITY
Start: 2021-05-22

## 2021-06-08 NOTE — PROGRESS NOTES
NURSING INTAKE COMMENTS: Patient presents with:   Ankle Pain: right ankle- feeling sore per patient- wearing cam boot- feels tender when boot is not on- repeat xrays today-      HPI: This 66year old female presents today 2 months status post right ankle tr APPENDECTOMY     • COLONOSCOPY N/A 12/5/2016    Procedure: COLONOSCOPY;  Surgeon: Jana Hines MD;  Location: [address] [place] [address] ENDOSCOPY   • COLONOSCOPY N/A 5/24/2018    Procedure: COLONOSCOPY;  Surgeon: Jana Hines MD;  Location: [address] [place] [address] ENDOSCOPY   • S Multiple Vitamin (MULTI-VITAMINS) Oral Tab Take 1 tablet by mouth daily with breakfast.     • ALPRAZolam (XANAX) 0.25 MG Oral Tab Take one a day as needed for anxiety (Patient not taking: Reported on 6/8/2021 ) 20 tablet 1   • butalbital-aspirin-caffeine 5 use: No      Sexual activity: Not on file       Review of Systems:  GENERAL: feels generally well, no significant weight loss or weight gain  SKIN: no ulcerated or worrisome skin lesions  EYES:denies blurred vision or double vision  HEENT: denies new nasal were obtained. CONCLUSION:  BONES: Examination out of cast.  Perhaps some healing of lateral malleolar fracture. Small bony density remains off the medial malleolus. Healing fracture of the posterior malleolus is seen.   There is slight widening o

## 2021-06-16 RX ORDER — ATORVASTATIN CALCIUM 40 MG/1
40 TABLET, FILM COATED ORAL NIGHTLY
Qty: 90 TABLET | Refills: 3 | Status: SHIPPED | OUTPATIENT
Start: 2021-06-16

## 2021-06-22 ENCOUNTER — HOSPITAL ENCOUNTER (OUTPATIENT)
Dept: ULTRASOUND IMAGING | Facility: HOSPITAL | Age: 76
Discharge: HOME | End: 2021-06-22
Attending: INTERNAL MEDICINE
Payer: MEDICARE

## 2021-06-22 ENCOUNTER — TELEPHONE (OUTPATIENT)
Dept: INTERNAL MEDICINE CLINIC | Facility: CLINIC | Age: 76
End: 2021-06-22

## 2021-06-22 ENCOUNTER — OFFICE VISIT (OUTPATIENT)
Dept: INTERNAL MEDICINE CLINIC | Facility: CLINIC | Age: 76
End: 2021-06-22
Payer: MEDICARE

## 2021-06-22 VITALS
WEIGHT: 147.63 LBS | BODY MASS INDEX: 27.17 KG/M2 | TEMPERATURE: 98 F | OXYGEN SATURATION: 97 % | SYSTOLIC BLOOD PRESSURE: 160 MMHG | HEART RATE: 54 BPM | HEIGHT: 62 IN | DIASTOLIC BLOOD PRESSURE: 70 MMHG

## 2021-06-22 DIAGNOSIS — Z86.711 HISTORY OF PULMONARY EMBOLUS (PE): ICD-10-CM

## 2021-06-22 DIAGNOSIS — Z86.718 HISTORY OF DEEP VENOUS THROMBOSIS (DVT) OF DISTAL VEIN OF RIGHT LOWER EXTREMITY: ICD-10-CM

## 2021-06-22 DIAGNOSIS — I10 ESSENTIAL HYPERTENSION: ICD-10-CM

## 2021-06-22 DIAGNOSIS — S82.851A CLOSED TRIMALLEOLAR FRACTURE OF RIGHT ANKLE, INITIAL ENCOUNTER: Primary | ICD-10-CM

## 2021-06-22 DIAGNOSIS — Z29.9 DVT PROPHYLAXIS: ICD-10-CM

## 2021-06-22 DIAGNOSIS — M79.89 PAIN AND SWELLING OF RIGHT LOWER LEG: Primary | ICD-10-CM

## 2021-06-22 DIAGNOSIS — M79.89 PAIN AND SWELLING OF RIGHT LOWER LEG: ICD-10-CM

## 2021-06-22 DIAGNOSIS — M79.661 PAIN AND SWELLING OF RIGHT LOWER LEG: Primary | ICD-10-CM

## 2021-06-22 DIAGNOSIS — Z00.00 ROUTINE HEALTH MAINTENANCE: ICD-10-CM

## 2021-06-22 DIAGNOSIS — Z87.19 HISTORY OF CHRONIC ULCERATIVE COLITIS: ICD-10-CM

## 2021-06-22 DIAGNOSIS — R94.4 DECREASED GFR: ICD-10-CM

## 2021-06-22 DIAGNOSIS — E53.8 VITAMIN B 12 DEFICIENCY: ICD-10-CM

## 2021-06-22 DIAGNOSIS — D64.9 ANEMIA, UNSPECIFIED TYPE: ICD-10-CM

## 2021-06-22 DIAGNOSIS — M79.661 PAIN AND SWELLING OF RIGHT LOWER LEG: ICD-10-CM

## 2021-06-22 PROCEDURE — 99214 OFFICE O/P EST MOD 30 MIN: CPT | Performed by: INTERNAL MEDICINE

## 2021-06-22 PROCEDURE — 96372 THER/PROPH/DIAG INJ SC/IM: CPT | Performed by: INTERNAL MEDICINE

## 2021-06-22 PROCEDURE — 93971 EXTREMITY STUDY: CPT | Performed by: INTERNAL MEDICINE

## 2021-06-22 RX ORDER — BUTALBITAL, ASPIRIN, AND CAFFEINE 50; 325; 40 MG/1; MG/1; MG/1
1 CAPSULE ORAL EVERY 6 HOURS PRN
Qty: 30 CAPSULE | Refills: 1 | Status: SHIPPED | OUTPATIENT
Start: 2021-06-22 | End: 2021-11-22

## 2021-06-22 RX ADMIN — CYANOCOBALAMIN 1000 MCG: 1000 INJECTION INTRAMUSCULAR; SUBCUTANEOUS at 09:32:00

## 2021-06-22 NOTE — PROGRESS NOTES
Anrdea Mccann is a 64year old female. HPI:   Patient presents with: Follow - Up: 1 month F/U   Medication Request: pending     Margorie Essex is here with her  Ramiro Cowart. She doing well. Reviewed that she did see Dr. Tiara Sibley office June 8.   She wi tablet (80 mg total) by mouth 2 (two) times daily. 180 tablet 3   • sulfaSALAzine (AZULFIDINE) 500 MG Oral Tab Take 3 tablets (1,500 mg total) by mouth 2 (two) times daily.  Take two tablets three times a day (Patient taking differently: Take 1,500 mg by mo METARSOPHALANGEAL JOINT   • Heart palpitations 2012    HEART MONITOR AUGUST 2012   • High blood pressure    • High cholesterol    • History of DVT (deep vein thrombosis)    • Hypercholesterolemia    • Hyperparathyroidism ([address] 2004    PAIR OF PARATHYROID R auscultation. Effort normal  CARDIO:  RRR without murmur. S1 and S2 normal  GI:  good BS's,  no masses,   HSM or tenderness  EXTREMITIES : no cyanosis, clubbing or edema. Calves nontender.   There is some bluish discoloration to her foot compared to the MD  6/22/2021  10:42 AM

## 2021-06-22 NOTE — TELEPHONE ENCOUNTER
Please see if we could have patient go for venous Doppler right lower extremity today. Diagnosis pain and swelling and history of DVT.

## 2021-06-22 NOTE — TELEPHONE ENCOUNTER
Please let patient know that her test for blood clots in her right leg was good. Negative. No blood clots.   I think the reason for her foot turning \"blue\" at times is what we would call venous insufficiency which is just a little bit of back of pressur

## 2021-06-28 ENCOUNTER — TELEPHONE (OUTPATIENT)
Dept: PHYSICAL THERAPY | Facility: HOSPITAL | Age: 76
End: 2021-06-28

## 2021-06-28 NOTE — TELEPHONE ENCOUNTER
LM for pt to let her know that there are some earlier appts available for her evaluation if she would like to come in sooner. Asked pt to call us back as soon as she can to let us know.

## 2021-06-30 ENCOUNTER — OFFICE VISIT (OUTPATIENT)
Dept: PHYSICAL THERAPY | Facility: HOSPITAL | Age: 76
End: 2021-06-30
Attending: PHYSICIAN ASSISTANT
Payer: MEDICARE

## 2021-06-30 ENCOUNTER — TELEPHONE (OUTPATIENT)
Dept: PHYSICAL THERAPY | Age: 76
End: 2021-06-30

## 2021-06-30 DIAGNOSIS — M80.00XA AGE-RELATED OSTEOPOROSIS WITH CURRENT PATHOLOGICAL FRACTURE, INITIAL ENCOUNTER: ICD-10-CM

## 2021-06-30 DIAGNOSIS — S82.851D CLOSED TRIMALLEOLAR FRACTURE OF RIGHT ANKLE WITH ROUTINE HEALING, SUBSEQUENT ENCOUNTER: ICD-10-CM

## 2021-06-30 PROCEDURE — 97110 THERAPEUTIC EXERCISES: CPT

## 2021-06-30 PROCEDURE — 97162 PT EVAL MOD COMPLEX 30 MIN: CPT

## 2021-06-30 NOTE — PROGRESS NOTES
LOWER EXTREMITY EVALUATION:   Referring Physician: Dr. Nixon Aguilar  Diagnosis: Age-related osteoporosis with current pathological fracture, initial encounter (M80.00XA)  Closed trimalleolar fracture of right ankle with routine healing, subsequent encounte and they will do an xray then. They told her there was a piece that was slightly off on the inside of the foot that they're still watching. She has been walking with her walker some, mostly just in open spaces.  She doesn't use if if there are things to Monica organism    • Pulmonary embolism [facility])    • Spinal stenosis    • Spinal stenosis 4/19/2016   • Tonsillitis    • Ulcerative colitis ([facility] [address] 1976   • Uterine cancer ([facility] [address] 03/2002     Pt reports that her BP is fine.  She reports that she does not have osteoporos denotes performed with pain)  Hip Foot/Ankle   Flexion: R 4+/5; L 4+/5  Extension: R 4-/5; L 4-/5  Abduction: R 4-/5; L 4-/5  ER: R 4-/5; L 4-/5  IR: R 4/5; L 4/5 DF: R 3+/5; L 5/5  PF: R 3+/5; L 5/5  INV: R 3+/5; L 4+/5  EV: R 3+/5; L 4+/5  R strength ass for >20 mins with min to no pain in the ankle and good mechanics. Frequency / Duration: Patient will be seen for 1-2 x/week or a total of 10 visits over a 90 day period.  Treatment will include: Gait training, Manual Therapy, Neuromuscular Re-education,

## 2021-07-02 ENCOUNTER — OFFICE VISIT (OUTPATIENT)
Dept: PHYSICAL THERAPY | Facility: HOSPITAL | Age: 76
End: 2021-07-02
Attending: PHYSICIAN ASSISTANT
Payer: MEDICARE

## 2021-07-02 PROCEDURE — 97110 THERAPEUTIC EXERCISES: CPT

## 2021-07-02 PROCEDURE — 97140 MANUAL THERAPY 1/> REGIONS: CPT

## 2021-07-02 NOTE — PROGRESS NOTES
Dx: Age-related osteoporosis with current pathological fracture, initial encounter (M80.00XA)  Closed trimalleolar fracture of right ankle with routine healing, subsequent encounter (B42.583Z)     Insurance (Authorized # of Visits):  Mdcr (10 per POC) gentle end range stretch       TherEx:  Gastroc stretch seated with towel - 3x30\"  Ant tib stretch seated - 3x30\"  Discussed seated piriformis stretch for HEP  Ankle isometrics - manual pressure from PT - 10x5\" DF, PF, inv/eversion  Hip abd isometrics s

## 2021-07-06 ENCOUNTER — OFFICE VISIT (OUTPATIENT)
Dept: PHYSICAL THERAPY | Facility: HOSPITAL | Age: 76
End: 2021-07-06
Attending: PHYSICIAN ASSISTANT
Payer: MEDICARE

## 2021-07-06 PROCEDURE — 97110 THERAPEUTIC EXERCISES: CPT

## 2021-07-06 PROCEDURE — 97140 MANUAL THERAPY 1/> REGIONS: CPT

## 2021-07-06 NOTE — PATIENT INSTRUCTIONS
Ankle isometrics:  -Eversion: place yellow band around feet. Pull the feet apart just until you meet the resistance of the band. Hold for 5 seconds and repeat 10x. Do 2x per day. -Inversion: place an pillow in between your feet.  Squeeze the ball and ho

## 2021-07-06 NOTE — PROGRESS NOTES
Dx: Age-related osteoporosis with current pathological fracture, initial encounter (M80.00XA)  Closed trimalleolar fracture of right ankle with routine healing, subsequent encounter (L32.476R)     Insurance (Authorized # of Visits):  Mdcr (10 per POC) [place]#: 5/ Date:    Tx#: 6/   Manual:  STM/DTM to the ant tib and lateral gastroc  PROM of the ankle with gentle end range stretch Manual:  STM/DTM to the ant tib and lateral gastroc  PROM of the ankle with gentle end range stretch      TherEx:  Gastroc stre

## 2021-07-09 ENCOUNTER — OFFICE VISIT (OUTPATIENT)
Dept: PHYSICAL THERAPY | Facility: HOSPITAL | Age: 76
End: 2021-07-09
Attending: PHYSICIAN ASSISTANT
Payer: MEDICARE

## 2021-07-09 PROCEDURE — 97140 MANUAL THERAPY 1/> REGIONS: CPT

## 2021-07-09 PROCEDURE — 97110 THERAPEUTIC EXERCISES: CPT

## 2021-07-09 NOTE — PROGRESS NOTES
Dx: Age-related osteoporosis with current pathological fracture, initial encounter (M80.00XA)  Closed trimalleolar fracture of right ankle with routine healing, subsequent encounter (S62.255E)     Insurance (Authorized # of Visits):  Mdcr (10 per POC) good mechanics. Plan: Cont PT for R ankle ROM and strength, hip strengthening, balance and gait   Date: 7/2/2021  TX#: 2/10 Date: 7/6/2021               TX#: 3/10 Date: 7/9/2021               TX#: 4/10 Date:                 TX#: 5/ Date:    Tx#: 6/ Ma

## 2021-07-20 ENCOUNTER — OFFICE VISIT (OUTPATIENT)
Dept: PHYSICAL THERAPY | Facility: HOSPITAL | Age: 76
End: 2021-07-20
Attending: PHYSICIAN ASSISTANT
Payer: MEDICARE

## 2021-07-20 PROCEDURE — 97140 MANUAL THERAPY 1/> REGIONS: CPT

## 2021-07-20 PROCEDURE — 97110 THERAPEUTIC EXERCISES: CPT

## 2021-07-20 NOTE — PROGRESS NOTES
Dx: Age-related osteoporosis with current pathological fracture, initial encounter (M80.00XA)  Closed trimalleolar fracture of right ankle with routine healing, subsequent encounter (B32.159X)     Insurance (Authorized # of Visits):  Mdcr (10 per POC) amb up and down steps with reciprocal pattern and min to no pain. Pt will be able to amb for >20 mins with min to no pain in the ankle and good mechanics.      Plan: Cont PT for R ankle ROM and strength, hip strengthening, balance and gait   Date: 7/2/202 Jane:  None performed this date    HEP: see pt instructions    Charges: Man - 1, TherEx - 2         Total Timed Treatment: 45 min  Total Treatment Time: 45 min

## 2021-07-23 ENCOUNTER — APPOINTMENT (OUTPATIENT)
Dept: PHYSICAL THERAPY | Facility: HOSPITAL | Age: 76
End: 2021-07-23
Attending: PHYSICIAN ASSISTANT
Payer: MEDICARE

## 2021-07-27 ENCOUNTER — OFFICE VISIT (OUTPATIENT)
Dept: INTERNAL MEDICINE CLINIC | Facility: CLINIC | Age: 76
End: 2021-07-27
Payer: MEDICARE

## 2021-07-27 VITALS
DIASTOLIC BLOOD PRESSURE: 58 MMHG | RESPIRATION RATE: 14 BRPM | WEIGHT: 144 LBS | BODY MASS INDEX: 26 KG/M2 | SYSTOLIC BLOOD PRESSURE: 136 MMHG | OXYGEN SATURATION: 98 % | HEART RATE: 56 BPM | TEMPERATURE: 98 F

## 2021-07-27 DIAGNOSIS — Z29.9 DVT PROPHYLAXIS: ICD-10-CM

## 2021-07-27 DIAGNOSIS — G89.29 CHRONIC MIDLINE LOW BACK PAIN WITH RIGHT-SIDED SCIATICA: ICD-10-CM

## 2021-07-27 DIAGNOSIS — E53.8 VITAMIN B 12 DEFICIENCY: ICD-10-CM

## 2021-07-27 DIAGNOSIS — Z87.19 HISTORY OF CHRONIC ULCERATIVE COLITIS: ICD-10-CM

## 2021-07-27 DIAGNOSIS — I10 ESSENTIAL HYPERTENSION: Primary | ICD-10-CM

## 2021-07-27 DIAGNOSIS — S82.851A CLOSED TRIMALLEOLAR FRACTURE OF RIGHT ANKLE, INITIAL ENCOUNTER: ICD-10-CM

## 2021-07-27 DIAGNOSIS — M54.41 CHRONIC MIDLINE LOW BACK PAIN WITH RIGHT-SIDED SCIATICA: ICD-10-CM

## 2021-07-27 DIAGNOSIS — R94.4 DECREASED GFR: ICD-10-CM

## 2021-07-27 PROCEDURE — 99214 OFFICE O/P EST MOD 30 MIN: CPT | Performed by: INTERNAL MEDICINE

## 2021-07-27 PROCEDURE — 96372 THER/PROPH/DIAG INJ SC/IM: CPT | Performed by: INTERNAL MEDICINE

## 2021-07-27 RX ORDER — ASPIRIN 81 MG/1
81 TABLET ORAL DAILY
Refills: 0 | COMMUNITY
Start: 2021-07-27

## 2021-07-27 RX ADMIN — CYANOCOBALAMIN 1000 MCG: 1000 INJECTION INTRAMUSCULAR; SUBCUTANEOUS at 10:13:00

## 2021-07-27 NOTE — PROGRESS NOTES
HPI:   Hector Mcknight is a 64year old female presents with the following problems. Rena Otoniel comes in for follow-up. She is doing very well. She is in physical therapy after having a trimalleolar fracture right ankle.   She will see her orthopedic tablet 11   • folic acid 1 MG Oral Tab Take 1 tablet (1 mg total) by mouth daily. 90 tablet 3   • Calcium Citrate-Vitamin D 315-250 MG-UNIT Oral Tab Take 1 tablet by mouth daily.      • apixaban (ELIQUIS) 2.5 MG Oral Tab Take 1 tablet (2.5 mg total) by mout Hypertension    • Migraine 2012    OCCULAR MIGRAINE   • Migraine     OCCULAR MIGRAINE    • Neuroma 2007    2 LT, HALLUX RIGIDUS LT, PER. NG.   • Osteoarthritis    • Other ill-defined conditions(799.89)     TAHBSO MGMT.    • Other ill-defined conditions(799. Osmolality 291 275 - 295 mOsm/kg    GFR, Non- 75 >=60    GFR, -American 86 >=60    FASTING No    CBC W/ DIFFERENTIAL   Result Value Ref Range    WBC 5.5 4.0 - 11.0 x10(3) uL    RBC 3.50 (L) 3.80 - 5.30 x10(6)uL    HGB 11.3 (L) 12.0 - Not related to breathing. I do not think this is cardiac or pulmonary. I think it is muscular. For now will observe. Been going on for 6 months or so.   NEURO:  Voices no headaches or dizziness  PSYCHE: Voices no depression or anxiety      EXAM:   BP 13 ulcerative colitis  Stable. She follows with Dr. Sarah Moore. 7. Chronic midline low back pain with right-sided sciatica  Referred to Dr. Shelly Lozano. Referral in place. This visit was 30 minutes.   I spent 10 minutes before visit preparing and review

## 2021-07-28 ENCOUNTER — TELEPHONE (OUTPATIENT)
Dept: INTERNAL MEDICINE CLINIC | Facility: CLINIC | Age: 76
End: 2021-07-28

## 2021-07-28 ENCOUNTER — OFFICE VISIT (OUTPATIENT)
Dept: PHYSICAL THERAPY | Facility: HOSPITAL | Age: 76
End: 2021-07-28
Attending: PHYSICIAN ASSISTANT
Payer: MEDICARE

## 2021-07-28 PROCEDURE — 97110 THERAPEUTIC EXERCISES: CPT

## 2021-07-28 PROCEDURE — 97140 MANUAL THERAPY 1/> REGIONS: CPT

## 2021-07-28 NOTE — PROGRESS NOTES
Dx: Age-related osteoporosis with current pathological fracture, initial encounter (M80.00XA)  Closed trimalleolar fracture of right ankle with routine healing, subsequent encounter (Y92.572F)     Insurance (Authorized # of Visits):  Mdcr (10 per POC) 6/10   Manual:  STM/DTM to the ant tib and lateral gastroc  PROM of the ankle with gentle end range stretch Manual:  STM/DTM to the ant tib and lateral gastroc  PROM of the ankle with gentle end range stretch Manual:  STM/DTM to the ant tib and lateral gas

## 2021-07-28 NOTE — TELEPHONE ENCOUNTER
Alexandrea Torrez requesting refill for:  Apixaban (Eliquis) 2.5MG  Take 1 tablet by mouth 2 times daily  Tasked to [employer]

## 2021-07-29 ENCOUNTER — TELEPHONE (OUTPATIENT)
Dept: INTERNAL MEDICINE CLINIC | Facility: CLINIC | Age: 76
End: 2021-07-29

## 2021-07-29 NOTE — TELEPHONE ENCOUNTER
Message faxed to [facility] # [phone], confirmation received placed in red folder

## 2021-07-29 NOTE — TELEPHONE ENCOUNTER
Spoke to patient and relayed MD message. Patient verbalized understanding. Dr. Nadya Riley to please advise---pt asking if she should continue taking 1/2 tablet of Telmisartan daily for a total of 40 mg or go back to taking 1 tablet (total 80 mg daily)?  Pt kina

## 2021-07-29 NOTE — TELEPHONE ENCOUNTER
Motion noted. I think she can stay on the one half of her 80 mg tablet of telmisartan i.e. 40 mg since blood pressure seems to be in a good range.

## 2021-07-30 ENCOUNTER — OFFICE VISIT (OUTPATIENT)
Dept: PHYSICAL THERAPY | Facility: HOSPITAL | Age: 76
End: 2021-07-30
Attending: PHYSICIAN ASSISTANT
Payer: MEDICARE

## 2021-07-30 PROCEDURE — 97140 MANUAL THERAPY 1/> REGIONS: CPT

## 2021-07-30 PROCEDURE — 97110 THERAPEUTIC EXERCISES: CPT

## 2021-07-30 NOTE — PROGRESS NOTES
Dx: Age-related osteoporosis with current pathological fracture, initial encounter (M80.00XA)  Closed trimalleolar fracture of right ankle with routine healing, subsequent encounter (L92.111Q)     Insurance (Authorized # of Visits):  Mdcr (10 per POC) Manual:  STM/DTM to the ant tib and lateral gastroc  PROM of the ankle with gentle end range stretch Manual:  STM/DTM to the ant tib and lateral gastroc  PROM of the ankle with gentle end range stretch Manual:  STM/DTM to the ant tib and lateral gastroc

## 2021-08-02 ENCOUNTER — OFFICE VISIT (OUTPATIENT)
Dept: PHYSICAL THERAPY | Facility: HOSPITAL | Age: 76
End: 2021-08-02
Attending: PHYSICIAN ASSISTANT
Payer: MEDICARE

## 2021-08-02 PROCEDURE — 97110 THERAPEUTIC EXERCISES: CPT

## 2021-08-02 PROCEDURE — 97140 MANUAL THERAPY 1/> REGIONS: CPT

## 2021-08-02 NOTE — PROGRESS NOTES
Dx: Age-related osteoporosis with current pathological fracture, initial encounter (M80.00XA)  Closed trimalleolar fracture of right ankle with routine healing, subsequent encounter (B42.586U)     Insurance (Authorized # of Visits):  Mdcr (10 per POC) 7/30/2021  Tx: 7/10 8/2/2021  Tx: 8/10   Manual:  STM/DTM to the ant tib and lateral gastroc  PROM of the ankle with gentle end range stretch Manual:  STM/DTM to the ant tib and lateral gastroc  PROM of the ankle with gentle end range stretch Manual:  STM/

## 2021-08-04 ENCOUNTER — OFFICE VISIT (OUTPATIENT)
Dept: PHYSICAL THERAPY | Facility: HOSPITAL | Age: 76
End: 2021-08-04
Attending: PHYSICIAN ASSISTANT
Payer: MEDICARE

## 2021-08-04 PROCEDURE — 97140 MANUAL THERAPY 1/> REGIONS: CPT

## 2021-08-04 PROCEDURE — 97112 NEUROMUSCULAR REEDUCATION: CPT

## 2021-08-04 PROCEDURE — 97110 THERAPEUTIC EXERCISES: CPT

## 2021-08-04 NOTE — PROGRESS NOTES
Dx: Age-related osteoporosis with current pathological fracture, initial encounter (M80.00XA)  Closed trimalleolar fracture of right ankle with routine healing, subsequent encounter (W42.642Y)     Insurance (Authorized # of Visits):  Mdcr (10 per POC) tib and lateral gastroc  PROM of the ankle with gentle end range stretch Manual:  STM/DTM to the ant tib and lateral gastroc  PROM of the ankle with gentle end range stretch Manual:  STM/DTM to the ant tib and lateral gastroc  PROM of the ankle with gentle

## 2021-08-09 ENCOUNTER — OFFICE VISIT (OUTPATIENT)
Dept: PHYSICAL THERAPY | Facility: HOSPITAL | Age: 76
End: 2021-08-09
Attending: PHYSICIAN ASSISTANT
Payer: MEDICARE

## 2021-08-09 PROCEDURE — 97140 MANUAL THERAPY 1/> REGIONS: CPT

## 2021-08-09 PROCEDURE — 97110 THERAPEUTIC EXERCISES: CPT

## 2021-08-09 NOTE — PROGRESS NOTES
ProgressSummary  Pt has attended 10 visits in Physical Therapy.      Dx: Age-related osteoporosis with current pathological fracture, initial encounter (M80.00XA)  Closed trimalleolar fracture of right ankle with routine healing, subsequent encounter (S82 standing activities for improved function of the foot/ankle during functional tasks. - met  Pt will be independent with HEP to assist with pain management and continue to improve function.  - met for current program  Pt will demonstrate decreased pain to <3 the ankle with gentle end range stretch  Gentle talocrural distraction - gr I-II Manual:  STM/DTM to the ant tib and lateral gastroc  PROM of the ankle with gentle end range stretch  Gentle talocrural distraction - gr I-II   TherEx:  Terri, level 3 - 5 mi

## 2021-08-10 ENCOUNTER — HOSPITAL ENCOUNTER (OUTPATIENT)
Dept: GENERAL RADIOLOGY | Facility: HOSPITAL | Age: 76
Discharge: HOME | End: 2021-08-10
Attending: PHYSICIAN ASSISTANT
Payer: MEDICARE

## 2021-08-10 ENCOUNTER — OFFICE VISIT (OUTPATIENT)
Dept: ORTHOPEDICS CLINIC | Facility: CLINIC | Age: 76
End: 2021-08-10
Payer: MEDICARE

## 2021-08-10 VITALS — WEIGHT: 140 LBS | HEIGHT: 62 IN | BODY MASS INDEX: 25.76 KG/M2

## 2021-08-10 DIAGNOSIS — S82.851D CLOSED TRIMALLEOLAR FRACTURE OF RIGHT ANKLE WITH ROUTINE HEALING, SUBSEQUENT ENCOUNTER: ICD-10-CM

## 2021-08-10 DIAGNOSIS — Z47.89 ORTHOPEDIC AFTERCARE: ICD-10-CM

## 2021-08-10 DIAGNOSIS — Z47.89 ORTHOPEDIC AFTERCARE: Primary | ICD-10-CM

## 2021-08-10 DIAGNOSIS — M80.00XA AGE-RELATED OSTEOPOROSIS WITH CURRENT PATHOLOGICAL FRACTURE, INITIAL ENCOUNTER: ICD-10-CM

## 2021-08-10 PROCEDURE — 73610 X-RAY EXAM OF ANKLE: CPT | Performed by: PHYSICIAN ASSISTANT

## 2021-08-10 PROCEDURE — 99212 OFFICE O/P EST SF 10 MIN: CPT | Performed by: PHYSICIAN ASSISTANT

## 2021-08-11 ENCOUNTER — OFFICE VISIT (OUTPATIENT)
Dept: PHYSICAL THERAPY | Facility: HOSPITAL | Age: 76
End: 2021-08-11
Attending: PHYSICIAN ASSISTANT
Payer: MEDICARE

## 2021-08-11 PROCEDURE — 97140 MANUAL THERAPY 1/> REGIONS: CPT

## 2021-08-11 PROCEDURE — 97110 THERAPEUTIC EXERCISES: CPT

## 2021-08-16 ENCOUNTER — OFFICE VISIT (OUTPATIENT)
Dept: PHYSICAL THERAPY | Facility: HOSPITAL | Age: 76
End: 2021-08-16
Attending: PHYSICIAN ASSISTANT
Payer: MEDICARE

## 2021-08-16 PROCEDURE — 97110 THERAPEUTIC EXERCISES: CPT

## 2021-08-16 PROCEDURE — 97140 MANUAL THERAPY 1/> REGIONS: CPT

## 2021-08-16 NOTE — PROGRESS NOTES
Dx: Age-related osteoporosis with current pathological fracture, initial encounter (M80.00XA)  Closed trimalleolar fracture of right ankle with routine healing, subsequent encounter (T72.890Q)     Insurance (Authorized # of Visits):  Mdcr (10 per POC) shorter steps  Pt will be able to amb for >20 mins with min to no pain in the ankle and good mechanics.  - partially met      Plan: Cont PT per POC for ankle strengthening, balance, hip strengthening and improved amb     Certification From: 0/7/6019  To:11/ balance on foam pad with reach to cone - 5 B    HEP: SLS at home; clams     Charges: Man - 1, TherEx - 2       Total Timed Treatment: 45 min  Total Treatment Time: 45 min

## 2021-08-20 ENCOUNTER — OFFICE VISIT (OUTPATIENT)
Dept: PHYSICAL THERAPY | Facility: HOSPITAL | Age: 76
End: 2021-08-20
Attending: PHYSICIAN ASSISTANT
Payer: MEDICARE

## 2021-08-20 PROCEDURE — 97110 THERAPEUTIC EXERCISES: CPT

## 2021-08-20 PROCEDURE — 97140 MANUAL THERAPY 1/> REGIONS: CPT

## 2021-08-20 NOTE — PROGRESS NOTES
Dx: Age-related osteoporosis with current pathological fracture, initial encounter (M80.00XA)  Closed trimalleolar fracture of right ankle with routine healing, subsequent encounter (D72.325X)     Insurance (Authorized # of Visits):  Mdcr (10 per POC) down steps with reciprocal pattern and min to no pain. - partially met, able to perform in clinic on shorter steps  Pt will be able to amb for >20 mins with min to no pain in the ankle and good mechanics.  - partially met      Plan: Cont PT per POC for ankl finger support on bar B - 20 ea  Single leg balance on foam pad with reach to cone - 5 B  Neuro Jane:  A/P and M/L taps on rockerboard with one finger support on bar B - 20 (only ~8 M/L)    HEP: SLS at home; clams     Charges: Man - 1, TherEx - 2       Tot

## 2021-08-21 ENCOUNTER — TELEPHONE (OUTPATIENT)
Dept: INTERNAL MEDICINE CLINIC | Facility: CLINIC | Age: 76
End: 2021-08-21

## 2021-08-21 NOTE — TELEPHONE ENCOUNTER
recvd xr refill request    APIXABAN ORAL TAB 2.5 MG  TAKE 1 TABLET BY MOUTH 2 TIMES DAILY  ORIG QTY ORDERED: 60.0  ORIG REFIILLS AUTH: 3

## 2021-08-23 ENCOUNTER — OFFICE VISIT (OUTPATIENT)
Dept: PULMONOLOGY | Facility: CLINIC | Age: 76
End: 2021-08-23
Payer: MEDICARE

## 2021-08-23 VITALS
WEIGHT: 146 LBS | HEIGHT: 62 IN | BODY MASS INDEX: 26.87 KG/M2 | TEMPERATURE: 98 F | RESPIRATION RATE: 15 BRPM | OXYGEN SATURATION: 97 % | DIASTOLIC BLOOD PRESSURE: 71 MMHG | SYSTOLIC BLOOD PRESSURE: 136 MMHG | HEART RATE: 60 BPM

## 2021-08-23 DIAGNOSIS — J47.9 BRONCHIECTASIS WITHOUT COMPLICATION (HCC): Primary | ICD-10-CM

## 2021-08-23 PROCEDURE — 99213 OFFICE O/P EST LOW 20 MIN: CPT | Performed by: INTERNAL MEDICINE

## 2021-08-23 NOTE — PROGRESS NOTES
The patient is a 33-year-old female who I kow well from prior evaluation comes in now for follow-up.   She has a history of cough with bronchiectasis and is doing really well from a respiratory perspective although she had fractured her right ankle since I

## 2021-08-26 ENCOUNTER — OFFICE VISIT (OUTPATIENT)
Dept: INTERNAL MEDICINE CLINIC | Facility: CLINIC | Age: 76
End: 2021-08-26
Payer: MEDICARE

## 2021-08-26 ENCOUNTER — LAB ENCOUNTER (OUTPATIENT)
Dept: LAB | Age: 76
End: 2021-08-26
Attending: INTERNAL MEDICINE
Payer: MEDICARE

## 2021-08-26 ENCOUNTER — TELEPHONE (OUTPATIENT)
Dept: INTERNAL MEDICINE CLINIC | Facility: CLINIC | Age: 76
End: 2021-08-26

## 2021-08-26 VITALS
OXYGEN SATURATION: 98 % | HEART RATE: 57 BPM | DIASTOLIC BLOOD PRESSURE: 56 MMHG | SYSTOLIC BLOOD PRESSURE: 138 MMHG | RESPIRATION RATE: 14 BRPM | TEMPERATURE: 98 F | WEIGHT: 144.13 LBS | HEIGHT: 62 IN | BODY MASS INDEX: 26.52 KG/M2

## 2021-08-26 DIAGNOSIS — I10 ESSENTIAL HYPERTENSION: Primary | ICD-10-CM

## 2021-08-26 DIAGNOSIS — S82.851A CLOSED TRIMALLEOLAR FRACTURE OF RIGHT ANKLE, INITIAL ENCOUNTER: ICD-10-CM

## 2021-08-26 DIAGNOSIS — Z87.19 HISTORY OF CHRONIC ULCERATIVE COLITIS: ICD-10-CM

## 2021-08-26 DIAGNOSIS — D64.9 ANEMIA, UNSPECIFIED TYPE: ICD-10-CM

## 2021-08-26 DIAGNOSIS — E53.8 VITAMIN B12 DEFICIENCY: ICD-10-CM

## 2021-08-26 DIAGNOSIS — I10 ESSENTIAL HYPERTENSION: ICD-10-CM

## 2021-08-26 DIAGNOSIS — Z00.00 ROUTINE HEALTH MAINTENANCE: ICD-10-CM

## 2021-08-26 LAB
ALBUMIN SERPL-MCNC: 4.2 G/DL (ref 3.4–5)
ALBUMIN/GLOB SERPL: 1.2 {RATIO} (ref 1–2)
ALP LIVER SERPL-CCNC: 70 U/L
ALT SERPL-CCNC: 24 U/L
ANION GAP SERPL CALC-SCNC: 6 MMOL/L (ref 0–18)
AST SERPL-CCNC: 19 U/L (ref 15–37)
BASOPHILS # BLD AUTO: 0.04 X10(3) UL (ref 0–0.2)
BASOPHILS NFR BLD AUTO: 0.6 %
BILIRUB SERPL-MCNC: 0.4 MG/DL (ref 0.1–2)
BUN BLD-MCNC: 23 MG/DL (ref 7–18)
BUN/CREAT SERPL: 28.8 (ref 10–20)
CALCIUM BLD-MCNC: 9.1 MG/DL (ref 8.5–10.1)
CHLORIDE SERPL-SCNC: 108 MMOL/L (ref 98–112)
CHOLEST SMN-MCNC: 172 MG/DL (ref ?–200)
CO2 SERPL-SCNC: 25 MMOL/L (ref 21–32)
CREAT BLD-MCNC: 0.8 MG/DL
DEPRECATED RDW RBC AUTO: 43.8 FL (ref 35.1–46.3)
EOSINOPHIL # BLD AUTO: 0.35 X10(3) UL (ref 0–0.7)
EOSINOPHIL NFR BLD AUTO: 5.5 %
ERYTHROCYTE [DISTWIDTH] IN BLOOD BY AUTOMATED COUNT: 12 % (ref 11–15)
GLOBULIN PLAS-MCNC: 3.4 G/DL (ref 2.8–4.4)
GLUCOSE BLD-MCNC: 75 MG/DL (ref 70–99)
HCT VFR BLD AUTO: 36.1 %
HDLC SERPL-MCNC: 74 MG/DL (ref 40–59)
HGB BLD-MCNC: 11.8 G/DL
IMM GRANULOCYTES # BLD AUTO: 0.02 X10(3) UL (ref 0–1)
IMM GRANULOCYTES NFR BLD: 0.3 %
LDLC SERPL CALC-MCNC: 80 MG/DL (ref ?–100)
LYMPHOCYTES # BLD AUTO: 1.16 X10(3) UL (ref 1–4)
LYMPHOCYTES NFR BLD AUTO: 18.3 %
M PROTEIN MFR SERPL ELPH: 7.6 G/DL (ref 6.4–8.2)
MCH RBC QN AUTO: 32.5 PG (ref 26–34)
MCHC RBC AUTO-ENTMCNC: 32.7 G/DL (ref 31–37)
MCV RBC AUTO: 99.4 FL
MONOCYTES # BLD AUTO: 0.69 X10(3) UL (ref 0.1–1)
MONOCYTES NFR BLD AUTO: 10.9 %
NEUTROPHILS # BLD AUTO: 4.09 X10 (3) UL (ref 1.5–7.7)
NEUTROPHILS # BLD AUTO: 4.09 X10(3) UL (ref 1.5–7.7)
NEUTROPHILS NFR BLD AUTO: 64.4 %
NONHDLC SERPL-MCNC: 98 MG/DL (ref ?–130)
OSMOLALITY SERPL CALC.SUM OF ELEC: 290 MOSM/KG (ref 275–295)
PATIENT FASTING Y/N/NP: NO
PATIENT FASTING Y/N/NP: NO
PLATELET # BLD AUTO: 201 10(3)UL (ref 150–450)
POTASSIUM SERPL-SCNC: 4.7 MMOL/L (ref 3.5–5.1)
RBC # BLD AUTO: 3.63 X10(6)UL
SODIUM SERPL-SCNC: 139 MMOL/L (ref 136–145)
TRIGL SERPL-MCNC: 98 MG/DL (ref 30–149)
VLDLC SERPL CALC-MCNC: 15 MG/DL (ref 0–30)
WBC # BLD AUTO: 6.4 X10(3) UL (ref 4–11)

## 2021-08-26 PROCEDURE — 36415 COLL VENOUS BLD VENIPUNCTURE: CPT

## 2021-08-26 PROCEDURE — 80053 COMPREHEN METABOLIC PANEL: CPT

## 2021-08-26 PROCEDURE — 96372 THER/PROPH/DIAG INJ SC/IM: CPT | Performed by: INTERNAL MEDICINE

## 2021-08-26 PROCEDURE — 99214 OFFICE O/P EST MOD 30 MIN: CPT | Performed by: INTERNAL MEDICINE

## 2021-08-26 PROCEDURE — 85025 COMPLETE CBC W/AUTO DIFF WBC: CPT

## 2021-08-26 PROCEDURE — 80061 LIPID PANEL: CPT

## 2021-08-26 RX ADMIN — CYANOCOBALAMIN 1000 MCG: 1000 INJECTION INTRAMUSCULAR; SUBCUTANEOUS at 09:02:00

## 2021-08-26 NOTE — PROGRESS NOTES
Andrea Mccann is a 64year old female. HPI:   No chief complaint on file. Margorie Essex is doing well. She will see her orthopedic physician next month. She is recovering from a trimalleolar fracture ankle that has been treated conservatively.   She st (DRISDOL) 95895 UNITS Oral Cap Take 1 capsule by mouth As Directed. Every 5 days.      • Multiple Vitamin (MULTI-VITAMINS) Oral Tab Take 1 tablet by mouth daily with breakfast.     • hydrochlorothiazide 12.5 MG Oral Tab Take 12.5 mg by mouth 3 (three) times breasts 10/21/2014   • Pneumonia 2011    HOSPITALIZED   • Pneumonia due to organism    • Pulmonary embolism [facility])    • Spinal stenosis    • Spinal stenosis 4/19/2016   • Tonsillitis    • Ulcerative colitis ([facility] [address] 1976   • [facility] ([facility] 03/2002      S them next month. 3. History of chronic ulcerative colitis  I did ask her to connect with Dr. Remi Osorio for consideration of colonoscopy for follow-up of her ulcerative colitis. She is asymptomatic.     4. Routine health maintenance  She has had her [facility] va

## 2021-08-26 NOTE — TELEPHONE ENCOUNTER
Please call patient let her know that I thought her labs came out acceptable. Electrolytes and kidney function satisfactory. Lipids good range. She does has a mild anemia. I'm not too concerned about this. Very mild. We can follow-up with us in 3 months.  Nikole Rash

## 2021-08-31 ENCOUNTER — OFFICE VISIT (OUTPATIENT)
Dept: PHYSICAL THERAPY | Facility: HOSPITAL | Age: 76
End: 2021-08-31
Attending: PHYSICIAN ASSISTANT
Payer: MEDICARE

## 2021-08-31 PROCEDURE — 97110 THERAPEUTIC EXERCISES: CPT

## 2021-08-31 PROCEDURE — 97140 MANUAL THERAPY 1/> REGIONS: CPT

## 2021-08-31 NOTE — PROGRESS NOTES
Dx: Age-related osteoporosis with current pathological fracture, initial encounter (M80.00XA)  Closed trimalleolar fracture of right ankle with routine healing, subsequent encounter (M32.226O)     Insurance (Authorized # of Visits):  Mdcr (10 per POC) to amb up and down steps with reciprocal pattern and min to no pain. - partially met, able to perform in clinic on shorter steps  Pt will be able to amb for >20 mins with min to no pain in the ankle and good mechanics.  - partially met      Plan: Cont PT pe M/L taps on rockerboard with one finger support on bar B - 20 ea  Single leg balance on foam pad with reach to cone - 5 B  Neuro Jane:  A/P and M/L taps on rockerboard with one finger support on bar B - 20 (only ~8 M/L)  Neuro Jane:  A/P taps on rockerboar

## 2021-09-03 ENCOUNTER — APPOINTMENT (OUTPATIENT)
Dept: PHYSICAL THERAPY | Facility: HOSPITAL | Age: 76
End: 2021-09-03
Attending: PHYSICIAN ASSISTANT
Payer: MEDICARE

## 2021-09-03 ENCOUNTER — TELEPHONE (OUTPATIENT)
Dept: PHYSICAL THERAPY | Facility: HOSPITAL | Age: 76
End: 2021-09-03

## 2021-09-07 ENCOUNTER — OFFICE VISIT (OUTPATIENT)
Dept: PHYSICAL THERAPY | Facility: HOSPITAL | Age: 76
End: 2021-09-07
Attending: PHYSICIAN ASSISTANT
Payer: MEDICARE

## 2021-09-07 PROCEDURE — 97110 THERAPEUTIC EXERCISES: CPT

## 2021-09-07 PROCEDURE — 97140 MANUAL THERAPY 1/> REGIONS: CPT

## 2021-09-07 NOTE — PROGRESS NOTES
Dx: Age-related osteoporosis with current pathological fracture, initial encounter (M80.00XA)  Closed trimalleolar fracture of right ankle with routine healing, subsequent encounter (R62.116Y)     Insurance (Authorized # of Visits):  Mdcr (10 per POC) functional ability. - progressing   Pt will be able to amb up and down steps with reciprocal pattern and min to no pain.  - partially met, able to perform in clinic on shorter steps  Pt will be able to amb for >20 mins with min to no pain in the ankle and g support on bar B - 20 ea  Single leg balance on foam pad with reach to cone - 5 B  Neuro Jane:  A/P and M/L taps on rockerboard with one finger support on bar B - 20 (only ~8 M/L)  Neuro Jane:  A/P taps on rockerboard with one finger support on bar B - 20

## 2021-09-13 RX ORDER — TELMISARTAN 80 MG/1
TABLET ORAL
Qty: 90 TABLET | Refills: 3 | OUTPATIENT
Start: 2021-09-13

## 2021-09-13 RX ORDER — TELMISARTAN 40 MG/1
40 TABLET ORAL DAILY
Qty: 90 TABLET | Refills: 3 | Status: SHIPPED | OUTPATIENT
Start: 2021-09-13

## 2021-09-13 NOTE — TELEPHONE ENCOUNTER
Spoke to pt  -change to 40 mg tab for ease;  Pt acknowledged  Refill request is for a maintenance medication and has met the criteria specified in the Ambulatory Medication Refill Standing Order for eligibility, visits, laboratory, alerts and was sent to t

## 2021-09-14 ENCOUNTER — OFFICE VISIT (OUTPATIENT)
Dept: PHYSICAL THERAPY | Facility: HOSPITAL | Age: 76
End: 2021-09-14
Attending: PHYSICIAN ASSISTANT
Payer: MEDICARE

## 2021-09-14 PROCEDURE — 97110 THERAPEUTIC EXERCISES: CPT

## 2021-09-14 PROCEDURE — 97140 MANUAL THERAPY 1/> REGIONS: CPT

## 2021-09-14 NOTE — PROGRESS NOTES
Dx: Age-related osteoporosis with current pathological fracture, initial encounter (M80.00XA)  Closed trimalleolar fracture of right ankle with routine healing, subsequent encounter (A42.848V)     Insurance (Authorized # of Visits):  Mdcr (10 per POC) - met for current program  Pt will demonstrate decreased pain to <3/10 during functional tasks and ADL's. - progressing   Pt will display improved score on the outcome measure to 75% to demonstrate increased functional ability.  - progressing   Pt will be a level 3 - 5 mins  Gastroc stretch on [employer] stretch board - 2x30\"  Leg press on shuttle, 35# - 2x15  Calf raises on shuttle (25#) - 2x15  Step-ups onto 4 in step - 15 fwd and lateral    Neuro Jane:  A/P and M/L taps on rockerboard with one finger support o

## 2021-09-28 ENCOUNTER — HOSPITAL ENCOUNTER (OUTPATIENT)
Dept: GENERAL RADIOLOGY | Facility: HOSPITAL | Age: 76
Discharge: HOME | End: 2021-09-28
Attending: ORTHOPAEDIC SURGERY
Payer: MEDICARE

## 2021-09-28 ENCOUNTER — OFFICE VISIT (OUTPATIENT)
Dept: PHYSICAL THERAPY | Facility: HOSPITAL | Age: 76
End: 2021-09-28
Attending: PHYSICIAN ASSISTANT
Payer: MEDICARE

## 2021-09-28 ENCOUNTER — OFFICE VISIT (OUTPATIENT)
Dept: ORTHOPEDICS CLINIC | Facility: CLINIC | Age: 76
End: 2021-09-28
Payer: MEDICARE

## 2021-09-28 VITALS — HEART RATE: 54 BPM | DIASTOLIC BLOOD PRESSURE: 70 MMHG | SYSTOLIC BLOOD PRESSURE: 178 MMHG

## 2021-09-28 DIAGNOSIS — Z47.89 ORTHOPEDIC AFTERCARE: ICD-10-CM

## 2021-09-28 DIAGNOSIS — S56.419A STRAIN OF EXTENSOR DIGITORUM TENDON: ICD-10-CM

## 2021-09-28 DIAGNOSIS — M80.00XA AGE-RELATED OSTEOPOROSIS WITH CURRENT PATHOLOGICAL FRACTURE, INITIAL ENCOUNTER: ICD-10-CM

## 2021-09-28 DIAGNOSIS — S82.851D CLOSED TRIMALLEOLAR FRACTURE OF RIGHT ANKLE WITH ROUTINE HEALING, SUBSEQUENT ENCOUNTER: Primary | ICD-10-CM

## 2021-09-28 PROCEDURE — 20550 NJX 1 TENDON SHEATH/LIGAMENT: CPT | Performed by: PHYSICIAN ASSISTANT

## 2021-09-28 PROCEDURE — 97110 THERAPEUTIC EXERCISES: CPT

## 2021-09-28 PROCEDURE — 97140 MANUAL THERAPY 1/> REGIONS: CPT

## 2021-09-28 PROCEDURE — 73610 X-RAY EXAM OF ANKLE: CPT | Performed by: ORTHOPAEDIC SURGERY

## 2021-09-28 PROCEDURE — 99213 OFFICE O/P EST LOW 20 MIN: CPT | Performed by: PHYSICIAN ASSISTANT

## 2021-09-28 RX ORDER — TRIAMCINOLONE ACETONIDE 40 MG/ML
40 INJECTION, SUSPENSION INTRA-ARTICULAR; INTRAMUSCULAR ONCE
Status: COMPLETED | OUTPATIENT
Start: 2021-09-28 | End: 2021-09-28

## 2021-09-28 RX ADMIN — TRIAMCINOLONE ACETONIDE 40 MG: 40 INJECTION, SUSPENSION INTRA-ARTICULAR; INTRAMUSCULAR at 17:30:00

## 2021-09-28 NOTE — PROGRESS NOTES
NURSING INTAKE COMMENTS: Patient presents with:  Fracture: Right ankle f/u - states she is better - has some pain on and off - still gets swelling by the end of the day - rates pain as 4/10 on and off       HPI: This 64year old female presents today 6 mon COLONOSCOPY N/A 12/5/2016    Procedure: COLONOSCOPY;  Surgeon: Crystal Alford MD;  Location: [address] [place] [address] ENDOSCOPY   • COLONOSCOPY N/A 5/24/2018    Procedure: COLONOSCOPY;  Surgeon: Crystal Alford MD;  Location: [address] [place] [address] ENDOSCOPY   • HYSTERECTOMY  2002   • daily. (Patient not taking: Reported on 8/26/2021 ) 1 Inhaler 0   • cyanocobalamin (VITAMIN B12) 1000 MCG/ML Injection Solution Inject 1 mL into the muscle every 30 (thirty) days.      • Vitamin D, Ergocalciferol, (DRISDOL) 33107 UNITS Oral Cap Take 1 capsu no diarrhea  : no dysuria, no blood in urine, no difficulty urinating, no incontinence  MUSCULOSKELETAL: no other musculoskeletal complaints other than in HPI  NEURO: no numbness or tingling, no weakness or balance disorder  PSYCHE: no depression or anxi fracture with new extensor digitorum longus tendinitis. Plan: For the ankle she will finish out the remainder of her physical therapy and transition to home exercise program.  I recommend use of compression stocking to help with swelling reduction.   For

## 2021-09-28 NOTE — PROGRESS NOTES
Per verbal order from Dr. Luis Enrique Fierro, draw up 1ml of 0.5% Marcaine and 1ml of Kenalog 40 for cortisone injection to right ankle Miguelisabelle Flynn Solano  Patient provided education handout for cortisone injection.    Patient left office prior to obtaining post injection vit

## 2021-09-28 NOTE — PROGRESS NOTES
Dx: Age-related osteoporosis with current pathological fracture, initial encounter (M80.00XA)  Closed trimalleolar fracture of right ankle with routine healing, subsequent encounter (N32.526M)     Insurance (Authorized # of Visits):  Mdcr (10 per POC) increased functional ability. - progressing   Pt will be able to amb up and down steps with reciprocal pattern and min to no pain.  - partially met, able to perform in clinic on shorter steps  Pt will be able to amb for >20 mins with min to no pain in the a peroneal; also performed in seated - 20 ankle pumps each in each position   Leg press on shuttle, 35# - 2x15  Calf raises on shuttle (25#) - 2x15   Neuro Jane:  A/P taps on rockerboard with one finger support on bar B - 20 Neuro Jane:  A/P taps on rockerbo

## 2021-09-30 ENCOUNTER — NURSE ONLY (OUTPATIENT)
Dept: INTERNAL MEDICINE CLINIC | Facility: CLINIC | Age: 76
End: 2021-09-30
Payer: MEDICARE

## 2021-09-30 DIAGNOSIS — E53.8 VITAMIN B12 DEFICIENCY: Primary | ICD-10-CM

## 2021-09-30 PROCEDURE — 96372 THER/PROPH/DIAG INJ SC/IM: CPT | Performed by: INTERNAL MEDICINE

## 2021-09-30 RX ADMIN — CYANOCOBALAMIN 1000 MCG: 1000 INJECTION INTRAMUSCULAR; SUBCUTANEOUS at 09:03:00

## 2021-09-30 NOTE — PROGRESS NOTES
Patient presents for monthly vitamin B12 injection. Name/ and order verified. Vitamin B12 1000 mcg administered IM to RT deltoid, tolerated well.

## 2021-10-13 ENCOUNTER — OFFICE VISIT (OUTPATIENT)
Dept: PHYSICAL THERAPY | Facility: HOSPITAL | Age: 76
End: 2021-10-13
Attending: PHYSICIAN ASSISTANT
Payer: MEDICARE

## 2021-10-13 PROCEDURE — 97110 THERAPEUTIC EXERCISES: CPT

## 2021-10-13 PROCEDURE — 97140 MANUAL THERAPY 1/> REGIONS: CPT

## 2021-10-13 NOTE — PROGRESS NOTES
DischargeSumkurtis  Pt has attended 18 visits in Physical Therapy.      Dx: Age-related osteoporosis with current pathological fracture, initial encounter (M80.00XA)  Closed trimalleolar fracture of right ankle with routine healing, subsequent encounter (S8 foot/ankle during functional tasks. - met  Pt will be independent with HEP to assist with pain management and continue to improve function. - met for current program  Pt will demonstrate decreased pain to <3/10 during functional tasks and ADL's.  - partiall mins  Gastroc stretch on [employer] stretch board - 2x30\"  Leg press on shuttle, 35# - 2x15  Calf raises on shuttle (25#) - 2x15  Step-ups onto 4 in step - 15 fwd and lateral  TherEx:  Recumbent bike, level 3 - 5 mins  Gastroc stretch on [employer] stretch board -

## 2021-10-29 ENCOUNTER — NURSE ONLY (OUTPATIENT)
Dept: INTERNAL MEDICINE CLINIC | Facility: CLINIC | Age: 76
End: 2021-10-29
Payer: MEDICARE

## 2021-10-29 DIAGNOSIS — E53.8 VITAMIN B 12 DEFICIENCY: Primary | ICD-10-CM

## 2021-10-29 PROCEDURE — 96372 THER/PROPH/DIAG INJ SC/IM: CPT | Performed by: INTERNAL MEDICINE

## 2021-10-29 RX ADMIN — CYANOCOBALAMIN 1000 MCG: 1000 INJECTION INTRAMUSCULAR; SUBCUTANEOUS at 09:01:00

## 2021-11-10 RX ORDER — AMLODIPINE BESYLATE 10 MG/1
TABLET ORAL
Qty: 90 TABLET | Refills: 3 | Status: SHIPPED | OUTPATIENT
Start: 2021-11-10

## 2021-11-10 NOTE — TELEPHONE ENCOUNTER
Amlodipine refilled. hydrochlorothiazide documented as not taking on 8/23/21. MyChart sent to patient to verify.

## 2021-11-22 ENCOUNTER — TELEPHONE (OUTPATIENT)
Dept: INTERNAL MEDICINE CLINIC | Facility: CLINIC | Age: 76
End: 2021-11-22

## 2021-11-22 ENCOUNTER — LAB ENCOUNTER (OUTPATIENT)
Dept: LAB | Age: 76
End: 2021-11-22
Attending: INTERNAL MEDICINE
Payer: MEDICARE

## 2021-11-22 ENCOUNTER — OFFICE VISIT (OUTPATIENT)
Dept: INTERNAL MEDICINE CLINIC | Facility: CLINIC | Age: 76
End: 2021-11-22
Payer: MEDICARE

## 2021-11-22 VITALS
TEMPERATURE: 98 F | RESPIRATION RATE: 16 BRPM | BODY MASS INDEX: 26.53 KG/M2 | HEIGHT: 62 IN | WEIGHT: 144.19 LBS | SYSTOLIC BLOOD PRESSURE: 130 MMHG | DIASTOLIC BLOOD PRESSURE: 74 MMHG | HEART RATE: 58 BPM | OXYGEN SATURATION: 98 %

## 2021-11-22 DIAGNOSIS — F43.9 STRESS AT HOME: ICD-10-CM

## 2021-11-22 DIAGNOSIS — R94.4 DECREASED GFR: ICD-10-CM

## 2021-11-22 DIAGNOSIS — Z00.00 MEDICARE ANNUAL WELLNESS VISIT, SUBSEQUENT: Primary | ICD-10-CM

## 2021-11-22 DIAGNOSIS — Z86.711 HISTORY OF PULMONARY EMBOLUS (PE): ICD-10-CM

## 2021-11-22 DIAGNOSIS — Z85.42 HISTORY OF UTERINE CANCER: ICD-10-CM

## 2021-11-22 DIAGNOSIS — I10 ESSENTIAL HYPERTENSION: ICD-10-CM

## 2021-11-22 DIAGNOSIS — Z00.00 ROUTINE HEALTH MAINTENANCE: ICD-10-CM

## 2021-11-22 DIAGNOSIS — Z80.0 FAMILY HISTORY OF COLON CANCER: ICD-10-CM

## 2021-11-22 DIAGNOSIS — Z87.19 HISTORY OF CHRONIC ULCERATIVE COLITIS: ICD-10-CM

## 2021-11-22 PROCEDURE — 85025 COMPLETE CBC W/AUTO DIFF WBC: CPT

## 2021-11-22 PROCEDURE — 36415 COLL VENOUS BLD VENIPUNCTURE: CPT

## 2021-11-22 PROCEDURE — 80048 BASIC METABOLIC PNL TOTAL CA: CPT

## 2021-11-22 PROCEDURE — 99214 OFFICE O/P EST MOD 30 MIN: CPT | Performed by: INTERNAL MEDICINE

## 2021-11-22 PROCEDURE — 81003 URINALYSIS AUTO W/O SCOPE: CPT | Performed by: INTERNAL MEDICINE

## 2021-11-22 PROCEDURE — G0439 PPPS, SUBSEQ VISIT: HCPCS | Performed by: INTERNAL MEDICINE

## 2021-11-22 PROCEDURE — 80061 LIPID PANEL: CPT

## 2021-11-22 RX ORDER — ALPRAZOLAM 0.25 MG/1
TABLET ORAL
Qty: 15 TABLET | Refills: 0 | Status: SHIPPED | OUTPATIENT
Start: 2021-11-22

## 2021-11-22 RX ORDER — BUTALBITAL, ASPIRIN, AND CAFFEINE 50; 325; 40 MG/1; MG/1; MG/1
1 CAPSULE ORAL EVERY 8 HOURS PRN
Qty: 15 CAPSULE | Refills: 1 | Status: SHIPPED | OUTPATIENT
Start: 2021-11-22

## 2021-11-22 RX ORDER — ESTRADIOL 0.1 MG/G
CREAM VAGINAL
COMMUNITY
Start: 2021-10-21

## 2021-11-22 NOTE — PROGRESS NOTES
HPI:   Jw Li is a 64year old female presents with the following problems. Ramiro Quiles comes in for Medicare wellness physical    She has hypertension. Blood pressure under good control    She has had stress at home.   Her  has a challeng Oral Tab TAKE ONE TABLET BY MOUTH ONE TIME DAILY 90 tablet 3   • telmisartan 40 MG Oral Tab Take 1 tablet (40 mg total) by mouth daily. 90 tablet 3   • aspirin 81 MG Oral Tab EC Take 1 tablet (81 mg total) by mouth daily.   0   • ATORVASTATIN 40 MG Oral Tab SURGERY, PINS PLACED IN TOE   • H/O oral surgery 2013    GUM/MOUTH SURGERY   • Hallux rigidus 2009    LEFT FOOT, YURY PROCEDURE, 1ST LEFT METARSOPHALANGEAL JOINT   • Heart palpitations 2012    HEART MONITOR AUGUST 2012   • High blood pressure    • High Cancer Neg      Results for orders placed or performed in visit on 18/67/83   COMP METABOLIC PANEL (14)   Result Value Ref Range    Glucose 75 70 - 99 mg/dL    Sodium 139 136 - 145 mmol/L    Potassium 4.7 3.5 - 5.1 mmol/L    Chloride 108 98 - 112 mmol/L Social History:   Social History    Tobacco Use      Smoking status: Never Smoker      Smokeless tobacco: Never Used    Vaping Use      Vaping Use: Never used    Alcohol use:  Yes      Alcohol/week: 4.0 standard drinks      Types: 4 Glasses of wine per we satisfactory control. Continue current therapy. - CBC WITH DIFFERENTIAL WITH PLATELET; Future  - BASIC METABOLIC PANEL (8); Future  - LIPID PANEL; Future  - URINALYSIS WITH CULTURE REFLEX    3.  Routine health maintenance  She is up-to-date with her vacci cancer    Closed trimalleolar fracture of right ankle with routine healing    DVT, lower extremity, distal, acute, right ([facility]    Elisabet Collazo MD  11/22/2021  9:41 AM

## 2021-11-26 RX ORDER — HYDROCHLOROTHIAZIDE 12.5 MG/1
TABLET ORAL
Qty: 36 TABLET | Refills: 0 | OUTPATIENT
Start: 2021-11-26

## 2021-11-30 ENCOUNTER — NURSE ONLY (OUTPATIENT)
Dept: INTERNAL MEDICINE CLINIC | Facility: CLINIC | Age: 76
End: 2021-11-30
Payer: MEDICARE

## 2021-11-30 DIAGNOSIS — E53.8 VITAMIN B 12 DEFICIENCY: Primary | ICD-10-CM

## 2021-11-30 PROCEDURE — 96372 THER/PROPH/DIAG INJ SC/IM: CPT | Performed by: INTERNAL MEDICINE

## 2021-11-30 RX ADMIN — CYANOCOBALAMIN 1000 MCG: 1000 INJECTION INTRAMUSCULAR; SUBCUTANEOUS at 08:53:00

## 2022-01-04 ENCOUNTER — NURSE ONLY (OUTPATIENT)
Dept: INTERNAL MEDICINE CLINIC | Facility: CLINIC | Age: 77
End: 2022-01-04
Payer: MEDICARE

## 2022-01-04 DIAGNOSIS — E53.8 B12 DEFICIENCY: ICD-10-CM

## 2022-01-04 PROCEDURE — 96372 THER/PROPH/DIAG INJ SC/IM: CPT | Performed by: INTERNAL MEDICINE

## 2022-01-04 RX ADMIN — CYANOCOBALAMIN 1000 MCG: 1000 INJECTION INTRAMUSCULAR; SUBCUTANEOUS at 09:00:00

## 2022-02-03 ENCOUNTER — NURSE ONLY (OUTPATIENT)
Dept: INTERNAL MEDICINE CLINIC | Facility: CLINIC | Age: 77
End: 2022-02-03
Payer: MEDICARE

## 2022-02-03 PROCEDURE — 96372 THER/PROPH/DIAG INJ SC/IM: CPT | Performed by: INTERNAL MEDICINE

## 2022-02-03 RX ADMIN — CYANOCOBALAMIN 1000 MCG: 1000 INJECTION INTRAMUSCULAR; SUBCUTANEOUS at 09:49:00

## 2022-02-22 ENCOUNTER — OFFICE VISIT (OUTPATIENT)
Dept: INTERNAL MEDICINE CLINIC | Facility: CLINIC | Age: 77
End: 2022-02-22
Payer: MEDICARE

## 2022-02-22 ENCOUNTER — LAB ENCOUNTER (OUTPATIENT)
Dept: LAB | Age: 77
End: 2022-02-22
Attending: INTERNAL MEDICINE
Payer: MEDICARE

## 2022-02-22 VITALS
DIASTOLIC BLOOD PRESSURE: 92 MMHG | WEIGHT: 144.19 LBS | SYSTOLIC BLOOD PRESSURE: 160 MMHG | TEMPERATURE: 98 F | HEART RATE: 66 BPM | OXYGEN SATURATION: 98 % | BODY MASS INDEX: 26 KG/M2

## 2022-02-22 DIAGNOSIS — I10 ESSENTIAL HYPERTENSION: ICD-10-CM

## 2022-02-22 DIAGNOSIS — Z86.711 HISTORY OF PULMONARY EMBOLUS (PE): ICD-10-CM

## 2022-02-22 DIAGNOSIS — Z00.00 MEDICARE ANNUAL WELLNESS VISIT, SUBSEQUENT: Primary | ICD-10-CM

## 2022-02-22 DIAGNOSIS — Z12.31 VISIT FOR SCREENING MAMMOGRAM: ICD-10-CM

## 2022-02-22 DIAGNOSIS — M48.061 SPINAL STENOSIS OF LUMBAR REGION, UNSPECIFIED WHETHER NEUROGENIC CLAUDICATION PRESENT: ICD-10-CM

## 2022-02-22 DIAGNOSIS — Z85.42 HISTORY OF UTERINE CANCER: ICD-10-CM

## 2022-02-22 DIAGNOSIS — Z87.19 HISTORY OF CHRONIC ULCERATIVE COLITIS: ICD-10-CM

## 2022-02-22 DIAGNOSIS — Z80.0 FAMILY HISTORY OF COLON CANCER: ICD-10-CM

## 2022-02-22 DIAGNOSIS — Z00.00 ROUTINE HEALTH MAINTENANCE: ICD-10-CM

## 2022-02-22 LAB
ANION GAP SERPL CALC-SCNC: 8 MMOL/L (ref 0–18)
BASOPHILS # BLD AUTO: 0.04 X10(3) UL (ref 0–0.2)
BASOPHILS NFR BLD AUTO: 0.7 %
BUN BLD-MCNC: 24 MG/DL (ref 7–18)
BUN/CREAT SERPL: 26.7 (ref 10–20)
CALCIUM BLD-MCNC: 9.8 MG/DL (ref 8.5–10.1)
CHLORIDE SERPL-SCNC: 105 MMOL/L (ref 98–112)
CO2 SERPL-SCNC: 26 MMOL/L (ref 21–32)
CREAT BLD-MCNC: 0.9 MG/DL
DEPRECATED RDW RBC AUTO: 43.3 FL (ref 35.1–46.3)
EOSINOPHIL # BLD AUTO: 0.3 X10(3) UL (ref 0–0.7)
EOSINOPHIL NFR BLD AUTO: 5.1 %
ERYTHROCYTE [DISTWIDTH] IN BLOOD BY AUTOMATED COUNT: 11.9 % (ref 11–15)
FASTING STATUS PATIENT QL REPORTED: YES
GLUCOSE BLD-MCNC: 104 MG/DL (ref 70–99)
HCT VFR BLD AUTO: 37.9 %
HGB BLD-MCNC: 12.2 G/DL
IMM GRANULOCYTES # BLD AUTO: 0.02 X10(3) UL (ref 0–1)
IMM GRANULOCYTES NFR BLD: 0.3 %
LYMPHOCYTES # BLD AUTO: 1.05 X10(3) UL (ref 1–4)
LYMPHOCYTES NFR BLD AUTO: 18 %
MCH RBC QN AUTO: 32 PG (ref 26–34)
MCHC RBC AUTO-ENTMCNC: 32.2 G/DL (ref 31–37)
MCV RBC AUTO: 99.5 FL
MONOCYTES # BLD AUTO: 0.69 X10(3) UL (ref 0.1–1)
MONOCYTES NFR BLD AUTO: 11.8 %
NEUTROPHILS # BLD AUTO: 3.73 X10 (3) UL (ref 1.5–7.7)
NEUTROPHILS # BLD AUTO: 3.73 X10(3) UL (ref 1.5–7.7)
NEUTROPHILS NFR BLD AUTO: 64.1 %
OSMOLALITY SERPL CALC.SUM OF ELEC: 292 MOSM/KG (ref 275–295)
PLATELET # BLD AUTO: 185 10(3)UL (ref 150–450)
POTASSIUM SERPL-SCNC: 4.6 MMOL/L (ref 3.5–5.1)
RBC # BLD AUTO: 3.81 X10(6)UL
SODIUM SERPL-SCNC: 139 MMOL/L (ref 136–145)
WBC # BLD AUTO: 5.8 X10(3) UL (ref 4–11)

## 2022-02-22 PROCEDURE — 36415 COLL VENOUS BLD VENIPUNCTURE: CPT

## 2022-02-22 PROCEDURE — 80048 BASIC METABOLIC PNL TOTAL CA: CPT

## 2022-02-22 PROCEDURE — 85025 COMPLETE CBC W/AUTO DIFF WBC: CPT

## 2022-02-22 PROCEDURE — 99214 OFFICE O/P EST MOD 30 MIN: CPT | Performed by: INTERNAL MEDICINE

## 2022-02-22 RX ORDER — ALENDRONATE SODIUM 70 MG/1
70 TABLET ORAL WEEKLY
COMMUNITY
Start: 2021-12-01

## 2022-02-24 NOTE — TELEPHONE ENCOUNTER
Initial SW/CM Assessment/Plan of Care Note     Baseline Assessment  63 year old admitted 2/23/2022 as Observation with a diagnosis of cellulitis.   Prior to admission patient was living with Adult children and residing at House.  Patient’s Primary Care Provider is Alex Ramírez MD.     Medical History  Past Medical History:   Diagnosis Date   • Blood clot associated with vein wall inflammation    • Diabetes mellitus (CMS/HCC)    • Essential (primary) hypertension    • High cholesterol    • Neuropathy    • Thyroid condition    • Urinary incontinence        Prior to Admission Status  Verified Living arrangments as above.  Functional Status  Functional Status Comments: wheelchair bound; needs ass    Agency/Support  Type of Services Prior to Hospitalization: None  Support Systems: Children  Home Devices/Equipment: None  Mobility Assist Devices: Standard walker  Sensory Support Devices: None    Current Status  Current Mental Status:    Stressors:      Insurance  Primary: [facility] MEDICARE SOLUTIONS  Secondary: [place] MEDICAID    Barriers to Discharge  Identified Barriers to Discharge/Transition Planning: Assessment/stabilization in progress    Progress Note  CM spoke to pt in room re: dc planning. Pt is wheelchair bound and needs assistance with ADLs, family mbrs at home help her. Pt states active with [facility] (including primary care), resumption sent. Pt declines SNF placement, but daughter is looking into rehab placement that would benefit pt. Pt states ok to talk to daughter re: rehab. Daughter to provide ride on dc.       Plan  SW/CM - Recommendations for Discharge: Home care  Therapy-recommendations for Discharge:   PT Recommendation:       OT Recommendation:       Anticipate patient will need post-hospital services. Necessary services are available.  Anticipate patient can return to the environment from which patient entered the hospital. Anticipate patient cannot provide self-care at  Please let patient know that I thought her carotid Doppler came out good. No significant stenosis. Just some mild hardening of the arteries both carotid arteries. Nothing major.   There was mention of the cystic 1 cm right thyroid nodule which the radiol discharge.    Refer to / Flowsheet for Goals and objective data.     Dg TIRADON, RN, CCM  In Care Mgr  [phone]  int ext

## 2022-02-28 ENCOUNTER — TELEPHONE (OUTPATIENT)
Dept: INTERNAL MEDICINE CLINIC | Facility: CLINIC | Age: 77
End: 2022-02-28

## 2022-02-28 NOTE — TELEPHONE ENCOUNTER
Patient last saw Dr Elise Ybarra on 2/22/2022 and was informed to take her blood pressure readings for three days straight and call with those readings. Patient is calling today.      2/24 - 148/55 2/25 - 181/83 (w/shirt on) & 155/68  (w/o shirt)  2/26 - 147/64  2/27 - 155/66  2/28 - 163/70     # [phone]

## 2022-03-01 ENCOUNTER — TELEPHONE (OUTPATIENT)
Dept: INTERNAL MEDICINE CLINIC | Facility: CLINIC | Age: 77
End: 2022-03-01

## 2022-03-01 RX ORDER — HYDROCHLOROTHIAZIDE 12.5 MG/1
TABLET ORAL
Qty: 90 TABLET | Refills: 3 | Status: SHIPPED | OUTPATIENT
Start: 2022-03-01

## 2022-03-01 NOTE — TELEPHONE ENCOUNTER
Please let patient know that I appreciate her getting me her blood pressure readings. They are just slightly higher than I would like. I would like her to restart the hydrochlorothiazide. We will use a dose of 12.5 mg and only have her take it Monday Wednesday and Friday. Please then make an appointment for to see Brendan Doss where she can have her blood pressure taken. I will also leave order for BMP on that day to check her electrolytes and potassium. She does not have to fast.    She may still have some at home as we had used it before but then stopped it. If she does need a refill we can call in hydrochlorothiazide. 12.5 mg.  Quantity #30. Take 1 tablet Monday Wednesday and Friday. 3 refills.

## 2022-03-01 NOTE — TELEPHONE ENCOUNTER
Called patient and relayed DR. MARK message - verbalized understanding. New RX sent .  Transferred to [facility] to schedule sabrina with Chris Kumar for BP check

## 2022-03-03 ENCOUNTER — NURSE ONLY (OUTPATIENT)
Dept: INTERNAL MEDICINE CLINIC | Facility: CLINIC | Age: 77
End: 2022-03-03
Payer: MEDICARE

## 2022-03-03 DIAGNOSIS — I10 ESSENTIAL HYPERTENSION: Primary | ICD-10-CM

## 2022-03-03 DIAGNOSIS — E53.8 VITAMIN B 12 DEFICIENCY: Primary | ICD-10-CM

## 2022-03-03 PROCEDURE — 96372 THER/PROPH/DIAG INJ SC/IM: CPT | Performed by: INTERNAL MEDICINE

## 2022-03-03 RX ADMIN — CYANOCOBALAMIN 1000 MCG: 1000 INJECTION INTRAMUSCULAR; SUBCUTANEOUS at 09:06:00

## 2022-03-03 NOTE — PROGRESS NOTES
Harshal Frey is a 64year old female who has an elevated blood pressure reading at visit today. She does not check home blood pressures. She denies chest pain, dizziness, edema, fatigue and headaches. She is exercising 4-5 days weekly, less strenuous due to her ankle issue and does not restrict her sodium intake. Pt reports she has not started the hydrochlorothiazide yet. Blood Pressure and Cardiac Medications          AMLODIPINE 10 MG Oral Tab    telmisartan 40 MG Oral Tab    Propranolol HCl 80 MG Oral Tab        Water Pills          hydrochlorothiazide 12.5 MG Oral Tab           BP Readings from Last 3 Encounters:  02/22/22 : (!) 160/92  11/22/21 : 130/74  09/28/21 : (!) 178/70   pt was at another medical facility and BP was 129 /69 on 3/1/22    There is no height or weight on file to calculate BMI. ASSESSMENT:   See encounter diagnosis  Discussion: Stage 2 (systolic =935 mmHg or diastolic =614 mmHg)  Cardiovascular risk factors: advanced age (older than 54 for men, 72 for women) and hypertension    PLAN:   Medication changes per orders. Dietary Sodium Restriction  Increased Physical Activity/ Get Regular Aerobic Exercise. Home [employer] machine is accurate for home testing; She will test 4-7 times weekly and let us know her BPs and send in 2 weeks  Pt will start the hydrochlorothiazide three times per week and complete the ordered labs after 3-4 weeks  Pt encouraged to limit NaCl in diet (high in cheeses/crackers)    Patient Education: Reviewed risks of hypertension and principles of treatment. Counseling time: not applicable. No follow-ups on file.    Michaell Boeck, PharmD, 3/3/2022, 8:31 AM

## 2022-03-04 VITALS — HEART RATE: 58 BPM | DIASTOLIC BLOOD PRESSURE: 75 MMHG | SYSTOLIC BLOOD PRESSURE: 185 MMHG

## 2022-04-04 ENCOUNTER — NURSE ONLY (OUTPATIENT)
Dept: INTERNAL MEDICINE CLINIC | Facility: CLINIC | Age: 77
End: 2022-04-04
Payer: MEDICARE

## 2022-04-04 DIAGNOSIS — E53.8 VITAMIN B 12 DEFICIENCY: Primary | ICD-10-CM

## 2022-04-04 PROCEDURE — 96372 THER/PROPH/DIAG INJ SC/IM: CPT | Performed by: INTERNAL MEDICINE

## 2022-04-04 RX ADMIN — CYANOCOBALAMIN 1000 MCG: 1000 INJECTION INTRAMUSCULAR; SUBCUTANEOUS at 08:58:00

## 2022-04-04 NOTE — PROGRESS NOTES
Patient presents for monthly b12 injection. Patient name and  verified. Order active in [facility], last admin date 3/3/22. Administered in Right Deltoid. Patient tolerated well.

## 2022-05-03 ENCOUNTER — NURSE ONLY (OUTPATIENT)
Dept: INTERNAL MEDICINE CLINIC | Facility: CLINIC | Age: 77
End: 2022-05-03
Payer: MEDICARE

## 2022-05-03 DIAGNOSIS — E53.8 VITAMIN B 12 DEFICIENCY: Primary | ICD-10-CM

## 2022-05-03 PROCEDURE — 96372 THER/PROPH/DIAG INJ SC/IM: CPT | Performed by: INTERNAL MEDICINE

## 2022-05-03 RX ADMIN — CYANOCOBALAMIN 1000 MCG: 1000 INJECTION INTRAMUSCULAR; SUBCUTANEOUS at 09:07:00

## 2022-05-12 RX ORDER — PROPRANOLOL HYDROCHLORIDE 80 MG/1
TABLET ORAL
Qty: 180 TABLET | Refills: 3 | Status: SHIPPED | OUTPATIENT
Start: 2022-05-12

## 2022-05-23 ENCOUNTER — OFFICE VISIT (OUTPATIENT)
Dept: INTERNAL MEDICINE CLINIC | Facility: CLINIC | Age: 77
End: 2022-05-23
Payer: MEDICARE

## 2022-05-23 ENCOUNTER — LAB ENCOUNTER (OUTPATIENT)
Dept: LAB | Age: 77
End: 2022-05-23
Attending: INTERNAL MEDICINE
Payer: MEDICARE

## 2022-05-23 VITALS
HEIGHT: 62 IN | DIASTOLIC BLOOD PRESSURE: 60 MMHG | OXYGEN SATURATION: 98 % | WEIGHT: 141 LBS | HEART RATE: 71 BPM | TEMPERATURE: 97 F | SYSTOLIC BLOOD PRESSURE: 140 MMHG | BODY MASS INDEX: 25.95 KG/M2

## 2022-05-23 DIAGNOSIS — E53.8 VITAMIN B 12 DEFICIENCY: ICD-10-CM

## 2022-05-23 DIAGNOSIS — Z87.19 HISTORY OF CHRONIC ULCERATIVE COLITIS: ICD-10-CM

## 2022-05-23 DIAGNOSIS — Z80.0 FAMILY HISTORY OF COLON CANCER: ICD-10-CM

## 2022-05-23 DIAGNOSIS — E53.8 B12 DEFICIENCY: ICD-10-CM

## 2022-05-23 DIAGNOSIS — I10 ESSENTIAL HYPERTENSION: Primary | ICD-10-CM

## 2022-05-23 DIAGNOSIS — Z86.711 HISTORY OF PULMONARY EMBOLUS (PE): ICD-10-CM

## 2022-05-23 DIAGNOSIS — I10 ESSENTIAL HYPERTENSION: ICD-10-CM

## 2022-05-23 DIAGNOSIS — Z85.42 HISTORY OF UTERINE CANCER: ICD-10-CM

## 2022-05-23 DIAGNOSIS — M48.061 SPINAL STENOSIS OF LUMBAR REGION, UNSPECIFIED WHETHER NEUROGENIC CLAUDICATION PRESENT: ICD-10-CM

## 2022-05-23 DIAGNOSIS — Z00.00 ROUTINE HEALTH MAINTENANCE: ICD-10-CM

## 2022-05-23 DIAGNOSIS — Z23 NEED FOR VACCINATION AGAINST STREPTOCOCCUS PNEUMONIAE: ICD-10-CM

## 2022-05-23 DIAGNOSIS — R94.4 DECREASED GFR: ICD-10-CM

## 2022-05-23 LAB
ANION GAP SERPL CALC-SCNC: 5 MMOL/L (ref 0–18)
BUN BLD-MCNC: 24 MG/DL (ref 7–18)
BUN/CREAT SERPL: 23.8 (ref 10–20)
CALCIUM BLD-MCNC: 10.3 MG/DL (ref 8.5–10.1)
CHLORIDE SERPL-SCNC: 104 MMOL/L (ref 98–112)
CO2 SERPL-SCNC: 26 MMOL/L (ref 21–32)
CREAT BLD-MCNC: 1.01 MG/DL
FASTING STATUS PATIENT QL REPORTED: NO
GLUCOSE BLD-MCNC: 109 MG/DL (ref 70–99)
OSMOLALITY SERPL CALC.SUM OF ELEC: 285 MOSM/KG (ref 275–295)
POTASSIUM SERPL-SCNC: 4.5 MMOL/L (ref 3.5–5.1)
SODIUM SERPL-SCNC: 135 MMOL/L (ref 136–145)

## 2022-05-23 PROCEDURE — 80048 BASIC METABOLIC PNL TOTAL CA: CPT

## 2022-05-23 PROCEDURE — 36415 COLL VENOUS BLD VENIPUNCTURE: CPT

## 2022-05-23 RX ORDER — ALPRAZOLAM 0.25 MG/1
TABLET ORAL
Qty: 15 TABLET | Refills: 0 | Status: SHIPPED | OUTPATIENT
Start: 2022-05-23

## 2022-05-24 ENCOUNTER — TELEPHONE (OUTPATIENT)
Dept: INTERNAL MEDICINE CLINIC | Facility: CLINIC | Age: 77
End: 2022-05-24

## 2022-05-24 NOTE — TELEPHONE ENCOUNTER
Please let patient know that her electrolytes came out fairly satisfactory. 1 kidney number called GFR was just a little bit low and her calcium count was just a little bit elevated. I do not think we need any changes in her medications on the basis of the lab abnormalities for now.     She was in a let us know how her blood pressure results have been recently

## 2022-05-26 ENCOUNTER — TELEPHONE (OUTPATIENT)
Dept: PHYSICAL MEDICINE AND REHAB | Facility: CLINIC | Age: 77
End: 2022-05-26

## 2022-05-26 ENCOUNTER — OFFICE VISIT (OUTPATIENT)
Dept: PHYSICAL MEDICINE AND REHAB | Facility: CLINIC | Age: 77
End: 2022-05-26
Payer: MEDICARE

## 2022-05-26 VITALS
RESPIRATION RATE: 14 BRPM | HEART RATE: 55 BPM | DIASTOLIC BLOOD PRESSURE: 74 MMHG | HEIGHT: 62 IN | BODY MASS INDEX: 25.95 KG/M2 | OXYGEN SATURATION: 98 % | SYSTOLIC BLOOD PRESSURE: 142 MMHG | WEIGHT: 141 LBS

## 2022-05-26 DIAGNOSIS — M48.062 SPINAL STENOSIS OF LUMBAR REGION WITH NEUROGENIC CLAUDICATION: ICD-10-CM

## 2022-05-26 DIAGNOSIS — M54.16 LUMBAR RADICULOPATHY: Primary | ICD-10-CM

## 2022-05-26 DIAGNOSIS — M51.9 LUMBAR DISC DISEASE: ICD-10-CM

## 2022-05-26 DIAGNOSIS — M41.25 OTHER IDIOPATHIC SCOLIOSIS, THORACOLUMBAR REGION: ICD-10-CM

## 2022-05-26 DIAGNOSIS — C54.9 MALIGNANT NEOPLASM OF CORPUS UTERI, EXCEPT ISTHMUS (HCC): ICD-10-CM

## 2022-05-26 DIAGNOSIS — M43.16 SPONDYLOLISTHESIS OF LUMBAR REGION: ICD-10-CM

## 2022-05-26 PROCEDURE — 99204 OFFICE O/P NEW MOD 45 MIN: CPT | Performed by: PHYSICAL MEDICINE & REHABILITATION

## 2022-05-26 RX ORDER — GABAPENTIN 100 MG/1
100 CAPSULE ORAL 3 TIMES DAILY
Qty: 90 CAPSULE | Refills: 5 | Status: SHIPPED | OUTPATIENT
Start: 2022-05-26 | End: 2023-05-26

## 2022-05-26 NOTE — TELEPHONE ENCOUNTER
Per Medicare Guidelines -no authorization is required for imaging     Status: Approved-Covered Benefit     Patient notified via Mk Freire can proceed with scheduling L-Spine MRI CPT 44026

## 2022-05-26 NOTE — PATIENT INSTRUCTIONS
Plan  She will get lumbar spine flexion and extension x-rays. She will get  MRI of the lumbar spine. She will follow up after after having the above. She will trial Neurontin 100 mg 3 times a day for the pain.

## 2022-06-01 ENCOUNTER — TELEPHONE (OUTPATIENT)
Dept: PHYSICAL MEDICINE AND REHAB | Facility: CLINIC | Age: 77
End: 2022-06-01

## 2022-06-01 ENCOUNTER — HOSPITAL ENCOUNTER (OUTPATIENT)
Dept: GENERAL RADIOLOGY | Facility: HOSPITAL | Age: 77
Discharge: HOME | End: 2022-06-01
Attending: PHYSICAL MEDICINE & REHABILITATION
Payer: MEDICARE

## 2022-06-01 ENCOUNTER — HOSPITAL ENCOUNTER (OUTPATIENT)
Dept: MRI IMAGING | Facility: HOSPITAL | Age: 77
Discharge: HOME | End: 2022-06-01
Attending: PHYSICAL MEDICINE & REHABILITATION
Payer: MEDICARE

## 2022-06-01 DIAGNOSIS — M54.16 LUMBAR RADICULOPATHY: ICD-10-CM

## 2022-06-01 DIAGNOSIS — M41.25 OTHER IDIOPATHIC SCOLIOSIS, THORACOLUMBAR REGION: ICD-10-CM

## 2022-06-01 DIAGNOSIS — M51.9 LUMBAR DISC DISEASE: ICD-10-CM

## 2022-06-01 DIAGNOSIS — M43.16 SPONDYLOLISTHESIS OF LUMBAR REGION: ICD-10-CM

## 2022-06-01 DIAGNOSIS — M48.062 SPINAL STENOSIS OF LUMBAR REGION WITH NEUROGENIC CLAUDICATION: ICD-10-CM

## 2022-06-01 DIAGNOSIS — C54.9 MALIGNANT NEOPLASM OF CORPUS UTERI, EXCEPT ISTHMUS (HCC): ICD-10-CM

## 2022-06-01 PROCEDURE — 72148 MRI LUMBAR SPINE W/O DYE: CPT | Performed by: PHYSICAL MEDICINE & REHABILITATION

## 2022-06-01 PROCEDURE — 72110 X-RAY EXAM L-2 SPINE 4/>VWS: CPT | Performed by: PHYSICAL MEDICINE & REHABILITATION

## 2022-06-01 NOTE — TELEPHONE ENCOUNTER
Patient completed lumbar spine MRI and xray. Per Suzanne Farrell at [facility] 5/26/22: \"Plan  She will get lumbar spine flexion and extension x-rays. She will get  MRI of the lumbar spine. She will follow up after after having the above. She will trial Neurontin 100 mg 3 times a day for the pain. The patient understands and agrees with the stated plan. \"    NOV: 8/29/22    Message forwarded to Suzanne Farrell to review and advise on next steps.

## 2022-06-02 ENCOUNTER — NURSE ONLY (OUTPATIENT)
Dept: INTERNAL MEDICINE CLINIC | Facility: CLINIC | Age: 77
End: 2022-06-02
Payer: MEDICARE

## 2022-06-02 ENCOUNTER — TELEPHONE (OUTPATIENT)
Dept: INTERNAL MEDICINE CLINIC | Facility: CLINIC | Age: 77
End: 2022-06-02

## 2022-06-02 DIAGNOSIS — E53.9 B-COMPLEX DEFICIENCY: ICD-10-CM

## 2022-06-02 PROCEDURE — 96372 THER/PROPH/DIAG INJ SC/IM: CPT | Performed by: INTERNAL MEDICINE

## 2022-06-02 RX ADMIN — CYANOCOBALAMIN 1000 MCG: 1000 INJECTION INTRAMUSCULAR; SUBCUTANEOUS at 09:31:00

## 2022-06-02 NOTE — PROGRESS NOTES
Pt presents for monthly Vit B 12 injection. Order active in [address], name and  verified. Last injection  . Toleratd injection well.

## 2022-06-02 NOTE — TELEPHONE ENCOUNTER
To Dr Quincy Nesbitt Please advise      Tx for Gabapentin 100 mg TID prescribed by Dr Ginny Hager for back and leg pain. She would like to make sure that it won't interfere with her other medications.

## 2022-06-02 NOTE — TELEPHONE ENCOUNTER
Spoke to patient and relayed MD message. Informed pt that medication can cause drowsiness and to use caution in engaging in activities requiring mental alertness. Discussed possibly taking medication late afternoon or at bedtime at first until he can assess her response to the medication. States her ankles are \"puffy\" at pesent. Encouraged to measure ankle with tape measure and re-measure if she feels they're getting worse. Pt verbalized understanding and agrees with plan.

## 2022-06-02 NOTE — TELEPHONE ENCOUNTER
Question noted. We can tell patient I think it is acceptable to take the gabapentin as prescribed. It sometimes can cause drowsiness or \"puffy\" ankles. However I think it is reasonable to start taking into account her musculoskeletal pain.

## 2022-06-03 PROBLEM — M48.061 LUMBAR FORAMINAL STENOSIS: Status: ACTIVE | Noted: 2022-06-03

## 2022-06-04 NOTE — TELEPHONE ENCOUNTER
I have reviewed her imaging and she has an unstable segment at L4-5 with severe stenosis. I can do bilateral L5 TFESI's if the pain is severe and I can have her do PT with either Brenda Goldsmith or Sudheer Barone. Please let me know what she would like to do.

## 2022-06-06 NOTE — TELEPHONE ENCOUNTER
S/w patient and went over Dr Adrian's recommendation. She will like to think about her options and will call back some other time. Greater than or equal to 3 Risk Factors or History of Arthero Diseas

## 2022-06-08 ENCOUNTER — TELEPHONE (OUTPATIENT)
Dept: NEUROLOGY | Facility: CLINIC | Age: 77
End: 2022-06-08

## 2022-06-08 DIAGNOSIS — M48.062 SPINAL STENOSIS OF LUMBAR REGION WITH NEUROGENIC CLAUDICATION: ICD-10-CM

## 2022-06-08 DIAGNOSIS — M54.16 LUMBAR RADICULOPATHY: Primary | ICD-10-CM

## 2022-06-08 DIAGNOSIS — M48.061 LUMBAR FORAMINAL STENOSIS: ICD-10-CM

## 2022-06-08 DIAGNOSIS — M48.061 SPINAL STENOSIS OF LUMBAR REGION, UNSPECIFIED WHETHER NEUROGENIC CLAUDICATION PRESENT: ICD-10-CM

## 2022-06-08 DIAGNOSIS — M43.16 SPONDYLOLISTHESIS OF LUMBAR REGION: ICD-10-CM

## 2022-06-08 DIAGNOSIS — M51.9 LUMBAR DISC DISEASE: ICD-10-CM

## 2022-06-08 NOTE — TELEPHONE ENCOUNTER
Per Medicare guidelines authorization is not required for Bilateral L5 TFESI cpt codes 59339-91, 69273. Will inform Nursing.

## 2022-06-09 NOTE — TELEPHONE ENCOUNTER
I printed clearance to hold aspirin for 5 days or whenever time is needed for her injections. In outbox. Not sure of fax number to return to [facility] Leti Maya. ( Anna Garcia.  [facility] )

## 2022-06-09 NOTE — TELEPHONE ENCOUNTER
Patient taking Asa 81mg by Dr Sloane Chapa    Status of Anticoag  [x] Clearance pending (Asa 81mg 5 days)  [] Clearance approved  [] Clearance denied

## 2022-06-09 NOTE — TELEPHONE ENCOUNTER
+IgG, awaiting IgM, pt with no vesicles on exam by Dr. Gilliland Patient underwent Cardiac CTA scan today. Prior to scan, heart rate 93 and blood pressure 142/75. Floor RN gave 15 mg PO Ivabradine and 50 mg PO Metoprolol prior to scan per protocol. Heart rate and blood pressure monitored throughout scan. 5mg IV Metoprolol, 0.4 SL Nitroglycerin given per protocol. IV contrast administered by CT technician. Patient was confused and difficult to instruct.  [employer]  iPad utilized throughout scan. Following the scan, moderate extravasation was noted at the RUE PIV site (swelling and erythema). Per , patient had no c/o of pain or diminished sensation on that extremity. Capillary refill < 3 seconds and radial pulse strong. Radiology resident Oli came to assess extravasation. Please refer to her note. PIV catheter removed intact and warm compress applied. Patient and floor nurse Katie updated and educated about Cardiac CTA and medications.    Status of Anticoag  [] Clearance pending  [x] Clearance approved (Asa 81mg 5 days)  [] Clearance denied    S/w patient, she's unsure if she wants to proceed with MAC sedation, I let her know her 3 options and tried to explain each (LOCAL/IVCS/MAC) she had similar injections several years ago and can't recall how painful they were. I let her know IVCS/MAC may have additional costs so she would have to verify with her insurance. She wanted to proceed with Local for now and if she changes her mind she said she'll call by Monday, I mentioned extra time is needed if she changes her mind so we may have to change her appointment date if no time left. She understood and was so grateful. Patient has been scheduled for Right S1 TFESI on 6/17/22 at the [facility] with [address]. -Anesthesia type: LOCAL. -If receiving MAC or IVC sedation patient will need to get COVID tested 3 days prior even if already vaccinated (order placed by [facility].)  -If scheduling [facility] covid testing required for all procedures whether patient is vaccinated or not. -Patient informed not to eat or drink anything after midnight the night prior to the procedure, if being sedated. -Patient was advised that if he/she does receive the covid vaccine it needs to be at least 2 weeks before or after the injection. -Medications and allergies reviewed. -Patient reminded to hold NSAIDs (Ibuprofen, ASA 81, Aleve, Naproxen, Mobic etc.) for 3 days prior to [facility]  if BMI is greater than 35. For Cervical injections only hold multivitamins, Vitamin E, Fish Oil, Phentermine (Lomaira) for 7 days prior to injection and NSAIDS.    mg to be held for 7 days prior to injections.  -If patient is receiving MAC/IVCS Phentermine iGl Nicolas) will need to be held for 7 days prior to injection.  -If on blood thinner clearance has been received to hold this medication by provider.   -Patient informed he/she will need a  to and from procedure. -[employer] is located in the [facility] 1st floor. Patient may park in the yellow parking. Patient verbalized understanding and agrees with plan.  -----> Scheduled in Epic: Yes  -----> Scheduled in [employer]:  Yes

## 2022-06-10 ENCOUNTER — TELEPHONE (OUTPATIENT)
Dept: NEUROLOGY | Facility: CLINIC | Age: 77
End: 2022-06-10

## 2022-06-10 ENCOUNTER — TELEPHONE (OUTPATIENT)
Dept: INTERNAL MEDICINE CLINIC | Facility: CLINIC | Age: 77
End: 2022-06-10

## 2022-06-10 NOTE — TELEPHONE ENCOUNTER
Patient called back and has decided to go through injection with IVCS. Changed in casetabs. New order added.

## 2022-06-10 NOTE — TELEPHONE ENCOUNTER
Per Medicare jose authorization is not required for Bilateral L5 TFESI cpt codes 10812-51, 55254. Will inform Nursing.

## 2022-06-10 NOTE — TELEPHONE ENCOUNTER
Patient stopped in today to drop off her most recent blood pressure readings. Reading were copied and pasted below by . 6/4 - 142/57 (0920), pulse 53  6/4 - 132/56 (0925), pulse 51  6/6 - 150/66 (0906), pulse 56  6/6 - 142/65 (0908), pulse 56  6/6 - 144/70 (0915), pulse 65  6/6 - 142/64 (0920), pulse 64  6/7 - 169/75 (4556), pulse 53  6/7 - 168/70 (5036), pulse 56  6/8 - 164/71 (0712), pulse 52  6/8 - 149/75 (0716), pulse 50  6/9 - 165/67 (0708), pulse 52  6/9 - 149/70 (0710), pulse 49  6/10- 147/64 (0850), pulse 56  6/10- 129/64 (0830), pulse 55    Results placed in Dr Gume Herrera with barcode.

## 2022-06-14 NOTE — TELEPHONE ENCOUNTER
Called patient with Dr. Edwin Obregon message. Patient expressed understanding.  Patient asked that I left Dr. Emily Quispe know

## 2022-06-14 NOTE — TELEPHONE ENCOUNTER
Please let patient know that I appreciate her getting the her blood pressure readings. I do have an idea to transition Inderal to another more popular medication that similar to what called Toprol-XL. However I want to discuss this with Indio LIND in terms of this change. We will get back to her later this week.

## 2022-06-17 ENCOUNTER — OFFICE VISIT (OUTPATIENT)
Dept: SURGERY | Facility: CLINIC | Age: 77
End: 2022-06-17

## 2022-06-17 DIAGNOSIS — M51.9 LUMBAR DISC DISEASE: ICD-10-CM

## 2022-06-17 DIAGNOSIS — M54.16 LUMBAR RADICULOPATHY: Primary | ICD-10-CM

## 2022-06-17 DIAGNOSIS — M48.062 SPINAL STENOSIS OF LUMBAR REGION WITH NEUROGENIC CLAUDICATION: ICD-10-CM

## 2022-06-17 RX ORDER — METOPROLOL SUCCINATE 50 MG/1
50 TABLET, EXTENDED RELEASE ORAL DAILY
Qty: 30 TABLET | Refills: 11 | Status: SHIPPED | OUTPATIENT
Start: 2022-06-17

## 2022-06-17 NOTE — PROCEDURES
[address]    LUMBAR TRANSFORAMINAL   NAME:  Zachary Blackwell    MR #:    [id number] :  1945     PHYSICIAN:  Yariel Hartman MD        Operative Report    DATE OF PROCEDURE: 2022   PREOPERATIVE DIAGNOSES: 1. right > left S1 radiculopathy    2. L5-S1 left large far lateral & mild central, L4-5 large diffuse, L3-4 mild-mod diffuse & left mod far lateral, L2-3 mod diffuse, L1-2 mod diffuse & right mod-large foraminal bulging discs     3. L4-5 severe, L3-4 mod, L2-3 mod, L1-2 mod-severe central stenosis        POSTOPERATIVE DIAGNOSES:   1. right > left S1 radiculopathy    2. L5-S1 left large far lateral & mild central, L4-5 large diffuse, L3-4 mild-mod diffuse & left mod far lateral, L2-3 mod diffuse, L1-2 mod diffuse & right mod-large foraminal bulging discs     3. L4-5 severe, L3-4 mod, L2-3 mod, L1-2 mod-severe central stenosis        PROCEDURES: right L5 transforaminal epidural steroid injection done under fluoroscopic guidance with contrast enhancement. SURGEON: Yariel Adrian MD   ANESTHESIA: IV conscious sedation   INDICATIONS:      OPERATIVE PROCEDURE:  Written consent was obtained from the patient. The patient was brought into the operating room and placed in the prone position on the fluoroscopy table with pillow underneath her abdomen. The patient's skin was cleaned and draped in a normal sterile fashion. Using AP fluoroscopy, all five lumbar vertebrae were identified. When the fifth vertebra was identified, fluoroscopy was left anterior obliqued opening up the right L5-S1 intervertebral foramen. At this point in time, the patient's skin was anesthetized with 1% PF lidocaine without epinephrine. Then, a 3.5 inch, 22-gauge spinal needle was inserted and directed towards the right L5-S1 intervertebral foramen. When it felt to be in good position, AP fluoroscopy was used to advance the needle to the 6 o'clock position on the right L5 pedicle.   At this point in time, HXSVXBHGW-[id number] contrast was used to obtain a good epidurogram indicating correct needle placement. Then, aspiration was performed. No blood, fluid, or air was aspirated. Then, the patient was injected with a 4 cc solution of 2 cc of 40 mg/cc of Kenalog and 2 cc of 1% PF lidocaine without epinephrine. After this, the needle was removed. The patient's skin was cleaned. A Band-Aid was applied. The patient was transferred to the cart and into [facility]. The patient was given discharge instructions and will follow up in the clinic as scheduled. Throughout the whole procedure, the patient's pulse oximetry and vital signs were monitored and they remained completely stable. Also, throughout the whole procedure, prior to injection of any medication, aspiration was performed. No blood, fluid, or air was aspirated at anytime. The total time for conscious sedation was 6 minutes and the total medication used was 2 mg of IV Versaid.

## 2022-06-18 RX ORDER — ATORVASTATIN CALCIUM 40 MG/1
TABLET, FILM COATED ORAL
Qty: 90 TABLET | Refills: 3 | Status: SHIPPED | OUTPATIENT
Start: 2022-06-18

## 2022-07-05 ENCOUNTER — NURSE ONLY (OUTPATIENT)
Dept: INTERNAL MEDICINE CLINIC | Facility: CLINIC | Age: 77
End: 2022-07-05
Payer: MEDICARE

## 2022-07-05 PROCEDURE — 96372 THER/PROPH/DIAG INJ SC/IM: CPT | Performed by: INTERNAL MEDICINE

## 2022-07-05 RX ADMIN — CYANOCOBALAMIN 1000 MCG: 1000 INJECTION INTRAMUSCULAR; SUBCUTANEOUS at 09:05:00

## 2022-07-11 ENCOUNTER — TELEPHONE (OUTPATIENT)
Dept: INTERNAL MEDICINE CLINIC | Facility: CLINIC | Age: 77
End: 2022-07-11

## 2022-07-11 ENCOUNTER — HOSPITAL ENCOUNTER (OUTPATIENT)
Dept: MAMMOGRAPHY | Facility: HOSPITAL | Age: 77
Discharge: HOME | End: 2022-07-11
Attending: INTERNAL MEDICINE
Payer: MEDICARE

## 2022-07-11 DIAGNOSIS — Z12.31 VISIT FOR SCREENING MAMMOGRAM: ICD-10-CM

## 2022-07-11 PROCEDURE — 77063 BREAST TOMOSYNTHESIS BI: CPT | Performed by: INTERNAL MEDICINE

## 2022-07-11 PROCEDURE — 77067 SCR MAMMO BI INCL CAD: CPT | Performed by: INTERNAL MEDICINE

## 2022-07-11 NOTE — TELEPHONE ENCOUNTER
I spoke with Waldemar Ramirez and relayed Dr. Jose Wang message. She verbalized understanding. She will see Dr. Mikaela Tucker on Thursday.

## 2022-07-11 NOTE — TELEPHONE ENCOUNTER
Please let patient know her mammogram came out good.   I believe she has a follow-up with Dr. Rashard Justice for her breast exam

## 2022-07-21 ENCOUNTER — TELEPHONE (OUTPATIENT)
Dept: INTERNAL MEDICINE CLINIC | Facility: CLINIC | Age: 77
End: 2022-07-21

## 2022-07-21 NOTE — TELEPHONE ENCOUNTER
Please let patient know that her mammogram came out good. I think she probably has a follow-up with Dr. Jeevan Escobar coming up soon?

## 2022-07-22 NOTE — TELEPHONE ENCOUNTER
We can call Dr. Yfn Downing office at [phone] and ask for fax number. We can then fax his office patient's mammogram results.

## 2022-07-22 NOTE — TELEPHONE ENCOUNTER
Pt seen Dr. Haylee Kate on thurs and MD was not able to view mammo results. Relayed MD message to pt.

## 2022-08-05 ENCOUNTER — NURSE ONLY (OUTPATIENT)
Dept: INTERNAL MEDICINE CLINIC | Facility: CLINIC | Age: 77
End: 2022-08-05
Payer: MEDICARE

## 2022-08-05 DIAGNOSIS — E53.8 VITAMIN B 12 DEFICIENCY: Primary | ICD-10-CM

## 2022-08-05 PROCEDURE — 96372 THER/PROPH/DIAG INJ SC/IM: CPT | Performed by: INTERNAL MEDICINE

## 2022-08-05 RX ADMIN — CYANOCOBALAMIN 1000 MCG: 1000 INJECTION INTRAMUSCULAR; SUBCUTANEOUS at 09:19:00

## 2022-08-05 NOTE — PROGRESS NOTES
Patient is here today for Vitamin B12 Injection. Patient has verified purpose of visit, their name and . Injection was drawn up and administered by LG,LILY. Patient tolerated IM injection (deltoid muscle) well. Pt is aware they are to return in 1 month for next injection. See [facility] Additional Details ie.  NDC, LOT & EXP of single dose vial.

## 2022-08-14 RX ORDER — TELMISARTAN 40 MG/1
TABLET ORAL
Qty: 90 TABLET | Refills: 0 | OUTPATIENT
Start: 2022-08-14

## 2022-08-23 ENCOUNTER — OFFICE VISIT (OUTPATIENT)
Dept: INTERNAL MEDICINE CLINIC | Facility: CLINIC | Age: 77
End: 2022-08-23
Payer: MEDICARE

## 2022-08-23 ENCOUNTER — LAB ENCOUNTER (OUTPATIENT)
Dept: LAB | Age: 77
End: 2022-08-23
Attending: INTERNAL MEDICINE
Payer: MEDICARE

## 2022-08-23 VITALS
HEIGHT: 62 IN | WEIGHT: 139 LBS | OXYGEN SATURATION: 98 % | TEMPERATURE: 98 F | BODY MASS INDEX: 25.58 KG/M2 | HEART RATE: 57 BPM | DIASTOLIC BLOOD PRESSURE: 82 MMHG | SYSTOLIC BLOOD PRESSURE: 170 MMHG

## 2022-08-23 DIAGNOSIS — Z00.00 ROUTINE HEALTH MAINTENANCE: ICD-10-CM

## 2022-08-23 DIAGNOSIS — Z87.19 HISTORY OF CHRONIC ULCERATIVE COLITIS: ICD-10-CM

## 2022-08-23 DIAGNOSIS — Z80.0 FAMILY HISTORY OF COLON CANCER: ICD-10-CM

## 2022-08-23 DIAGNOSIS — I10 ESSENTIAL HYPERTENSION: ICD-10-CM

## 2022-08-23 DIAGNOSIS — I10 ESSENTIAL HYPERTENSION: Primary | ICD-10-CM

## 2022-08-23 LAB
ALBUMIN SERPL-MCNC: 4.1 G/DL (ref 3.4–5)
ALBUMIN/GLOB SERPL: 1.4 {RATIO} (ref 1–2)
ALP LIVER SERPL-CCNC: 64 U/L
ALT SERPL-CCNC: 25 U/L
ANION GAP SERPL CALC-SCNC: 10 MMOL/L (ref 0–18)
AST SERPL-CCNC: 20 U/L (ref 15–37)
BASOPHILS # BLD AUTO: 0.04 X10(3) UL (ref 0–0.2)
BASOPHILS NFR BLD AUTO: 0.7 %
BILIRUB SERPL-MCNC: 0.4 MG/DL (ref 0.1–2)
BUN BLD-MCNC: 22 MG/DL (ref 7–18)
BUN/CREAT SERPL: 27.2 (ref 10–20)
CALCIUM BLD-MCNC: 9.2 MG/DL (ref 8.5–10.1)
CHLORIDE SERPL-SCNC: 106 MMOL/L (ref 98–112)
CHOLEST SERPL-MCNC: 165 MG/DL (ref ?–200)
CO2 SERPL-SCNC: 23 MMOL/L (ref 21–32)
CREAT BLD-MCNC: 0.81 MG/DL
DEPRECATED RDW RBC AUTO: 47.2 FL (ref 35.1–46.3)
EOSINOPHIL # BLD AUTO: 0.29 X10(3) UL (ref 0–0.7)
EOSINOPHIL NFR BLD AUTO: 5.4 %
ERYTHROCYTE [DISTWIDTH] IN BLOOD BY AUTOMATED COUNT: 12.7 % (ref 11–15)
FASTING PATIENT LIPID ANSWER: YES
FASTING STATUS PATIENT QL REPORTED: YES
GFR SERPLBLD BASED ON 1.73 SQ M-ARVRAT: 74 ML/MIN/1.73M2 (ref 60–?)
GLOBULIN PLAS-MCNC: 3 G/DL (ref 2.8–4.4)
GLUCOSE BLD-MCNC: 94 MG/DL (ref 70–99)
HCT VFR BLD AUTO: 36.1 %
HDLC SERPL-MCNC: 66 MG/DL (ref 40–59)
HGB BLD-MCNC: 11.6 G/DL
IMM GRANULOCYTES # BLD AUTO: 0.01 X10(3) UL (ref 0–1)
IMM GRANULOCYTES NFR BLD: 0.2 %
LDLC SERPL CALC-MCNC: 84 MG/DL (ref ?–100)
LYMPHOCYTES # BLD AUTO: 1.35 X10(3) UL (ref 1–4)
LYMPHOCYTES NFR BLD AUTO: 25.1 %
MCH RBC QN AUTO: 32.9 PG (ref 26–34)
MCHC RBC AUTO-ENTMCNC: 32.1 G/DL (ref 31–37)
MCV RBC AUTO: 102.3 FL
MONOCYTES # BLD AUTO: 0.54 X10(3) UL (ref 0.1–1)
MONOCYTES NFR BLD AUTO: 10 %
NEUTROPHILS # BLD AUTO: 3.15 X10 (3) UL (ref 1.5–7.7)
NEUTROPHILS # BLD AUTO: 3.15 X10(3) UL (ref 1.5–7.7)
NEUTROPHILS NFR BLD AUTO: 58.6 %
NONHDLC SERPL-MCNC: 99 MG/DL (ref ?–130)
OSMOLALITY SERPL CALC.SUM OF ELEC: 291 MOSM/KG (ref 275–295)
PLATELET # BLD AUTO: 182 10(3)UL (ref 150–450)
POTASSIUM SERPL-SCNC: 4.4 MMOL/L (ref 3.5–5.1)
PROT SERPL-MCNC: 7.1 G/DL (ref 6.4–8.2)
RBC # BLD AUTO: 3.53 X10(6)UL
SODIUM SERPL-SCNC: 139 MMOL/L (ref 136–145)
TRIGL SERPL-MCNC: 81 MG/DL (ref 30–149)
VLDLC SERPL CALC-MCNC: 13 MG/DL (ref 0–30)
WBC # BLD AUTO: 5.4 X10(3) UL (ref 4–11)

## 2022-08-23 PROCEDURE — 99214 OFFICE O/P EST MOD 30 MIN: CPT | Performed by: INTERNAL MEDICINE

## 2022-08-23 PROCEDURE — 80053 COMPREHEN METABOLIC PANEL: CPT

## 2022-08-23 PROCEDURE — 80061 LIPID PANEL: CPT

## 2022-08-23 PROCEDURE — 1126F AMNT PAIN NOTED NONE PRSNT: CPT | Performed by: INTERNAL MEDICINE

## 2022-08-23 PROCEDURE — 85025 COMPLETE CBC W/AUTO DIFF WBC: CPT

## 2022-08-23 PROCEDURE — 36415 COLL VENOUS BLD VENIPUNCTURE: CPT

## 2022-08-23 RX ORDER — BUTALBITAL, ASPIRIN, AND CAFFEINE 325; 50; 40 MG/1; MG/1; MG/1
1 CAPSULE ORAL EVERY 8 HOURS PRN
Qty: 15 CAPSULE | Refills: 1 | Status: SHIPPED | OUTPATIENT
Start: 2022-08-23

## 2022-08-25 ENCOUNTER — OFFICE VISIT (OUTPATIENT)
Dept: PULMONOLOGY | Facility: CLINIC | Age: 77
End: 2022-08-25
Payer: MEDICARE

## 2022-08-25 ENCOUNTER — TELEPHONE (OUTPATIENT)
Dept: INTERNAL MEDICINE CLINIC | Facility: CLINIC | Age: 77
End: 2022-08-25

## 2022-08-25 VITALS
WEIGHT: 141 LBS | HEIGHT: 62 IN | BODY MASS INDEX: 25.95 KG/M2 | DIASTOLIC BLOOD PRESSURE: 73 MMHG | OXYGEN SATURATION: 98 % | SYSTOLIC BLOOD PRESSURE: 156 MMHG | HEART RATE: 53 BPM

## 2022-08-25 DIAGNOSIS — J47.9 BRONCHIECTASIS WITHOUT COMPLICATION (HCC): Primary | ICD-10-CM

## 2022-08-25 PROCEDURE — 99213 OFFICE O/P EST LOW 20 MIN: CPT | Performed by: INTERNAL MEDICINE

## 2022-08-25 PROCEDURE — 1126F AMNT PAIN NOTED NONE PRSNT: CPT | Performed by: INTERNAL MEDICINE

## 2022-08-25 NOTE — TELEPHONE ENCOUNTER
Please let patient know that her labs came out acceptable. Kidney function is good. Lipids I believe are in a good range. She does have a mild anemia that she has had from time to time. Nothing very significant. She has had unremarkable iron stores in the past.  For now nothing needs to be done and we will recheck her labs in 3 months. I do not think she needs any additional supplements.

## 2022-08-25 NOTE — TELEPHONE ENCOUNTER
To Dr Ronn Ramirez to patient and relayed MD message. Pt verbalized understanding and agrees with plan. Discussed method of taking BP. She will continue to monitor her BP but a bit more consistently. Will monitor BP 2 X a day 1 morning and evening, same arm. Discussed taking BP in am either beofre she takes her meds or after she has taken meds but to be consistent for 1 week. Will call next week with BP readings.     8/24  Rt arm 0830    162/68      P 51   Took meds 30 min prior to taking BP                        0950    129/67     P  58    8/25              0820      160/71     P 52      Not taken meds                       0830       153/72    P 50

## 2022-08-29 ENCOUNTER — TELEPHONE (OUTPATIENT)
Dept: NEUROLOGY | Facility: CLINIC | Age: 77
End: 2022-08-29

## 2022-08-29 ENCOUNTER — OFFICE VISIT (OUTPATIENT)
Dept: PHYSICAL MEDICINE AND REHAB | Facility: CLINIC | Age: 77
End: 2022-08-29
Payer: MEDICARE

## 2022-08-29 VITALS
RESPIRATION RATE: 16 BRPM | HEART RATE: 57 BPM | WEIGHT: 141 LBS | DIASTOLIC BLOOD PRESSURE: 82 MMHG | SYSTOLIC BLOOD PRESSURE: 168 MMHG | HEIGHT: 62 IN | BODY MASS INDEX: 25.95 KG/M2 | OXYGEN SATURATION: 98 %

## 2022-08-29 DIAGNOSIS — C54.9 MALIGNANT NEOPLASM OF CORPUS UTERI, EXCEPT ISTHMUS (HCC): ICD-10-CM

## 2022-08-29 DIAGNOSIS — M41.25 OTHER IDIOPATHIC SCOLIOSIS, THORACOLUMBAR REGION: ICD-10-CM

## 2022-08-29 DIAGNOSIS — M43.16 SPONDYLOLISTHESIS OF LUMBAR REGION: ICD-10-CM

## 2022-08-29 DIAGNOSIS — M48.062 SPINAL STENOSIS OF LUMBAR REGION WITH NEUROGENIC CLAUDICATION: ICD-10-CM

## 2022-08-29 DIAGNOSIS — M51.9 LUMBAR DISC DISEASE: ICD-10-CM

## 2022-08-29 DIAGNOSIS — I73.00 RAYNAUD'S PHENOMENON WITHOUT GANGRENE: ICD-10-CM

## 2022-08-29 DIAGNOSIS — M54.16 LUMBAR RADICULOPATHY: Primary | ICD-10-CM

## 2022-08-29 DIAGNOSIS — M48.061 LUMBAR FORAMINAL STENOSIS: ICD-10-CM

## 2022-08-29 PROCEDURE — 99214 OFFICE O/P EST MOD 30 MIN: CPT | Performed by: PHYSICAL MEDICINE & REHABILITATION

## 2022-08-29 PROCEDURE — 1125F AMNT PAIN NOTED PAIN PRSNT: CPT | Performed by: PHYSICAL MEDICINE & REHABILITATION

## 2022-08-29 RX ORDER — TRIAMCINOLONE ACETONIDE 1 MG/G
CREAM TOPICAL
COMMUNITY
Start: 2022-07-12

## 2022-08-29 NOTE — TELEPHONE ENCOUNTER
Per Medicare guidelines authorization is not required for Right L4 + Right L5 TFESI      cpt codes 73629-CI, [id number], 73612. Will inform Nursing.

## 2022-08-29 NOTE — PATIENT INSTRUCTIONS
Plan  I will perform right L4 and L5 TFESI(s). She will get into the PT for the lumbar spine. She will take gabapentin 200 mg 3 times a day to see if this helps the right leg pain. The patient will follow up in 2 months, but the patient will call me 2 weeks after having the injection to let me know how the injection worked.

## 2022-09-02 ENCOUNTER — NURSE ONLY (OUTPATIENT)
Dept: INTERNAL MEDICINE CLINIC | Facility: CLINIC | Age: 77
End: 2022-09-02
Payer: MEDICARE

## 2022-09-02 DIAGNOSIS — E53.8 VITAMIN B 12 DEFICIENCY: Primary | ICD-10-CM

## 2022-09-02 RX ADMIN — CYANOCOBALAMIN 1000 MCG: 1000 INJECTION INTRAMUSCULAR; SUBCUTANEOUS at 09:12:00

## 2022-09-09 ENCOUNTER — OFFICE VISIT (OUTPATIENT)
Dept: SURGERY | Facility: CLINIC | Age: 77
End: 2022-09-09

## 2022-09-09 DIAGNOSIS — M48.062 SPINAL STENOSIS OF LUMBAR REGION WITH NEUROGENIC CLAUDICATION: ICD-10-CM

## 2022-09-09 DIAGNOSIS — M54.16 LUMBAR RADICULOPATHY: Primary | ICD-10-CM

## 2022-09-09 DIAGNOSIS — M51.9 LUMBAR DISC DISEASE: ICD-10-CM

## 2022-09-09 PROCEDURE — 64484 NJX AA&/STRD TFRM EPI L/S EA: CPT | Performed by: PHYSICAL MEDICINE & REHABILITATION

## 2022-09-09 PROCEDURE — 64483 NJX AA&/STRD TFRM EPI L/S 1: CPT | Performed by: PHYSICAL MEDICINE & REHABILITATION

## 2022-09-09 NOTE — PROCEDURES
[address]    2-LEVEL LUMBAR TRANSFORAMINAL   NAME:  Elham Savage    MR #:    [id number] :  1945     PHYSICIAN:  Malinda Claude A. Bunnie Pac, MD        Operative Report    DATE OF PROCEDURE: 2022   PREOPERATIVE DIAGNOSES: 1. right > left S1 radiculopathy with right L4-5 radiculitis    2. L5-S1 left large far lateral & mild central, L4-5 large diffuse, L3-4 mild-mod diffuse & left mod far lateral, L2-3 mod diffuse, L1-2 mod diffuse & right mod-large foraminal bulging discs     3. L4-5 severe, L3-4 mod, L2-3 mod, L1-2 mod-severe central stenosis        POSTOPERATIVE DIAGNOSES:   1. right > left S1 radiculopathy with right L4-5 radiculitis    2. L5-S1 left large far lateral & mild central, L4-5 large diffuse, L3-4 mild-mod diffuse & left mod far lateral, L2-3 mod diffuse, L1-2 mod diffuse & right mod-large foraminal bulging discs     3. L4-5 severe, L3-4 mod, L2-3 mod, L1-2 mod-severe central stenosis        PROCEDURES: Right L4 and L5 transforaminal epidural steroid injections done under fluoroscopic guidance with contrast enhancement. SURGEON: Malinda Claude A. Couri, MD   ANESTHESIA: Local   INDICATIONS:      OPERATIVE PROCEDURE:  Written consent was obtained from the patient. The patient was brought into the operating room and placed in the prone position on the fluoroscopy table with pillow underneath the abdomen. The patient's skin was cleaned and draped in a normal sterile fashion. Using AP fluoroscopy, all five lumbar vertebrae were identified. When the fourth and fifth vertebrae were identified, fluoroscopy was left anterior obliqued opening up the right L4-5 and right L5-S1 intervertebral foramen. At this point in time, each site was anesthetized with 1% PF lidocaine without epinephrine. Then, 5 inch, 22-gauge spinal needles were inserted and directed towards the right L4-5 and right L5-S1 intervertebral foramen.   When they felt to be in good position, AP fluoroscopy was used to advance the needles to the 6 o'clock position on the right L4 and right L5 pedicles. At this point in time, Omnipaque-240 contrast was used to obtain a good epidurogram indicating correct needle placement at each level. Then, aspiration was performed. No blood, fluid, or air was aspirated. Then, the patient was injected with a 3 cc solution of 1.5 cc of 40 mg/cc of Kenalog and 1.5 cc of 1% PF lidocaine without epinephrine at each site. After this, the needles were removed. Each site was cleaned. Band-Aids were applied. The patient was transferred to the cart and into [facility]. The patient was given discharge instructions and will follow up in the clinic as scheduled. Throughout the whole procedure, the patient's pulse oximetry and vital signs were monitored and they remained completely stable. Also, throughout the whole procedure, prior to injection of any medication, aspiration was performed. No blood, fluid, or air was aspirated at anytime.

## 2022-09-12 RX ORDER — TELMISARTAN 40 MG/1
TABLET ORAL
Qty: 90 TABLET | Refills: 3 | Status: SHIPPED | OUTPATIENT
Start: 2022-09-12

## 2022-09-16 ENCOUNTER — TELEPHONE (OUTPATIENT)
Dept: INTERNAL MEDICINE CLINIC | Facility: CLINIC | Age: 77
End: 2022-09-16

## 2022-09-16 DIAGNOSIS — I10 ESSENTIAL HYPERTENSION: Primary | ICD-10-CM

## 2022-09-16 NOTE — TELEPHONE ENCOUNTER
Please call patient and let her know I apologize for the delay in getting back to her. She dropped off some blood pressure readings early September. I did review them. They range from what appeared to be a low of 132/55 to a high of 172/77 with many of them in the 140-150 range. they are a little bit on the high side and so with that said 1 thought of mine is for her to take an extra half of her telmisartan if she is able to cut that in half. They are 40 mg tablets. It can be used up to 80 mg. If she would have difficulty cutting them in half I do not think it is unreasonable to increase her telmisartan to 80 mg. She can take two of the 40 mg tablets every morning. Or we can call in telmisartan 80 mg.  Quantity #30.  11 refills. This I think would be neck step. I then would like to make sure her kidney function remains acceptable and I placed order for BMP that she can get 2 weeks after this adjustment. She can drop off her blood pressure readings at that time.   She does not have to fast.

## 2022-09-16 NOTE — TELEPHONE ENCOUNTER
Patient called and relayed Dr Yessenia Tyler message. Patient verbalized understanding with no further questions noted.

## 2022-10-03 ENCOUNTER — LAB ENCOUNTER (OUTPATIENT)
Dept: LAB | Age: 77
End: 2022-10-03
Attending: INTERNAL MEDICINE
Payer: MEDICARE

## 2022-10-03 ENCOUNTER — NURSE ONLY (OUTPATIENT)
Dept: INTERNAL MEDICINE CLINIC | Facility: CLINIC | Age: 77
End: 2022-10-03
Payer: MEDICARE

## 2022-10-03 DIAGNOSIS — E53.8 VITAMIN B 12 DEFICIENCY: Primary | ICD-10-CM

## 2022-10-03 DIAGNOSIS — I10 ESSENTIAL HYPERTENSION: ICD-10-CM

## 2022-10-03 LAB
ANION GAP SERPL CALC-SCNC: 6 MMOL/L (ref 0–18)
BUN BLD-MCNC: 29 MG/DL (ref 7–18)
BUN/CREAT SERPL: 29.3 (ref 10–20)
CALCIUM BLD-MCNC: 9.8 MG/DL (ref 8.5–10.1)
CHLORIDE SERPL-SCNC: 104 MMOL/L (ref 98–112)
CO2 SERPL-SCNC: 26 MMOL/L (ref 21–32)
CREAT BLD-MCNC: 0.99 MG/DL
FASTING STATUS PATIENT QL REPORTED: NO
GFR SERPLBLD BASED ON 1.73 SQ M-ARVRAT: 58 ML/MIN/1.73M2 (ref 60–?)
GLUCOSE BLD-MCNC: 99 MG/DL (ref 70–99)
OSMOLALITY SERPL CALC.SUM OF ELEC: 288 MOSM/KG (ref 275–295)
POTASSIUM SERPL-SCNC: 4.6 MMOL/L (ref 3.5–5.1)
SODIUM SERPL-SCNC: 136 MMOL/L (ref 136–145)

## 2022-10-03 PROCEDURE — 36415 COLL VENOUS BLD VENIPUNCTURE: CPT

## 2022-10-03 PROCEDURE — 80048 BASIC METABOLIC PNL TOTAL CA: CPT

## 2022-10-03 RX ADMIN — CYANOCOBALAMIN 1000 MCG: 1000 INJECTION INTRAMUSCULAR; SUBCUTANEOUS at 09:05:00

## 2022-10-09 ENCOUNTER — TELEPHONE (OUTPATIENT)
Dept: INTERNAL MEDICINE CLINIC | Facility: CLINIC | Age: 77
End: 2022-10-09

## 2022-10-09 DIAGNOSIS — I10 ESSENTIAL HYPERTENSION: Primary | ICD-10-CM

## 2022-10-09 NOTE — TELEPHONE ENCOUNTER
Please let patient know that I appreciate her giving me her blood pressures from September 18 up till October 3. Her blood test showed that her sodium count is good. One kidney level was just a little bit low. I think next step is to increase the telmisartan from 40 mg to 80 mg. If she has another 40 mg tablets she can take 2 tablets at the same time once a day. If she needs more we can call in telmisartan 80 mg.  Quantity #30.  1 daily. 11 refills. We can then recheck her electrolytes in 2 weeks since the increase and I have an effect on her kidney function. Order in place.

## 2022-10-10 NOTE — TELEPHONE ENCOUNTER
Dr Javon Moncada, patient states she has been taking Telmisartan 80 mg already for quite some time that was changed previously form an [facility] partner. Please advise.

## 2022-10-14 ENCOUNTER — HOSPITAL ENCOUNTER (OUTPATIENT)
Facility: HOSPITAL | Age: 77
Setting detail: HOSPITAL OUTPATIENT SURGERY
Discharge: HOME | End: 2022-10-14
Attending: INTERNAL MEDICINE | Admitting: INTERNAL MEDICINE
Payer: MEDICARE

## 2022-10-14 ENCOUNTER — ANESTHESIA EVENT (OUTPATIENT)
Dept: ENDOSCOPY | Facility: HOSPITAL | Age: 77
End: 2022-10-14
Payer: MEDICARE

## 2022-10-14 ENCOUNTER — ANESTHESIA (OUTPATIENT)
Dept: ENDOSCOPY | Facility: HOSPITAL | Age: 77
End: 2022-10-14
Payer: MEDICARE

## 2022-10-14 VITALS
TEMPERATURE: 98 F | BODY MASS INDEX: 23.92 KG/M2 | HEART RATE: 60 BPM | SYSTOLIC BLOOD PRESSURE: 126 MMHG | OXYGEN SATURATION: 94 % | WEIGHT: 130 LBS | DIASTOLIC BLOOD PRESSURE: 52 MMHG | HEIGHT: 62 IN | RESPIRATION RATE: 14 BRPM

## 2022-10-14 DIAGNOSIS — K51.319 CHRONIC ULCERATIVE RECTOSIGMOIDITIS WITH COMPLICATION (HCC): ICD-10-CM

## 2022-10-14 PROCEDURE — 0DBM8ZX EXCISION OF DESCENDING COLON, VIA NATURAL OR ARTIFICIAL OPENING ENDOSCOPIC, DIAGNOSTIC: ICD-10-PCS | Performed by: INTERNAL MEDICINE

## 2022-10-14 PROCEDURE — 88305 TISSUE EXAM BY PATHOLOGIST: CPT | Performed by: INTERNAL MEDICINE

## 2022-10-14 PROCEDURE — 0DBK8ZX EXCISION OF ASCENDING COLON, VIA NATURAL OR ARTIFICIAL OPENING ENDOSCOPIC, DIAGNOSTIC: ICD-10-PCS | Performed by: INTERNAL MEDICINE

## 2022-10-14 RX ORDER — LIDOCAINE HYDROCHLORIDE 10 MG/ML
INJECTION, SOLUTION EPIDURAL; INFILTRATION; INTRACAUDAL; PERINEURAL AS NEEDED
Status: DISCONTINUED | OUTPATIENT
Start: 2022-10-14 | End: 2022-10-14 | Stop reason: SURG

## 2022-10-14 RX ORDER — SODIUM CHLORIDE, SODIUM LACTATE, POTASSIUM CHLORIDE, CALCIUM CHLORIDE 600; 310; 30; 20 MG/100ML; MG/100ML; MG/100ML; MG/100ML
INJECTION, SOLUTION INTRAVENOUS CONTINUOUS
Status: DISCONTINUED | OUTPATIENT
Start: 2022-10-14 | End: 2022-10-14

## 2022-10-14 RX ORDER — NALOXONE HYDROCHLORIDE 0.4 MG/ML
80 INJECTION, SOLUTION INTRAMUSCULAR; INTRAVENOUS; SUBCUTANEOUS AS NEEDED
OUTPATIENT
Start: 2022-10-14 | End: 2022-10-14

## 2022-10-14 RX ORDER — SODIUM CHLORIDE 0.9 % (FLUSH) 0.9 %
10 SYRINGE (ML) INJECTION AS NEEDED
OUTPATIENT
Start: 2022-10-14

## 2022-10-14 RX ORDER — SODIUM CHLORIDE, SODIUM LACTATE, POTASSIUM CHLORIDE, CALCIUM CHLORIDE 600; 310; 30; 20 MG/100ML; MG/100ML; MG/100ML; MG/100ML
INJECTION, SOLUTION INTRAVENOUS CONTINUOUS
OUTPATIENT
Start: 2022-10-14

## 2022-10-14 RX ORDER — MIDAZOLAM HYDROCHLORIDE 1 MG/ML
1 INJECTION INTRAMUSCULAR; INTRAVENOUS EVERY 5 MIN PRN
Status: DISCONTINUED | OUTPATIENT
Start: 2022-10-14 | End: 2022-10-14

## 2022-10-14 RX ADMIN — LIDOCAINE HYDROCHLORIDE 50 MG: 10 INJECTION, SOLUTION EPIDURAL; INFILTRATION; INTRACAUDAL; PERINEURAL at 07:41:00

## 2022-10-14 RX ADMIN — SODIUM CHLORIDE, SODIUM LACTATE, POTASSIUM CHLORIDE, CALCIUM CHLORIDE: 600; 310; 30; 20 INJECTION, SOLUTION INTRAVENOUS at 08:04:00

## 2022-10-14 NOTE — OPERATIVE REPORT
COLONOSCOPY REPORT    Patient Name:  She Lew Record #: [id number]  YOB: 1945  Date of Procedure: 10/14/2022    Referring physician: Destin James MD    Surgeon:  Hang Mackay. Syeda Prieto MD    Pre-op diagnosis: Ulcerative colitis since 1970s in long-term remission on sulfasalazine. Both parents had colon cancer in their 76s. (Last colonoscopy 4 years ago)  Post-op diagnosis: Diverticulosis and 2 small polyps. Quiescent colitis    Medications given:  MAC    Procedure:    After informed consent, discussion of risks and alternatives the patient was placed in the left lateral decubitus position and the high definition colonoscope was introduced into the rectum and advanced under direct visualization to the cecum which was identified by landmarks. Bowel preparation was excellent. The mucosa was carefully inspected upon withdrawal of the scope. Withdrawal time was 11 minutes. ASA class was 2. Findings: The cecum was normal.  2 4 to 5 mm polyps were cold snare excised from the distal ascending colon and mid ascending colon. There was mild scattered diverticulosis in the ascending colon and sigmoid colon. There was no evidence of ulcerations, colitis, vascular anomalies or mass lesions.   Digital rectal exam was normal.    Plan:  High fiber diet  Await biopsy results    Specimens: polyps  EBL: minimal    Aronchick bowel prep scale:  5 Inadequate (repeat preparation needed)  4 Poor (semisolid stool could not be suctioned and <90% of mucosa seen)  3 Fair (semisolid stool could not be suctioned, but >90% of mucosa seen)  2 Good (clear liquid covering up to 25% of mucosa, but >90% of mucosa seen)  1 Excellent (>95% of mucosa seen)

## 2022-10-14 NOTE — ADDENDUM NOTE
Addendum  created 10/14/22 0825 by Rigoberto Villanueva CRNA    Attestation recorded in [facility] Coleman 97 filed

## 2022-10-18 ENCOUNTER — OFFICE VISIT (OUTPATIENT)
Dept: PHYSICAL THERAPY | Facility: HOSPITAL | Age: 77
End: 2022-10-18
Attending: PHYSICAL MEDICINE & REHABILITATION
Payer: MEDICARE

## 2022-10-18 DIAGNOSIS — M43.16 SPONDYLOLISTHESIS OF LUMBAR REGION: ICD-10-CM

## 2022-10-18 DIAGNOSIS — M41.25 OTHER IDIOPATHIC SCOLIOSIS, THORACOLUMBAR REGION: ICD-10-CM

## 2022-10-18 DIAGNOSIS — I73.00 RAYNAUD'S PHENOMENON WITHOUT GANGRENE: ICD-10-CM

## 2022-10-18 DIAGNOSIS — M48.061 LUMBAR FORAMINAL STENOSIS: ICD-10-CM

## 2022-10-18 DIAGNOSIS — M48.062 SPINAL STENOSIS OF LUMBAR REGION WITH NEUROGENIC CLAUDICATION: ICD-10-CM

## 2022-10-18 DIAGNOSIS — M51.9 LUMBAR DISC DISEASE: ICD-10-CM

## 2022-10-18 DIAGNOSIS — C54.9 MALIGNANT NEOPLASM OF CORPUS UTERI, EXCEPT ISTHMUS (HCC): ICD-10-CM

## 2022-10-18 DIAGNOSIS — M54.16 LUMBAR RADICULOPATHY: ICD-10-CM

## 2022-10-18 PROCEDURE — 97110 THERAPEUTIC EXERCISES: CPT

## 2022-10-18 PROCEDURE — 97162 PT EVAL MOD COMPLEX 30 MIN: CPT

## 2022-10-21 ENCOUNTER — OFFICE VISIT (OUTPATIENT)
Dept: PHYSICAL THERAPY | Facility: HOSPITAL | Age: 77
End: 2022-10-21
Attending: PHYSICAL MEDICINE & REHABILITATION
Payer: MEDICARE

## 2022-10-21 PROCEDURE — 97112 NEUROMUSCULAR REEDUCATION: CPT

## 2022-10-24 ENCOUNTER — OFFICE VISIT (OUTPATIENT)
Dept: PHYSICAL THERAPY | Facility: HOSPITAL | Age: 77
End: 2022-10-24
Attending: PHYSICAL MEDICINE & REHABILITATION
Payer: MEDICARE

## 2022-10-24 PROCEDURE — 97112 NEUROMUSCULAR REEDUCATION: CPT

## 2022-10-24 PROCEDURE — 97110 THERAPEUTIC EXERCISES: CPT

## 2022-10-25 NOTE — TELEPHONE ENCOUNTER
Please let patient know that I did receive her blood pressures that she dropped off with her readings from September and early October. I apologize for the delay. Please thank her for that correction on the telmisartan. I think next step is to have her take the hydrochlorothiazide every day rather than Monday and Wednesday and Friday.     She has an appointment with me coming up in the not-too-distant future and we can check her blood pressure at that time and get a kidney blood test.

## 2022-10-28 ENCOUNTER — OFFICE VISIT (OUTPATIENT)
Dept: PHYSICAL THERAPY | Facility: HOSPITAL | Age: 77
End: 2022-10-28
Attending: PHYSICAL MEDICINE & REHABILITATION
Payer: MEDICARE

## 2022-10-28 PROCEDURE — 97112 NEUROMUSCULAR REEDUCATION: CPT

## 2022-10-28 PROCEDURE — 97110 THERAPEUTIC EXERCISES: CPT

## 2022-11-01 ENCOUNTER — OFFICE VISIT (OUTPATIENT)
Dept: PHYSICAL THERAPY | Facility: HOSPITAL | Age: 77
End: 2022-11-01
Attending: PHYSICAL MEDICINE & REHABILITATION
Payer: MEDICARE

## 2022-11-01 PROCEDURE — 97110 THERAPEUTIC EXERCISES: CPT

## 2022-11-01 PROCEDURE — 97112 NEUROMUSCULAR REEDUCATION: CPT

## 2022-11-04 ENCOUNTER — OFFICE VISIT (OUTPATIENT)
Dept: PHYSICAL THERAPY | Facility: HOSPITAL | Age: 77
End: 2022-11-04
Attending: PHYSICAL MEDICINE & REHABILITATION
Payer: MEDICARE

## 2022-11-04 PROCEDURE — 97112 NEUROMUSCULAR REEDUCATION: CPT

## 2022-11-04 PROCEDURE — 97110 THERAPEUTIC EXERCISES: CPT

## 2022-11-07 ENCOUNTER — NURSE ONLY (OUTPATIENT)
Dept: INTERNAL MEDICINE CLINIC | Facility: CLINIC | Age: 77
End: 2022-11-07
Payer: MEDICARE

## 2022-11-07 DIAGNOSIS — E53.9 B-COMPLEX DEFICIENCY: ICD-10-CM

## 2022-11-07 DIAGNOSIS — D51.0 PERNICIOUS ANEMIA: ICD-10-CM

## 2022-11-07 PROCEDURE — 96372 THER/PROPH/DIAG INJ SC/IM: CPT | Performed by: INTERNAL MEDICINE

## 2022-11-07 RX ADMIN — CYANOCOBALAMIN 1000 MCG: 1000 INJECTION, SOLUTION INTRAMUSCULAR; SUBCUTANEOUS at 09:03:00

## 2022-11-07 NOTE — PROGRESS NOTES
Patient present for monthly Vitamin B12 injection. Patient name, , and order verified. Injection well tolerated to right deltoid with no adverse reactions noted.

## 2022-11-08 ENCOUNTER — APPOINTMENT (OUTPATIENT)
Dept: PHYSICAL THERAPY | Facility: HOSPITAL | Age: 77
End: 2022-11-08
Attending: PHYSICAL MEDICINE & REHABILITATION
Payer: MEDICARE

## 2022-11-11 ENCOUNTER — APPOINTMENT (OUTPATIENT)
Dept: PHYSICAL THERAPY | Facility: HOSPITAL | Age: 77
End: 2022-11-11
Attending: PHYSICAL MEDICINE & REHABILITATION
Payer: MEDICARE

## 2022-11-11 RX ORDER — AMLODIPINE BESYLATE 10 MG/1
TABLET ORAL
Qty: 90 TABLET | Refills: 0 | Status: SHIPPED | OUTPATIENT
Start: 2022-11-11

## 2022-11-21 ENCOUNTER — OFFICE VISIT (OUTPATIENT)
Dept: PHYSICAL THERAPY | Facility: HOSPITAL | Age: 77
End: 2022-11-21
Attending: PHYSICAL MEDICINE & REHABILITATION
Payer: MEDICARE

## 2022-11-21 PROCEDURE — 97110 THERAPEUTIC EXERCISES: CPT

## 2022-11-21 PROCEDURE — 97112 NEUROMUSCULAR REEDUCATION: CPT

## 2022-11-28 ENCOUNTER — TELEPHONE (OUTPATIENT)
Dept: INTERNAL MEDICINE CLINIC | Facility: CLINIC | Age: 77
End: 2022-11-28

## 2022-11-28 ENCOUNTER — VIRTUAL PHONE E/M (OUTPATIENT)
Dept: INTERNAL MEDICINE CLINIC | Facility: CLINIC | Age: 77
End: 2022-11-28

## 2022-11-28 DIAGNOSIS — Z87.19 HISTORY OF CHRONIC ULCERATIVE COLITIS: ICD-10-CM

## 2022-11-28 DIAGNOSIS — Z00.00 ROUTINE HEALTH MAINTENANCE: ICD-10-CM

## 2022-11-28 DIAGNOSIS — R94.4 DECREASED GFR: ICD-10-CM

## 2022-11-28 DIAGNOSIS — Z80.0 FAMILY HISTORY OF COLON CANCER: ICD-10-CM

## 2022-11-28 DIAGNOSIS — I10 ESSENTIAL HYPERTENSION: Primary | ICD-10-CM

## 2022-11-28 DIAGNOSIS — D51.0 PERNICIOUS ANEMIA: ICD-10-CM

## 2022-11-28 RX ORDER — HYDROCHLOROTHIAZIDE 12.5 MG/1
TABLET ORAL
Qty: 90 TABLET | Refills: 3 | COMMUNITY
Start: 2022-11-28

## 2022-11-28 RX ORDER — BUTALBITAL, ASPIRIN, AND CAFFEINE 325; 50; 40 MG/1; MG/1; MG/1
1 CAPSULE ORAL EVERY 8 HOURS PRN
Qty: 15 CAPSULE | Refills: 1 | Status: SHIPPED | OUTPATIENT
Start: 2022-11-28

## 2022-11-28 RX ORDER — ALPRAZOLAM 0.25 MG/1
TABLET ORAL
Qty: 15 TABLET | Refills: 0 | Status: SHIPPED | OUTPATIENT
Start: 2022-11-28

## 2022-11-28 NOTE — TELEPHONE ENCOUNTER
Nursing, please call Rhenda Essex. I have tended to try to be transparent and not have any health care discussions without the patient being involved. I wonder if next visit he comes with and we can discuss any health care issues he has concerning Yanira Jolly.

## 2022-11-28 NOTE — TELEPHONE ENCOUNTER
Please check patient in for her telemedicine visit. I already completed visit.  Just need her to be \" arrived\"

## 2022-11-28 NOTE — TELEPHONE ENCOUNTER
Pt spouse, Inder Henriquez stopped in office  Asked that Dr Nigel Denny please call him prior to speaking to patient this morning  (office will call patient again to confirm virtual appt ok)  Would like to ask if testing can be ordered for patient, to be proactive   Inder Henriquez did not want patient to be aware that he stopped in office.  This is confidential   Tasked to Dr Nigel Denny

## 2022-11-28 NOTE — PROGRESS NOTES
Virtual Telephone Check-In    Mario Lozano verbally consents to a Virtual/Telephone Check-In visit on 11/28/22. Patient has been referred to the [facility] website at [email or web address] to review the yearly Consent to Treat document. Patient understands and accepts financial responsibility for any deductible, co-insurance and/or co-pays associated with this service. Duration of the service: 20 minutes      Summary of topics discussed:     Waldemar Ramirez reports that she is feeling fairly well. She will monitor her blood pressure at home. I did review her medication list.  She is no longer on Fosamax or gabapentin. She is on both metoprolol and propranolol. I told her she can stop the propranolol. She is up-to-date with her visit with Dr. Farnaz Perez. It looks like her DEXA scan shows osteopenia. Her chemistries look good. Renal function normal.  Her thyroid ultrasound appeared to be stable. She had no new cardiac pulmonary GI or  complaints. She had her colonoscopy with Dr. Bri Metzger October 2022. She had tubular adenoma. She has a family history of colon cancer and ulcerative colitis. She has had her updated COVID bivalent vaccination and flu vaccine. She had PCV 20 vaccine May 2022. She has had Shingrix x2. She will call to reschedule a office visit. We also spoke about trying to come off the omeprazole since she is asymptomatic now for heartburn. My last office visit note from August 23, 2022 was reviewed.             Tyler Zuñiga MD

## 2022-12-01 ENCOUNTER — OFFICE VISIT (OUTPATIENT)
Dept: PHYSICAL MEDICINE AND REHAB | Facility: CLINIC | Age: 77
End: 2022-12-01
Payer: MEDICARE

## 2022-12-01 ENCOUNTER — TELEPHONE (OUTPATIENT)
Dept: NEUROLOGY | Facility: CLINIC | Age: 77
End: 2022-12-01

## 2022-12-01 VITALS
OXYGEN SATURATION: 97 % | WEIGHT: 130 LBS | HEIGHT: 62 IN | HEART RATE: 64 BPM | DIASTOLIC BLOOD PRESSURE: 72 MMHG | BODY MASS INDEX: 23.92 KG/M2 | SYSTOLIC BLOOD PRESSURE: 126 MMHG

## 2022-12-01 DIAGNOSIS — C54.9 MALIGNANT NEOPLASM OF CORPUS UTERI, EXCEPT ISTHMUS (HCC): ICD-10-CM

## 2022-12-01 DIAGNOSIS — M48.062 SPINAL STENOSIS OF LUMBAR REGION WITH NEUROGENIC CLAUDICATION: Primary | ICD-10-CM

## 2022-12-01 DIAGNOSIS — M48.061 LUMBAR FORAMINAL STENOSIS: ICD-10-CM

## 2022-12-01 DIAGNOSIS — M51.9 LUMBAR DISC DISEASE: ICD-10-CM

## 2022-12-01 DIAGNOSIS — M43.16 SPONDYLOLISTHESIS OF LUMBAR REGION: ICD-10-CM

## 2022-12-01 DIAGNOSIS — M54.16 LUMBAR RADICULOPATHY: ICD-10-CM

## 2022-12-01 PROCEDURE — 99214 OFFICE O/P EST MOD 30 MIN: CPT | Performed by: PHYSICAL MEDICINE & REHABILITATION

## 2022-12-01 PROCEDURE — 1125F AMNT PAIN NOTED PAIN PRSNT: CPT | Performed by: PHYSICAL MEDICINE & REHABILITATION

## 2022-12-01 NOTE — TELEPHONE ENCOUNTER
Right L4 (L4-5) Right L5 (L5-S1) TFESI   CPT Code: 84342,76266,05949  Status: Authorization is not required per health plan however, may be subject to review once claim is submitted  Per Medicare Guidelines: pre-certification is not require. All Medicare coverage is based on Medical Necessity.    Notified nursing for scheduling

## 2022-12-01 NOTE — PATIENT INSTRUCTIONS
Plan  I will perform right L4 and L5 TFESI(s). The patient will continue with PT. The patient will continue with her home exercise program.    The patient does not need any pain medications at this time. The patient will follow up in 3 months, but the patient will call me 2 weeks after having the injection to let me know how the injection worked.

## 2022-12-05 ENCOUNTER — OFFICE VISIT (OUTPATIENT)
Dept: PHYSICAL THERAPY | Facility: HOSPITAL | Age: 77
End: 2022-12-05
Attending: PHYSICAL MEDICINE & REHABILITATION
Payer: MEDICARE

## 2022-12-05 PROCEDURE — 97110 THERAPEUTIC EXERCISES: CPT

## 2022-12-05 PROCEDURE — 97112 NEUROMUSCULAR REEDUCATION: CPT

## 2022-12-05 NOTE — TELEPHONE ENCOUNTER
Patient has been scheduled for Right L4 (L4-5) Right L5 (L5-S1) TFESI on 12/13/22 at the [facility] with [address] [place]. -Anesthesia type: LOCAL. -If scheduling [facility] covid testing required for all procedures whether patient is vaccinated or not. -Patient informed not to eat or drink anything after midnight the night prior to the procedure, if being sedated. (Patient informed to fast 12 hours prior to procedure with IVCS/MAC. )  -Patient was advised that if he/she does receive the covid vaccine it needs to be at least 2 weeks before or after the injection. -Medications and allergies reviewed. -Patient reminded to hold NSAIDs (Ibuprofen, ASA 81, Aleve, Naproxen, Mobic, Diclofenac, Etodolac, Celebrex etc.) for 3 days prior to [facility]  if BMI is greater than 35. For Cervical injections only hold multivitamins, Vitamin E, Fish Oil, Phentermine (Lomaira) for 7 days prior to injection and NSAIDS.  mg to be held for 7 days prior to injections.  -If patient is receiving MAC/IVCS Phentermine Karla Base) will need to be held for 7 days prior to injection.  -If on blood thinner clearance has been received to hold this medication by provider.   -Patient informed he/she will need a  to and from procedure. -[facility] is located in the [place] [facility] 1st floor. Patient may park in the yellow parking. Patient verbalized understanding and agrees with plan.  -----> Scheduled in Epic: Yes  -----> Scheduled in Caset[employer]:  Yes

## 2022-12-07 ENCOUNTER — NURSE ONLY (OUTPATIENT)
Dept: INTERNAL MEDICINE CLINIC | Facility: CLINIC | Age: 77
End: 2022-12-07
Payer: MEDICARE

## 2022-12-07 DIAGNOSIS — E53.8 VITAMIN B 12 DEFICIENCY: Primary | ICD-10-CM

## 2022-12-07 PROCEDURE — 96372 THER/PROPH/DIAG INJ SC/IM: CPT | Performed by: INTERNAL MEDICINE

## 2022-12-07 RX ADMIN — CYANOCOBALAMIN 1000 MCG: 1000 INJECTION, SOLUTION INTRAMUSCULAR; SUBCUTANEOUS at 08:52:00

## 2022-12-07 NOTE — PROGRESS NOTES
Patient presents for monthly b12 injection. Patient name and  verified. Order active in [address] [place] [address] Rd, last admin date 22. Administered in R Deltoid. Patient tolerated well.

## 2022-12-08 ENCOUNTER — OFFICE VISIT (OUTPATIENT)
Dept: PHYSICAL THERAPY | Facility: HOSPITAL | Age: 77
End: 2022-12-08
Attending: PHYSICAL MEDICINE & REHABILITATION
Payer: MEDICARE

## 2022-12-08 PROCEDURE — 97112 NEUROMUSCULAR REEDUCATION: CPT

## 2022-12-08 PROCEDURE — 97110 THERAPEUTIC EXERCISES: CPT

## 2022-12-09 ENCOUNTER — OFFICE VISIT (OUTPATIENT)
Dept: PHYSICAL THERAPY | Facility: HOSPITAL | Age: 77
End: 2022-12-09
Attending: PHYSICAL MEDICINE & REHABILITATION
Payer: MEDICARE

## 2022-12-09 PROCEDURE — 97110 THERAPEUTIC EXERCISES: CPT

## 2022-12-09 PROCEDURE — 97112 NEUROMUSCULAR REEDUCATION: CPT

## 2022-12-13 ENCOUNTER — OFFICE VISIT (OUTPATIENT)
Dept: SURGERY | Facility: CLINIC | Age: 77
End: 2022-12-13
Payer: MEDICARE

## 2022-12-13 DIAGNOSIS — M51.9 LUMBAR DISC DISEASE: ICD-10-CM

## 2022-12-13 DIAGNOSIS — M48.062 SPINAL STENOSIS OF LUMBAR REGION WITH NEUROGENIC CLAUDICATION: ICD-10-CM

## 2022-12-13 DIAGNOSIS — M54.16 LUMBAR RADICULOPATHY: Primary | ICD-10-CM

## 2022-12-13 PROCEDURE — 64483 NJX AA&/STRD TFRM EPI L/S 1: CPT | Performed by: PHYSICAL MEDICINE & REHABILITATION

## 2022-12-13 PROCEDURE — 64484 NJX AA&/STRD TFRM EPI L/S EA: CPT | Performed by: PHYSICAL MEDICINE & REHABILITATION

## 2022-12-13 NOTE — PROCEDURES
[address]    2-LEVEL LUMBAR TRANSFORAMINAL   NAME:  Rose Mary Gutierrez    MR #:    [id number] :  1945     PHYSICIAN:  Teena Solo MD        Operative Report    DATE OF PROCEDURE: 2022   PREOPERATIVE DIAGNOSES: 1. right > left S1 radiculopathy with right L4-5 radiculitis    2. L4-5 severe, L3-4 mod, L2-3 mod, L1-2 mod-severe central stenosis    3. L5-S1 left large far lateral & mild central, L4-5 large diffuse, L3-4 mild-mod diffuse & left mod far lateral, L2-3 mod diffuse, L1-2 mod diffuse & right mod-large foraminal bulging discs         POSTOPERATIVE DIAGNOSES:   1. right > left S1 radiculopathy with right L4-5 radiculitis    2. L4-5 severe, L3-4 mod, L2-3 mod, L1-2 mod-severe central stenosis    3. L5-S1 left large far lateral & mild central, L4-5 large diffuse, L3-4 mild-mod diffuse & left mod far lateral, L2-3 mod diffuse, L1-2 mod diffuse & right mod-large foraminal bulging discs         PROCEDURES: Right L4 and L5 transforaminal epidural steroid injections done under fluoroscopic guidance with contrast enhancement. SURGEON: Teena Adrian MD   ANESTHESIA: IV conscious sedation   INDICATIONS:      OPERATIVE PROCEDURE:  Written consent was obtained from the patient. The patient was brought into the operating room and placed in the prone position on the fluoroscopy table with pillow underneath the abdomen. The patient's skin was cleaned and draped in a normal sterile fashion. Using AP fluoroscopy, all five lumbar vertebrae were identified. When the fourth and fifth vertebrae were identified, fluoroscopy was left anterior obliqued opening up the right L4-5 and right L5-S1 intervertebral foramen. At this point in time, each site was anesthetized with 1% PF lidocaine without epinephrine. Then, 3.5 inch, 22-gauge spinal needles were inserted and directed towards the right L4-5 and right L5-S1 intervertebral foramen.   When they felt to be in good position, AP fluoroscopy was used to advance the needles to the 6 o'clock position on the right L4 and right L5 pedicles. At this point in time, Omnipaque-240 contrast was used to obtain a good epidurogram indicating correct needle placement at each level. Then, aspiration was performed. No blood, fluid, or air was aspirated. Then, the patient was injected with a 3 cc solution of 1.5 cc of 40 mg/cc of Kenalog and 1.5 cc of 1% PF lidocaine without epinephrine at each site. After this, the needles were removed. Each site was cleaned. Band-Aids were applied. The patient was transferred to the cart and into [facility]. The patient was given discharge instructions and will follow up in the clinic as scheduled. Throughout the whole procedure, the patient's pulse oximetry and vital signs were monitored and they remained completely stable. Also, throughout the whole procedure, prior to injection of any medication, aspiration was performed. No blood, fluid, or air was aspirated at anytime. The total time for conscious sedation was 7 minutes and the total medication used was 2 mg of IV Versaid.

## 2022-12-16 ENCOUNTER — OFFICE VISIT (OUTPATIENT)
Dept: PHYSICAL THERAPY | Facility: HOSPITAL | Age: 77
End: 2022-12-16
Attending: PHYSICAL MEDICINE & REHABILITATION
Payer: MEDICARE

## 2022-12-16 PROCEDURE — 97112 NEUROMUSCULAR REEDUCATION: CPT

## 2022-12-16 PROCEDURE — 97110 THERAPEUTIC EXERCISES: CPT

## 2022-12-22 ENCOUNTER — OFFICE VISIT (OUTPATIENT)
Dept: PHYSICAL THERAPY | Facility: HOSPITAL | Age: 77
End: 2022-12-22
Attending: PHYSICAL MEDICINE & REHABILITATION
Payer: MEDICARE

## 2022-12-22 PROCEDURE — 97112 NEUROMUSCULAR REEDUCATION: CPT

## 2022-12-22 PROCEDURE — 97110 THERAPEUTIC EXERCISES: CPT

## 2022-12-30 ENCOUNTER — OFFICE VISIT (OUTPATIENT)
Dept: PHYSICAL THERAPY | Facility: HOSPITAL | Age: 77
End: 2022-12-30
Attending: PHYSICAL MEDICINE & REHABILITATION
Payer: MEDICARE

## 2022-12-30 PROCEDURE — 97112 NEUROMUSCULAR REEDUCATION: CPT

## 2022-12-30 PROCEDURE — 97110 THERAPEUTIC EXERCISES: CPT

## 2023-01-05 ENCOUNTER — OFFICE VISIT (OUTPATIENT)
Dept: INTERNAL MEDICINE CLINIC | Facility: CLINIC | Age: 78
End: 2023-01-05
Payer: MEDICARE

## 2023-01-05 ENCOUNTER — LAB ENCOUNTER (OUTPATIENT)
Dept: LAB | Age: 78
End: 2023-01-05
Attending: INTERNAL MEDICINE
Payer: MEDICARE

## 2023-01-05 VITALS
DIASTOLIC BLOOD PRESSURE: 50 MMHG | HEIGHT: 62 IN | TEMPERATURE: 98 F | SYSTOLIC BLOOD PRESSURE: 110 MMHG | BODY MASS INDEX: 24.26 KG/M2 | OXYGEN SATURATION: 96 % | WEIGHT: 131.81 LBS | HEART RATE: 69 BPM

## 2023-01-05 DIAGNOSIS — R51.9 BILATERAL HEADACHES: ICD-10-CM

## 2023-01-05 DIAGNOSIS — R41.3 MEMORY CHANGES: ICD-10-CM

## 2023-01-05 DIAGNOSIS — R94.4 DECREASED GFR: ICD-10-CM

## 2023-01-05 DIAGNOSIS — Z00.00 ROUTINE HEALTH MAINTENANCE: ICD-10-CM

## 2023-01-05 DIAGNOSIS — R07.0 THROAT PAIN IN ADULT: ICD-10-CM

## 2023-01-05 DIAGNOSIS — F43.9 STRESS AT HOME: ICD-10-CM

## 2023-01-05 DIAGNOSIS — D64.9 ANEMIA, UNSPECIFIED TYPE: ICD-10-CM

## 2023-01-05 DIAGNOSIS — Z80.0 FAMILY HISTORY OF COLON CANCER: ICD-10-CM

## 2023-01-05 DIAGNOSIS — I10 ESSENTIAL HYPERTENSION: ICD-10-CM

## 2023-01-05 DIAGNOSIS — Z85.42 HISTORY OF UTERINE CANCER: ICD-10-CM

## 2023-01-05 DIAGNOSIS — I10 ESSENTIAL HYPERTENSION: Primary | ICD-10-CM

## 2023-01-05 DIAGNOSIS — D51.0 PERNICIOUS ANEMIA: ICD-10-CM

## 2023-01-05 DIAGNOSIS — Z87.19 HISTORY OF CHRONIC ULCERATIVE COLITIS: ICD-10-CM

## 2023-01-05 DIAGNOSIS — N64.4 BREAST PAIN, LEFT: ICD-10-CM

## 2023-01-05 DIAGNOSIS — Z86.711 HISTORY OF PULMONARY EMBOLUS (PE): ICD-10-CM

## 2023-01-05 LAB
ANION GAP SERPL CALC-SCNC: 6 MMOL/L (ref 0–18)
BASOPHILS # BLD AUTO: 0.04 X10(3) UL (ref 0–0.2)
BASOPHILS NFR BLD AUTO: 0.4 %
BUN BLD-MCNC: 38 MG/DL (ref 7–18)
BUN/CREAT SERPL: 31.9 (ref 10–20)
CALCIUM BLD-MCNC: 9.4 MG/DL (ref 8.5–10.1)
CHLORIDE SERPL-SCNC: 106 MMOL/L (ref 98–112)
CO2 SERPL-SCNC: 26 MMOL/L (ref 21–32)
CREAT BLD-MCNC: 1.19 MG/DL
CRP SERPL-MCNC: <0.29 MG/DL (ref ?–0.3)
DEPRECATED HBV CORE AB SER IA-ACNC: 86.1 NG/ML
DEPRECATED RDW RBC AUTO: 44 FL (ref 35.1–46.3)
EOSINOPHIL # BLD AUTO: 0.17 X10(3) UL (ref 0–0.7)
EOSINOPHIL NFR BLD AUTO: 1.9 %
ERYTHROCYTE [DISTWIDTH] IN BLOOD BY AUTOMATED COUNT: 12.2 % (ref 11–15)
ERYTHROCYTE [SEDIMENTATION RATE] IN BLOOD: 11 MM/HR
FASTING STATUS PATIENT QL REPORTED: NO
GFR SERPLBLD BASED ON 1.73 SQ M-ARVRAT: 47 ML/MIN/1.73M2 (ref 60–?)
GLUCOSE BLD-MCNC: 100 MG/DL (ref 70–99)
HCT VFR BLD AUTO: 35.8 %
HGB BLD-MCNC: 11.8 G/DL
IMM GRANULOCYTES # BLD AUTO: 0.05 X10(3) UL (ref 0–1)
IMM GRANULOCYTES NFR BLD: 0.6 %
IRON SATN MFR SERPL: 25 %
IRON SERPL-MCNC: 101 UG/DL
LYMPHOCYTES # BLD AUTO: 1.38 X10(3) UL (ref 1–4)
LYMPHOCYTES NFR BLD AUTO: 15.4 %
MCH RBC QN AUTO: 32.9 PG (ref 26–34)
MCHC RBC AUTO-ENTMCNC: 33 G/DL (ref 31–37)
MCV RBC AUTO: 99.7 FL
MONOCYTES # BLD AUTO: 0.79 X10(3) UL (ref 0.1–1)
MONOCYTES NFR BLD AUTO: 8.8 %
NEUTROPHILS # BLD AUTO: 6.54 X10 (3) UL (ref 1.5–7.7)
NEUTROPHILS # BLD AUTO: 6.54 X10(3) UL (ref 1.5–7.7)
NEUTROPHILS NFR BLD AUTO: 72.9 %
OSMOLALITY SERPL CALC.SUM OF ELEC: 295 MOSM/KG (ref 275–295)
PLATELET # BLD AUTO: 221 10(3)UL (ref 150–450)
POTASSIUM SERPL-SCNC: 3.9 MMOL/L (ref 3.5–5.1)
RBC # BLD AUTO: 3.59 X10(6)UL
SODIUM SERPL-SCNC: 138 MMOL/L (ref 136–145)
TIBC SERPL-MCNC: 398 UG/DL (ref 240–450)
TRANSFERRIN SERPL-MCNC: 267 MG/DL (ref 200–360)
TSI SER-ACNC: 1.38 MIU/ML (ref 0.36–3.74)
WBC # BLD AUTO: 9 X10(3) UL (ref 4–11)

## 2023-01-05 PROCEDURE — 85025 COMPLETE CBC W/AUTO DIFF WBC: CPT

## 2023-01-05 PROCEDURE — 99215 OFFICE O/P EST HI 40 MIN: CPT | Performed by: INTERNAL MEDICINE

## 2023-01-05 PROCEDURE — 86140 C-REACTIVE PROTEIN: CPT

## 2023-01-05 PROCEDURE — 82728 ASSAY OF FERRITIN: CPT

## 2023-01-05 PROCEDURE — 86225 DNA ANTIBODY NATIVE: CPT

## 2023-01-05 PROCEDURE — 84443 ASSAY THYROID STIM HORMONE: CPT

## 2023-01-05 PROCEDURE — 83540 ASSAY OF IRON: CPT

## 2023-01-05 PROCEDURE — 36415 COLL VENOUS BLD VENIPUNCTURE: CPT

## 2023-01-05 PROCEDURE — 96372 THER/PROPH/DIAG INJ SC/IM: CPT | Performed by: INTERNAL MEDICINE

## 2023-01-05 PROCEDURE — 86038 ANTINUCLEAR ANTIBODIES: CPT

## 2023-01-05 PROCEDURE — 85652 RBC SED RATE AUTOMATED: CPT

## 2023-01-05 PROCEDURE — 80048 BASIC METABOLIC PNL TOTAL CA: CPT

## 2023-01-05 PROCEDURE — 84466 ASSAY OF TRANSFERRIN: CPT

## 2023-01-05 RX ORDER — BUTALBITAL, ASPIRIN, AND CAFFEINE 325; 50; 40 MG/1; MG/1; MG/1
1 CAPSULE ORAL EVERY 8 HOURS PRN
Qty: 15 CAPSULE | Refills: 1 | Status: SHIPPED | OUTPATIENT
Start: 2023-01-05

## 2023-01-05 RX ORDER — OMEPRAZOLE 20 MG/1
20 TABLET, DELAYED RELEASE ORAL DAILY
Refills: 0 | COMMUNITY
Start: 2023-01-05

## 2023-01-05 RX ADMIN — CYANOCOBALAMIN 1000 MCG: 1000 INJECTION, SOLUTION INTRAMUSCULAR; SUBCUTANEOUS at 11:03:00

## 2023-01-06 LAB
DSDNA IGG SERPL IA-ACNC: 37.2 IU/ML
ENA AB SER QL IA: 0.2 UG/L
ENA AB SER QL IA: POSITIVE

## 2023-01-10 ENCOUNTER — TELEPHONE (OUTPATIENT)
Dept: INTERNAL MEDICINE CLINIC | Facility: CLINIC | Age: 78
End: 2023-01-10

## 2023-01-10 DIAGNOSIS — R76.8 ELEVATED ANTINUCLEAR ANTIBODY (ANA) LEVEL: Primary | ICD-10-CM

## 2023-01-11 NOTE — TELEPHONE ENCOUNTER
Please let patient know the following regarding her recent lab results    1. Please let her know that her kidney function is slightly reduced. She has had this intermittently in the past.  It may be due to a combination of her medications for her blood pressure. For now I would not want to change any of her blood pressure medications since her blood pressure is in a good range. 2.  However I would like her to get a simple ultrasound of the kidneys. This can just make sure that her kidneys are normal in size. She had an ultrasound many years ago but we should updated. Order in place    3. In addition I took the opportunity to do an autoimmune test called an JEFF. This sometimes can  on autoimmune diseases. One value did come out positive. However I really do not think she has any autoimmune disease as far as I can tell. 4.  With that said though I would like her to see a specialist.  There would be a rheumatologist.  I generated a referral to [address] [place] [address]. She will be able to determine if there is any further testing that needs to be done.

## 2023-01-11 NOTE — TELEPHONE ENCOUNTER
Called patient and relayed DR. MARK message - verbalized understanding .  Scheduling number and  Information for DR Brynn Mandujano given to patient

## 2023-01-12 ENCOUNTER — HOSPITAL ENCOUNTER (OUTPATIENT)
Dept: ULTRASOUND IMAGING | Facility: HOSPITAL | Age: 78
Discharge: HOME | End: 2023-01-12
Attending: INTERNAL MEDICINE
Payer: MEDICARE

## 2023-01-12 ENCOUNTER — TELEPHONE (OUTPATIENT)
Dept: INTERNAL MEDICINE CLINIC | Facility: CLINIC | Age: 78
End: 2023-01-12

## 2023-01-12 DIAGNOSIS — R94.4 DECREASED GFR: ICD-10-CM

## 2023-01-12 DIAGNOSIS — R94.4 DECREASED GFR: Primary | ICD-10-CM

## 2023-01-12 PROCEDURE — 76775 US EXAM ABDO BACK WALL LIM: CPT | Performed by: INTERNAL MEDICINE

## 2023-01-12 NOTE — TELEPHONE ENCOUNTER
Spoke with Prerna Rider, relayed MD message. Verbalizes understanding and states she will call the pt to schedule.

## 2023-01-12 NOTE — TELEPHONE ENCOUNTER
Ana Xie called from [facility]  Patient called to schedule US of kidneys as per Dr Nohemi Chinchilla not yet in system  Please call Ana Xie when order has been entered and she will contact patient  Tasked to nursing

## 2023-01-13 ENCOUNTER — TELEPHONE (OUTPATIENT)
Dept: INTERNAL MEDICINE CLINIC | Facility: CLINIC | Age: 78
End: 2023-01-13

## 2023-01-13 NOTE — TELEPHONE ENCOUNTER
Please let patient know that her kidney ultrasound came out good. For now I do not think she needs any changes in her medications. I am thinking she will eventually make appointment with the rheumatologist we recently communicated with her regarding an elevated antibody. Vlad Hernandes.

## 2023-01-13 NOTE — TELEPHONE ENCOUNTER
Spoke with patient relayed physician message below. Patient verbalized understanding.  Provided number for Dr. Joyce Kincaid [phone]

## 2023-01-24 ENCOUNTER — HOSPITAL ENCOUNTER (OUTPATIENT)
Dept: MRI IMAGING | Facility: HOSPITAL | Age: 78
Discharge: HOME | End: 2023-01-24
Attending: INTERNAL MEDICINE
Payer: MEDICARE

## 2023-01-24 DIAGNOSIS — R41.3 MEMORY CHANGES: ICD-10-CM

## 2023-01-24 PROCEDURE — 70551 MRI BRAIN STEM W/O DYE: CPT | Performed by: INTERNAL MEDICINE

## 2023-01-26 ENCOUNTER — TELEPHONE (OUTPATIENT)
Dept: INTERNAL MEDICINE CLINIC | Facility: CLINIC | Age: 78
End: 2023-01-26

## 2023-01-26 NOTE — TELEPHONE ENCOUNTER
Please let patient know that her MRI of the brain came out fairly satisfactory. Meaning no major abnormalities. There was some evidence of some minor \"hardening of the arteries\" but this may just be due to age. With last visit we spoke about seeing Dr. Bret Owen from neurology. It does not look like she has made that appointment yet. It would be good for her to see Dr. Bret Owen for her memory symptoms that we spoke about last visit. Also there was 1 blood test for autoimmune problems and I had wanted her to see Carrie Pedro. Both referrals in place. I left copy of MRI report in outbox if she would like us to send it to her.

## 2023-01-26 NOTE — TELEPHONE ENCOUNTER
Spoke with patient and relayed MD's message. Verbalized understanding and agreement with plan. She hasn't tried to make an appt yet with Dr. Maciej Coy. She will do so. She tried making an appt with Dr. Cloud Patient [facility]), but no one answers. We verified office #. I provided number on referral. She will call. If she has trouble making an appt, she will call our office for assistance.

## 2023-01-30 NOTE — TELEPHONE ENCOUNTER
Pt has not been able to schedule either   Each office requires more information before the appts can be scheduled    Pt requests call back for assistance in scheduling [phone]

## 2023-02-02 ENCOUNTER — NURSE ONLY (OUTPATIENT)
Dept: INTERNAL MEDICINE CLINIC | Facility: CLINIC | Age: 78
End: 2023-02-02

## 2023-02-02 DIAGNOSIS — E53.8 VITAMIN B 12 DEFICIENCY: Primary | ICD-10-CM

## 2023-02-02 PROCEDURE — 96372 THER/PROPH/DIAG INJ SC/IM: CPT | Performed by: INTERNAL MEDICINE

## 2023-02-02 RX ADMIN — CYANOCOBALAMIN 1000 MCG: 1000 INJECTION, SOLUTION INTRAMUSCULAR; SUBCUTANEOUS at 14:47:00

## 2023-02-13 RX ORDER — AMLODIPINE BESYLATE 10 MG/1
TABLET ORAL
Qty: 90 TABLET | Refills: 3 | Status: SHIPPED | OUTPATIENT
Start: 2023-02-13

## 2023-03-13 ENCOUNTER — NURSE ONLY (OUTPATIENT)
Dept: INTERNAL MEDICINE CLINIC | Facility: CLINIC | Age: 78
End: 2023-03-13

## 2023-03-13 DIAGNOSIS — E53.8 VITAMIN B 12 DEFICIENCY: Primary | ICD-10-CM

## 2023-03-13 RX ADMIN — CYANOCOBALAMIN 1000 MCG: 1000 INJECTION, SOLUTION INTRAMUSCULAR; SUBCUTANEOUS at 08:59:00

## 2023-03-13 NOTE — PROGRESS NOTES
Patient presents for monthly vitamin B12 IM injection. Patient verbalized reason for visit. Name, , and order verified. Vitamin B12 1000 mcg administered IM to RT deltoid, tolerated well.

## 2023-03-14 ENCOUNTER — OFFICE VISIT (OUTPATIENT)
Dept: PHYSICAL MEDICINE AND REHAB | Facility: CLINIC | Age: 78
End: 2023-03-14
Payer: MEDICARE

## 2023-03-14 VITALS — HEIGHT: 62 IN | BODY MASS INDEX: 23.92 KG/M2 | HEART RATE: 60 BPM | OXYGEN SATURATION: 98 % | WEIGHT: 130 LBS

## 2023-03-14 DIAGNOSIS — M48.062 SPINAL STENOSIS OF LUMBAR REGION WITH NEUROGENIC CLAUDICATION: Primary | ICD-10-CM

## 2023-03-14 DIAGNOSIS — M51.9 LUMBAR DISC DISEASE: ICD-10-CM

## 2023-03-14 DIAGNOSIS — M43.16 SPONDYLOLISTHESIS OF LUMBAR REGION: ICD-10-CM

## 2023-03-14 DIAGNOSIS — M41.25 OTHER IDIOPATHIC SCOLIOSIS, THORACOLUMBAR REGION: ICD-10-CM

## 2023-03-14 DIAGNOSIS — M48.061 LUMBAR FORAMINAL STENOSIS: ICD-10-CM

## 2023-03-14 DIAGNOSIS — M54.16 LUMBAR RADICULOPATHY: ICD-10-CM

## 2023-03-14 PROCEDURE — 99214 OFFICE O/P EST MOD 30 MIN: CPT | Performed by: PHYSICAL MEDICINE & REHABILITATION

## 2023-03-14 PROCEDURE — 1125F AMNT PAIN NOTED PAIN PRSNT: CPT | Performed by: PHYSICAL MEDICINE & REHABILITATION

## 2023-03-14 NOTE — PATIENT INSTRUCTIONS
Plan  I will perform right L4 and L5 TFESI(s) under IV conscious sedation. She will get back into the PT with Yandel Thomas. She would like to hold on getting a surgical opinion at this time. The patient will follow up in 3 months, but the patient will call me 2 weeks after having the injection to let me know how the injection worked.

## 2023-03-15 ENCOUNTER — LAB ENCOUNTER (OUTPATIENT)
Dept: LAB | Facility: HOSPITAL | Age: 78
End: 2023-03-15
Attending: INTERNAL MEDICINE
Payer: MEDICARE

## 2023-03-15 ENCOUNTER — OFFICE VISIT (OUTPATIENT)
Dept: RHEUMATOLOGY | Facility: CLINIC | Age: 78
End: 2023-03-15

## 2023-03-15 ENCOUNTER — TELEPHONE (OUTPATIENT)
Dept: PHYSICAL MEDICINE AND REHAB | Facility: CLINIC | Age: 78
End: 2023-03-15

## 2023-03-15 VITALS
HEART RATE: 60 BPM | HEIGHT: 62 IN | WEIGHT: 130 LBS | SYSTOLIC BLOOD PRESSURE: 138 MMHG | DIASTOLIC BLOOD PRESSURE: 66 MMHG | BODY MASS INDEX: 23.92 KG/M2

## 2023-03-15 DIAGNOSIS — R76.8 POSITIVE ANA (ANTINUCLEAR ANTIBODY): ICD-10-CM

## 2023-03-15 DIAGNOSIS — R76.8 POSITIVE ANA (ANTINUCLEAR ANTIBODY): Primary | ICD-10-CM

## 2023-03-15 LAB
BILIRUB UR QL: NEGATIVE
C3 SERPL-MCNC: 120 MG/DL (ref 90–180)
C4 SERPL-MCNC: 18.7 MG/DL (ref 10–40)
CLARITY UR: CLEAR
COLOR UR: YELLOW
CREAT UR-SCNC: 129 MG/DL
GLUCOSE UR-MCNC: NEGATIVE MG/DL
HGB UR QL STRIP.AUTO: NEGATIVE
HYALINE CASTS #/AREA URNS AUTO: PRESENT /LPF
KETONES UR-MCNC: NEGATIVE MG/DL
LEUKOCYTE ESTERASE UR QL STRIP.AUTO: NEGATIVE
NITRITE UR QL STRIP.AUTO: NEGATIVE
PH UR: 5 [PH] (ref 5–8)
PROT UR-MCNC: 13.8 MG/DL
PROT UR-MCNC: NEGATIVE MG/DL
PROT/CREAT UR-RTO: 0.11
SP GR UR STRIP: 1.02 (ref 1–1.03)
UROBILINOGEN UR STRIP-ACNC: <2
VIT C UR-MCNC: NEGATIVE MG/DL

## 2023-03-15 PROCEDURE — 86160 COMPLEMENT ANTIGEN: CPT | Performed by: INTERNAL MEDICINE

## 2023-03-15 PROCEDURE — 1125F AMNT PAIN NOTED PAIN PRSNT: CPT | Performed by: INTERNAL MEDICINE

## 2023-03-15 PROCEDURE — 82570 ASSAY OF URINE CREATININE: CPT

## 2023-03-15 PROCEDURE — 86225 DNA ANTIBODY NATIVE: CPT | Performed by: INTERNAL MEDICINE

## 2023-03-15 PROCEDURE — 81003 URINALYSIS AUTO W/O SCOPE: CPT | Performed by: INTERNAL MEDICINE

## 2023-03-15 PROCEDURE — 86039 ANTINUCLEAR ANTIBODIES (ANA): CPT | Performed by: INTERNAL MEDICINE

## 2023-03-15 PROCEDURE — 36415 COLL VENOUS BLD VENIPUNCTURE: CPT | Performed by: INTERNAL MEDICINE

## 2023-03-15 PROCEDURE — 86235 NUCLEAR ANTIGEN ANTIBODY: CPT | Performed by: INTERNAL MEDICINE

## 2023-03-15 PROCEDURE — 86038 ANTINUCLEAR ANTIBODIES: CPT | Performed by: INTERNAL MEDICINE

## 2023-03-15 PROCEDURE — 84156 ASSAY OF PROTEIN URINE: CPT

## 2023-03-15 PROCEDURE — 99204 OFFICE O/P NEW MOD 45 MIN: CPT | Performed by: INTERNAL MEDICINE

## 2023-03-15 NOTE — TELEPHONE ENCOUNTER
Per Medicare Guidelines -no authorization is required for Right L4 and Right L5 TFESIs CPT 92387, 61147    Status: Authorization is not required however may be subject to review once claim is submitted-Covered Benefit    Fyi, per CMS LCD limitations-Use of Moderate or Deep Sedation, General Anesthesia, or Monitored Anesthesia Care (MAC) is usually unnecessary or rarely indicated for these procedures and therefore, is not considered medically reasonable and necessary. Even in patients with a needle phobia and anxiety, typically oral anxiolytics suffice. In exceptional and unique cases, documentation must clearly establish the need for such sedation in the specific patient.     Per Dr. Lesvia Rondon

## 2023-03-15 NOTE — PATIENT INSTRUCTIONS
You were seen today for blood work concerning for possible lupus but again you do not have the symptoms  Lets reevaluate and get some more blood work  I will send you a message in my chart regarding the results

## 2023-03-16 LAB — NUCLEAR IGG TITR SER IF: POSITIVE {TITER}

## 2023-03-16 NOTE — TELEPHONE ENCOUNTER
Patient has been scheduled for  Right L4 and Right L5 TFESI  on 03/28/23 at the [address] [place] [address] with . -Anesthesia type: IVCS. -If scheduling [facility] covid testing required for all procedures whether patient is vaccinated or not. -Patient informed not to eat or drink anything after midnight the night prior to the procedure, if being sedated. (Patient informed to fast 12 hours prior to procedure with IVCS/MAC. )  -Patient was advised that if he/she does receive the covid vaccine it needs to be at least 2 weeks before or after the injection. -Medications and allergies reviewed. -Patient reminded to hold NSAIDs (Ibuprofen, ASA 81, Aleve, Naproxen, Mobic, Diclofenac, Etodolac, Celebrex etc.) for 3 days prior to [place]  if BMI is greater than 35. For Cervical injections only hold multivitamins, Vitamin E, Fish Oil, Phentermine (Lomaira) for 7 days prior to injection and NSAIDS.  mg to be held for 7 days prior to injections.  -If patient is receiving MAC/IVCS Phentermine Miguel Rebel) will need to be held for 7 days prior to injection.  -If on blood thinner clearance has been received to hold this medication by provider. -patient confirmed she is taking aspirin 81mg but not to prevent a heart attack or stroke and has no history of heart attack or stroke. -Patient informed he/she will need a  to and from procedure. -[facility] is located in the [facility] 1st floor. Patient may park in the yellow or purple parking. Patient verbalized understanding and agrees with plan.  -----> Scheduled in Epic: Yes  -----> Scheduled in Caset[employer]:  Yes

## 2023-03-17 LAB
CENTROMERE IGG SER-ACNC: <0.4 U/ML
DSDNA AB TITR SER: <10 {TITER}
ENA JO1 AB SER IA-ACNC: <0.3 U/ML
ENA RNP IGG SER IA-ACNC: 1.3 U/ML
ENA SCL70 IGG SER IA-ACNC: <0.6 U/ML
ENA SM IGG SER IA-ACNC: <0.7 U/ML
ENA SS-A IGG SER IA-ACNC: <0.4 U/ML
ENA SS-B IGG SER IA-ACNC: <0.4 U/ML
U1 SNRNP IGG SER IA-ACNC: 1.5 U/ML

## 2023-03-21 LAB — ANA NUCLEOLAR TITR SER IF: 1280 {TITER}

## 2023-03-28 ENCOUNTER — OFFICE VISIT (OUTPATIENT)
Dept: SURGERY | Facility: CLINIC | Age: 78
End: 2023-03-28
Payer: MEDICARE

## 2023-03-28 DIAGNOSIS — M54.16 LUMBAR RADICULOPATHY: Primary | ICD-10-CM

## 2023-03-28 DIAGNOSIS — M51.9 LUMBAR DISC DISEASE: ICD-10-CM

## 2023-03-28 DIAGNOSIS — M48.062 SPINAL STENOSIS OF LUMBAR REGION WITH NEUROGENIC CLAUDICATION: ICD-10-CM

## 2023-03-28 PROCEDURE — 64483 NJX AA&/STRD TFRM EPI L/S 1: CPT | Performed by: PHYSICAL MEDICINE & REHABILITATION

## 2023-03-28 PROCEDURE — 64484 NJX AA&/STRD TFRM EPI L/S EA: CPT | Performed by: PHYSICAL MEDICINE & REHABILITATION

## 2023-03-28 NOTE — PROCEDURES
[address]    2-LEVEL LUMBAR TRANSFORAMINAL   NAME:  Zachary Blackwell    MR #:    [id number] :  1945     PHYSICIAN:  Yariel Hartman MD        Operative Report    DATE OF PROCEDURE: 3/28/2023   PREOPERATIVE DIAGNOSES: 1. right > left S1 radiculopathy with right L4-5 radiculitis    2. L4-5 severe, L3-4 mod, L2-3 mod, L1-2 mod-severe central stenosis    3. L5-S1 left large far lateral & mild central, L4-5 large diffuse, L3-4 mild-mod diffuse & left mod far lateral, L2-3 mod diffuse, L1-2 mod diffuse & right mod-large foraminal bulging discs         POSTOPERATIVE DIAGNOSES:   1. right > left S1 radiculopathy with right L4-5 radiculitis    2. L4-5 severe, L3-4 mod, L2-3 mod, L1-2 mod-severe central stenosis    3. L5-S1 left large far lateral & mild central, L4-5 large diffuse, L3-4 mild-mod diffuse & left mod far lateral, L2-3 mod diffuse, L1-2 mod diffuse & right mod-large foraminal bulging discs         PROCEDURES: Right L4 and L5 transforaminal epidural steroid injections done under fluoroscopic guidance with contrast enhancement. SURGEON: Yariel Adrian MD   ANESTHESIA: IV conscious sedation   INDICATIONS:      OPERATIVE PROCEDURE:  Written consent was obtained from the patient. The patient was brought into the operating room and placed in the prone position on the fluoroscopy table with pillow underneath the abdomen. The patient's skin was cleaned and draped in a normal sterile fashion. Using AP fluoroscopy, all five lumbar vertebrae were identified. When the fourth and fifth vertebrae were identified, fluoroscopy was left anterior obliqued opening up the right L4-5 and right L5-S1 intervertebral foramen. At this point in time, each site was anesthetized with 1% PF lidocaine without epinephrine. Then, 5 inch, 22-gauge spinal needles were inserted and directed towards the right L4-5 and right L5-S1 intervertebral foramen.   When they felt to be in good position, AP fluoroscopy was used to advance the needles to the 6 o'clock position on the right L4 and right L5 pedicles. At this point in time, Omnipaque-240 contrast was used to obtain a good epidurogram indicating correct needle placement at each level. Then, aspiration was performed. No blood, fluid, or air was aspirated. Then, the patient was injected with a 3 cc solution of 1.5 cc of 40 mg/cc of Kenalog and 1.5 cc of 1% PF lidocaine without epinephrine at each site. After this, the needles were removed. Each site was cleaned. Band-Aids were applied. The patient was transferred to the cart and into [facility]. The patient was given discharge instructions and will follow up in the clinic as scheduled. Throughout the whole procedure, the patient's pulse oximetry and vital signs were monitored and they remained completely stable. Also, throughout the whole procedure, prior to injection of any medication, aspiration was performed. No blood, fluid, or air was aspirated at anytime. The total time for conscious sedation was 5 minutes and the medication used was 2 mg of IV Versaid.

## 2023-04-03 ENCOUNTER — OFFICE VISIT (OUTPATIENT)
Dept: PHYSICAL THERAPY | Facility: HOSPITAL | Age: 78
End: 2023-04-03
Attending: PHYSICAL MEDICINE & REHABILITATION
Payer: MEDICARE

## 2023-04-03 DIAGNOSIS — M48.062 SPINAL STENOSIS OF LUMBAR REGION WITH NEUROGENIC CLAUDICATION: ICD-10-CM

## 2023-04-03 DIAGNOSIS — M41.25 OTHER IDIOPATHIC SCOLIOSIS, THORACOLUMBAR REGION: ICD-10-CM

## 2023-04-03 DIAGNOSIS — M54.16 LUMBAR RADICULOPATHY: ICD-10-CM

## 2023-04-03 DIAGNOSIS — M51.9 LUMBAR DISC DISEASE: ICD-10-CM

## 2023-04-03 DIAGNOSIS — M43.16 SPONDYLOLISTHESIS OF LUMBAR REGION: ICD-10-CM

## 2023-04-03 DIAGNOSIS — M48.061 LUMBAR FORAMINAL STENOSIS: ICD-10-CM

## 2023-04-03 PROCEDURE — 97110 THERAPEUTIC EXERCISES: CPT

## 2023-04-03 PROCEDURE — 97162 PT EVAL MOD COMPLEX 30 MIN: CPT

## 2023-04-13 ENCOUNTER — OFFICE VISIT (OUTPATIENT)
Dept: PHYSICAL THERAPY | Facility: HOSPITAL | Age: 78
End: 2023-04-13
Attending: PHYSICAL MEDICINE & REHABILITATION
Payer: MEDICARE

## 2023-04-13 PROCEDURE — 97110 THERAPEUTIC EXERCISES: CPT

## 2023-04-13 PROCEDURE — 97112 NEUROMUSCULAR REEDUCATION: CPT

## 2023-04-13 NOTE — PROGRESS NOTES
Zachary Blackwell    6/14/1945  Referring Physician:  Korin Adrian  Diagnosis: Spinal stenosis of lumbar region with neurogenic claudication (M48.062)  Lumbar disc disease (M51.9)  Lumbar foraminal stenosis (M48.061)  Lumbar radiculopathy (M54.16)  Other idiopathic scoliosis, thoracolumbar region (M41.25)  Spondylolisthesis of lumbar region (M43.16)    Initial Evaluation Date: 4/3/2023  Date of Surgery: None for this diagnosis     Precautions/Hx: arthritis, DVT, HTN, L 1st MTP surg, HLD, parathyroid removed, TAHBSO for uterine cancer, ulcerative colitis, appy, scoliosis  R L4-L5 TFESI 3/28/2023    SUBJECTIVE:     Pt reports that she continues to have large fluctuations in pain. She notes that one day she had pain in the L SI region. She notes that her pain is worst first thing in the morning. Visit count 7/2/2023 1/8 2/8    Date 4/3/2023 4/13/2023     LBP/ R gluteals      Pain Range 0 to 7/10 0-7/10    Pain Ave 3/10 3/10         OBJECTIVE:     Treatment performed this date:    Therapeutic Exercise:  Visit #   1/8 2/8   Position Exercise HEP 4/3/2023 4/13/2023    Supine Sciatic nerve glide H X 10x X 10x    TA set w exhale  X 10x X 10x    SKC H  X 5x    DKC   X 5x    DKC w head raise H  X 5x    LTR H  X 10x   Sitting Leg pumps sciatic nerve glide H X 10x X 10x    TA set w exhale H X 10x    Neuro Re-ed   X see assessment    H = HEP. Pt given copies of this exercise for home program.  X = Exercises done this date - pt verbalized understanding and demonstrated competence. All exercises done B unless otherwise indicated. Pt advised to discontinue exercises that increase pain and to call or return to therapist to discuss. Each intervention above is specifically prescribed to address the patients identified impairments, activity limitations, and participation restrictions. ASSESSMENT:     Pt educated on use of morning routine to manage her am pain.   Pt advised to discuss further options w her physician for pain management. Pt demonstrates all previously instructed exercises with proficiency. Physical Therapy Goals: From 4/3/2023 to 7/2/2023  - Created with patient input during initial evaluation   1. Pt will be independent in beginning level of HEP for stretching, posture and strengthening. 2. Pt will be able to walk for exercise 15 min x 2 without increased symptoms. 3. Pt will be able to  a line for 10 min without increased symptoms. 4. Pt will be able to lift laundry basket without increased symptoms. 5. Pt will be able to perform early am exercise to allow for getting out of bed with less pain in am.      PLAN OF CARE:      Continue PT per original plan for therapeutic exercises, posture retraining, therapeutic activities, manual treatment, neuromuscular reeducation, therapeutic pain neuroscience education, patient education, self care home management, home exercise program, and modalities as needed. Charges: Neuro1 - 9 min, TE2 - 36 min    Total Timed treatment: 45 min      Total Treatment Time: 45 min     __________________________________________________________________________________________      21st Century Cures Act Notice to Patient: Medical documents like this are made available to patients in the interest of transparency. However, be advised this is a medical document and it is intended as efno-it-ckgn communication between your medical providers. This medical document may contain abbreviations, assessments, medical data, and results or other terms that are unfamiliar. Medical documents are intended to carry relevant information, facts as evident, and the clinical opinion of the practitioner. As such, this medical document may be written in language that appears blunt or direct. You are encouraged to contact your medical provider and/or [facility] if you have any questions about this medical document.     Objective Initial Evaluation Data 4/3/2023:    Oswestry Disability Index Score  Score: 28 % (4/3/2023  8:39 PM)    Gait:        Deviations: decreased trunk rotation and lat shift       Devices: none       Assistance: none      Posture 4/3/2023   Alignment L lat shift, min B pronation, flattened thoracic and lumbar spine, significant L lumbar scoliosis       Sensation 4/3/2023   Dermatomes Light Touch    Proximal medial thigh R (L2)  wnl   Proximal medial thigh L (L2) wnl   Above knee R (L3) wnl   Above knee L (L3) wnl   Medial arch R(L4) wnl   Medial arch L (L4) wnl   Between 1st - 2nd toes R (L5) wnl   Between 1st - 2nd toes L (L5) wnl   Lateral border of foot R (S1) wnl   Lateral border of foot L (S1) wnl   Popliteal fossa R (S2) wnl   Popliteal fossa L(S2) wnl       Abdominals 4/3/2023   Tone  good     Diastasis in fingers 4/3/2023   12 cm above umbilicus 1   6 cm above umbilicus 1   At umbilicus 1   6 cm below umbilicus 1   12 cm above umbilicus 1      Lumbopelvic 4/3/2023   Control  fair        ROM Lumbar 4/3/2023   Flexion Mod* R glut sx   Extension Mod-max, all lower T   R SB Wnl*   L SB wnl   R Rotation min   L Rotation Min* R glut sx   * pain limiting    ROM Hip 4/3/2023   Flex R 125 130   Flex L 125 130   ER R 45 47   ER L 45 48   IR R 40 25   IR L 40 40   *pain limiting     MMT 5/5 4/3/2023   L2- hip flex R 5   Hip flex L 5   L3- knee ext R 5   Knee ext L 5   L4- ankle DF R 5   Ankle DF L 5-   L5- EHL R 5   EHL L 5-   S1- ankle PF R 5   Ankle PF L 5   S2- Hams R 5   Hams L 5   *pain limiting    LE flexibility 4/3/2023   Piriformis R wnl   Piriformis L wnl   Hams R -19   Hams L -6   Hip flexors R wnl   Hip flexors L wnl   Quads R wnl   Quads L wnl   *pain limiting    Neural mobility 4/3/2023   SLR R (+) 75 deg   SLR L wnl      DANIELLE 4/3/2023   R wnl   L wnl

## 2023-04-18 ENCOUNTER — OFFICE VISIT (OUTPATIENT)
Dept: INTERNAL MEDICINE CLINIC | Facility: CLINIC | Age: 78
End: 2023-04-18

## 2023-04-18 VITALS
BODY MASS INDEX: 23.74 KG/M2 | WEIGHT: 129 LBS | SYSTOLIC BLOOD PRESSURE: 138 MMHG | DIASTOLIC BLOOD PRESSURE: 62 MMHG | TEMPERATURE: 99 F | HEART RATE: 64 BPM | OXYGEN SATURATION: 99 % | HEIGHT: 62 IN

## 2023-04-18 DIAGNOSIS — Z00.00 ROUTINE HEALTH MAINTENANCE: ICD-10-CM

## 2023-04-18 DIAGNOSIS — Z87.19 HISTORY OF CHRONIC ULCERATIVE COLITIS: ICD-10-CM

## 2023-04-18 DIAGNOSIS — R41.3 MEMORY CHANGES: ICD-10-CM

## 2023-04-18 DIAGNOSIS — R94.4 DECREASED GFR: ICD-10-CM

## 2023-04-18 DIAGNOSIS — I10 ESSENTIAL HYPERTENSION: ICD-10-CM

## 2023-04-18 DIAGNOSIS — D64.9 ANEMIA, UNSPECIFIED TYPE: ICD-10-CM

## 2023-04-18 DIAGNOSIS — Z00.00 MEDICARE ANNUAL WELLNESS VISIT, SUBSEQUENT: Primary | ICD-10-CM

## 2023-04-18 DIAGNOSIS — D51.0 PERNICIOUS ANEMIA: ICD-10-CM

## 2023-04-18 DIAGNOSIS — R76.8 ELEVATED ANTINUCLEAR ANTIBODY (ANA) LEVEL: ICD-10-CM

## 2023-04-18 DIAGNOSIS — Z80.0 FAMILY HISTORY OF COLON CANCER: ICD-10-CM

## 2023-04-18 DIAGNOSIS — Z85.42 HISTORY OF UTERINE CANCER: ICD-10-CM

## 2023-04-18 RX ADMIN — CYANOCOBALAMIN 1000 MCG: 1000 INJECTION, SOLUTION INTRAMUSCULAR; SUBCUTANEOUS at 14:42:00

## 2023-04-19 ENCOUNTER — LAB ENCOUNTER (OUTPATIENT)
Dept: LAB | Facility: HOSPITAL | Age: 78
End: 2023-04-19
Attending: Other
Payer: MEDICARE

## 2023-04-19 DIAGNOSIS — I10 ESSENTIAL HYPERTENSION: ICD-10-CM

## 2023-04-19 DIAGNOSIS — G31.84 AMNESTIC MCI (MILD COGNITIVE IMPAIRMENT WITH MEMORY LOSS): ICD-10-CM

## 2023-04-19 LAB
ALBUMIN SERPL-MCNC: 3.8 G/DL (ref 3.4–5)
ALBUMIN/GLOB SERPL: 1.1 {RATIO} (ref 1–2)
ALP LIVER SERPL-CCNC: 61 U/L
ALT SERPL-CCNC: 23 U/L
ANION GAP SERPL CALC-SCNC: 5 MMOL/L (ref 0–18)
AST SERPL-CCNC: 22 U/L (ref 15–37)
BASOPHILS # BLD AUTO: 0.03 X10(3) UL (ref 0–0.2)
BASOPHILS NFR BLD AUTO: 0.5 %
BILIRUB SERPL-MCNC: 0.4 MG/DL (ref 0.1–2)
BUN BLD-MCNC: 25 MG/DL (ref 7–18)
BUN/CREAT SERPL: 30.9 (ref 10–20)
CALCIUM BLD-MCNC: 9.3 MG/DL (ref 8.5–10.1)
CHLORIDE SERPL-SCNC: 103 MMOL/L (ref 98–112)
CO2 SERPL-SCNC: 29 MMOL/L (ref 21–32)
CREAT BLD-MCNC: 0.81 MG/DL
DEPRECATED RDW RBC AUTO: 42.1 FL (ref 35.1–46.3)
EOSINOPHIL # BLD AUTO: 0.23 X10(3) UL (ref 0–0.7)
EOSINOPHIL NFR BLD AUTO: 3.7 %
ERYTHROCYTE [DISTWIDTH] IN BLOOD BY AUTOMATED COUNT: 11.6 % (ref 11–15)
FASTING STATUS PATIENT QL REPORTED: NO
FOLATE SERPL-MCNC: >20 NG/ML (ref 8.7–?)
GFR SERPLBLD BASED ON 1.73 SQ M-ARVRAT: 74 ML/MIN/1.73M2 (ref 60–?)
GLOBULIN PLAS-MCNC: 3.4 G/DL (ref 2.8–4.4)
GLUCOSE BLD-MCNC: 87 MG/DL (ref 70–99)
HCT VFR BLD AUTO: 34.7 %
HGB BLD-MCNC: 11.5 G/DL
IMM GRANULOCYTES # BLD AUTO: 0.02 X10(3) UL (ref 0–1)
IMM GRANULOCYTES NFR BLD: 0.3 %
LYMPHOCYTES # BLD AUTO: 1.19 X10(3) UL (ref 1–4)
LYMPHOCYTES NFR BLD AUTO: 19 %
MCH RBC QN AUTO: 32.4 PG (ref 26–34)
MCHC RBC AUTO-ENTMCNC: 33.1 G/DL (ref 31–37)
MCV RBC AUTO: 97.7 FL
MONOCYTES # BLD AUTO: 0.67 X10(3) UL (ref 0.1–1)
MONOCYTES NFR BLD AUTO: 10.7 %
NEUTROPHILS # BLD AUTO: 4.13 X10 (3) UL (ref 1.5–7.7)
NEUTROPHILS # BLD AUTO: 4.13 X10(3) UL (ref 1.5–7.7)
NEUTROPHILS NFR BLD AUTO: 65.8 %
OSMOLALITY SERPL CALC.SUM OF ELEC: 288 MOSM/KG (ref 275–295)
PLATELET # BLD AUTO: 202 10(3)UL (ref 150–450)
POTASSIUM SERPL-SCNC: 4.4 MMOL/L (ref 3.5–5.1)
PROT SERPL-MCNC: 7.2 G/DL (ref 6.4–8.2)
RBC # BLD AUTO: 3.55 X10(6)UL
SODIUM SERPL-SCNC: 137 MMOL/L (ref 136–145)
TSI SER-ACNC: 1.24 MIU/ML (ref 0.36–3.74)
WBC # BLD AUTO: 6.3 X10(3) UL (ref 4–11)

## 2023-04-19 PROCEDURE — 85025 COMPLETE CBC W/AUTO DIFF WBC: CPT

## 2023-04-19 PROCEDURE — 84425 ASSAY OF VITAMIN B-1: CPT

## 2023-04-19 PROCEDURE — 80053 COMPREHEN METABOLIC PANEL: CPT

## 2023-04-19 PROCEDURE — 83921 ORGANIC ACID SINGLE QUANT: CPT

## 2023-04-19 PROCEDURE — 82746 ASSAY OF FOLIC ACID SERUM: CPT

## 2023-04-19 PROCEDURE — 84443 ASSAY THYROID STIM HORMONE: CPT

## 2023-04-19 PROCEDURE — 36415 COLL VENOUS BLD VENIPUNCTURE: CPT

## 2023-04-21 ENCOUNTER — TELEPHONE (OUTPATIENT)
Dept: INTERNAL MEDICINE CLINIC | Facility: CLINIC | Age: 78
End: 2023-04-21

## 2023-04-21 ENCOUNTER — OFFICE VISIT (OUTPATIENT)
Dept: PHYSICAL THERAPY | Facility: HOSPITAL | Age: 78
End: 2023-04-21
Attending: PHYSICAL MEDICINE & REHABILITATION
Payer: MEDICARE

## 2023-04-21 LAB — VITAMIN B1 WHOLE BLD: 149.8 NMOL/L

## 2023-04-21 PROCEDURE — 97112 NEUROMUSCULAR REEDUCATION: CPT

## 2023-04-21 PROCEDURE — 97110 THERAPEUTIC EXERCISES: CPT

## 2023-04-21 NOTE — PROGRESS NOTES
Christy Villanueva    6/14/1945  Referring Physician:  Lulú Adrian  Diagnosis: Spinal stenosis of lumbar region with neurogenic claudication (M48.062)  Lumbar disc disease (M51.9)  Lumbar foraminal stenosis (M48.061)  Lumbar radiculopathy (M54.16)  Other idiopathic scoliosis, thoracolumbar region (M41.25)  Spondylolisthesis of lumbar region (M43.16)    Initial Evaluation Date: 4/3/2023  Date of Surgery: None for this diagnosis     Precautions/Hx: arthritis, DVT, HTN, L 1st MTP surg, HLD, parathyroid removed, TAHBSO for uterine cancer, ulcerative colitis, appy, scoliosis  R L4-L5 TFESI 3/28/2023    SUBJECTIVE:     Pt reports that she is feeling better. She has noticed a decrease in R LE radiating pain even in the am.      Visit count 7/2/2023 1/8 2/8 3/8   Date 4/3/2023 4/13/2023  4/21/2023    LBP/ R gluteals      Pain Range 0 to 7/10 0-7/10 0-7/10   Pain Ave 3/10 3/10 2-3/10        OBJECTIVE:     Treatment performed this date:    Therapeutic Exercise:  Visit #   1/8 2/8 3/8   Position Exercise HEP 4/3/2023 4/13/2023  4/21/2023    Supine Sciatic nerve glide H X 10x X 10x     TA set w exhale  X 10x X 10x X     SKC H  X 5x X     DKC   X 5x X     DKC w head raise H  X 5x     LTR H  X 10x X     Unilat LE reach    X 10x2    Unilat UE and LE reach H   X 3x    Pelvic tilt    X 10x    TA set w exhale and pelvic tilt    X 10x     TA skc    X     TA dkc    X     TA pistoning    X    TA pistoning arms up    X    TA pistoning arms reciprocal mvmt H   X 10x2   Sitting Leg pumps sciatic nerve glide H X 10x X 10x     TA set w exhale H X 10x      Trunk flex H   X 10 x 3s; 1x 30s   Standing Wall hip shift to L H   X 10x2    Hip shift L    X   Neuro Re-ed   X see assessment  X see assessment     Transfers    X supine to sit   H = HEP. Pt given copies of this exercise for home program.  X = Exercises done this date - pt verbalized understanding and demonstrated competence. All exercises done B unless otherwise indicated.    Pt advised to discontinue exercises that increase pain and to call or return to therapist to discuss. Each intervention above is specifically prescribed to address the patients identified impairments, activity limitations, and participation restrictions. ASSESSMENT:     Pt doing well with reduced ave pain levels and good tolerance of ADL. Discussed use of repetition for joint health. Benefits of joint motion and mod compression for arthritic joints. Importance of consistent use of the sciatic nerve glide. Pt demonstrates very poor transfer supine<>sit. Pt instructed in correct performance with demonstration, discussion and practice. Pt then demonstrated correct independent performance. Pt progressed well thru lumbopelvic stability exercise progression. Physical Therapy Goals: From 4/3/2023 to 7/2/2023  - Created with patient input during initial evaluation   1. Pt will be independent in beginning level of HEP for stretching, posture and strengthening. 2. Pt will be able to walk for exercise 15 min x 2 without increased symptoms. 3. Pt will be able to  a line for 10 min without increased symptoms. 4. Pt will be able to lift laundry basket without increased symptoms. 5. Pt will be able to perform early am exercise to allow for getting out of bed with less pain in am.      PLAN OF CARE:      Continue PT per original plan for therapeutic exercises, posture retraining, therapeutic activities, manual treatment, neuromuscular reeducation, therapeutic pain neuroscience education, patient education, self care home management, home exercise program, and modalities as needed.     Charged Units Units Minutes   Neuro 1 20   Ther ex 3 37   Gait     Man Tx     US          Total Timed  57   Total Tx Time  60         __________________________________________________________________________________________      21st Century Cures Act Notice to Patient: Medical documents like this are made available to patients in the interest of transparency. However, be advised this is a medical document and it is intended as [id number]-[id number] communication between your medical providers. This medical document may contain abbreviations, assessments, medical data, and results or other terms that are unfamiliar. Medical documents are intended to carry relevant information, facts as evident, and the clinical opinion of the practitioner. As such, this medical document may be written in language that appears blunt or direct. You are encouraged to contact your medical provider and/or [facility] Patient Experience if you have any questions about this medical document.     Objective Initial Evaluation Data 4/3/2023:    Oswestry Disability Index Score  Score: 28 % (4/3/2023  8:39 PM)    Gait:        Deviations: decreased trunk rotation and lat shift       Devices: none       Assistance: none      Posture 4/3/2023   Alignment L lat shift, min B pronation, flattened thoracic and lumbar spine, significant L lumbar scoliosis       Sensation 4/3/2023   Dermatomes Light Touch    Proximal medial thigh R (L2)  wnl   Proximal medial thigh L (L2) wnl   Above knee R (L3) wnl   Above knee L (L3) wnl   Medial arch R(L4) wnl   Medial arch L (L4) wnl   Between 1st - 2nd toes R (L5) wnl   Between 1st - 2nd toes L (L5) wnl   Lateral border of foot R (S1) wnl   Lateral border of foot L (S1) wnl   Popliteal fossa R (S2) wnl   Popliteal fossa L(S2) wnl       Abdominals 4/3/2023   Tone  good     Diastasis in fingers 4/3/2023   12 cm above umbilicus 1   6 cm above umbilicus 1   At umbilicus 1   6 cm below umbilicus 1   12 cm above umbilicus 1      Lumbopelvic 4/3/2023   Control  fair        ROM Lumbar 4/3/2023   Flexion Mod* R glut sx   Extension Mod-max, all lower T   R SB Wnl*   L SB wnl   R Rotation min   L Rotation Min* R glut sx   * pain limiting    ROM Hip 4/3/2023   Flex R 125 130   Flex L 125 130   ER R 45 47   ER L 45 48   IR R 40 25   IR L 40 40   *pain limiting     MMT 5/5 4/3/2023   L2- hip flex R 5   Hip flex L 5   L3- knee ext R 5   Knee ext L 5   L4- ankle DF R 5   Ankle DF L 5-   L5- EHL R 5   EHL L 5-   S1- ankle PF R 5   Ankle PF L 5   S2- Hams R 5   Hams L 5   *pain limiting    LE flexibility 4/3/2023   Piriformis R wnl   Piriformis L wnl   Hams R -19   Hams L -6   Hip flexors R wnl   Hip flexors L wnl   Quads R wnl   Quads L wnl   *pain limiting    Neural mobility 4/3/2023   SLR R (+) 75 deg   SLR L wnl      DANIELLE 4/3/2023   R wnl   L wnl

## 2023-04-21 NOTE — TELEPHONE ENCOUNTER
Please let patient know that the labs that I wanted for her blood count and chemistries came out acceptable. Just a very minimal anemia which she has had from time to time. Her electrolytes are good. Some of Dr. Atilio Anderson labs are still pending. We will wait for those.

## 2023-04-23 LAB — METHYLMALONIC ACID: 209 NMOL/L

## 2023-05-01 ENCOUNTER — OFFICE VISIT (OUTPATIENT)
Dept: PHYSICAL THERAPY | Facility: HOSPITAL | Age: 78
End: 2023-05-01
Attending: PHYSICAL MEDICINE & REHABILITATION
Payer: MEDICARE

## 2023-05-01 PROCEDURE — 97110 THERAPEUTIC EXERCISES: CPT

## 2023-05-01 PROCEDURE — 97112 NEUROMUSCULAR REEDUCATION: CPT

## 2023-05-01 NOTE — PROGRESS NOTES
Christy Smithuton    6/14/1945  Referring Physician:  Lulú Adrian  Diagnosis: Spinal stenosis of lumbar region with neurogenic claudication (M48.062)  Lumbar disc disease (M51.9)  Lumbar foraminal stenosis (M48.061)  Lumbar radiculopathy (M54.16)  Other idiopathic scoliosis, thoracolumbar region (M41.25)  Spondylolisthesis of lumbar region (M43.16)    Initial Evaluation Date: 4/3/2023  Date of Surgery: None for this diagnosis     Precautions/Hx: arthritis, DVT, HTN, L 1st MTP surg, HLD, parathyroid removed, TAHBSO for uterine cancer, ulcerative colitis, appy, scoliosis  R L4-L5 TFESI 3/28/2023    SUBJECTIVE:     Pt reports that she is doing well today with low level of pain. Pt states that she woke Friday am and had deep pain in her R LE. She started doing her exercises and gently walked and her pain resolved by 2 hours. Visit count 7/2/2023 1/8 2/8 3/8 4/8   Date 4/3/2023 4/13/2023  4/21/2023  5/1/2023    LBP/ R gluteals       Pain Range 0 to 7/10 0-7/10 0-7/10 0-8/10   Pain Ave 3/10 3/10 2-3/10 2/10        OBJECTIVE:     Treatment performed this date:    Therapeutic Exercise:  Visit #   2/8 3/8 4/8   Position Exercise HEP 4/13/2023 4/21/2023 5/1/2023    Supine Sciatic nerve glide H X 10x  10x    TA set w exhale  X 10x X      SKC H X 5x X  X     DKC  X 5x X  X     DKC w head raise H X 5x      LTR H X 10x X  X     Unilat LE reach   X 10x2 10x    Unilat UE and LE reach H  X 3x 3x    Pelvic tilt   X 10x     TA set w exhale and pelvic tilt   X 10x      TA skc   X      TA dkc   X      TA pistoning   X     TA pistoning arms up   X     TA pistoning arms reciprocal mvmt H  X 10x2 10x2   Sitting Leg pumps sciatic nerve glide H X 10x      TA set w exhale H   X     Trunk flex H  X 10 x 3s; 1x 30s X     Pelvic tilt    X   Standing Wall hip shift to L H  X 10x2     Hip shift L   X    Neuro Re-ed  X see assessment  X see assessment  X see assessment     Transfers   X supine to sit     Posture    X   H = HEP.  Pt given copies of this exercise for home program.  X = Exercises done this date - pt verbalized understanding and demonstrated competence. All exercises done B unless otherwise indicated. Pt advised to discontinue exercises that increase pain and to call or return to therapist to discuss. Each intervention above is specifically prescribed to address the patients identified impairments, activity limitations, and participation restrictions. ASSESSMENT:     Pt was doing well with low level of pain and then had a flare up from unknown trigger. Pain started in the am when she went to get out of bed. Pt educated on options for bed positioning to increase foraminal space. Reviewed stenosis and dermatomes w use of pictures. Pt asking good clarifying questions about self care and HEP. Pt educated on body pressurization changes associated with barometric pressure changes. Analogy with photos of chip bags at elevated altitude to reinforce understanding. Discussed scoliosis concepts of compression on convex side. Physical Therapy Goals: From 4/3/2023 to 7/2/2023  - Created with patient input during initial evaluation   1. Pt will be independent in beginning level of HEP for stretching, posture and strengthening. 2. Pt will be able to walk for exercise 15 min x 2 without increased symptoms. 3. Pt will be able to  a line for 10 min without increased symptoms. 4. Pt will be able to lift laundry basket without increased symptoms. 5. Pt will be able to perform early am exercise to allow for getting out of bed with less pain in am.      PLAN OF CARE:      Assess response to use of flexion based exercise for self management of stenosis.   Continue PT per original plan for therapeutic exercises, posture retraining, therapeutic activities, manual treatment, neuromuscular reeducation, therapeutic pain neuroscience education, patient education, self care home management, home exercise program, and modalities as needed. Charged Units Units Minutes   Neuro 1 9   Ther ex 2 34   Gait     Man Tx     US          Total Timed  43   Total Tx Time  43         __________________________________________________________________________________________      21st Century Cures Act Notice to Patient: Medical documents like this are made available to patients in the interest of transparency. However, be advised this is a medical document and it is intended as [id number] communication between your medical providers. This medical document may contain abbreviations, assessments, medical data, and results or other terms that are unfamiliar. Medical documents are intended to carry relevant information, facts as evident, and the clinical opinion of the practitioner. As such, this medical document may be written in language that appears blunt or direct. You are encouraged to contact your medical provider and/or [facility] Patient Experience if you have any questions about this medical document.     Objective Initial Evaluation Data 4/3/2023:    Oswestry Disability Index Score  Score: 28 % (4/3/2023  8:39 PM)    Gait:        Deviations: decreased trunk rotation and lat shift       Devices: none       Assistance: none      Posture 4/3/2023   Alignment L lat shift, min B pronation, flattened thoracic and lumbar spine, significant L lumbar scoliosis       Sensation 4/3/2023   Dermatomes Light Touch    Proximal medial thigh R (L2)  wnl   Proximal medial thigh L (L2) wnl   Above knee R (L3) wnl   Above knee L (L3) wnl   Medial arch R(L4) wnl   Medial arch L (L4) wnl   Between 1st - 2nd toes R (L5) wnl   Between 1st - 2nd toes L (L5) wnl   Lateral border of foot R (S1) wnl   Lateral border of foot L (S1) wnl   Popliteal fossa R (S2) wnl   Popliteal fossa L(S2) wnl       Abdominals 4/3/2023   Tone  good     Diastasis in fingers 4/3/2023   12 cm above umbilicus 1   6 cm above umbilicus 1   At umbilicus 1   6 cm below umbilicus 1   12 cm above umbilicus 1      Lumbopelvic 4/3/2023   Control  fair        ROM Lumbar 4/3/2023   Flexion Mod* R glut sx   Extension Mod-max, all lower T   R SB Wnl*   L SB wnl   R Rotation min   L Rotation Min* R glut sx   * pain limiting    ROM Hip 4/3/2023   Flex R 125 130   Flex L 125 130   ER R 45 47   ER L 45 48   IR R 40 25   IR L 40 40   *pain limiting     MMT 5/5 4/3/2023   L2- hip flex R 5   Hip flex L 5   L3- knee ext R 5   Knee ext L 5   L4- ankle DF R 5   Ankle DF L 5-   L5- EHL R 5   EHL L 5-   S1- ankle PF R 5   Ankle PF L 5   S2- Hams R 5   Hams L 5   *pain limiting    LE flexibility 4/3/2023   Piriformis R wnl   Piriformis L wnl   Hams R -19   Hams L -6   Hip flexors R wnl   Hip flexors L wnl   Quads R wnl   Quads L wnl   *pain limiting    Neural mobility 4/3/2023   SLR R (+) 75 deg   SLR L wnl      DANIELLE 4/3/2023   R wnl   L wnl

## 2023-05-09 ENCOUNTER — OFFICE VISIT (OUTPATIENT)
Dept: PHYSICAL THERAPY | Facility: HOSPITAL | Age: 78
End: 2023-05-09
Attending: PHYSICAL MEDICINE & REHABILITATION
Payer: MEDICARE

## 2023-05-09 PROCEDURE — 97112 NEUROMUSCULAR REEDUCATION: CPT

## 2023-05-09 PROCEDURE — 97110 THERAPEUTIC EXERCISES: CPT

## 2023-05-09 NOTE — PROGRESS NOTES
Zachary Blackwell    6/14/1945  Referring Physician:  Korin Adrian  Diagnosis: Spinal stenosis of lumbar region with neurogenic claudication (M48.062)  Lumbar disc disease (M51.9)  Lumbar foraminal stenosis (M48.061)  Lumbar radiculopathy (M54.16)  Other idiopathic scoliosis, thoracolumbar region (M41.25)  Spondylolisthesis of lumbar region (M43.16)    Initial Evaluation Date: 4/3/2023  Date of Surgery: None for this diagnosis     Precautions/Hx: arthritis, DVT, HTN, L 1st MTP surg, HLD, parathyroid removed, TAHBSO for uterine cancer, ulcerative colitis, appy, scoliosis  R L4-L5 TFESI 3/28/2023    SUBJECTIVE:     Pt reports that she has been better overall, but has some increased R gluteal pain today that is min-mod    Visit count 7/2/2023 1/8 2/8 3/8 4/8 5/8   Date 4/3/2023 4/13/2023  4/21/2023  5/1/2023  5/9/2023    LBP/ R gluteals        Pain Range 0 to 7/10 0-7/10 0-7/10 0-8/10 0-8/10   Pain Ave 3/10 3/10 2-3/10 2/10 2/10        OBJECTIVE:     Treatment performed this date:    Therapeutic Exercise:  Visit #   3/8 4/8 5/8   Position Exercise HEP 4/21/2023 5/1/2023 5/9/2023    Supine Sciatic nerve glide H  10x     TA set w exhale  X       SKC H X  X      DKC  X  X      DKC w head raise H       LTR H X  X      Unilat LE reach  X 10x2 10x     Unilat UE and LE reach H X 3x 3x     Pelvic tilt  X 10x      TA set w exhale and pelvic tilt  X 10x       TA skc  X       TA dkc  X       TA pistoning  X      TA pistoning arms up  X      TA pistoning arms reciprocal mvmt H X 10x2 10x2    Sidelying Hip abd wall H   X 10x2   Sitting Leg pumps sciatic nerve glide H   x    TA set w exhale H  X      Trunk flex H X 10 x 3s; 1x 30s X  x    Pelvic tilt   X x   Standing Wall hip shift to L H X 10x2  X 10x2    Hip shift L  X      Hip abd    X 10x   Prone Bent knee hip extn    X 3x    Gluteal retraining knee lift H   X 5x    Gluteal retraining leg lift    X 5x   4 point Child pose    X     Child pose w lat reach    X    Neuro Re-ed X see assessment  X see assessment  X see assessment     Transfers  X supine to sit      Posture   X    H = HEP. Pt given copies of this exercise for home program.  X = Exercises done this date - pt verbalized understanding and demonstrated competence. All exercises done B unless otherwise indicated. Pt advised to discontinue exercises that increase pain and to call or return to therapist to discuss. Each intervention above is specifically prescribed to address the patients identified impairments, activity limitations, and participation restrictions. ASSESSMENT:     Pt continues to wake w some pain. Discussed inflammatory reactivity increasing overnight. Pt had good mobility for child pose, but had mild paraesthesias w central and L lat reach. Discussed importance of avoiding triggers of radicular pain and wedging of soft tissue as possible contact to nerve root. Pt demonstrates B hip abd and extn weakness. Pt did well with strengthening/activation exercises. Physical Therapy Goals: From 4/3/2023 to 7/2/2023  - Created with patient input during initial evaluation   1. Pt will be independent in beginning level of HEP for stretching, posture and strengthening. 2. Pt will be able to walk for exercise 15 min x 2 without increased symptoms. 3. Pt will be able to  a line for 10 min without increased symptoms. 4. Pt will be able to lift laundry basket without increased symptoms. 5. Pt will be able to perform early am exercise to allow for getting out of bed with less pain in am.      PLAN OF CARE:      Assess response to hip strengthening. Continue PT per original plan for therapeutic exercises, posture retraining, therapeutic activities, manual treatment, neuromuscular reeducation, therapeutic pain neuroscience education, patient education, self care home management, home exercise program, and modalities as needed.     Charged Units Units Minutes   Neuro 1 12   Ther ex 3 42   Gait     [place] Tx US          Total Timed  54   Total Tx Time  54         __________________________________________________________________________________________      21st Century Cures Act Notice to Patient: Medical documents like this are made available to patients in the interest of transparency. However, be advised this is a medical document and it is intended as mzfh-ji-woyv communication between your medical providers. This medical document may contain abbreviations, assessments, medical data, and results or other terms that are unfamiliar. Medical documents are intended to carry relevant information, facts as evident, and the clinical opinion of the practitioner. As such, this medical document may be written in language that appears blunt or direct. You are encouraged to contact your medical provider and/or [facility] Patient Experience if you have any questions about this medical document.     Objective Initial Evaluation Data 4/3/2023:    Oswestry Disability Index Score  Score: 28 % (4/3/2023  8:39 PM)    Gait:        Deviations: decreased trunk rotation and lat shift       Devices: none       Assistance: none      Posture 4/3/2023   Alignment L lat shift, min B pronation, flattened thoracic and lumbar spine, significant L lumbar scoliosis       Sensation 4/3/2023   Dermatomes Light Touch    Proximal medial thigh R (L2)  wnl   Proximal medial thigh L (L2) wnl   Above knee R (L3) wnl   Above knee L (L3) wnl   Medial arch R(L4) wnl   Medial arch L (L4) wnl   Between 1st - 2nd toes R (L5) wnl   Between 1st - 2nd toes L (L5) wnl   Lateral border of foot R (S1) wnl   Lateral border of foot L (S1) wnl   Popliteal fossa R (S2) wnl   Popliteal fossa L(S2) wnl       Abdominals 4/3/2023   Tone  good     Diastasis in fingers 4/3/2023   12 cm above umbilicus 1   6 cm above umbilicus 1   At umbilicus 1   6 cm below umbilicus 1   12 cm above umbilicus 1      Lumbopelvic 4/3/2023   Control  fair        ROM Lumbar 4/3/2023 Flexion Mod* R glut sx   Extension Mod-max, all lower T   R SB Wnl*   L SB wnl   R Rotation min   L Rotation Min* R glut sx   * pain limiting    ROM Hip 4/3/2023   Flex R 125 130   Flex L 125 130   ER R 45 47   ER L 45 48   IR R 40 25   IR L 40 40   *pain limiting     MMT 5/5 4/3/2023   L2- hip flex R 5   Hip flex L 5   L3- knee ext R 5   Knee ext L 5   L4- ankle DF R 5   Ankle DF L 5-   L5- EHL R 5   EHL L 5-   S1- ankle PF R 5   Ankle PF L 5   S2- Hams R 5   Hams L 5        5/9/2023    Hip abd R 4-   Hip abd L 4-   Hip extn R 3+   Hip extn L 3+   *pain limiting    LE flexibility 4/3/2023   Piriformis R wnl   Piriformis L wnl   Hams R -19   Hams L -6   Hip flexors R wnl   Hip flexors L wnl   Quads R wnl   Quads L wnl   *pain limiting    Neural mobility 4/3/2023   SLR R (+) 75 deg   SLR L wnl      DANIELLE 4/3/2023   R wnl   L wnl

## 2023-05-12 ENCOUNTER — OFFICE VISIT (OUTPATIENT)
Dept: PHYSICAL THERAPY | Facility: HOSPITAL | Age: 78
End: 2023-05-12
Attending: PHYSICAL MEDICINE & REHABILITATION
Payer: MEDICARE

## 2023-05-12 PROCEDURE — 97110 THERAPEUTIC EXERCISES: CPT

## 2023-05-12 PROCEDURE — 97112 NEUROMUSCULAR REEDUCATION: CPT

## 2023-05-12 PROCEDURE — 97140 MANUAL THERAPY 1/> REGIONS: CPT

## 2023-05-12 NOTE — PROGRESS NOTES
[facility]    6/14/1945  Referring Physician:  Alexandra Adrian  Diagnosis: Spinal stenosis of lumbar region with neurogenic claudication (M48.062)  Lumbar disc disease (M51.9)  Lumbar foraminal stenosis (M48.061)  Lumbar radiculopathy (M54.16)  Other idiopathic scoliosis, thoracolumbar region (M41.25)  Spondylolisthesis of lumbar region (M43.16)    Initial Evaluation Date: 4/3/2023  Date of Surgery: None for this diagnosis     Precautions/Hx: arthritis, DVT, HTN, L 1st MTP surg, HLD, parathyroid removed, TAHBSO for uterine cancer, ulcerative colitis, appy, scoliosis  R L4-L5 TFESI 3/28/2023    SUBJECTIVE:     Pt reports that she has been doing well. She is not doing well today as she is under high stress due to family issues.      Visit count 7/2/2023 1/8 2/8 3/8 4/8 5/8 6/8   Date 4/3/2023 4/13/2023  4/21/2023  5/1/2023  5/9/2023  5/12/2023    LBP/ R gluteals         Pain Range 0 to 7/10 0-7/10 0-7/10 0-8/10 0-8/10 0-7/10   Pain Ave 3/10 3/10 2-3/10 2/10 2/10 2/10        OBJECTIVE:     Treatment performed this date:    Therapeutic Exercise:  Visit #   4/8 5/8 6/8   Position Exercise HEP 5/1/2023 5/9/2023 5/12/2023    Supine Sciatic nerve glide H 10x      TA set w exhale        SKC H X   X    DKC  X   X    DKC w head raise H       LTR H X   X    Unilat LE reach  10x      Unilat UE and LE reach H 3x      Pelvic tilt        TA set w exhale and pelvic tilt        TA skc        TA dkc        TA pistoning        TA pistoning arms up        TA pistoning arms reciprocal mvmt H 10x2  X 10x2    Gluteal sets    X 5x    Bridging    X 10x   Sidelying Hip abd wall H  X 10x2    Sitting Leg pumps sciatic nerve glide H  x x    TA set w exhale H X       Trunk flex H X  x     Pelvic tilt  X x    Standing Wall hip shift to L H  X 10x2     Hip shift L        Hip abd   X 10x    Prone Bent knee hip extn   X 3x     Gluteal retraining knee lift H  X 5x     Gluteal retraining leg lift   X 5x    4 point Child pose   X      Child pose w lat reach   X      Cat cow    x   Neuro Re-ed  X see assessment  X see assessment      Transfers        Posture  X      TNE    X see assessment    H = HEP. Pt given copies of this exercise for home program.  X = Exercises done this date - pt verbalized understanding and demonstrated competence. All exercises done B unless otherwise indicated. Pt advised to discontinue exercises that increase pain and to call or return to therapist to discuss. Each intervention above is specifically prescribed to address the patients identified impairments, activity limitations, and participation restrictions. ASSESSMENT:     Pt doing well overall, but has increased pain due to her severe current stressors which she discussed. Pt educated on therapeutic neuroscience concepts of pain as danger decision. Pt demonstrates chest wall flare positioning w decreased costal mobility. Pt tolerated brachial chain mobilization well to upper and lower ribs w education on full mobility with breath work. Pt did well to decrease pain w ther ex this date to 2/10 post tx. Physical Therapy Goals: From 4/3/2023 to 7/2/2023  - Created with patient input during initial evaluation   1. Pt will be independent in beginning level of HEP for stretching, posture and strengthening. 2. Pt will be able to walk for exercise 15 min x 2 without increased symptoms. 3. Pt will be able to  a line for 10 min without increased symptoms. 4. Pt will be able to lift laundry basket without increased symptoms. 5. Pt will be able to perform early am exercise to allow for getting out of bed with less pain in am.      PLAN OF CARE:      Assess response to rib mobilization and abdominal activation.   Continue PT per original plan for therapeutic exercises, posture retraining, therapeutic activities, manual treatment, neuromuscular reeducation, therapeutic pain neuroscience education, patient education, self care home management, home exercise program, and modalities as needed. Charged Units Units Minutes   Neuro 1 11   Ther ex 1 21   Gait     Man Tx 1 10   US          Total Timed  42   Total Tx Time  42         __________________________________________________________________________________________      21st Century Cures Act Notice to Patient: Medical documents like this are made available to patients in the interest of transparency. However, be advised this is a medical document and it is intended as [id number] communication between your medical providers. This medical document may contain abbreviations, assessments, medical data, and results or other terms that are unfamiliar. Medical documents are intended to carry relevant information, facts as evident, and the clinical opinion of the practitioner. As such, this medical document may be written in language that appears blunt or direct. You are encouraged to contact your medical provider and/or [facility] Patient Experience if you have any questions about this medical document.     Objective Initial Evaluation Data 4/3/2023:    Oswestry Disability Index Score  Score: 28 % (4/3/2023  8:39 PM)    Gait:        Deviations: decreased trunk rotation and lat shift       Devices: none       Assistance: none      Posture 4/3/2023   Alignment L lat shift, min B pronation, flattened thoracic and lumbar spine, significant L lumbar scoliosis       Sensation 4/3/2023   Dermatomes Light Touch    Proximal medial thigh R (L2)  wnl   Proximal medial thigh L (L2) wnl   Above knee R (L3) wnl   Above knee L (L3) wnl   Medial arch R(L4) wnl   Medial arch L (L4) wnl   Between 1st - 2nd toes R (L5) wnl   Between 1st - 2nd toes L (L5) wnl   Lateral border of foot R (S1) wnl   Lateral border of foot L (S1) wnl   Popliteal fossa R (S2) wnl   Popliteal fossa L(S2) wnl       Abdominals 4/3/2023   Tone  good     Diastasis in fingers 4/3/2023   12 cm above umbilicus 1   6 cm above umbilicus 1   At umbilicus 1   6 cm below umbilicus 1   12 cm above umbilicus 1      Lumbopelvic 4/3/2023   Control  fair        ROM Lumbar 4/3/2023   Flexion Mod* R glut sx   Extension Mod-max, all lower T   R SB Wnl*   L SB wnl   R Rotation min   L Rotation Min* R glut sx   * pain limiting    ROM Hip 4/3/2023   Flex R 125 130   Flex L 125 130   ER R 45 47   ER L 45 48   IR R 40 25   IR L 40 40   *pain limiting     MMT 5/5 4/3/2023   L2- hip flex R 5   Hip flex L 5   L3- knee ext R 5   Knee ext L 5   L4- ankle DF R 5   Ankle DF L 5-   L5- EHL R 5   EHL L 5-   S1- ankle PF R 5   Ankle PF L 5   S2- Hams R 5   Hams L 5        5/9/2023    Hip abd R 4-   Hip abd L 4-   Hip extn R 3+   Hip extn L 3+   *pain limiting    LE flexibility 4/3/2023   Piriformis R wnl   Piriformis L wnl   Hams R -19   Hams L -6   Hip flexors R wnl   Hip flexors L wnl   Quads R wnl   Quads L wnl   *pain limiting    Neural mobility 4/3/2023   SLR R (+) 75 deg   SLR L wnl      DANIELLE 4/3/2023   R wnl   L wnl

## 2023-05-16 ENCOUNTER — APPOINTMENT (OUTPATIENT)
Dept: PHYSICAL THERAPY | Facility: HOSPITAL | Age: 78
End: 2023-05-16
Attending: PHYSICAL MEDICINE & REHABILITATION
Payer: MEDICARE

## 2023-05-18 ENCOUNTER — TELEPHONE (OUTPATIENT)
Dept: NEUROLOGY | Facility: CLINIC | Age: 78
End: 2023-05-18

## 2023-05-18 NOTE — TELEPHONE ENCOUNTER
Received neuro psych report from [address] Report has been placed in the nurse bin for the patients pending appointment on 6/6/23. Report has also been sent for scanning. Reviewed and electronically signed by:  [address], [facility]

## 2023-05-19 ENCOUNTER — NURSE ONLY (OUTPATIENT)
Dept: INTERNAL MEDICINE CLINIC | Facility: CLINIC | Age: 78
End: 2023-05-19

## 2023-05-19 DIAGNOSIS — E53.8 VITAMIN B 12 DEFICIENCY: Primary | ICD-10-CM

## 2023-05-19 RX ADMIN — CYANOCOBALAMIN 1000 MCG: 1000 INJECTION, SOLUTION INTRAMUSCULAR; SUBCUTANEOUS at 09:54:00

## 2023-05-19 NOTE — PROGRESS NOTES
Patient presents for monthly b12 injection. Patient name and  verified. Order active in [address], last admin date 2023. Patient tolerated well.

## 2023-05-23 ENCOUNTER — OFFICE VISIT (OUTPATIENT)
Dept: PHYSICAL THERAPY | Facility: HOSPITAL | Age: 78
End: 2023-05-23
Attending: PHYSICAL MEDICINE & REHABILITATION
Payer: MEDICARE

## 2023-05-23 PROCEDURE — 97110 THERAPEUTIC EXERCISES: CPT

## 2023-05-23 PROCEDURE — 97112 NEUROMUSCULAR REEDUCATION: CPT

## 2023-05-23 PROCEDURE — 97140 MANUAL THERAPY 1/> REGIONS: CPT

## 2023-05-23 NOTE — PROGRESS NOTES
Carmen Yesseniatorin    6/14/1945  Referring Physician:  Hanh Adrian  Diagnosis: Spinal stenosis of lumbar region with neurogenic claudication (M48.062)  Lumbar disc disease (M51.9)  Lumbar foraminal stenosis (M48.061)  Lumbar radiculopathy (M54.16)  Other idiopathic scoliosis, thoracolumbar region (M41.25)  Spondylolisthesis of lumbar region (M43.16)    Initial Evaluation Date: 4/3/2023  Date of Surgery: None for this diagnosis     Precautions/Hx: arthritis, DVT, HTN, L 1st MTP surg, HLD, parathyroid removed, TAHBSO for uterine cancer, ulcerative colitis, appy, scoliosis  R L4-L5 TFESI 3/28/2023    SUBJECTIVE:     Pt reports that she continues to have high stress at home. She states that she had some pain in the R groin up to 7/10 this past week that has now resolved.   She has mild pain into the R gluteal.     Visit count 7/2/2023 1/8 2/8 3/8 4/8 5/8 6/8 7/8   Date 4/3/2023 4/13/2023  4/21/2023  5/1/2023  5/9/2023  5/12/2023  5/23/2023    LBP/ R gluteals          Pain Range 0 to 7/10 0-7/10 0-7/10 0-8/10 0-8/10 0-7/10 0-7/10   Pain Ave 3/10 3/10 2-3/10 2/10 2/10 2/10 2/10        OBJECTIVE:     Treatment performed this date:    Therapeutic Exercise:  Visit #   5/8 6/8 7/8   Position Exercise HEP 5/9/2023 5/12/2023 5/23/2023    Supine Sciatic nerve glide H   X 10x    TA set w exhale        SKC H  X x    DKC   X x    DKC w head raise H   X     LTR H  X X     Unilat LE reach        Unilat UE and LE reach H       Pelvic tilt        TA set w exhale and pelvic tilt        TA skc        TA dkc        TA pistoning        TA pistoning arms up        TA pistoning arms reciprocal mvmt H  X 10x2     Gluteal sets   X 5x     Bridging   X 10x    Sidelying Hip abd wall H X 10x2     Sitting Leg pumps sciatic nerve glide H x x     TA set w exhale H       Trunk flex H x  X 10x cues for lumbar flex vs hip flex     Pelvic tilt  x     Standing Wall hip shift to L H X 10x2      Hip shift L        Hip abd  X 10x     Prone Bent knee hip extn  X 3x      Gluteal retraining knee lift H X 5x      Gluteal retraining leg lift  X 5x      Knee bend femoral glide    X    4 point Child pose  X       Child pose w lat reach  X       Cat cow   x    Neuro Re-ed  X see assessment   X see assessment     Transfers        Posture        TNE   X see assessment     Man Tx Joint mob    X very gentle PA glides L1, L2    STM    X R ps release   H = HEP. Pt given copies of this exercise for home program.  X = Exercises done this date - pt verbalized understanding and demonstrated competence. All exercises done B unless otherwise indicated. Pt advised to discontinue exercises that increase pain and to call or return to therapist to discuss. Each intervention above is specifically prescribed to address the patients identified impairments, activity limitations, and participation restrictions. ASSESSMENT:     Pt having less of previous pain, but new pain in the R inguinal area. Pt given review of dermatomal distribution of nerves and foraminal stenosis. Continue to discuss neural self protection w pacing and positioning. Pt required V/T cues for spinal flexion self articulation vs all use of hip flexion for sitting trunk flex exercise. Pt (-) for B SLR,  femoral nerve glides and hip extn restrictions. Pt displays mod limit of L1 PA and L rotation mobility. She tolerated grade l-ll joint mobilization to the area as noted above. Physical Therapy Goals: From 4/3/2023 to 7/2/2023  - Created with patient input during initial evaluation   1. Pt will be independent in beginning level of HEP for stretching, posture and strengthening. 2. Pt will be able to walk for exercise 15 min x 2 without increased symptoms. 3. Pt will be able to  a line for 10 min without increased symptoms. 4. Pt will be able to lift laundry basket without increased symptoms.   5. Pt will be able to perform early am exercise to allow for getting out of bed with less pain in am. PLAN OF CARE:      Assess response to joint mobilization. Continue PT per original plan for therapeutic exercises, posture retraining, therapeutic activities, manual treatment, neuromuscular reeducation, therapeutic pain neuroscience education, patient education, self care home management, home exercise program, and modalities as needed. Charged Units Units Minutes   Neuro 1 11   Ther ex 1 15   Gait     Man Tx 1 16   US          Total Timed  42   Total Tx Time  42         __________________________________________________________________________________________      21st Century Cures Act Notice to Patient: Medical documents like this are made available to patients in the interest of transparency. However, be advised this is a medical document and it is intended as [id number]-[id number] communication between your medical providers. This medical document may contain abbreviations, assessments, medical data, and results or other terms that are unfamiliar. Medical documents are intended to carry relevant information, facts as evident, and the clinical opinion of the practitioner. As such, this medical document may be written in language that appears blunt or direct. You are encouraged to contact your medical provider and/or [facility] Patient Experience if you have any questions about this medical document.     Objective Initial Evaluation Data 4/3/2023:    Oswestry Disability Index Score  Score: 28 % (4/3/2023  8:39 PM)    Gait:        Deviations: decreased trunk rotation and lat shift       Devices: none       Assistance: none      Posture 4/3/2023   Alignment L lat shift, min B pronation, flattened thoracic and lumbar spine, significant L lumbar scoliosis       Sensation 4/3/2023   Dermatomes Light Touch    Proximal medial thigh R (L2)  wnl   Proximal medial thigh L (L2) wnl   Above knee R (L3) wnl   Above knee L (L3) wnl   Medial arch R(L4) wnl   Medial arch L (L4) wnl   Between 1st - 2nd toes R (L5) wnl Between 1st - 2nd toes L (L5) wnl   Lateral border of foot R (S1) wnl   Lateral border of foot L (S1) wnl   Popliteal fossa R (S2) wnl   Popliteal fossa L(S2) wnl       Abdominals 4/3/2023   Tone  good     Diastasis in fingers 4/3/2023   12 cm above umbilicus 1   6 cm above umbilicus 1   At umbilicus 1   6 cm below umbilicus 1   12 cm above umbilicus 1      Lumbopelvic 4/3/2023   Control  fair        ROM Lumbar 4/3/2023   Flexion Mod* R glut sx   Extension Mod-max, all lower T   R SB Wnl*   L SB wnl   R Rotation min   L Rotation Min* R glut sx   * pain limiting    ROM Hip 4/3/2023   Flex R 125 130   Flex L 125 130   ER R 45 47   ER L 45 48   IR R 40 25   IR L 40 40   *pain limiting     MMT 5/5 4/3/2023   L2- hip flex R 5   Hip flex L 5   L3- knee ext R 5   Knee ext L 5   L4- ankle DF R 5   Ankle DF L 5-   L5- EHL R 5   EHL L 5-   S1- ankle PF R 5   Ankle PF L 5   S2- Hams R 5   Hams L 5        5/9/2023    Hip abd R 4-   Hip abd L 4-   Hip extn R 3+   Hip extn L 3+   *pain limiting    LE flexibility 4/3/2023   Piriformis R wnl   Piriformis L wnl   Hams R -19   Hams L -6   Hip flexors R wnl   Hip flexors L wnl   Quads R wnl   Quads L wnl   *pain limiting    Neural mobility 4/3/2023   SLR R (+) 75 deg   SLR L wnl      DANIELLE 4/3/2023   R wnl   L wnl      Position [facility] Spring Tests 5/23/2023        Prone Passive Hip Extension R wnl    Passive Hip Extension L wnl    PA Trochanter R wnl    PA Trochanter L wnl    PA Ischium R wnl    PA Ischium L wnl    Sacral Side Bending R wnl    Sacral Side Bending L wnl

## 2023-05-30 ENCOUNTER — TELEPHONE (OUTPATIENT)
Dept: PHYSICAL THERAPY | Facility: HOSPITAL | Age: 78
End: 2023-05-30

## 2023-05-30 ENCOUNTER — APPOINTMENT (OUTPATIENT)
Dept: PHYSICAL THERAPY | Facility: HOSPITAL | Age: 78
End: 2023-05-30
Attending: PHYSICAL MEDICINE & REHABILITATION
Payer: MEDICARE

## 2023-06-01 ENCOUNTER — APPOINTMENT (OUTPATIENT)
Dept: PHYSICAL THERAPY | Facility: HOSPITAL | Age: 78
End: 2023-06-01
Attending: PHYSICAL MEDICINE & REHABILITATION
Payer: MEDICARE

## 2023-06-06 RX ORDER — HYDROCHLOROTHIAZIDE 12.5 MG/1
12.5 TABLET ORAL
Qty: 39 TABLET | Refills: 3 | Status: SHIPPED | OUTPATIENT
Start: 2023-06-07

## 2023-06-08 ENCOUNTER — OFFICE VISIT (OUTPATIENT)
Dept: PHYSICAL THERAPY | Facility: HOSPITAL | Age: 78
End: 2023-06-08
Attending: PHYSICAL MEDICINE & REHABILITATION
Payer: MEDICARE

## 2023-06-08 PROCEDURE — 97110 THERAPEUTIC EXERCISES: CPT

## 2023-06-08 PROCEDURE — 97112 NEUROMUSCULAR REEDUCATION: CPT

## 2023-06-08 RX ORDER — METOPROLOL SUCCINATE 50 MG/1
TABLET, EXTENDED RELEASE ORAL
Qty: 90 TABLET | Refills: 3 | Status: SHIPPED | OUTPATIENT
Start: 2023-06-08

## 2023-06-08 NOTE — PROGRESS NOTES
Discharge Summary    Pt has attended 8, cancelled 0, and no shown 0 visits in Physical Therapy. Mervin Avelar    6/14/1945  Referring Physician:  Janet Adrian  Diagnosis: Spinal stenosis of lumbar region with neurogenic claudication (M48.062)  Lumbar disc disease (M51.9)  Lumbar foraminal stenosis (M48.061)  Lumbar radiculopathy (M54.16)  Other idiopathic scoliosis, thoracolumbar region (M41.25)  Spondylolisthesis of lumbar region (M43.16)    Initial Evaluation Date: 4/3/2023  Date of Surgery: None for this diagnosis     Precautions/Hx: arthritis, DVT, HTN, L 1st MTP surg, HLD, parathyroid removed, TAHBSO for uterine cancer, ulcerative colitis, appy, scoliosis  R L4-L5 TFESI 3/28/2023    SUBJECTIVE:     Pt reports that she has no pain for most of the time, then will occasionally move and have a heavy pain. Pt has less frequent occurrences of sharp pain. She is unaware of the triggers as she is doing normal ADL's. She notes that she is better w consistent performance w her HEP. Visit count 7/2/2023 1/8 8/10   Date 4/3/2023 6/8/2023    LBP/ R gluteals     Pain Range 0 to 7/10 0-7/10   Pain Ave 3/10 2/10     Pt has completed skilled physical therapy intervention with good decrease of pain complaints. Pt displays improved: lumbar ROM; B LE strength; R sciatic neural mobility. Pt advised to continue consistently w her HEP especially for B LE hip weakness to allow for continued comfort w walking and climbing stairs. Pt demonstrates improved function as noted as progression towards goals and improved functional score. See objective discharge data below in tables with initial evaluation data. Pt has met goals, is independent with HEP and ready to be discharged to [facility].      Post Oswestry Disability Index Score  Post Score: 22 % (6/8/2023 12:47 PM)    6% improvement     OBJECTIVE:     Treatment performed this date:    Therapeutic Exercise:  Visit #   6/8 7/8 8/10   Position Exercise HEP 5/12/2023 5/23/2023 6/8/2023    Supine Sciatic nerve glide H  X 10x 10x    TA set w exhale        SKC H X x x    DKC  X x     DKC w head raise H  X  x    LTR H X X  x    Unilat LE reach        Unilat UE and LE reach H   x    Pelvic tilt        TA set w exhale and pelvic tilt        TA skc        TA dkc        TA pistoning        TA pistoning arms up        TA pistoning arms reciprocal mvmt H X 10x2  x    Gluteal sets  X 5x      Bridging  X 10x     Sidelying Hip abd wall H   x   Sitting Leg pumps sciatic nerve glide H x  x    TA set w exhale H       Trunk flex H  X 10x cues for lumbar flex vs hip flex  x    Pelvic tilt       Standing Wall hip shift to L H       Hip shift L        Hip abd       Prone Bent knee hip extn        Gluteal retraining knee lift H       Gluteal retraining leg lift        Knee bend femoral glide   X     4 point Child pose        Child pose w lat reach        Cat cow  x     Many ROM and strength for assessment    X    Neuro Re-ed   X see assessment  X see assessment     Transfers        Posture        TNE  X see assessment      Man Tx Joint mob   X very gentle PA glides L1, L2     STM   X R ps release    H = HEP. Pt given copies of this exercise for home program.  X = Exercises done this date - pt verbalized understanding and demonstrated competence. All exercises done B unless otherwise indicated. Pt advised to discontinue exercises that increase pain and to call or return to therapist to discuss. Each intervention above is specifically prescribed to address the patients identified impairments, activity limitations, and participation restrictions. ASSESSMENT:         Physical Therapy Goals: From 4/3/2023 to 7/2/2023, Assessed 6/8/2023   - Created with patient input during initial evaluation   1. Pt will be independent in beginning level of HEP for stretching, posture and strengthening. MET  2. Pt will be able to walk for exercise 15 min x 2 without increased symptoms. Meets intermittently  3.  Pt will be able to  a line for 10 min without increased symptoms. MET  4. Pt will be able to lift laundry basket without increased symptoms. MET  5. Pt will be able to perform early am exercise to allow for getting out of bed with less pain in am. Meets intermittently     PLAN OF CARE:      Plan:  Discharge to [facility]    Thank you for your referral. If you have any questions, please contact me at Dept: [phone]. Sincerely,  Electronically signed by therapist: Janina Mcbride PT        Charged Units Units Minutes   Neuro 1 10   Ther ex 1 30   Gait     Man Tx     US          Total Timed  40   Total Tx Time  45         __________________________________________________________________________________________      21st Century Cures Act Notice to Patient: Medical documents like this are made available to patients in the interest of transparency. However, be advised this is a medical document and it is intended as bhyg-rh-[id number] communication between your medical providers. This medical document may contain abbreviations, assessments, medical data, and results or other terms that are unfamiliar. Medical documents are intended to carry relevant information, facts as evident, and the clinical opinion of the practitioner. As such, this medical document may be written in language that appears blunt or direct. You are encouraged to contact your medical provider and/or [facility] Patient Experience if you have any questions about this medical document.     Objective Initial Evaluation Data 4/3/2023:    Oswestry Disability Index Score  Score: 28 % (4/3/2023  8:39 PM)    Gait:        Deviations: decreased trunk rotation and lat shift       Devices: none       Assistance: none      Posture 4/3/2023   Alignment L lat shift, min B pronation, flattened thoracic and lumbar spine, significant L lumbar scoliosis       Sensation 4/3/2023   Dermatomes Light Touch    Proximal medial thigh R (L2)  wnl   Proximal medial thigh L (L2) wnl   Above knee R (L3) wnl   Above knee L (L3) wnl   Medial arch R(L4) wnl   Medial arch L (L4) wnl   Between 1st - 2nd toes R (L5) wnl   Between 1st - 2nd toes L (L5) wnl   Lateral border of foot R (S1) wnl   Lateral border of foot L (S1) wnl   Popliteal fossa R (S2) wnl   Popliteal fossa L(S2) wnl       Abdominals 4/3/2023   Tone  good     Diastasis in fingers 4/3/2023   12 cm above umbilicus 1   6 cm above umbilicus 1   At umbilicus 1   6 cm below umbilicus 1   12 cm above umbilicus 1      Lumbopelvic 4/3/2023   Control  fair        ROM Lumbar 4/3/2023 6/8/2023    Flexion Mod* R glut sx Min-mod* R glut   Extension Mod-max, all lower T Mod, more lower T   R SB Wnl* Wnl*   L SB wnl wnl   R Rotation min min   L Rotation Min* R glut sx Min *R glut   * pain limiting    ROM Hip 4/3/2023   Flex R 125 130   Flex L 125 130   ER R 45 47   ER L 45 48   IR R 40 25   IR L 40 40   *pain limiting     MMT 5/5 4/3/2023 6/8/2023    L2- hip flex R 5 5   Hip flex L 5 5   L3- knee ext R 5 5   Knee ext L 5 5   L4- ankle DF R 5 5   Ankle DF L 5- 5   L5- EHL R 5 5   EHL L 5- 5   S1- ankle PF R 5 5   Ankle PF L 5 5   S2- Hams R 5 5   Hams L 5 5         5/9/2023     Hip abd R 4- 5-   Hip abd L 4- 4   Hip extn R 3+ 4   Hip extn L 3+ 4-   *pain limiting    LE flexibility 4/3/2023   Piriformis R wnl   Piriformis L wnl   Hams R -19   Hams L -6   Hip flexors R wnl   Hip flexors L wnl   Quads R wnl   Quads L wnl   *pain limiting    Neural mobility 4/3/2023 6/8/2023    SLR R (+) 75 deg Wnl to 90   SLR L wnl Wnl to 90      DANIELLE 4/3/2023   R wnl   L wnl      Position [facility] Spring Tests 5/23/2023        Prone Passive Hip Extension R wnl    Passive Hip Extension L wnl    PA Trochanter R wnl    PA Trochanter L wnl    PA Ischium R wnl    PA Ischium L wnl    Sacral Side Bending R wnl    Sacral Side Bending L wnl

## 2023-06-09 ENCOUNTER — OFFICE VISIT (OUTPATIENT)
Dept: NEUROLOGY | Facility: CLINIC | Age: 78
End: 2023-06-09
Payer: MEDICARE

## 2023-06-09 VITALS — WEIGHT: 129 LBS | BODY MASS INDEX: 23.74 KG/M2 | HEIGHT: 62 IN

## 2023-06-09 DIAGNOSIS — F03.A0 MILD DEMENTIA, UNSPECIFIED DEMENTIA TYPE, UNSPECIFIED WHETHER BEHAVIORAL, PSYCHOTIC, OR MOOD DISTURBANCE OR ANXIETY (HCC): Primary | ICD-10-CM

## 2023-06-09 PROBLEM — Z79.01 LONG TERM (CURRENT) USE OF ANTICOAGULANTS: Status: ACTIVE | Noted: 2020-02-12

## 2023-06-09 PROBLEM — M54.9 DORSALGIA, UNSPECIFIED: Status: ACTIVE | Noted: 2020-02-12

## 2023-06-09 PROBLEM — Z87.01 PERSONAL HISTORY OF PNEUMONIA (RECURRENT): Status: ACTIVE | Noted: 2020-02-12

## 2023-06-09 PROBLEM — Z91.81 HISTORY OF FALLING: Status: ACTIVE | Noted: 2020-02-12

## 2023-06-09 PROBLEM — Z86.718 PERSONAL HISTORY OF OTHER VENOUS THROMBOSIS AND EMBOLISM: Status: ACTIVE | Noted: 2020-02-12

## 2023-06-09 PROBLEM — S42.034A: Status: ACTIVE | Noted: 2020-02-12

## 2023-06-09 PROBLEM — M19.91 PRIMARY OSTEOARTHRITIS: Status: ACTIVE | Noted: 2020-02-12

## 2023-06-09 PROBLEM — Z86.711 PERSONAL HISTORY OF PULMONARY EMBOLISM: Status: ACTIVE | Noted: 2020-02-12

## 2023-06-09 PROCEDURE — 99215 OFFICE O/P EST HI 40 MIN: CPT | Performed by: OTHER

## 2023-06-09 RX ORDER — DONEPEZIL HYDROCHLORIDE 10 MG/1
TABLET, FILM COATED ORAL
Qty: 75 TABLET | Refills: 0 | Status: SHIPPED | OUTPATIENT
Start: 2023-06-09 | End: 2023-09-07

## 2023-06-12 ENCOUNTER — TELEPHONE (OUTPATIENT)
Dept: NEUROLOGY | Facility: CLINIC | Age: 78
End: 2023-06-12

## 2023-06-12 NOTE — TELEPHONE ENCOUNTER
Received a call form patient's  requesting a transfer of care to see Mitzi Veloz .     Please assist

## 2023-06-13 ENCOUNTER — OFFICE VISIT (OUTPATIENT)
Dept: INTERNAL MEDICINE CLINIC | Facility: CLINIC | Age: 78
End: 2023-06-13

## 2023-06-13 VITALS
WEIGHT: 129 LBS | DIASTOLIC BLOOD PRESSURE: 62 MMHG | SYSTOLIC BLOOD PRESSURE: 152 MMHG | BODY MASS INDEX: 23.74 KG/M2 | OXYGEN SATURATION: 99 % | HEART RATE: 60 BPM | HEIGHT: 62 IN | TEMPERATURE: 98 F

## 2023-06-13 DIAGNOSIS — Z12.31 ENCOUNTER FOR SCREENING MAMMOGRAM FOR MALIGNANT NEOPLASM OF BREAST: ICD-10-CM

## 2023-06-13 DIAGNOSIS — Z80.0 FAMILY HISTORY OF COLON CANCER: ICD-10-CM

## 2023-06-13 DIAGNOSIS — R41.3 MEMORY DEFICIT: Primary | ICD-10-CM

## 2023-06-13 DIAGNOSIS — Z00.00 ROUTINE HEALTH MAINTENANCE: ICD-10-CM

## 2023-06-13 DIAGNOSIS — I10 ESSENTIAL HYPERTENSION: ICD-10-CM

## 2023-06-13 DIAGNOSIS — Z87.19 HISTORY OF CHRONIC ULCERATIVE COLITIS: ICD-10-CM

## 2023-06-13 PROCEDURE — 96372 THER/PROPH/DIAG INJ SC/IM: CPT | Performed by: INTERNAL MEDICINE

## 2023-06-13 PROCEDURE — 99214 OFFICE O/P EST MOD 30 MIN: CPT | Performed by: INTERNAL MEDICINE

## 2023-06-13 PROCEDURE — 1126F AMNT PAIN NOTED NONE PRSNT: CPT | Performed by: INTERNAL MEDICINE

## 2023-06-13 RX ADMIN — CYANOCOBALAMIN 1000 MCG: 1000 INJECTION, SOLUTION INTRAMUSCULAR; SUBCUTANEOUS at 09:11:00

## 2023-07-03 ENCOUNTER — OFFICE VISIT (OUTPATIENT)
Dept: OTOLARYNGOLOGY | Facility: CLINIC | Age: 78
End: 2023-07-03

## 2023-07-03 DIAGNOSIS — H91.93 BILATERAL HEARING LOSS, UNSPECIFIED HEARING LOSS TYPE: Primary | ICD-10-CM

## 2023-07-03 DIAGNOSIS — H61.23 CERUMEN DEBRIS ON TYMPANIC MEMBRANE OF BOTH EARS: ICD-10-CM

## 2023-07-03 PROCEDURE — 99202 OFFICE O/P NEW SF 15 MIN: CPT | Performed by: SPECIALIST

## 2023-07-07 RX ORDER — ATORVASTATIN CALCIUM 40 MG/1
TABLET, FILM COATED ORAL
Qty: 90 TABLET | Refills: 3 | Status: SHIPPED | OUTPATIENT
Start: 2023-07-07

## 2023-07-07 NOTE — TELEPHONE ENCOUNTER
Refill request is for a maintenance medication and has met the criteria specified in the Ambulatory Medication Refill Standing Order for eligibility, visits, laboratory, alerts and was sent to the requested pharmacy. Requested Prescriptions     Signed Prescriptions Disp Refills    atorvastatin 40 MG Oral Tab 90 tablet 3     Sig: TAKE ONE TABLET BY MOUTH EVERY DAY AT NIGHTLY.      Authorizing Provider: Brea Singletary     Ordering User: Molly Dee

## 2023-07-10 ENCOUNTER — TELEPHONE (OUTPATIENT)
Dept: PHYSICAL MEDICINE AND REHAB | Facility: CLINIC | Age: 78
End: 2023-07-10

## 2023-07-10 ENCOUNTER — OFFICE VISIT (OUTPATIENT)
Dept: PHYSICAL MEDICINE AND REHAB | Facility: CLINIC | Age: 78
End: 2023-07-10
Payer: MEDICARE

## 2023-07-10 VITALS — DIASTOLIC BLOOD PRESSURE: 62 MMHG | HEART RATE: 65 BPM | SYSTOLIC BLOOD PRESSURE: 144 MMHG | OXYGEN SATURATION: 96 %

## 2023-07-10 DIAGNOSIS — M54.16 LUMBAR RADICULOPATHY: ICD-10-CM

## 2023-07-10 DIAGNOSIS — M41.25 OTHER IDIOPATHIC SCOLIOSIS, THORACOLUMBAR REGION: ICD-10-CM

## 2023-07-10 DIAGNOSIS — M48.061 LUMBAR FORAMINAL STENOSIS: ICD-10-CM

## 2023-07-10 DIAGNOSIS — M48.062 SPINAL STENOSIS OF LUMBAR REGION WITH NEUROGENIC CLAUDICATION: Primary | ICD-10-CM

## 2023-07-10 DIAGNOSIS — C54.9 MALIGNANT NEOPLASM OF CORPUS UTERI, EXCEPT ISTHMUS (HCC): ICD-10-CM

## 2023-07-10 DIAGNOSIS — M43.16 SPONDYLOLISTHESIS OF LUMBAR REGION: ICD-10-CM

## 2023-07-10 DIAGNOSIS — M51.9 LUMBAR DISC DISEASE: ICD-10-CM

## 2023-07-10 PROCEDURE — 99214 OFFICE O/P EST MOD 30 MIN: CPT | Performed by: PHYSICAL MEDICINE & REHABILITATION

## 2023-07-10 NOTE — PATIENT INSTRUCTIONS
Plan  I will perform right L4 and L5 TFESI(s) under IV conscious sedation. She will get back into the PT in another 2 months. The patient will continue with her home exercise program.    She will follow up in 3 months or sooner if needed.

## 2023-07-10 NOTE — PROGRESS NOTES
Low Back Pain H & P    Chief Complaint: Patient presents with: Follow - Up: LOV:3/14/23, INJ:3/28/23 Right L4 and L5 TFESI. States injection gave her 100% relief for a few weeks. Reports pain in R low back radiating to R leg. Denies N/T. Completed PT with Tobias, she'd like to go back. Takes Tylenol PRN. Nursing note reviewed and verified. Patient was last seen on 3/14/2023. The right L4 and L5 TFESI's helped her for several weeks at 100% and then the pain slowly returned after this. She is doing the HEP that Tobias gave her. She is still doing better now compared to where she was prior to having the injections. She has days where the more active that she is the less the pain is and other days where the pain is constant no matter what she does. Description of the Pain  The pain is located in the right low back. The pain radiates to the right buttock, right groin, right lateral hip, right lateral thigh, and right lateral lower leg. The pain at its best is 0/10. The pain at its worst is 10/10. The pain is currently  2/10. Past Medical History   Past Medical History:   Diagnosis Date    Arthritis     Back pain     Cough 2011    Deep vein thrombosis ([address]     Essential hypertension     Family history of colon cancer 10/21/2014     0     PARA ZERO    H/O foot surgery ,    LEFT FOOT SURGERY, PINS PLACED IN TOE    H/O oral surgery 2013    GUM/MOUTH SURGERY    Hallux rigidus 2009    LEFT FOOT, YURY PROCEDURE, 1ST LEFT METARSOPHALANGEAL JOINT    Heart palpitations 2012    HEART MONITOR 2012    High blood pressure     High cholesterol     History of DVT (deep vein thrombosis)     Hypercholesterolemia     Hyperparathyroidism ([address]     PAIR OF PARATHYROID REMOVED    Hyperparathyroidism ([address]     PAIR OF PARATHYROID REMOVED     Hypertension     Migraine     OCCULAR MIGRAINE    Migraine     OCCULAR MIGRAINE     Neuroma 2007    2 LT, HALLUX RIGIDUS LT, PERParrish MANJARREZ. Osteoarthritis     Other ill-defined conditions(799.89)     TAHBSO MGMT. Other ill-defined conditions(799.89)     CHRONIC LEFT CYSTIC PELVIC MASS    Ovarian cyst     REMOVED PER NG. Painful breasts 10/21/2014    Pneumonia     HOSPITALIZED    Pneumonia due to organism     Spinal stenosis     Spinal stenosis 2016    Tonsillitis     Ulcerative colitis ([facility] 1976    Uterine cancer ([facility] 2002       Past Surgical History   Past Surgical History:   Procedure Laterality Date    APPENDECTOMY      CATARACT Bilateral     Left Eye: 23, Right Eye: 23    COLONOSCOPY N/A 2016    Procedure: COLONOSCOPY;  Surgeon: Nahun Yeager MD;  Location: [address] [place] [address] ENDOSCOPY    COLONOSCOPY N/A 2018    Procedure: COLONOSCOPY;  Surgeon: Nahun Yeager MD;  Location: [address] [place] [address] ENDOSCOPY    COLONOSCOPY  10/2022    COLONOSCOPY N/A 10/14/2022    Procedure: COLONOSCOPY;  Surgeon: Tabatha Hilton MD;  Location: [address] [place] [address] ENDOSCOPY    HYSTERECTOMY      OTHER SURGICAL HISTORY  ?     Parathyroid removal    OTHER SURGICAL HISTORY  2017    left foot surgery plate removed    TONSILLECTOMY         Family History   Family History   Problem Relation Age of Onset    Cancer Father 68        COLON, CAUSE OF DEATH    Cancer Mother 59        COLON, 2nd primary 80    Cancer Maternal Cousin Male 61        prostate/patient denies     Cancer Self 62        uterine    Prostate Cancer Maternal Uncle         age unknown/ at 80    Kidney Disease Neg         UROLITHIASIS    Breast Cancer Neg     Ovarian Cancer Neg        Social History   Social History    Socioeconomic History      Marital status:       Spouse name: Not on file      Number of children: Not on file      Years of education: Not on file      Highest education level: Not on file    Occupational History      Not on file    Tobacco Use      Smoking status: Never      Smokeless tobacco: Never    Vaping Use      Vaping Use: Never used    Substance and Sexual Activity      Alcohol use: Not Currently        Comment: 1-2 drinks/wk      Drug use: No      Sexual activity: Not on file    Other Topics      Concerns:         Service: Not Asked        Blood Transfusions: Not Asked        Caffeine Concern: Yes          SODA/TEA 1 CAN/DAY        Occupational Exposure: Not Asked        Hobby Hazards: Not Asked        Sleep Concern: Not Asked        Stress Concern: Not Asked        Weight Concern: Not Asked        Special Diet: Not Asked        Back Care: Not Asked        Exercise: Not Asked        Bike Helmet: Not Asked        Seat Belt: Not Asked        Self-Exams: Not Asked    Social History Narrative      Not on file    Social Determinants of Health  Financial Resource Strain: Not on file  Food Insecurity: Not on file  Transportation Needs: Not on file  Physical Activity: Not on file  Stress: Not on file  Social Connections: Not on file  Housing Stability: Not on file    PE:  The patient does appear in her stated age in no distress. The patient is well groomed. Psychiatric:  The patient is alert and oriented x 3. The patient has a normal affect and mood. Respiratory:  No acute respiratory distress. Patient does not have a cough. HEENT:  Extraocular muscles are intact. There is no kern icterus. Pupils are equal, round, and reactive to light. No redness or discharge bilaterally. Skin:  There are no rashes or lesions. Vitals:   07/10/23  0918   BP: 144/62   Pulse: 65     Vascular lower extremity:   Dorsalis pedis pulse-RIGHT 2+   Dorsalis pedis pulse-LEFT 2+   Tibialis posterior pulse-RIGHT 2+   Tibialis posterior pulse-LEFT 2+     Neurological Lower Extremity:    Light Touch Sensation: Intact in bilateral Lower Extremities   LE Muscle Strength:  All LE strength measurements 5/5 except:  Hamstring RIGHT:   4/5  Hamstring LEFT:   4+/5   RIGHT plantar reflexes: downward response   LEFT plantar reflexes: downward response   Reflexes: 2+ in bilateral lower extremities except:  Right Achilles tendon which are 1+     Assessment  1. L4-5 severe, L3-4 mod, L2-3 mod, L1-2 mod-severe central stenosis    2. L5-S1 left large far lateral & mild central, L4-5 large diffuse, L3-4 mild-mod diffuse & left mod far lateral, L2-3 mod diffuse, L1-2 mod diffuse & right mod-large foraminal bulging discs     3. L5-S1 left severe/right moderate, L4-5 bilateral moderate, L3-4 left moderate-severe/right moderate, L2-3 right mderate-severe, L1-2 bilateral moderate-severe foraminal stenosis    4. right > left S1 radiculopathy with right L4-5 radiculitis    5. Other idiopathic scoliosis, thoracolumbar region    6. L4-5 unstable grade 2-3, L5-S1 stable grade 1 spondylolisthesis    7. Malignant neoplasm of corpus uteri, except isthmus ([address]         Plan  I will perform right L4 and L5 TFESI(s) under IV conscious sedation. She will get back into the PT in another 2 months. The patient will continue with her home exercise program.    She will follow up in 3 months or sooner if needed. The patient understands and agrees with the stated plan. Ryan Blood MD  7/10/2023

## 2023-07-10 NOTE — TELEPHONE ENCOUNTER
Per CMS Guidelines -no authorization is required for Right L4 and Right L5 TFESIs CPT 43248, 61625     Status: Authorization is not required however may be subject to review once claim is submitted-Covered Benefit     Fyi, per CMS LCD limitations-Use of Moderate or Deep Sedation, General Anesthesia, or Monitored Anesthesia Care (MAC) is usually unnecessary or rarely indicated for these procedures and therefore, is not considered medically reasonable and necessary. Even in patients with a needle phobia and anxiety, typically oral anxiolytics suffice. In exceptional and unique cases, documentation must clearly establish the need for such sedation in the specific patient.     Per Dr. Anjel Martinez w/ IVCS

## 2023-07-12 ENCOUNTER — OFFICE VISIT (OUTPATIENT)
Dept: RHEUMATOLOGY | Facility: CLINIC | Age: 78
End: 2023-07-12

## 2023-07-12 VITALS
WEIGHT: 127.69 LBS | HEIGHT: 62 IN | BODY MASS INDEX: 23.5 KG/M2 | DIASTOLIC BLOOD PRESSURE: 66 MMHG | SYSTOLIC BLOOD PRESSURE: 167 MMHG | HEART RATE: 66 BPM

## 2023-07-12 DIAGNOSIS — R76.8 POSITIVE ANA (ANTINUCLEAR ANTIBODY): Primary | ICD-10-CM

## 2023-07-12 PROCEDURE — 99213 OFFICE O/P EST LOW 20 MIN: CPT | Performed by: INTERNAL MEDICINE

## 2023-07-12 NOTE — PROGRESS NOTES
Mei Cotton is a [de-identified]year old female. HPI:   Patient presents with: Follow - Up      I had the pleasure of seeing Mei Cotton on 2023 for follow up +JEFF    Current Medications:  none  Blood work:  +CTD screen, +dsDNA IgG 37.2   +JEFF 1:1280, neg DAGOBERTO panel  Neg C3 and C4    Interval History: This is a 75 yo F with hx of HTN, HLD, Uterine cancer s/p hysterectomy, Pernicious anemia, DVT, Severe spinal stenosis (s/p EPI in 2022 and 2023), Osteopenia (on Prolia) referred for some positive blood work. She was found to have a positive connective tissue disease screen and double-stranded DNA. She reports some joint pain and achiness in her hands but not severe. No swelling in her joints. She does have some chronic pain in her right ankle but she fractured in the past and had surgery. She has chronic lower back pain with radiculopathy symptoms going down her right leg. She follows with physiatry Dr. Romina Young gets epidural injections, last injection was in 2023, it does help. Denies any rash. She was recently in [place] and got a sunburn. Denies any oral ulcers, sicca symptoms, lymphadenopathy, hair loss, serositis, RP. She had a DVT in the past, unclear of the cause. No family history of autoimmune disease. She has been having some memory issues. She will be seeing neurology in May.    2023:  Presents for follow-up of positive JEFF  Has some chronic right-sided lower back pain and chronic right ankle pain due to a fracture  Denies any other joint pain  Skin on her arms been really dry.   She has a lot of cream.  Has memory issues and is on donepezil        HISTORY:  Past Medical History:   Diagnosis Date    Arthritis     Back pain     Cough 2011    Deep vein thrombosis (HCC)     Essential hypertension     Family history of colon cancer 10/21/2014     0     PARA ZERO    H/O foot surgery ,    LEFT FOOT SURGERY, PINS PLACED IN TOE    H/O oral surgery  GUM/MOUTH SURGERY    Hallux rigidus 2009    LEFT FOOT, YURY PROCEDURE, 1ST LEFT METARSOPHALANGEAL JOINT    Heart palpitations 2012    HEART MONITOR AUGUST 2012    High blood pressure     High cholesterol     History of DVT (deep vein thrombosis)     Hypercholesterolemia     Hyperparathyroidism ([address] 2004    PAIR OF PARATHYROID REMOVED    Hyperparathyroidism ([address]     PAIR OF PARATHYROID REMOVED     Hypertension     Migraine 2012    OCCULAR MIGRAINE    Migraine     OCCULAR MIGRAINE     Neuroma 2007    2 LT, HALLUX RIGIDUS LT, PER. NG.    Osteoarthritis     Other ill-defined conditions(799.89)     TAHBSO MGMT. Other ill-defined conditions(799.89)     CHRONIC LEFT CYSTIC PELVIC MASS    Ovarian cyst     REMOVED PER NG. Painful breasts 10/21/2014    Pneumonia 2011    HOSPITALIZED    Pneumonia due to organism     Spinal stenosis     Spinal stenosis 4/19/2016    Tonsillitis     Ulcerative colitis ([facility] 1976    Uterine cancer ([facility] 03/2002      Social Hx Reviewed   Family Hx Reviewed     Medications (Active prior to today's visit):  Current Outpatient Medications   Medication Sig Dispense Refill    atorvastatin 40 MG Oral Tab TAKE ONE TABLET BY MOUTH EVERY DAY AT NIGHTLY. 90 tablet 3    donepezil 10 MG Oral Tab Take 0.5 tablets (5 mg total) by mouth daily for 30 days, THEN 1 tablet (10 mg total) daily. 75 tablet 0    METOPROLOL SUCCINATE ER 50 MG Oral Tablet 24 Hr TAKE ONE TABLET BY MOUTH ONE TIME DAILY 90 tablet 3    hydroCHLOROthiazide 12.5 MG Oral Tab Take 1 tablet (12.5 mg total) by mouth Every Monday, Wednesday, and Friday. 39 tablet 3    AMLODIPINE 10 MG Oral Tab TAKE ONE TABLET BY MOUTH ONE TIME DAILY 90 tablet 3    Omeprazole Magnesium (PRILOSEC OTC) 20 MG Oral Tab EC Take 1 tablet (20 mg total) by mouth daily. 0    ALPRAZolam (XANAX) 0.25 MG Oral Tab 1 tablet every day as needed for anxiety. 15 tablet 0    telmisartan 80 MG Oral Tab Take 1 tablet (80 mg total) by mouth in the morning.  90 tablet 3 triamcinolone 0.1 % External Cream apply to itchy rash on hands twice a day      SULFASALAZINE 500 MG Oral Tab TAKE 3 TABLETS BY MOUTH TWICE A  tablet 0    aspirin 81 MG Oral Tab EC Take 1 tablet (81 mg total) by mouth daily. 0    folic acid 1 MG Oral Tab Take 1 tablet (1 mg total) by mouth daily. 90 tablet 3    Calcium Citrate-Vitamin D 315-250 MG-UNIT Oral Tab Take 2 tablets by mouth 2 (two) times daily with meals. cyanocobalamin (VITAMIN B12) 1000 MCG/ML Injection Solution Inject 1 mL (1,000 mcg total) into the muscle every 30 (thirty) days. Vitamin D, Ergocalciferol, (DRISDOL) 29693 UNITS Oral Cap Take 1 capsule (50,000 Units total) by mouth As Directed. Every 5 days. Multiple Vitamin (MULTI-VITAMINS) Oral Tab Take 1 tablet by mouth daily with breakfast.       .cmed  Allergies:    Fentanyl                  Levaquin [Levofloxa*    RASH    Comment:Patient developed diffuse rash macular in nature             after about 5 days of Levaquin. Suspect Levaquin             causes a rash. Meperidine              NAUSEA AND VOMITING    Comment:Demerol  Morphine                NAUSEA AND VOMITING  Zithromax [Azithrom*    RASH      ROS:   All other ROS are negative. PHYSICAL EXAM:   GEN: AAOx3, NAD  HEENT: EOMI, PERRLA, no injection or icterus, oral mucosa moist, no oral lesions. No lymphadenopathy. No facial rash  CVS: RRR, no murmurs rubs or gallops. Equal 2+ distal pulses. LUNGS: CTAB, no increased work of breathing  ABDOMEN:  soft NT/ND, +BS, no HSM  SKIN: dryness noted on skin  MSK:  Cervical spine: FROM  Hands: no synovitis in DIP, PIP and MCP, strong full fists  Wrist: FROM, no pain or swelling or warmth on palpation  Elbow: FROM, no pain or swelling or warmth on palpation  Shoulders: FROM, no pain or swelling or warmth on palpation  Hip: normal log roll, no lateral hip pain, DANIELLE test negative b/l  Knees: FROM, no warmth or effusion present. No pain with ROM.    Ankles: FROM, no pain or swelling or warmth on palpation  Feet: no pain with MTP squeeze, no toe swelling or pain or warmth on palpation with FROM  Spine: no lumbar or sacral pain on palpation. NEURO: Cranial nerves II-XII intact grossly. 5/5 strength throughout in both upper and lower extremities, sensation intact. PSYCH: normal mood       LABS:     Component      Latest Ref Rng 3/15/2023   Color Urine      Yellow  Yellow    Clarity Urine      Clear  Clear    Spec Gravity      1.005 - 1.030  1.021    Glucose Urine      Negative mg/dL Negative    Bilirubin Urine      Negative  Negative    Ketones, UA      Negative mg/dL Negative    Blood Urine      Negative  Negative    PH Urine      5.0 - 8.0  5.0    Protein Urine      Negative mg/dL Negative    Urobilinogen Urine      <2.0  <2.0    Nitrite Urine      Negative  Negative    Leukocyte Esterase       Negative  Negative    Microscopic Microscopic not indicated    WBC Urine      0 - 5 /HPF 1-5    RBC Urine      0 - 2 /HPF 0-2    Bacteria Urine      None Seen /HPF None Seen    SQUAM EPI CELLS UR      None Seen /HPF Few ! HYALINE CASTS      None Seen /LPF Present ! ASCORBIC ACID      Negative mg/dL Negative    Anti-SSA Antibody, IGG      <7 U/mL <0.4    Anti-SSB Antibody, IGG      <7 U/mL <0.4    Anti-Smith Antibody, IGG      <7 U/mL <0.7    Anti-U1RNP Antibody, IGG      <5 U/mL 1.5    Anti-RNP70 Antibody, IGG      <7 U/mL 1.3    Anti-Centromere Antibody, IGG      <7 U/mL <0.4    Anti-SCL70 Antibody, IGG      <7 U/mL <0.6    Anti-Digna-1 Antibody, IGG      <7 U/mL <0.3    TOTAL PROTEIN URINE RANDOM      mg/dL 13.8    CREATININE UR RANDOM      mg/dL 129.00    Urine Protein/Creatinine Ratio, Random 0.11    JEFF Titer/Pattern      <80  1280 ! Reviewed By: Zander Tesfaye M.D. JEFF SCREEN WITH REFLEX (S)      Negative  Positive !     COMPLEMENT C3      90.0 - 180.0 mg/dL 120.0    COMPLEMENT C4      10.0 - 40.0 mg/dL 18.7    Anti Double Strand DNA      <10  <10         Imaging:     MRI Brain:  CONCLUSION:   1. No acute intracranial abnormality. Specifically, no evidence of acute or early subacute infarction. 2. Minor nonspecific white matter changes throughout both cerebral hemispheres, which likely relate to a normal variation for patient age. 3. Mild generalized cerebral volume loss. 4. Nonspecific partially empty sella turcica. 5. Lesser incidental findings as above. ASSESSMENT/PLAN:     Positive JEFF  - Found to have a positive JEFF 1: 1280.   Initial double-stranded DAGOBERTO was positive and now negative  - She has no symptoms consistent with connective tissue disease.  - Normal CBC and CMP  - Plan to continue to monitor her symptoms    Lower back pain with severe stenosis  - Following with physiatry, she gets epidural injections which help     Memory issues  - Following with neurology, now on donepezil    Pt will f/u in yearly     Vesna Tamayo MD  7/12/2023   5:25 PM

## 2023-07-13 ENCOUNTER — NURSE ONLY (OUTPATIENT)
Dept: INTERNAL MEDICINE CLINIC | Facility: CLINIC | Age: 78
End: 2023-07-13

## 2023-07-13 DIAGNOSIS — D51.0 PERNICIOUS ANEMIA: Primary | ICD-10-CM

## 2023-07-13 PROCEDURE — 96372 THER/PROPH/DIAG INJ SC/IM: CPT | Performed by: INTERNAL MEDICINE

## 2023-07-13 RX ADMIN — CYANOCOBALAMIN 1000 MCG: 1000 INJECTION, SOLUTION INTRAMUSCULAR; SUBCUTANEOUS at 08:35:00

## 2023-07-13 NOTE — PROGRESS NOTES
Patient presents for monthly b12 injection. Patient name and  verified. Order active in [address], last admin date 23. Patient tolerated well.

## 2023-07-14 NOTE — TELEPHONE ENCOUNTER
S/w patient to schedule injection but she recalls agreeing with Dr. Harjit Neely during [address] [place] [address] Avenue that she was to start PT again first in September and then schedule the injection some time around in October, she would like to confirm with him. I let her know we would talk to him and call her back, she understood.

## 2023-07-26 ENCOUNTER — TELEPHONE (OUTPATIENT)
Dept: NEUROLOGY | Facility: CLINIC | Age: 78
End: 2023-07-26

## 2023-07-26 NOTE — TELEPHONE ENCOUNTER
Patient's  dropped off some informational paperwork and a letter for   Dr. Zena Bird. VERY IMPORTANT THE PATIENT HERSELF IS NOT CONTACTED; ONLY AND THE SPOUSE SHOULD BE CONTACTED.   Put in Nurses bin

## 2023-07-27 NOTE — TELEPHONE ENCOUNTER
Reviewed the information and it is a letter to Dr. Qian Downing asking for his advise on further treatment and medication. There is also a 2 page letter in regards to the observations of her medical issues, her physicians, and her medication list.    Information has been placed on Dr. Sharita Lewis desk to review. The patient has a pending appointment 8/15/23 with Dr. Qian Downing. Reviewed and electronically signed by:  [address], [facility]

## 2023-08-03 ENCOUNTER — OFFICE VISIT (OUTPATIENT)
Dept: INTERNAL MEDICINE CLINIC | Facility: CLINIC | Age: 78
End: 2023-08-03

## 2023-08-03 ENCOUNTER — LAB ENCOUNTER (OUTPATIENT)
Dept: LAB | Age: 78
End: 2023-08-03
Attending: INTERNAL MEDICINE
Payer: MEDICARE

## 2023-08-03 VITALS
HEIGHT: 62 IN | SYSTOLIC BLOOD PRESSURE: 142 MMHG | HEART RATE: 60 BPM | OXYGEN SATURATION: 98 % | BODY MASS INDEX: 23.37 KG/M2 | RESPIRATION RATE: 20 BRPM | TEMPERATURE: 98 F | DIASTOLIC BLOOD PRESSURE: 60 MMHG | WEIGHT: 127 LBS

## 2023-08-03 DIAGNOSIS — I10 ESSENTIAL HYPERTENSION: Primary | ICD-10-CM

## 2023-08-03 DIAGNOSIS — D64.9 ANEMIA, UNSPECIFIED TYPE: ICD-10-CM

## 2023-08-03 DIAGNOSIS — D51.0 PERNICIOUS ANEMIA: ICD-10-CM

## 2023-08-03 DIAGNOSIS — Z80.0 FAMILY HISTORY OF COLON CANCER: ICD-10-CM

## 2023-08-03 DIAGNOSIS — Z00.00 ROUTINE HEALTH MAINTENANCE: ICD-10-CM

## 2023-08-03 DIAGNOSIS — I10 ESSENTIAL HYPERTENSION: ICD-10-CM

## 2023-08-03 DIAGNOSIS — R41.3 MEMORY DEFICIT: ICD-10-CM

## 2023-08-03 LAB
ANION GAP SERPL CALC-SCNC: 6 MMOL/L (ref 0–18)
BASOPHILS # BLD AUTO: 0.04 X10(3) UL (ref 0–0.2)
BASOPHILS NFR BLD AUTO: 0.8 %
BUN BLD-MCNC: 22 MG/DL (ref 7–18)
BUN/CREAT SERPL: 27.2 (ref 10–20)
CALCIUM BLD-MCNC: 9.8 MG/DL (ref 8.5–10.1)
CHLORIDE SERPL-SCNC: 103 MMOL/L (ref 98–112)
CHOLEST SERPL-MCNC: 149 MG/DL (ref ?–200)
CO2 SERPL-SCNC: 26 MMOL/L (ref 21–32)
CREAT BLD-MCNC: 0.81 MG/DL
DEPRECATED HBV CORE AB SER IA-ACNC: 108.3 NG/ML
DEPRECATED RDW RBC AUTO: 42.8 FL (ref 35.1–46.3)
EGFRCR SERPLBLD CKD-EPI 2021: 73 ML/MIN/1.73M2 (ref 60–?)
EOSINOPHIL # BLD AUTO: 0.3 X10(3) UL (ref 0–0.7)
EOSINOPHIL NFR BLD AUTO: 6.3 %
ERYTHROCYTE [DISTWIDTH] IN BLOOD BY AUTOMATED COUNT: 12.2 % (ref 11–15)
FASTING PATIENT LIPID ANSWER: YES
FASTING STATUS PATIENT QL REPORTED: YES
GLUCOSE BLD-MCNC: 94 MG/DL (ref 70–99)
HCT VFR BLD AUTO: 32.8 %
HDLC SERPL-MCNC: 77 MG/DL (ref 40–59)
HGB BLD-MCNC: 10.8 G/DL
IMM GRANULOCYTES # BLD AUTO: 0.02 X10(3) UL (ref 0–1)
IMM GRANULOCYTES NFR BLD: 0.4 %
IRON SATN MFR SERPL: 18 %
IRON SERPL-MCNC: 69 UG/DL
LDLC SERPL CALC-MCNC: 57 MG/DL (ref ?–100)
LYMPHOCYTES # BLD AUTO: 1.22 X10(3) UL (ref 1–4)
LYMPHOCYTES NFR BLD AUTO: 25.5 %
MCH RBC QN AUTO: 31.9 PG (ref 26–34)
MCHC RBC AUTO-ENTMCNC: 32.9 G/DL (ref 31–37)
MCV RBC AUTO: 96.8 FL
MONOCYTES # BLD AUTO: 0.7 X10(3) UL (ref 0.1–1)
MONOCYTES NFR BLD AUTO: 14.6 %
NEUTROPHILS # BLD AUTO: 2.5 X10 (3) UL (ref 1.5–7.7)
NEUTROPHILS # BLD AUTO: 2.5 X10(3) UL (ref 1.5–7.7)
NEUTROPHILS NFR BLD AUTO: 52.4 %
NONHDLC SERPL-MCNC: 72 MG/DL (ref ?–130)
OSMOLALITY SERPL CALC.SUM OF ELEC: 283 MOSM/KG (ref 275–295)
PLATELET # BLD AUTO: 215 10(3)UL (ref 150–450)
POTASSIUM SERPL-SCNC: 4.4 MMOL/L (ref 3.5–5.1)
RBC # BLD AUTO: 3.39 X10(6)UL
SODIUM SERPL-SCNC: 135 MMOL/L (ref 136–145)
TIBC SERPL-MCNC: 377 UG/DL (ref 240–450)
TRANSFERRIN SERPL-MCNC: 253 MG/DL (ref 200–360)
TRIGL SERPL-MCNC: 81 MG/DL (ref 30–149)
VIT B12 SERPL-MCNC: 923 PG/ML (ref 193–986)
VLDLC SERPL CALC-MCNC: 12 MG/DL (ref 0–30)
WBC # BLD AUTO: 4.8 X10(3) UL (ref 4–11)

## 2023-08-03 PROCEDURE — 80061 LIPID PANEL: CPT

## 2023-08-03 PROCEDURE — 80048 BASIC METABOLIC PNL TOTAL CA: CPT

## 2023-08-03 PROCEDURE — 84165 PROTEIN E-PHORESIS SERUM: CPT

## 2023-08-03 PROCEDURE — 85025 COMPLETE CBC W/AUTO DIFF WBC: CPT

## 2023-08-03 PROCEDURE — 82728 ASSAY OF FERRITIN: CPT

## 2023-08-03 PROCEDURE — 36415 COLL VENOUS BLD VENIPUNCTURE: CPT

## 2023-08-03 PROCEDURE — 83521 IG LIGHT CHAINS FREE EACH: CPT

## 2023-08-03 PROCEDURE — 84466 ASSAY OF TRANSFERRIN: CPT

## 2023-08-03 PROCEDURE — 82607 VITAMIN B-12: CPT

## 2023-08-03 PROCEDURE — 86334 IMMUNOFIX E-PHORESIS SERUM: CPT

## 2023-08-03 PROCEDURE — 1125F AMNT PAIN NOTED PAIN PRSNT: CPT | Performed by: INTERNAL MEDICINE

## 2023-08-03 PROCEDURE — 83540 ASSAY OF IRON: CPT

## 2023-08-03 PROCEDURE — 99214 OFFICE O/P EST MOD 30 MIN: CPT | Performed by: INTERNAL MEDICINE

## 2023-08-07 ENCOUNTER — TELEPHONE (OUTPATIENT)
Dept: PHYSICAL MEDICINE AND REHAB | Facility: CLINIC | Age: 78
End: 2023-08-07

## 2023-08-07 DIAGNOSIS — M54.16 LUMBAR RADICULOPATHY: Primary | ICD-10-CM

## 2023-08-07 NOTE — TELEPHONE ENCOUNTER
Patient has been scheduled for  Right L4 and Right L5 TFESIs  on 08/18/2023 at the [facility] with Ewa Brunson. -Anesthesia type: IVCS. -If scheduling [facility] covid testing required for all procedures whether patient is vaccinated or not. -Patient informed not to eat or drink anything after midnight the night prior to the procedure, if being sedated. (For afternoon injections: Patient to fast 6-8 hours prior to procedure with IVCS/MAC. )  -Patient was advised that if he/she does receive the covid vaccine it needs to be at least 2 weeks before or after the injection. -Medications and allergies reviewed. -Patient reminded to hold NSAIDs (Ibuprofen, ASA 81, Aleve, Naproxen, Mobic, Diclofenac, Etodolac, Celebrex etc.) for 3 days prior to [facility]  if BMI is greater than 35. For Cervical injections only hold multivitamins, Vitamin E, Fish Oil, Phentermine (Lomaira) for 7 days prior to injection and NSAIDS.  mg to be held for 7 days prior to injections.  -If patient is receiving MAC/IVCS Phentermine Josie Yady), Adlyxin (Lixisenatide), Bydureon BCise (Exenatide-release), Byetta (Exenatide), Mounjaro (Tirezepatide), Ozempic (Semaglutide), Rybelsus (oral demaglutide), Saxenda (Liraglutide), Trulicity (Dulaglutide), Victoriza (Liraglutide), Wegovy (Semaglutide), Berberine (oral natures ozempic) will need to be held for 7 days prior to injection.  -If on blood thinner clearance has been received to hold this medication by provider.   -Patient informed he/she will need a  to and from procedure. -[facility] is located in the [facility] 1st floor. Patient may park in the yellow or purple parking.     Patient verbalized understanding and agrees with plan.  -----> Scheduled in Epic: Yes

## 2023-08-10 ENCOUNTER — HOSPITAL ENCOUNTER (OUTPATIENT)
Dept: MAMMOGRAPHY | Facility: HOSPITAL | Age: 78
Discharge: HOME | End: 2023-08-10
Attending: INTERNAL MEDICINE
Payer: MEDICARE

## 2023-08-10 DIAGNOSIS — Z12.31 ENCOUNTER FOR SCREENING MAMMOGRAM FOR MALIGNANT NEOPLASM OF BREAST: ICD-10-CM

## 2023-08-10 LAB
ALBUMIN SERPL ELPH-MCNC: 4.22 G/DL (ref 3.75–5.21)
ALBUMIN/GLOB SERPL: 1.57 {RATIO} (ref 1–2)
ALPHA1 GLOB SERPL ELPH-MCNC: 0.35 G/DL (ref 0.19–0.46)
ALPHA2 GLOB SERPL ELPH-MCNC: 0.76 G/DL (ref 0.48–1.05)
B-GLOBULIN SERPL ELPH-MCNC: 0.72 G/DL (ref 0.68–1.23)
GAMMA GLOB SERPL ELPH-MCNC: 0.86 G/DL (ref 0.62–1.7)
KAPPA LC FREE SER-MCNC: 2.88 MG/DL (ref 0.33–1.94)
KAPPA LC FREE/LAMBDA FREE SER NEPH: 1.44 {RATIO} (ref 0.26–1.65)
LAMBDA LC FREE SERPL-MCNC: 2 MG/DL (ref 0.57–2.63)
PROT SERPL-MCNC: 6.9 G/DL (ref 6.4–8.2)

## 2023-08-10 PROCEDURE — 77067 SCR MAMMO BI INCL CAD: CPT | Performed by: INTERNAL MEDICINE

## 2023-08-10 PROCEDURE — 77063 BREAST TOMOSYNTHESIS BI: CPT | Performed by: INTERNAL MEDICINE

## 2023-08-11 ENCOUNTER — TELEPHONE (OUTPATIENT)
Dept: INTERNAL MEDICINE CLINIC | Facility: CLINIC | Age: 78
End: 2023-08-11

## 2023-08-11 NOTE — TELEPHONE ENCOUNTER
Please let patient know that her labs from a week ago showed a mild anemia. She has had this before. We did iron studies and vitamin B12 and they all look to be good. For now I do not think there is anything else that we need to do. Will repeat her blood test for anemia when I see her next. Her kidney level was very good and the rest of the labs were good.

## 2023-08-14 ENCOUNTER — TELEPHONE (OUTPATIENT)
Dept: INTERNAL MEDICINE CLINIC | Facility: CLINIC | Age: 78
End: 2023-08-14

## 2023-08-14 NOTE — TELEPHONE ENCOUNTER
Left detailed message with patient, relaying MD message below.  (OK per [facility] Verbal Release / Privacy)

## 2023-08-14 NOTE — TELEPHONE ENCOUNTER
You  Katie Brand now (3:26 PM)     Jerzy Connor,      Sending you this to avoid further phone tag; I left a detailed message on your cell as well. Please read the message below that Dr. Elise Ybarra left for me in reagrds to your recent labs. Yamini De Luna MD3 days ago      Summary: Test results   Please let patient know that her labs from a week ago showed a mild anemia. She has had this before. We did iron studies and vitamin B12 and they all look to be good. For now I do not think there is anything else that we need to do. Will repeat her blood test for anemia when I see her next. Her kidney level was very good and the rest of the labs were good.           Note

## 2023-08-14 NOTE — TELEPHONE ENCOUNTER
I will see her first on her office visit today and then decide what labs to get just in case anything changes.   She can do labs after I see her

## 2023-08-14 NOTE — TELEPHONE ENCOUNTER
Dr. Zaria Poon, did you want to place future lab orders for checkup in November or place orders at that visit.

## 2023-08-14 NOTE — TELEPHONE ENCOUNTER
Patient is returning nurse call How Severe Are Your Spot(S)?: mild Have Your Spot(S) Been Treated In The Past?: has not been treated Hpi Title: Evaluation of Skin Lesions Year Removed: 1900

## 2023-08-15 ENCOUNTER — OFFICE VISIT (OUTPATIENT)
Dept: NEUROLOGY | Facility: CLINIC | Age: 78
End: 2023-08-15
Payer: MEDICARE

## 2023-08-15 ENCOUNTER — NURSE ONLY (OUTPATIENT)
Dept: INTERNAL MEDICINE CLINIC | Facility: CLINIC | Age: 78
End: 2023-08-15

## 2023-08-15 VITALS — BODY MASS INDEX: 23.98 KG/M2 | HEIGHT: 61 IN | WEIGHT: 127 LBS

## 2023-08-15 DIAGNOSIS — E53.8 VITAMIN B12 DEFICIENCY: Primary | ICD-10-CM

## 2023-08-15 DIAGNOSIS — M79.2 NEUROPATHIC PAIN: ICD-10-CM

## 2023-08-15 DIAGNOSIS — F03.A0 MILD DEMENTIA, UNSPECIFIED DEMENTIA TYPE, UNSPECIFIED WHETHER BEHAVIORAL, PSYCHOTIC, OR MOOD DISTURBANCE OR ANXIETY (HCC): Primary | ICD-10-CM

## 2023-08-15 PROCEDURE — 99214 OFFICE O/P EST MOD 30 MIN: CPT | Performed by: OTHER

## 2023-08-15 PROCEDURE — 96372 THER/PROPH/DIAG INJ SC/IM: CPT | Performed by: INTERNAL MEDICINE

## 2023-08-15 RX ORDER — DULOXETIN HYDROCHLORIDE 30 MG/1
CAPSULE, DELAYED RELEASE ORAL
Qty: 270 CAPSULE | Refills: 0 | Status: SHIPPED | OUTPATIENT
Start: 2023-08-15 | End: 2024-02-11

## 2023-08-15 RX ADMIN — CYANOCOBALAMIN 1000 MCG: 1000 INJECTION, SOLUTION INTRAMUSCULAR; SUBCUTANEOUS at 16:22:00

## 2023-08-24 ENCOUNTER — OFFICE VISIT (OUTPATIENT)
Dept: PULMONOLOGY | Facility: CLINIC | Age: 78
End: 2023-08-24

## 2023-08-24 VITALS
OXYGEN SATURATION: 99 % | WEIGHT: 127 LBS | BODY MASS INDEX: 23.98 KG/M2 | HEIGHT: 61 IN | HEART RATE: 55 BPM | DIASTOLIC BLOOD PRESSURE: 63 MMHG | SYSTOLIC BLOOD PRESSURE: 172 MMHG

## 2023-08-24 DIAGNOSIS — J47.9 BRONCHIECTASIS WITHOUT COMPLICATION (HCC): Primary | ICD-10-CM

## 2023-08-24 PROCEDURE — 99213 OFFICE O/P EST LOW 20 MIN: CPT | Performed by: INTERNAL MEDICINE

## 2023-08-24 PROCEDURE — 1126F AMNT PAIN NOTED NONE PRSNT: CPT | Performed by: INTERNAL MEDICINE

## 2023-08-24 NOTE — PROGRESS NOTES
The patient is an 25-year-old female who I know well from prior evaluation and comes in now for follow-up. Her cough associated with bronchiectasis is actually minimal now and she is doing well clinically. Review of Systems:  Vision normal. Ear nose and throat normal. Bowel normal. Bladder function normal. No depression. No thyroid disease. No lymphatic system concerns. No rash. Muscles and joints unremarkable. No weight loss no weight gain. Physical Examination:  Vital signs normal. HEENT examination is unremarkable with pupils equal round and reactive to light and accommodation. Neck without adenopathy, thyromegaly, JVD nor bruit. Lungs clear to auscultation and percussion. Cardiac regular rate and rhythm no murmur. Abdomen nontender, without hepatosplenomegaly and no mass appreciable. Extremities and Musculoskeletal without clubbing cyanosis nor edema, and mobility acceptable. Neurologic grossly intact with symmetric tone and strength and reflex. Assessment and plan:  1. Bronchiectasis with cough-doing well clinically. Recommendations: Continue current medical regime, see me in the office at the 1 year interval, we will go without Acapella as patient is doing so well and the patient should contact me promptly if new problem in the meantime.

## 2023-09-05 DIAGNOSIS — F03.A0 MILD DEMENTIA, UNSPECIFIED DEMENTIA TYPE, UNSPECIFIED WHETHER BEHAVIORAL, PSYCHOTIC, OR MOOD DISTURBANCE OR ANXIETY (HCC): ICD-10-CM

## 2023-09-05 RX ORDER — DONEPEZIL HYDROCHLORIDE 10 MG/1
10 TABLET, FILM COATED ORAL DAILY
Qty: 90 TABLET | Refills: 1 | Status: SHIPPED | OUTPATIENT
Start: 2023-09-05

## 2023-09-05 NOTE — TELEPHONE ENCOUNTER
Requested Prescriptions     Pending Prescriptions Disp Refills    donepezil 10 MG Oral Tab [Pharmacy Med Name: Donepezil Hcl 10 Mg Tab [facility]] 90 tablet 1     Sig: Take 1 tablet (10 mg total) by mouth daily.         LOV: 8/15/23  NOV:12/21/23    Last refill/[place]: 6/9/23 QTY 75

## 2023-09-07 ENCOUNTER — OFFICE VISIT (OUTPATIENT)
Dept: AUDIOLOGY | Facility: CLINIC | Age: 78
End: 2023-09-07

## 2023-09-07 DIAGNOSIS — H90.3 SENSORINEURAL HEARING LOSS, BILATERAL: Primary | ICD-10-CM

## 2023-09-07 PROCEDURE — 92557 COMPREHENSIVE HEARING TEST: CPT | Performed by: AUDIOLOGIST

## 2023-09-07 PROCEDURE — 92567 TYMPANOMETRY: CPT | Performed by: AUDIOLOGIST

## 2023-09-11 ENCOUNTER — TELEPHONE (OUTPATIENT)
Dept: PHYSICAL THERAPY | Facility: HOSPITAL | Age: 78
End: 2023-09-11

## 2023-09-14 ENCOUNTER — NURSE ONLY (OUTPATIENT)
Dept: INTERNAL MEDICINE CLINIC | Facility: CLINIC | Age: 78
End: 2023-09-14

## 2023-09-14 DIAGNOSIS — E53.8 VITAMIN B12 DEFICIENCY: Primary | ICD-10-CM

## 2023-09-14 PROCEDURE — 96372 THER/PROPH/DIAG INJ SC/IM: CPT | Performed by: INTERNAL MEDICINE

## 2023-09-14 RX ADMIN — CYANOCOBALAMIN 1000 MCG: 1000 INJECTION, SOLUTION INTRAMUSCULAR; SUBCUTANEOUS at 08:16:00

## 2023-09-15 ENCOUNTER — APPOINTMENT (OUTPATIENT)
Dept: PHYSICAL THERAPY | Facility: HOSPITAL | Age: 78
End: 2023-09-15
Attending: PHYSICAL MEDICINE & REHABILITATION
Payer: MEDICARE

## 2023-10-06 RX ORDER — TELMISARTAN 80 MG/1
80 TABLET ORAL EVERY MORNING
Qty: 90 TABLET | Refills: 3 | Status: SHIPPED | OUTPATIENT
Start: 2023-10-06

## 2023-10-06 NOTE — TELEPHONE ENCOUNTER
Refill request is for a maintenance medication and has met the criteria specified in the Ambulatory Medication Refill Standing Order for eligibility, visits, laboratory, alerts and was sent to the requested pharmacy.     Requested Prescriptions     Signed Prescriptions Disp Refills    telmisartan 80 MG Oral Tab 90 tablet 3     Sig: TAKE ONE TABLET BY MOUTH IN THE MORNING     Authorizing Provider: Alysha Ingram     Ordering User: Delia Cabezas

## 2023-10-09 ENCOUNTER — TELEPHONE (OUTPATIENT)
Dept: INTERNAL MEDICINE CLINIC | Facility: CLINIC | Age: 78
End: 2023-10-09

## 2023-10-09 NOTE — TELEPHONE ENCOUNTER
Received a fax from [place] in [place] stating the patient received Arexvy and Flu on 10/7/2023    Original sent to scanning, copy placed in [address]

## 2023-10-12 ENCOUNTER — TELEPHONE (OUTPATIENT)
Dept: PHYSICAL THERAPY | Facility: HOSPITAL | Age: 78
End: 2023-10-12

## 2023-10-16 ENCOUNTER — NURSE ONLY (OUTPATIENT)
Dept: INTERNAL MEDICINE CLINIC | Facility: CLINIC | Age: 78
End: 2023-10-16

## 2023-10-16 RX ADMIN — CYANOCOBALAMIN 1000 MCG: 1000 INJECTION, SOLUTION INTRAMUSCULAR; SUBCUTANEOUS at 08:50:00

## 2023-10-25 ENCOUNTER — OFFICE VISIT (OUTPATIENT)
Dept: PHYSICAL MEDICINE AND REHAB | Facility: CLINIC | Age: 78
End: 2023-10-25

## 2023-10-25 ENCOUNTER — TELEPHONE (OUTPATIENT)
Dept: PHYSICAL MEDICINE AND REHAB | Facility: CLINIC | Age: 78
End: 2023-10-25

## 2023-10-25 VITALS — WEIGHT: 124 LBS | HEIGHT: 61 IN | BODY MASS INDEX: 23.41 KG/M2

## 2023-10-25 DIAGNOSIS — M41.25 OTHER IDIOPATHIC SCOLIOSIS, THORACOLUMBAR REGION: Primary | ICD-10-CM

## 2023-10-25 DIAGNOSIS — M48.061 LUMBAR FORAMINAL STENOSIS: ICD-10-CM

## 2023-10-25 DIAGNOSIS — C54.9 MALIGNANT NEOPLASM OF CORPUS UTERI, EXCEPT ISTHMUS (HCC): ICD-10-CM

## 2023-10-25 DIAGNOSIS — M43.16 SPONDYLOLISTHESIS OF LUMBAR REGION: ICD-10-CM

## 2023-10-25 DIAGNOSIS — M51.9 LUMBAR DISC DISEASE: ICD-10-CM

## 2023-10-25 DIAGNOSIS — M54.16 LUMBAR RADICULOPATHY: ICD-10-CM

## 2023-10-25 DIAGNOSIS — G89.29 CHRONIC RIGHT HIP PAIN: ICD-10-CM

## 2023-10-25 DIAGNOSIS — M25.551 CHRONIC RIGHT HIP PAIN: ICD-10-CM

## 2023-10-25 DIAGNOSIS — M48.062 SPINAL STENOSIS OF LUMBAR REGION WITH NEUROGENIC CLAUDICATION: ICD-10-CM

## 2023-10-25 PROCEDURE — 1125F AMNT PAIN NOTED PAIN PRSNT: CPT | Performed by: PHYSICAL MEDICINE & REHABILITATION

## 2023-10-25 PROCEDURE — 99214 OFFICE O/P EST MOD 30 MIN: CPT | Performed by: PHYSICAL MEDICINE & REHABILITATION

## 2023-10-25 NOTE — PATIENT INSTRUCTIONS
Plan  I will perform right L4 and L5 TFESI(s) under IV conscious sedation. She will start the PT tomorrow again with Adeola Dunn. She will get a right hip x-ray. The patient will follow up in 2-3 months, but the patient will call me 2 weeks after having the injection to let me know how the injection worked.

## 2023-10-25 NOTE — PROGRESS NOTES
Low Back Pain H & P    Chief Complaint: Patient presents with: Follow - Up: LOV 7/10/2023 Patient follows up on Right L4 and Right L5 Transforaminal Epidural Steroid Injections done 2023 which provided 100% relief for about one week and has progressively gone away. Patient states previous injections lasted much longer. Has been out of PT for a couple months as mikhail was on vacation, starting again tomorrow. Denies new sx, most of the pain is now localized to her right groin radiating down her leg, denies N/T. LOP 3/10    Nursing note reviewed and verified. Patient was last seen on 7/10/2023. The right L4 and L5 TFESI's helped her and she will only occasionally have right low back pain now. She is mostly having right groin and anterior and lateral thigh pain that can go into her right anterior knee and on bad days will go into the right anterior lower leg and the right foot. She will also have right buttock pain that will go into the right posterior thigh and right whole upper leg. She states that she will walk without moving her hip. Description of the Pain  The pain at its best is 5/10. The pain at its worst is 10/10. The pain is currently  7/10. The pain is described as a(n) aching sensation. The pain is worse walking. The pain is better  taking the weight off of the right leg . There is no numbness. There is tingling in the right anterior lower leg by the ankle. There is weakness in the right leg at times.      Past Medical History   Past Medical History:   Diagnosis Date    Arthritis     Back pain     Cough 2011    Deep vein thrombosis (HCC)     Dementia (HCC)     Essential hypertension     Family history of colon cancer 10/21/2014     0     PARA ZERO    H/O foot surgery ,    LEFT FOOT SURGERY, PINS PLACED IN TOE    H/O oral surgery 2013    GUM/MOUTH SURGERY    Hallux rigidus 2009    LEFT FOOT, YURY PROCEDURE, 1ST LEFT METARSOPHALANGEAL JOINT    Heart palpitations 2012    HEART MONITOR 2012    High blood pressure     High cholesterol     History of DVT (deep vein thrombosis)     Hypercholesterolemia     Hyperlipidemia     Hyperparathyroidism ([address] 2004    PAIR OF PARATHYROID REMOVED    Hyperparathyroidism ([facility] [address]     PAIR OF PARATHYROID REMOVED     Hypertension     Migraine 2012    OCCULAR MIGRAINE    Migraine     OCCULAR MIGRAINE     Neuroma 2007    2 LT, HALLUX RIGIDUS LT, PER. NG.    Osteoarthritis     Other ill-defined conditions(799.89)     TAHBSO MGMT. Other ill-defined conditions(799.89)     CHRONIC LEFT CYSTIC PELVIC MASS    Ovarian cyst     REMOVED PER NG. Painful breasts 10/21/2014    Pneumonia     HOSPITALIZED    Pneumonia due to organism     Spinal stenosis     Spinal stenosis 2016    Tonsillitis     Ulcerative colitis ([facility] [address] 1976    Uterine cancer ([facility] [address] 2002       Past Surgical History   Past Surgical History:   Procedure Laterality Date    APPENDECTOMY      patient doenst rememeber    CATARACT Bilateral     Left Eye: 23, Right Eye: 23    COLONOSCOPY N/A 2016    Procedure: COLONOSCOPY;  Surgeon: Britany Cano MD;  Location: [address] [place] [address] ENDOSCOPY    COLONOSCOPY N/A 2018    Procedure: COLONOSCOPY;  Surgeon: Britany Cano MD;  Location: [address] [place] [address] ENDOSCOPY    COLONOSCOPY  10/2022    COLONOSCOPY N/A 10/14/2022    Procedure: COLONOSCOPY;  Surgeon: Maira Ramirez MD;  Location: [address] [place] [address] ENDOSCOPY    HYSTERECTOMY      OTHER SURGICAL HISTORY  ?     Parathyroid removal    OTHER SURGICAL HISTORY  2017    left foot surgery plate removed    TONSILLECTOMY         Family History   Family History   Problem Relation Age of Onset    Cancer Father 68        COLON, CAUSE OF DEATH    Cancer Mother 59        COLON, 2nd primary 80    Cancer Maternal Cousin Male 61        prostate/patient denies     Cancer Self 62        uterine    Prostate Cancer Maternal Uncle         age unknown/ at 80    Kidney Disease Neg UROLITHIASIS    Breast Cancer Neg     Ovarian Cancer Neg        Social History   Social History    Socioeconomic History      Marital status:       Spouse name: Not on file      Number of children: Not on file      Years of education: Not on file      Highest education level: Not on file    Occupational History      Not on file    Tobacco Use      Smoking status: Never      Smokeless tobacco: Never    Vaping Use      Vaping Use: Never used    Substance and Sexual Activity      Alcohol use: Yes        Alcohol/week: 2.0 standard drinks of alcohol        Types: 2 Glasses of wine per week        Comment: 1-2 drinks/wk      Drug use: No      Sexual activity: Not on file    Other Topics      Concerns:         Service: Not Asked        Blood Transfusions: Not Asked        Caffeine Concern: Yes          SODA/TEA 1 CAN/DAY        Occupational Exposure: Not Asked        Hobby Hazards: Not Asked        Sleep Concern: Not Asked        Stress Concern: Not Asked        Weight Concern: Not Asked        Special Diet: Not Asked        Back Care: Not Asked        Exercise: Not Asked        Bike Helmet: Not Asked        Seat Belt: Not Asked        Self-Exams: Not Asked    Social History Narrative      Not on file    Social Determinants of Health  Financial Resource Strain: Not on file  Food Insecurity: Not on file  Transportation Needs: Not on file  Physical Activity: Not on file  Stress: Not on file  Social Connections: Not on file  Housing Stability: Not on file    PE:  The patient does appear in her stated age in mild distress. The patient is well groomed. Psychiatric:  The patient is alert and oriented x 3. The patient has a normal affect and mood. Respiratory:  No acute respiratory distress. Patient does not have a cough. HEENT:  Extraocular muscles are intact. There is no kern icterus. Pupils are equal, round, and reactive to light. No redness or discharge bilaterally.     Skin:  There are no rashes or lesions. Vitals: There were no vitals filed for this visit. Gait:    Gait: Normal gait   Sit to Stand: no difficulty      Lumbar Spine:    Scoliosis: Thoracic dextroscoliosis that is moderate, Lumbar levoscoliosis that is moderate, and Left shift that is moderate     Lumbar Spine Palpation:    Spinous Processes: Non-tender for all Spinous Processes   Z-joints: Non-tender for all Z-joints   SIJ: Non-tender for bilateral SIJ   Piriformis Muscle: Non-tender bilateral Piriformis muscles   Greater Trochanteric Bursa: Non-tender for bilateral Greater Trochanteric Bursa     Vascular lower extremity:   Dorsalis pedis pulse-RIGHT 2+   Dorsalis pedis pulse-LEFT 2+   Tibialis posterior pulse-RIGHT 2+   Tibialis posterior pulse-LEFT 2+     Neurological Lower Extremity:    Light Touch Sensation: Intact in bilateral Lower Extremities   LE Muscle Strength: All LE strength measurements 5/5 except:  Hamstring RIGHT:   4/5  Hamstring LEFT:   4+/5   RIGHT plantar reflexes: downward response   LEFT plantar reflexes: downward response   Reflexes: 2+ in bilateral lower extremities     Hip: Hips are stable. RIGHT hip ROM normal   LEFT hip ROM mildly limited   Right hip flexion Positive right buttock pain   LEFT hip flexion Negative pain   RIGHT hip DANIELLE test Negative for pain   LEFT hip DANIELLE test Negative for pain   RIGHT hip internal rotation Negative for pain   LEFT hip internal rotation Negative for pain   RIGHT hip piriformis stretch test Negative for pain   LEFT hip piriformis stretch test Negative for pain     Neural Tension Tests Lumbar Spine:  Supine straight leg raise-RIGHT Negative for pain   Supine straight leg raise-LEFT Negative for pain     Assessment  1. Other idiopathic scoliosis, thoracolumbar region    2. L5-S1 left severe/right moderate, L4-5 bilateral moderate, L3-4 left moderate-severe/right moderate, L2-3 right mderate-severe, L1-2 bilateral moderate-severe foraminal stenosis    3.  L5-S1 left large far lateral & mild central, L4-5 large diffuse, L3-4 mild-mod diffuse & left mod far lateral, L2-3 mod diffuse, L1-2 mod diffuse & right mod-large foraminal bulging discs     4. L4-5 severe, L3-4 mod, L2-3 mod, L1-2 mod-severe central stenosis    5. L4-5 unstable grade 2-3, L5-S1 stable grade 1 spondylolisthesis    6. Malignant neoplasm of corpus uteri, except isthmus (HCC)    7. right > left S1 radiculopathy with right L4 radiculitis    8. Chronic right hip pain         Plan  I will perform right L4 and L5 TFESI(s) under IV conscious sedation. She will start the PT tomorrow again with Mirian Ruth. She will get a right hip x-ray. The patient will follow up in 2-3 months, but the patient will call me 2 weeks after having the injection to let me know how the injection worked. The patient understands and agrees with the stated plan. Jose Neely MD  10/25/2023

## 2023-10-25 NOTE — TELEPHONE ENCOUNTER
Per CMS Guidelines -no authorization is required for Left L4 and Left L5 TFESIs CPT 39470, 40842     Status: Authorization is not required based on medical necessity however may be subject to review once claim is submitted-Covered Benefit     Per CMS Guidelines-ESIs are limited to a maximum of four (4) sessions per spinal region in a rolling twelve (12) month period.   This will be #4  Patient had lumbar mehreen 12/13/22, 3/28/23, 8/18/23

## 2023-10-26 ENCOUNTER — OFFICE VISIT (OUTPATIENT)
Dept: PHYSICAL THERAPY | Facility: HOSPITAL | Age: 78
End: 2023-10-26
Attending: PHYSICAL MEDICINE & REHABILITATION
Payer: MEDICARE

## 2023-10-26 DIAGNOSIS — M51.9 LUMBAR DISC DISEASE: ICD-10-CM

## 2023-10-26 DIAGNOSIS — M41.25 OTHER IDIOPATHIC SCOLIOSIS, THORACOLUMBAR REGION: ICD-10-CM

## 2023-10-26 DIAGNOSIS — M43.16 SPONDYLOLISTHESIS OF LUMBAR REGION: ICD-10-CM

## 2023-10-26 DIAGNOSIS — M54.16 LUMBAR RADICULOPATHY: ICD-10-CM

## 2023-10-26 DIAGNOSIS — M48.061 LUMBAR FORAMINAL STENOSIS: ICD-10-CM

## 2023-10-26 DIAGNOSIS — M48.062 SPINAL STENOSIS OF LUMBAR REGION WITH NEUROGENIC CLAUDICATION: Primary | ICD-10-CM

## 2023-10-26 DIAGNOSIS — C54.9 MALIGNANT NEOPLASM OF CORPUS UTERI, EXCEPT ISTHMUS (HCC): ICD-10-CM

## 2023-10-26 PROCEDURE — 97110 THERAPEUTIC EXERCISES: CPT

## 2023-10-26 PROCEDURE — 97162 PT EVAL MOD COMPLEX 30 MIN: CPT

## 2023-10-26 NOTE — PROGRESS NOTES
LUMBAR SPINE EVALUATION:   Jennifer Parker    1945  Referring Physician:  Aparna Adrian  Diagnosis: Spinal stenosis of lumbar region with neurogenic claudication (M48.062)  Lumbar disc disease (M51.9)  Lumbar foraminal stenosis (M48.061)  Lumbar radiculopathy (M54.16)  Other idiopathic scoliosis, thoracolumbar region (M41.25)  Spondylolisthesis of lumbar region (M43.16)  Malignant neoplasm of corpus uteri, except isthmus (HCC) (C54.9)  Initial Evaluation Date: 10/26/2023  Date of Surgery: None for this diagnosis   Authorized # of visits NA by 10/2024    Precautions/Hx: arthritis, DVT, HTN, L 1st MTP surg, HLD, parathyroid removed, TAHBSO for uterine cancer, ulcerative colitis, appy, scoliosis  R L4-L5 TFESI 3/28/2023 and 7/10/203    Past medical history was reviewed with Juan R Almanzar. Past Medical History:   Diagnosis Date    Arthritis     Back pain     Cough 2011    Deep vein thrombosis (HCC)     Dementia (HCC)     Essential hypertension     Family history of colon cancer 10/21/2014     0     PARA ZERO    H/O foot surgery ,    LEFT FOOT SURGERY, PINS PLACED IN TOE    H/O oral surgery     GUM/MOUTH SURGERY    Hallux rigidus 2009    LEFT FOOT, YURY PROCEDURE, 1ST LEFT METARSOPHALANGEAL JOINT    Heart palpitations 2012    HEART MONITOR 2012    High blood pressure     High cholesterol     History of DVT (deep vein thrombosis)     Hypercholesterolemia     Hyperlipidemia     Hyperparathyroidism ([address] 2004    PAIR OF PARATHYROID REMOVED    Hyperparathyroidism ([address]     PAIR OF PARATHYROID REMOVED     Hypertension     Migraine 2012    OCCULAR MIGRAINE    Migraine     OCCULAR MIGRAINE     Neuroma 2007    2 LT, HALLUX RIGIDUS LT, PER. NG.    Osteoarthritis     Other ill-defined conditions(799.89)     TAHBSO MGMT. Other ill-defined conditions(799.89)     CHRONIC LEFT CYSTIC PELVIC MASS    Ovarian cyst     REMOVED PER NG.     Painful breasts 10/21/2014    Pneumonia 2011 HOSPITALIZED    Pneumonia due to organism     Spinal stenosis     Spinal stenosis 04/19/2016    Tonsillitis     Ulcerative colitis ([address] 1976    Uterine cancer ([address] 03/2002     Past Surgical History:   Procedure Laterality Date    APPENDECTOMY      patient doenst rememeber    CATARACT Bilateral     Left Eye: 03/21/23, Right Eye: 01/25/23    COLONOSCOPY N/A 12/05/2016    Procedure: COLONOSCOPY;  Surgeon: Cassandra London MD;  Location: [address] [place] [address] ENDOSCOPY    COLONOSCOPY N/A 05/24/2018    Procedure: COLONOSCOPY;  Surgeon: Cassandra London MD;  Location: [address] [place] [address] ENDOSCOPY    COLONOSCOPY  10/2022    COLONOSCOPY N/A 10/14/2022    Procedure: COLONOSCOPY;  Surgeon: Genaro Pradhan MD;  Location: [address] [place] [address] ENDOSCOPY    HYSTERECTOMY  2002    OTHER SURGICAL HISTORY  2004? Parathyroid removal    OTHER SURGICAL HISTORY  08/2017    left foot surgery plate removed    TONSILLECTOMY         PATIENT SUMMARY   Pete Gonzáles is a [de-identified]year old y/o female who presents to therapy today with complaints of LBP and R LE pain which is the worst pain. She has had a recent R L4 and L5 TFESI on 7/10/203. History of current condition: chronic with insidious onset. Current functional limitations include, but are not limited to standing, walking, climbing stairs, lifting. Prior treatments: injections, PT  Recent accidents or falls: geovani Blackwood describes prior level of function as able to perform all desired ADL's with mild  difficulty. Pt goals included as physical therapy goals below. ASSESSMENT:   Pt presents with subjective complaints and functional limitations as noted above. Pt's function and objective finding are consistent with physician's diagnosis.   The results of the objective tests and measures demonstrate the following limitations: gait deviation - R antalgia; postural alterations w scoliosis; decreased lumbopelvic control; decreased lumbar ROM; decreased R hip IR; LBP w L ankle PF in stance; decreased flexibility; and decreased neural sciatic glide mobility. Pt and PT discussed evaluation findings, pathology, POC and HEP. Pt voiced understanding and performs HEP correctly without reported pain. Skilled Physical Therapy is medically necessary to address the above impairments and reach functional goals. In agreement with functional score and clinical rationale, this evaluation involved Moderate Complexity decision making due to 1-2 personal factors/comorbidities, 3 body structures involved/activity limitations, and evolving symptoms including changing pain levels. SUBJECTIVE:     Visit count 2024     Date 10/26/2023     LBP      Pain Range 5 to 10/10     Pain [address]       Better: sitting, lying  Sensation: none  Night: falls asleep - yes ; Position - back w R leg up sometimes, L side; Bedtime - 10; Hours of sleep - 8; Wakes - bathroom1-3x; Sweats - no. Incontinence: no  Saddle Anesthesia: no  OB-Gyn:  NVD0  GI: ulcerative colitis  Stress: high  Work:  Teacher, , book publishing co manager  Living environment: house, , his son  Stairs: 2 steps in, flight to bedroom, flight to basement, has chair lift  Unexplained wt loss: no  Increased pain w coughing, sneezing, straining in the bathroom: no  Blood thinners: no  Diabetes: no  Latex Allergy: no  Pacemaker: no     Are you being hurt, frightened, demeaned, or taken advantage of by anyone at your home or in your life? No   Have you recently had thoughts of hurting yourself?  No   Have you tried to hurt yourself in the past? No     OBJECTIVE:     Objective Initial Evaluation Data 10/26/2023:    Oswestry Disability Index Score  Score: 50 % (10/26/2023  3:16 PM)    Gait:        Deviations: antalgic off R LE       Devices: none       Assistance: none      Posture 10/26/2023   Alignment L lat shift, min B pronation, flattened thoracic and lumbar spine, significant L lumbar scoliosis        Sensation 10/26/2023   Dermatomes Light Touch Proximal medial thigh R (L2)  wnl   Proximal medial thigh L (L2) wnl   Above knee R (L3) wnl   Above knee L (L3) wnl   Medial arch R(L4) wnl   Medial arch L (L4) wnl   Between 1st - 2nd toes R (L5) wnl   Between 1st - 2nd toes L (L5) wnl   Lateral border of foot R (S1) wnl   Lateral border of foot L (S1) wnl   Popliteal fossa R (S2) wnl   Popliteal fossa L(S2) wnl       Abdominals 10/26/2023   Tone  good     Diastasis in fingers 10/26/2023   12 cm above umbilicus 1   6 cm above umbilicus 1   At umbilicus 1   6 cm below umbilicus 1   12 cm above umbilicus 1      Lumbopelvic 10/26/2023   Control  Fair d/t lack of lumbar mobility       ROM Lumbar 10/26/2023   Flexion min   Extension max   R SB min   L SB min   R Rotation wnl   L Rotation wnl   * pain limiting    ROM Hip 10/26/2023   Flex R 125 130   Flex L 125 130   ER R 45 45   ER L 45 50   IR R 40 28   IR L 40 40   *pain limiting     MMT 5/5 10/26/2023   L2- hip flex R 5   Hip flex L 5   L3- knee ext R 5   Knee ext L 5   L4- ankle DF R 5   Ankle DF L 5   L5- EHL R 5   EHL L 4 surg   S1- ankle PF R 5   Ankle PF L 5* LBP   S2- Hams R 5   Hams L 5   *pain limiting    LE flexibility 10/26/2023   Piriformis R min   Piriformis L wnl   Hams R wnl   Hams L wnl   *pain limiting    Neural mobility 10/26/2023   SLR R (+) 70 deg   SLR L (-) to 80 deg      DANIELLE 10/26/2023   R (-) min-mod loss of ROM   L (-) min-mod loss of ROM        Imaging:     PROCEDURE: MRI SPINE LUMBAR (CPT=72148)     COMPARISON: [facility], [facility] [employer], XR LUMBAR SPINE AP LAT FLEX EXT (CPT=72110), 6/01/2022, 1:58 PM.  [facility], [facility] [employer], MRI [address] [place], 6/18/2013, 12:27 PM.     INDICATIONS: Chronic bilateral low back pain radiating to right hip and leg. Spinal stenosis. History of uterine cancer     TECHNIQUE: A variety of imaging planes and parameters were utilized for visualization of suspected pathology.      FINDINGS:  NUMERATION: For the purposes of this examination, the lowest fully formed disc space is designated as L5-S1.  ALIGNMENT: There is preservation of the expected lumbar lordosis. Moderate levoscoliosis; degenerative-type grade I anterolisthesis of L4 relative to L5  BONES: No acute lumbar spine fracture. Endplate edematous degenerative changes at L2-L3. CORD/CAUDA EQUINA: The distal cord and nerve roots have normal caliber, contour, and signal intensity. PARASPINAL AREA: No visible mass. OTHER: There is a 5.7 cm left pelvic cyst.  Probable 1.4 cm hepatic cyst noted on localizer sequence. LUMBAR DISC LEVELS:  L1-L2: Diffuse disc bulge with ligamentum flavum redundancy, dorsal epidural lipomatosis, and bilateral facet arthropathy. Moderate left and mild right neural foraminal with mild spinal canal stenosis. L2-L3: Right eccentric disc bulge with ligamentum flavum redundancy, mild dorsal epidural lipomatosis, and mild right greater than left facet arthropathy. Moderate right and minor left neural foraminal with mild spinal canal stenosis. L3-L4: Left eccentric disc bulge with left greater than right facet arthropathy, ligamentum flavum redundancy, and mild dorsal epidural lipomatosis. Mild-to-moderate bilateral neural foraminal and mild spinal canal stenosis. L4-L5: Large diffuse disc bulge with left greater than right hypertrophic facet arthropathy in addition to ligamentum flavum redundancy. Moderate to severe multifactorial spinal canal with moderate to severe left greater than right neural foraminal  stenosis. L5-S1: Left eccentric disc bulge with left greater than right facet arthropathy, which results in moderate left neural foraminal stenosis with no additional significant neural compromise. Impression   CONCLUSION:     1. Advanced multilevel degenerative changes of the lumbar spine as detailed. Notable levels as follows:     2.  L4-L5:  Moderate to severe spinal canal with moderate to severe left greater than right neural foraminal stenosis. 3. L5-S1:  Moderate left neural foraminal stenosis. 4. L3-L4:  Mild-to-moderate bilateral neural foraminal and mild spinal canal stenosis. 5. L2-L3:  Moderate right and minor left neural foraminal with mild spinal canal stenosis. 6. L1-L2:  Moderate right and mild left neural foraminal stenosis. 7. Moderate levoscoliosis of the lumbar spine. 8. Degenerative-type grade I anterolisthesis of L4 relative to L5.     9. Endplate edematous degenerative changes at L2-L3. 10. Incidentally noted 6.8 cm left pelvic cyst.  This appears relatively similar in size as compared with prior abdominal CT from December, 2019. 11. Lesser incidental findings as above. Dictated by (CST): Leandro Hoffman MD on 6/01/2022 at 3:38 PM      Finalized by (CST): Leandro Hoffman MD on 6/01/2022 at 3:46 PM       PROCEDURE: XR LUMBAR SPINE AP LAT FLEX EXT EM (CPT=72120)     COMPARISON: [facility] [employer], XR LUMBAR SPINE (MIN 2 VIEWS) (CPT=72100), 11/02/2020, 8:40 AM.  [facility] [employer], MRI SPINE LUMBAR (GHY=02168), 6/01/2022, 1:49 PM.     INDICATIONS: Chronic atraumatic bilateral lower back pain radiating to right hip and tailbone. TECHNIQUE: Lumbar spine radiographs with flexion and extension views       FINDINGS:     ALIGNMENT:   Moderate mid lumbar rotary levoscoliosis. Mild grade 1 anterolisthesis of L4 in relation L5. VERTEBRAL BODIES:   No acute appearing fracture. Biconcave appearance to L1-L2 probably chronic. DISC SPACES: Severe intervertebral disc space narrowing at L2-L3, L4-L5 and L5-S1 and moderate narrowing at L1-L2 and L3-L4. Endplate sclerosis and bony proliferative change at L2-L3 and L5-S1. SACROILIAC JOINTS: Normal.    FLEXION/EXTENSION VIEWS:   Normal range of motion. No subluxation during flexion and extension. OTHER:   Multiple clips in the right and left hemipelvis. Impression   CONCLUSION:  1. Redemonstration of a moderate mid lumbar rotary levoscoliosis. No significant acute fracture or new subluxation. Grade 1 anterolisthesis of L4 in relation L5 without significant movement during flexion or extension. Moderate multilevel spondylosis  as discussed above fairly stable. Dictated by (CST): Nohemi Castle MD on 6/02/2022 at 4:51 PM      Finalized by (CST): Nohemi Castle MD on 6/02/2022 at 4:55 PM     Treatment performed this date:    Therapeutic Exercise:  Visit #   1/8   Position Exercise HEP 10/26/2023   Supine  SKC H X    DKC H X    LTR H  X    Cross leg piriformis R H X    Sciatic nerve glide H X   Sitting Piriformis stretch H X     Leg pumps nerve glides H X   H = HEP. Pt given copies of this exercise for home program.  X = Exercises done this date - pt verbalized understanding and demonstrated competence. All exercises done B unless otherwise indicated. Pt advised to discontinue exercises that increase pain and to call or return to therapist to discuss. Each intervention above is specifically prescribed to address the patients identified impairments, activity limitations, and participation restrictions. ASSESSMENT:     Physical Therapy Goals: From 10/26/2023 to 1/24/2024  - Created with patient input during initial evaluation   1. Pt will be independent in beginning level of HEP for stretching, posture and strengthening. 2. Pt will be able to walk for 15 min without increased symptoms. 3. Pt will be able to stand to make a small meal without increased symptoms. 4. Pt will be able to ascend/descend stairs reciprocally without increased symptoms. 5. Pt will be able to lift grocery bags without increased symptoms.      PLAN OF CARE:      Frequency / Duration: Patient will be seen for 1-2x/week decreasing to every other week for a total of 18 visits over a 90 day period for therapeutic exercises, posture retraining, therapeutic activities, manual treatment, neuromuscular reeducation, therapeutic pain neuroscience education, patient education, modalities as needed, home exercise program.    Education or treatment limitation: None  Rehab Potential:good    Patient was advised of these findings, precautions, goals of treatment, plan of care, beginning self care and treatment options and has agreed to actively participate in planning and for this course of care. Pt educated on possible soreness after evaluation w use of modalities as needed (ice/heat), postural corrections and the importance of staying active. Charges: PT Eval 2, TE 1     Total Timed treatment: 12 min      Total Treatment Time: 45 min    Thank you for your referral. Please co-sign or sign and return this letter via fax as soon as possible to [phone]. If you have any questions, please contact me at Dept: [phone]    Sincerely,  Electronically signed by therapist: Sathish Sharpe PT    [de-identified] certification required: Yes  I certify the need for these services furnished under this plan of treatment and while under my care. X___________________________________________________ Date____________________    Certification From: 50/26/7671  To:1/24/2024        __________________________________________________________________________________________      21st Century Cures Act Notice to Patient: Medical documents like this are made available to patients in the interest of transparency. However, be advised this is a medical document and it is intended as [id number] communication between your medical providers. This medical document may contain abbreviations, assessments, medical data, and results or other terms that are unfamiliar. Medical documents are intended to carry relevant information, facts as evident, and the clinical opinion of the practitioner. As such, this medical document may be written in language that appears blunt or direct.  You are encouraged to contact your medical provider and/or [facility] if you have any questions about this medical document.

## 2023-10-27 NOTE — TELEPHONE ENCOUNTER
Pt taking Aspirin 81 mg ordered by Dr Solomon Sanderson. Informed patient that we need clearance from Dr Solomon Sanderson to hold aspirin for 5 days prior to the injection. Pt verbalized understanding. [place] letter faxed to [facility] #[phone] Fax #: [phone].       Status of Anticoag  [x] Clearance pending   [] Clearance approved  [] Clearance denied

## 2023-10-30 ENCOUNTER — OFFICE VISIT (OUTPATIENT)
Dept: PHYSICAL THERAPY | Facility: HOSPITAL | Age: 78
End: 2023-10-30
Attending: PHYSICAL MEDICINE & REHABILITATION
Payer: MEDICARE

## 2023-10-30 PROCEDURE — 97110 THERAPEUTIC EXERCISES: CPT

## 2023-10-30 PROCEDURE — 97112 NEUROMUSCULAR REEDUCATION: CPT

## 2023-10-30 PROCEDURE — 97530 THERAPEUTIC ACTIVITIES: CPT

## 2023-10-30 NOTE — PROGRESS NOTES
Zulemada Aid    6/14/1945  Referring Physician:  Ronald Adrian  Diagnosis: Spinal stenosis of lumbar region with neurogenic claudication (M48.062)  Lumbar disc disease (M51.9)  Lumbar foraminal stenosis (M48.061)  Lumbar radiculopathy (M54.16)  Other idiopathic scoliosis, thoracolumbar region (M41.25)  Spondylolisthesis of lumbar region (M43.16)  Malignant neoplasm of corpus uteri, except isthmus (HCC) (C54.9)  Initial Evaluation Date: 10/26/2023  Date of Surgery: None for this diagnosis   Authorized # of visits NA by 10/2024    Precautions/Hx: arthritis, DVT, HTN, L 1st MTP surg, HLD, parathyroid removed, TAHBSO for uterine cancer, ulcerative colitis, appy, scoliosis  R L4-L5 TFESI 3/28/2023 and 7/10/203    SUBJECTIVE:     Pt reports that she had a couple episodes of sharp pain into the R LE. Visit count 1/24/2024 1/8 2/8    Date 10/26/2023 10/30/2023     LBP/ LRLE      Pain Range 5 to 10/10 4-10/10    Pain Ave 7/10 6-7/10       OBJECTIVE:     Treatment performed this date:    Therapeutic Exercise:  Visit #   1/8 2/8   Position Exercise HEP 10/26/2023 10/30/2023    Supine  SKC H X X     DKC H X X     LTR H  X X     Lat reach stretch isp H X X     Lat reach stretch contra   X mild increased sx    Sciatic nerve glide H X x    Piriformis stretch H  X 3x10s    Trunk elongation L LE, R UE reach H  X 10x    Trunk elongation B LE/UE reach H  X 10x   Sidelying  R trunk rot nerve glide H  X    Sitting Piriformis stretch H X      Leg pumps nerve glides H X     Trunk flex   X 10x    Trunk flex w forearms to    X 10x on the exhale    Cross leg piriformis R   X     Cross leg piriformis w trunk rot   X    Neuro Re-ed   X see assessment    Ther Act function   X supine<>sit   H = HEP. Pt given copies of this exercise for home program.  X = Exercises done this date - pt verbalized understanding and demonstrated competence. All exercises done B unless otherwise indicated.    Pt advised to discontinue exercises that increase pain and to call or return to therapist to discuss. Each intervention above is specifically prescribed to address the patients identified impairments, activity limitations, and participation restrictions. ASSESSMENT:     Pt doing well with HEP. She has sudden intermittent sharp pain into the gluteals to the L LE. Pt educated further on stenosis and anterolisthesis with use of models and diagrams. Pt instructed in immediate re-position at time of radicular pain. Pt demonstrates small tolerance of ROM prior to onset of radicular symptoms. Pt did well with sciatic nerve glide w addition of rotation unloading. Pt had good performance of supine elongation for scoliosis after instruction w V/T cues. Physical Therapy Goals: From 10/26/2023 to 1/24/2024  - Created with patient input during initial evaluation   1. Pt will be independent in beginning level of HEP for stretching, posture and strengthening. 2. Pt will be able to walk for 15 min without increased symptoms. 3. Pt will be able to stand to make a small meal without increased symptoms. 4. Pt will be able to ascend/descend stairs reciprocally without increased symptoms. 5. Pt will be able to lift grocery bags without increased symptoms. PLAN OF CARE:      Continue PT per original plan for therapeutic exercises, posture retraining, therapeutic activities, manual treatment, neuromuscular reeducation, therapeutic pain neuroscience education, patient education, self care home management, home exercise program, and modalities as needed. Charged Units Units Minutes   Ther Ex 2 35   Neuro 1 15   Ther Activity 1 8   Gait     Man Tx          Total Timed  58   Total Tx Time  58           __________________________________________________________________________________________      21st Century Cures Act Notice to Patient: Medical documents like this are made available to patients in the interest of transparency.  However, be advised this is a medical document and it is intended as [id number] communication between your medical providers. This medical document may contain abbreviations, assessments, medical data, and results or other terms that are unfamiliar. Medical documents are intended to carry relevant information, facts as evident, and the clinical opinion of the practitioner. As such, this medical document may be written in language that appears blunt or direct. You are encouraged to contact your medical provider and/or [facility] Patient Experience if you have any questions about this medical document.     Objective Initial Evaluation Data 10/26/2023:    Oswestry Disability Index Score  Score: 50 % (10/26/2023  3:16 PM)    Gait:        Deviations: antalgic off R LE       Devices: none       Assistance: none      Posture 10/26/2023   Alignment L lat shift, min B pronation, flattened thoracic and lumbar spine, significant L lumbar scoliosis        Sensation 10/26/2023   Dermatomes Light Touch    Proximal medial thigh R (L2)  wnl   Proximal medial thigh L (L2) wnl   Above knee R (L3) wnl   Above knee L (L3) wnl   Medial arch R(L4) wnl   Medial arch L (L4) wnl   Between 1st - 2nd toes R (L5) wnl   Between 1st - 2nd toes L (L5) wnl   Lateral border of foot R (S1) wnl   Lateral border of foot L (S1) wnl   Popliteal fossa R (S2) wnl   Popliteal fossa L(S2) wnl       Abdominals 10/26/2023   Tone  good     Diastasis in fingers 10/26/2023   12 cm above umbilicus 1   6 cm above umbilicus 1   At umbilicus 1   6 cm below umbilicus 1   12 cm above umbilicus 1      Lumbopelvic 10/26/2023   Control  Fair d/t lack of lumbar mobility       ROM Lumbar 10/26/2023   Flexion min   Extension max   R SB min   L SB min   R Rotation wnl   L Rotation wnl   * pain limiting    ROM Hip 10/26/2023   Flex R 125 130   Flex L 125 130   ER R 45 45   ER L 45 50   IR R 40 28   IR L 40 40   *pain limiting     MMT 5/5 10/26/2023   L2- hip flex R 5   Hip flex L 5   L3- knee ext R 5 Knee ext L 5   L4- ankle DF R 5   Ankle DF L 5   L5- EHL R 5   EHL L 4 surg   S1- ankle PF R 5   Ankle PF L 5* LBP   S2- Hams R 5   Hams L 5   *pain limiting    LE flexibility 10/26/2023   Piriformis R min   Piriformis L wnl   Hams R wnl   Hams L wnl   *pain limiting    Neural mobility 10/26/2023   SLR R (+) 70 deg   SLR L (-) to 80 deg      DANIELLE 10/26/2023   R (-) min-mod loss of ROM   L (-) min-mod loss of ROM        Imaging:     PROCEDURE: MRI SPINE LUMBAR (CPT=72148)     COMPARISON: [facility] [place], [email or web address], XR LUMBAR SPINE AP LAT FLEX EXT (CPT=72110), 6/01/2022, 1:58 PM.  [facility], [facility]. [email or web address], MRI [address] [place], 6/18/2013, 12:27 PM.     INDICATIONS: Chronic bilateral low back pain radiating to right hip and leg. Spinal stenosis. History of uterine cancer     TECHNIQUE: A variety of imaging planes and parameters were utilized for visualization of suspected pathology. FINDINGS:  NUMERATION: For the purposes of this examination, the lowest fully formed disc space is designated as L5-S1.  ALIGNMENT: There is preservation of the expected lumbar lordosis. Moderate levoscoliosis; degenerative-type grade I anterolisthesis of L4 relative to L5  BONES: No acute lumbar spine fracture. Endplate edematous degenerative changes at L2-L3. CORD/CAUDA EQUINA: The distal cord and nerve roots have normal caliber, contour, and signal intensity. PARASPINAL AREA: No visible mass. OTHER: There is a 5.7 cm left pelvic cyst.  Probable 1.4 cm hepatic cyst noted on localizer sequence. LUMBAR DISC LEVELS:  L1-L2: Diffuse disc bulge with ligamentum flavum redundancy, dorsal epidural lipomatosis, and bilateral facet arthropathy. Moderate left and mild right neural foraminal with mild spinal canal stenosis. L2-L3: Right eccentric disc bulge with ligamentum flavum redundancy, mild dorsal epidural lipomatosis, and mild right greater than left facet arthropathy.   Moderate right and minor left neural foraminal with mild spinal canal stenosis. L3-L4: Left eccentric disc bulge with left greater than right facet arthropathy, ligamentum flavum redundancy, and mild dorsal epidural lipomatosis. Mild-to-moderate bilateral neural foraminal and mild spinal canal stenosis. L4-L5: Large diffuse disc bulge with left greater than right hypertrophic facet arthropathy in addition to ligamentum flavum redundancy. Moderate to severe multifactorial spinal canal with moderate to severe left greater than right neural foraminal  stenosis. L5-S1: Left eccentric disc bulge with left greater than right facet arthropathy, which results in moderate left neural foraminal stenosis with no additional significant neural compromise. Impression   CONCLUSION:     1. Advanced multilevel degenerative changes of the lumbar spine as detailed. Notable levels as follows:     2. L4-L5:  Moderate to severe spinal canal with moderate to severe left greater than right neural foraminal stenosis. 3. L5-S1:  Moderate left neural foraminal stenosis. 4. L3-L4:  Mild-to-moderate bilateral neural foraminal and mild spinal canal stenosis. 5. L2-L3:  Moderate right and minor left neural foraminal with mild spinal canal stenosis. 6. L1-L2:  Moderate right and mild left neural foraminal stenosis. 7. Moderate levoscoliosis of the lumbar spine. 8. Degenerative-type grade I anterolisthesis of L4 relative to L5.     9. Endplate edematous degenerative changes at L2-L3. 10. Incidentally noted 6.8 cm left pelvic cyst.  This appears relatively similar in size as compared with prior abdominal CT from December, 2019. 11. Lesser incidental findings as above.           Dictated by (CST): Ricardo Ray MD on 6/01/2022 at 3:38 PM      Finalized by (CST): Ricardo Ray MD on 6/01/2022 at 3:46 PM       PROCEDURE: XR LUMBAR SPINE AP LAT FLEX EXT EM (CPT=72120)     COMPARISON: [facility], [facility]. [facility] (MIN 2 VIEWS) (CPT=72100), 11/02/2020, 8:40 AM.  [facility], [facility]. for [facility], MRI SPINE LUMBAR (CAS=[id number]), 6/01/2022, 1:49 PM.     INDICATIONS: Chronic atraumatic bilateral lower back pain radiating to right hip and tailbone. TECHNIQUE: Lumbar spine radiographs with flexion and extension views       FINDINGS:     ALIGNMENT:   Moderate mid lumbar rotary levoscoliosis. Mild grade 1 anterolisthesis of L4 in relation L5. VERTEBRAL BODIES:   No acute appearing fracture. Biconcave appearance to L1-L2 probably chronic. DISC SPACES: Severe intervertebral disc space narrowing at L2-L3, L4-L5 and L5-S1 and moderate narrowing at L1-L2 and L3-L4. Endplate sclerosis and bony proliferative change at L2-L3 and L5-S1. SACROILIAC JOINTS: Normal.    FLEXION/EXTENSION VIEWS:   Normal range of motion. No subluxation during flexion and extension. OTHER:   Multiple clips in the right and left hemipelvis. Impression   CONCLUSION:  1. Redemonstration of a moderate mid lumbar rotary levoscoliosis. No significant acute fracture or new subluxation. Grade 1 anterolisthesis of L4 in relation L5 without significant movement during flexion or extension. Moderate multilevel spondylosis  as discussed above fairly stable.            Dictated by (CST): Pastora Snow MD on 6/02/2022 at 4:51 PM      Finalized by (CST): Pastora Snow MD on 6/02/2022 at 4:55 PM

## 2023-10-31 ENCOUNTER — TELEPHONE (OUTPATIENT)
Dept: INTERNAL MEDICINE CLINIC | Facility: CLINIC | Age: 78
End: 2023-10-31

## 2023-10-31 NOTE — TELEPHONE ENCOUNTER
Pre op order from Dr. Ashley Winchester to hold low dose aspirin for 5 days prior to surgery faxed, confirmation received.  Sent to scan    Fax # [phone]

## 2023-11-06 ENCOUNTER — OFFICE VISIT (OUTPATIENT)
Dept: PHYSICAL THERAPY | Facility: HOSPITAL | Age: 78
End: 2023-11-06
Attending: PHYSICAL MEDICINE & REHABILITATION
Payer: MEDICARE

## 2023-11-06 ENCOUNTER — HOSPITAL ENCOUNTER (OUTPATIENT)
Dept: GENERAL RADIOLOGY | Facility: HOSPITAL | Age: 78
Discharge: HOME | End: 2023-11-06
Attending: PHYSICAL MEDICINE & REHABILITATION
Payer: MEDICARE

## 2023-11-06 DIAGNOSIS — G89.29 CHRONIC RIGHT HIP PAIN: ICD-10-CM

## 2023-11-06 DIAGNOSIS — M25.551 CHRONIC RIGHT HIP PAIN: ICD-10-CM

## 2023-11-06 PROCEDURE — 97530 THERAPEUTIC ACTIVITIES: CPT

## 2023-11-06 PROCEDURE — 97110 THERAPEUTIC EXERCISES: CPT

## 2023-11-06 PROCEDURE — 73502 X-RAY EXAM HIP UNI 2-3 VIEWS: CPT | Performed by: PHYSICAL MEDICINE & REHABILITATION

## 2023-11-06 PROCEDURE — 97112 NEUROMUSCULAR REEDUCATION: CPT

## 2023-11-06 NOTE — PROGRESS NOTES
Christy Smithuton    6/14/1945  Referring Physician:  Lulú Adrian  Diagnosis: Spinal stenosis of lumbar region with neurogenic claudication (M48.062)  Lumbar disc disease (M51.9)  Lumbar foraminal stenosis (M48.061)  Lumbar radiculopathy (M54.16)  Other idiopathic scoliosis, thoracolumbar region (M41.25)  Spondylolisthesis of lumbar region (M43.16)  Malignant neoplasm of corpus uteri, except isthmus (HCC) (C54.9)  Initial Evaluation Date: 10/26/2023  Date of Surgery: None for this diagnosis   Authorized # of visits NA by 10/2024    Precautions/Hx: arthritis, DVT, HTN, L 1st MTP surg, HLD, parathyroid removed, TAHBSO for uterine cancer, ulcerative colitis, appy, scoliosis  R L4-L5 TFESI 3/28/2023 and 7/10/203    SUBJECTIVE:     Pt reports that she is have episodes of no pain after sleeping on her L side. She has much less pain with walking for the first few steps in the am.  She continues to have increases to severe pain if she continues to stand or walk. She notes that she has less pain in the R LE if she walks with her R leg stiff.      Visit count 1/24/2024 1/8 2/8 3/8   Date 10/26/2023 10/30/2023  11/6/2023    LBP/ R LE      Pain Range 5 to 10/10 4-10/10 0-8/10   Pain Ave 7/10 6-7/10 5-6/10      OBJECTIVE:     Treatment performed this date:    Therapeutic Exercise:  Visit #   1/8 2/8 3/8   Position Exercise HEP 10/26/2023 10/30/2023  11/6/2023    Supine  SKC H X X      DKC H X X      LTR H  X X      Lat reach stretch isp H X X      Lat reach stretch contra   X mild increased sx     Sciatic nerve glide H X x     Piriformis stretch H  X 3x10s     Trunk elongation L LE, R UE reach H  X 10x     Trunk elongation B LE/UE reach H  X 10x    Sidelying  R trunk rot nerve glide H  X     Sitting Piriformis stretch H X   X 3x    Leg pumps nerve glides H X  X 10x    Trunk flex   X 10x X 10x    Trunk flex w forearms to thighs   X 10x on the exhale X 10x2 cues    Cross leg piriformis R   X      Cross leg piriformis w trunk rot   X  X 3x    Functional squat h   x   Neuro Re-ed   X see assessment  X see assessment    Ther Act function   X supine<>sit X sit>stand   H = HEP. Pt given copies of this exercise for home program.  X = Exercises done this date - pt verbalized understanding and demonstrated competence. All exercises done B unless otherwise indicated. Pt advised to discontinue exercises that increase pain and to call or return to therapist to discuss. Each intervention above is specifically prescribed to address the patients identified impairments, activity limitations, and participation restrictions. ASSESSMENT:     Pt doing well with corrective positioning for sleeping to aid in neutral spinal alignment and relief of neurogenic claudication. Pt given thorough understanding of spinal anatomy related to her central and foraminal stenosis. Pt educated on mobility and postural deficits w review of imaging to reinforce corrective exercises and positional awareness. Pt shown models, diagrams, demonstration with discussion and verbalized understanding. Pt asking good clarifying questions about self care and HEP. Reviewed use of daily activities to maintain good strength. Instructed in controlled descent stand to sit. Continue to emphasize the importance of changing positions at initial onset of radicular symptoms. Physical Therapy Goals: From 10/26/2023 to 1/24/2024  - Created with patient input during initial evaluation   1. Pt will be independent in beginning level of HEP for stretching, posture and strengthening. 2. Pt will be able to walk for 15 min without increased symptoms. 3. Pt will be able to stand to make a small meal without increased symptoms. 4. Pt will be able to ascend/descend stairs reciprocally without increased symptoms. 5. Pt will be able to lift grocery bags without increased symptoms.      PLAN OF CARE:      Continue PT per original plan for therapeutic exercises, posture retraining, therapeutic activities, manual treatment, neuromuscular reeducation, therapeutic pain neuroscience education, patient education, self care home management, home exercise program, and modalities as needed. Charged Units Units Minutes    Ther Ex 1 20   Neuro 2 25   Ther Activity 1 10   Gait     Man Tx          Total Timed  55   Total Tx Time  55           __________________________________________________________________________________________      21st Century Cures Act Notice to Patient: Medical documents like this are made available to patients in the interest of transparency. However, be advised this is a medical document and it is intended as hlcl-in-urzo communication between your medical providers. This medical document may contain abbreviations, assessments, medical data, and results or other terms that are unfamiliar. Medical documents are intended to carry relevant information, facts as evident, and the clinical opinion of the practitioner. As such, this medical document may be written in language that appears blunt or direct. You are encouraged to contact your medical provider and/or [facility] Patient Experience if you have any questions about this medical document.     Objective Initial Evaluation Data 10/26/2023:    Oswestry Disability Index Score  Score: 50 % (10/26/2023  3:16 PM)    Gait:        Deviations: antalgic off R LE       Devices: none       Assistance: none      Posture 10/26/2023   Alignment L lat shift, min B pronation, flattened thoracic and lumbar spine, significant L lumbar scoliosis        Sensation 10/26/2023   Dermatomes Light Touch    Proximal medial thigh R (L2)  wnl   Proximal medial thigh L (L2) wnl   Above knee R (L3) wnl   Above knee L (L3) wnl   Medial arch R(L4) wnl   Medial arch L (L4) wnl   Between 1st - 2nd toes R (L5) wnl   Between 1st - 2nd toes L (L5) wnl   Lateral border of foot R (S1) wnl   Lateral border of foot L (S1) wnl   Popliteal fossa R (S2) wnl Popliteal fossa L(S2) wnl       Abdominals 10/26/2023   Tone  good     Diastasis in fingers 10/26/2023   12 cm above umbilicus 1   6 cm above umbilicus 1   At umbilicus 1   6 cm below umbilicus 1   12 cm above umbilicus 1      Lumbopelvic 10/26/2023   Control  Fair d/t lack of lumbar mobility       ROM Lumbar 10/26/2023   Flexion min   Extension max   R SB min   L SB min   R Rotation wnl   L Rotation wnl   * pain limiting    ROM Hip 10/26/2023   Flex R 125 130   Flex L 125 130   ER R 45 45   ER L 45 50   IR R 40 28   IR L 40 40   *pain limiting     MMT 5/5 10/26/2023   L2- hip flex R 5   Hip flex L 5   L3- knee ext R 5   Knee ext L 5   L4- ankle DF R 5   Ankle DF L 5   L5- EHL R 5   EHL L 4 surg   S1- ankle PF R 5   Ankle PF L 5* LBP   S2- Hams R 5   Hams L 5   *pain limiting    LE flexibility 10/26/2023   Piriformis R min   Piriformis L wnl   Hams R wnl   Hams L wnl   *pain limiting    Neural mobility 10/26/2023   SLR R (+) 70 deg   SLR L (-) to 80 deg      DANIELLE 10/26/2023   R (-) min-mod loss of ROM   L (-) min-mod loss of ROM        Imaging:     PROCEDURE: MRI SPINE LUMBAR (CPT=72148)     COMPARISON: [facility], [facility] [employer], XR LUMBAR SPINE AP LAT FLEX EXT (CPT=72110), 6/01/2022, 1:58 PM.  [facility], [facility], MRI [address], 6/18/2013, 12:27 PM.     INDICATIONS: Chronic bilateral low back pain radiating to right hip and leg. Spinal stenosis. History of uterine cancer     TECHNIQUE: A variety of imaging planes and parameters were utilized for visualization of suspected pathology. FINDINGS:  NUMERATION: For the purposes of this examination, the lowest fully formed disc space is designated as L5-S1.  ALIGNMENT: There is preservation of the expected lumbar lordosis. Moderate levoscoliosis; degenerative-type grade I anterolisthesis of L4 relative to L5  BONES: No acute lumbar spine fracture. Endplate edematous degenerative changes at L2-L3.   CORD/CAUDA EQUINA: The distal cord and nerve roots have normal caliber, contour, and signal intensity. PARASPINAL AREA: No visible mass. OTHER: There is a 5.7 cm left pelvic cyst.  Probable 1.4 cm hepatic cyst noted on localizer sequence. LUMBAR DISC LEVELS:  L1-L2: Diffuse disc bulge with ligamentum flavum redundancy, dorsal epidural lipomatosis, and bilateral facet arthropathy. Moderate left and mild right neural foraminal with mild spinal canal stenosis. L2-L3: Right eccentric disc bulge with ligamentum flavum redundancy, mild dorsal epidural lipomatosis, and mild right greater than left facet arthropathy. Moderate right and minor left neural foraminal with mild spinal canal stenosis. L3-L4: Left eccentric disc bulge with left greater than right facet arthropathy, ligamentum flavum redundancy, and mild dorsal epidural lipomatosis. Mild-to-moderate bilateral neural foraminal and mild spinal canal stenosis. L4-L5: Large diffuse disc bulge with left greater than right hypertrophic facet arthropathy in addition to ligamentum flavum redundancy. Moderate to severe multifactorial spinal canal with moderate to severe left greater than right neural foraminal  stenosis. L5-S1: Left eccentric disc bulge with left greater than right facet arthropathy, which results in moderate left neural foraminal stenosis with no additional significant neural compromise. Impression   CONCLUSION:     1. Advanced multilevel degenerative changes of the lumbar spine as detailed. Notable levels as follows:     2. L4-L5:  Moderate to severe spinal canal with moderate to severe left greater than right neural foraminal stenosis. 3. L5-S1:  Moderate left neural foraminal stenosis. 4. L3-L4:  Mild-to-moderate bilateral neural foraminal and mild spinal canal stenosis. 5. L2-L3:  Moderate right and minor left neural foraminal with mild spinal canal stenosis. 6. L1-L2:  Moderate right and mild left neural foraminal stenosis.      7. Moderate levoscoliosis of the lumbar spine. 8. Degenerative-type grade I anterolisthesis of L4 relative to L5.     9. Endplate edematous degenerative changes at L2-L3. 10. Incidentally noted 6.8 cm left pelvic cyst.  This appears relatively similar in size as compared with prior abdominal CT from December, 2019. 11. Lesser incidental findings as above. Dictated by (CST): Caty Vick MD on 6/01/2022 at 3:38 PM      Finalized by (CST): Caty Vick MD on 6/01/2022 at 3:46 PM       PROCEDURE: XR LUMBAR SPINE AP LAT FLEX EXT EM (CPT=72120)     COMPARISON: [facility], [facility]. [employer], XR LUMBAR SPINE (MIN 2 VIEWS) (CPT=72100), 11/02/2020, 8:40 AM.  [facility], MRI SPINE LUMBAR (URY=[id number]), 6/01/2022, 1:49 PM.     INDICATIONS: Chronic atraumatic bilateral lower back pain radiating to right hip and tailbone. TECHNIQUE: Lumbar spine radiographs with flexion and extension views       FINDINGS:     ALIGNMENT:   Moderate mid lumbar rotary levoscoliosis. Mild grade 1 anterolisthesis of L4 in relation L5. VERTEBRAL BODIES:   No acute appearing fracture. Biconcave appearance to L1-L2 probably chronic. DISC SPACES: Severe intervertebral disc space narrowing at L2-L3, L4-L5 and L5-S1 and moderate narrowing at L1-L2 and L3-L4. Endplate sclerosis and bony proliferative change at L2-L3 and L5-S1. SACROILIAC JOINTS: Normal.    FLEXION/EXTENSION VIEWS:   Normal range of motion. No subluxation during flexion and extension. OTHER:   Multiple clips in the right and left hemipelvis. Impression   CONCLUSION:  1. Redemonstration of a moderate mid lumbar rotary levoscoliosis. No significant acute fracture or new subluxation. Grade 1 anterolisthesis of L4 in relation L5 without significant movement during flexion or extension. Moderate multilevel spondylosis  as discussed above fairly stable.            Dictated by (CST): Mark Bello MD on 6/02/2022 at 4:51 PM Finalized by (CST): Nohemi Castle MD on 6/02/2022 at 4:55 PM

## 2023-11-10 ENCOUNTER — LAB ENCOUNTER (OUTPATIENT)
Dept: LAB | Age: 78
End: 2023-11-10
Attending: INTERNAL MEDICINE
Payer: MEDICARE

## 2023-11-10 ENCOUNTER — OFFICE VISIT (OUTPATIENT)
Dept: INTERNAL MEDICINE CLINIC | Facility: CLINIC | Age: 78
End: 2023-11-10

## 2023-11-10 VITALS
HEART RATE: 60 BPM | SYSTOLIC BLOOD PRESSURE: 148 MMHG | DIASTOLIC BLOOD PRESSURE: 68 MMHG | HEIGHT: 62.5 IN | BODY MASS INDEX: 22.97 KG/M2 | TEMPERATURE: 98 F | WEIGHT: 128 LBS

## 2023-11-10 DIAGNOSIS — Z80.0 FAMILY HISTORY OF COLON CANCER: ICD-10-CM

## 2023-11-10 DIAGNOSIS — I10 ESSENTIAL HYPERTENSION: ICD-10-CM

## 2023-11-10 DIAGNOSIS — D51.0 PERNICIOUS ANEMIA: ICD-10-CM

## 2023-11-10 DIAGNOSIS — E53.8 VITAMIN B12 DEFICIENCY: ICD-10-CM

## 2023-11-10 DIAGNOSIS — R41.3 MEMORY DEFICIT: ICD-10-CM

## 2023-11-10 DIAGNOSIS — I10 ESSENTIAL HYPERTENSION: Primary | ICD-10-CM

## 2023-11-10 DIAGNOSIS — Z00.00 ROUTINE HEALTH MAINTENANCE: ICD-10-CM

## 2023-11-10 DIAGNOSIS — Z87.19 HISTORY OF CHRONIC ULCERATIVE COLITIS: ICD-10-CM

## 2023-11-10 LAB
ANION GAP SERPL CALC-SCNC: 5 MMOL/L (ref 0–18)
ATRIAL RATE: 53 BPM
BASOPHILS # BLD AUTO: 0.04 X10(3) UL (ref 0–0.2)
BASOPHILS NFR BLD AUTO: 0.6 %
BUN BLD-MCNC: 18 MG/DL (ref 9–23)
BUN/CREAT SERPL: 20.5 (ref 10–20)
CALCIUM BLD-MCNC: 10 MG/DL (ref 8.7–10.4)
CHLORIDE SERPL-SCNC: 100 MMOL/L (ref 98–112)
CO2 SERPL-SCNC: 28 MMOL/L (ref 21–32)
CREAT BLD-MCNC: 0.88 MG/DL
DEPRECATED RDW RBC AUTO: 47.5 FL (ref 35.1–46.3)
EGFRCR SERPLBLD CKD-EPI 2021: 66 ML/MIN/1.73M2 (ref 60–?)
EOSINOPHIL # BLD AUTO: 0.35 X10(3) UL (ref 0–0.7)
EOSINOPHIL NFR BLD AUTO: 5.1 %
ERYTHROCYTE [DISTWIDTH] IN BLOOD BY AUTOMATED COUNT: 13 % (ref 11–15)
FASTING STATUS PATIENT QL REPORTED: YES
GLUCOSE BLD-MCNC: 99 MG/DL (ref 70–99)
HCT VFR BLD AUTO: 36.6 %
HGB BLD-MCNC: 12 G/DL
IMM GRANULOCYTES # BLD AUTO: 0.03 X10(3) UL (ref 0–1)
IMM GRANULOCYTES NFR BLD: 0.4 %
LYMPHOCYTES # BLD AUTO: 1.61 X10(3) UL (ref 1–4)
LYMPHOCYTES NFR BLD AUTO: 23.5 %
MCH RBC QN AUTO: 32.6 PG (ref 26–34)
MCHC RBC AUTO-ENTMCNC: 32.8 G/DL (ref 31–37)
MCV RBC AUTO: 99.5 FL
MONOCYTES # BLD AUTO: 0.94 X10(3) UL (ref 0.1–1)
MONOCYTES NFR BLD AUTO: 13.7 %
NEUTROPHILS # BLD AUTO: 3.89 X10 (3) UL (ref 1.5–7.7)
NEUTROPHILS # BLD AUTO: 3.89 X10(3) UL (ref 1.5–7.7)
NEUTROPHILS NFR BLD AUTO: 56.7 %
OSMOLALITY SERPL CALC.SUM OF ELEC: 278 MOSM/KG (ref 275–295)
P AXIS: 55 DEGREES
P-R INTERVAL: 162 MS
PLATELET # BLD AUTO: 249 10(3)UL (ref 150–450)
POTASSIUM SERPL-SCNC: 5 MMOL/L (ref 3.5–5.1)
Q-T INTERVAL: 416 MS
QRS DURATION: 70 MS
QTC CALCULATION (BEZET): 390 MS
R AXIS: -7 DEGREES
RBC # BLD AUTO: 3.68 X10(6)UL
SODIUM SERPL-SCNC: 133 MMOL/L (ref 136–145)
T AXIS: -12 DEGREES
VENTRICULAR RATE: 53 BPM
WBC # BLD AUTO: 6.9 X10(3) UL (ref 4–11)

## 2023-11-10 PROCEDURE — 1126F AMNT PAIN NOTED NONE PRSNT: CPT | Performed by: INTERNAL MEDICINE

## 2023-11-10 PROCEDURE — 36415 COLL VENOUS BLD VENIPUNCTURE: CPT

## 2023-11-10 PROCEDURE — 80048 BASIC METABOLIC PNL TOTAL CA: CPT

## 2023-11-10 PROCEDURE — 96372 THER/PROPH/DIAG INJ SC/IM: CPT | Performed by: INTERNAL MEDICINE

## 2023-11-10 PROCEDURE — 85025 COMPLETE CBC W/AUTO DIFF WBC: CPT

## 2023-11-10 PROCEDURE — 99214 OFFICE O/P EST MOD 30 MIN: CPT | Performed by: INTERNAL MEDICINE

## 2023-11-10 PROCEDURE — 93005 ELECTROCARDIOGRAM TRACING: CPT

## 2023-11-10 PROCEDURE — 93010 ELECTROCARDIOGRAM REPORT: CPT | Performed by: STUDENT IN AN ORGANIZED HEALTH CARE EDUCATION/TRAINING PROGRAM

## 2023-11-10 RX ADMIN — CYANOCOBALAMIN 1000 MCG: 1000 INJECTION, SOLUTION INTRAMUSCULAR; SUBCUTANEOUS at 09:10:00

## 2023-11-13 ENCOUNTER — OFFICE VISIT (OUTPATIENT)
Dept: PHYSICAL THERAPY | Facility: HOSPITAL | Age: 78
End: 2023-11-13
Attending: PHYSICAL MEDICINE & REHABILITATION
Payer: MEDICARE

## 2023-11-13 ENCOUNTER — TELEPHONE (OUTPATIENT)
Dept: PHYSICAL THERAPY | Facility: HOSPITAL | Age: 78
End: 2023-11-13

## 2023-11-13 PROCEDURE — 97112 NEUROMUSCULAR REEDUCATION: CPT

## 2023-11-13 PROCEDURE — 97110 THERAPEUTIC EXERCISES: CPT

## 2023-11-13 NOTE — PROGRESS NOTES
Chuck Mullins    6/14/1945  Referring Physician:  Padma Adrian  Diagnosis: Spinal stenosis of lumbar region with neurogenic claudication (M48.062)  Lumbar disc disease (M51.9)  Lumbar foraminal stenosis (M48.061)  Lumbar radiculopathy (M54.16)  Other idiopathic scoliosis, thoracolumbar region (M41.25)  Spondylolisthesis of lumbar region (M43.16)  Malignant neoplasm of corpus uteri, except isthmus (HCC) (C54.9)  Initial Evaluation Date: 10/26/2023  Date of Surgery: None for this diagnosis   Authorized # of visits NA by 10/2024    Precautions/Hx: arthritis, DVT, HTN, L 1st MTP surg, HLD, parathyroid removed, TAHBSO for uterine cancer, ulcerative colitis, appy, scoliosis  R L4-L5 TFESI 3/28/2023 and 7/10/203    SUBJECTIVE:     Pt reports that she is doing better to manage her pain with activity modification and altered sleeping positions      Visit count 1/24/2024 1/8 2/8 3/8 4/8   Date 10/26/2023 10/30/2023  11/6/2023  11/13/2023    LBP/ R LE       Pain Range 5 to 10/10 4-10/10 0-8/10 0-5/10   Pain Ave 7/10 6-7/10 5-6/10 3-4/10      OBJECTIVE:     Treatment performed this date:    Therapeutic Exercise:  Visit #   2/8 3/8 4/8   Position Exercise HEP 10/30/2023  11/6/2023  11/13/2023    Supine  SKC H X       DKC H X       LTR H  X       Lat reach stretch isp H X       Lat reach stretch contra  X mild increased sx      Sciatic nerve glide H x      Piriformis stretch H X 3x10s      Trunk elongation L LE, R UE reach H X 10x      Trunk elongation B LE/UE reach H X 10x     Sidelying  R trunk rot nerve glide H X      Sitting Piriformis stretch H  X 3x X 3x    Leg pumps nerve glides H  X 10x X 10x    Trunk flex  X 10x X 10x X 10x    Trunk flex w forearms to thighs  X 10x on the exhale X 10x2 cues X 10x    Cross leg piriformis R  X   X 3x    Cross leg piriformis w trunk rot  X  X 3x X 3x    Functional squat h  x X 19\" 5x   Gait Pattern     X    Neuro Re-ed  X see assessment  X see assessment  X see assessment    Ther Act function  X supine<>sit X sit>stand    H = HEP. Pt given copies of this exercise for home program.  X = Exercises done this date - pt verbalized understanding and demonstrated competence. All exercises done B unless otherwise indicated. Pt advised to discontinue exercises that increase pain and to call or return to therapist to discuss. Each intervention above is specifically prescribed to address the patients identified impairments, activity limitations, and participation restrictions. ASSESSMENT:     Pt continues to progress well with modification of activity and exercise. Pt had her  present w her this date. Pt and spouse educated on stenosis concepts w review of central canal, foramina, facet arthropathy, ligamentum flavum, disc anatomy and behavior. Pt and spouse educated on mobility and postural deficits w review of imaging to reinforce corrective exercises and positional awareness. Pt and spouse educated on the concepts of directional preference and centralization. Reviewed goals of pain management and avoidance of neurogenic claudication to allow for continued good function. Physical Therapy Goals: From 10/26/2023 to 1/24/2024  - Created with patient input during initial evaluation   1. Pt will be independent in beginning level of HEP for stretching, posture and strengthening. 2. Pt will be able to walk for 15 min without increased symptoms. 3. Pt will be able to stand to make a small meal without increased symptoms. 4. Pt will be able to ascend/descend stairs reciprocally without increased symptoms. 5. Pt will be able to lift grocery bags without increased symptoms. PLAN OF CARE:      Assess response to further education for self management of her symptoms.  Continue PT per original plan for therapeutic exercises, posture retraining, therapeutic activities, manual treatment, neuromuscular reeducation, therapeutic pain neuroscience education, patient education, self care home management, home exercise program, and modalities as needed. Charged Units Units Minutes    Ther Ex 1 20   Neuro 2 25   Ther Activity     Gait     Man Tx          Total Timed  45   Total Tx Time  45          __________________________________________________________________________________________      21st Century Cures Act Notice to Patient: Medical documents like this are made available to patients in the interest of transparency. However, be advised this is a medical document and it is intended as [id number] communication between your medical providers. This medical document may contain abbreviations, assessments, medical data, and results or other terms that are unfamiliar. Medical documents are intended to carry relevant information, facts as evident, and the clinical opinion of the practitioner. As such, this medical document may be written in language that appears blunt or direct. You are encouraged to contact your medical provider and/or [facility] Patient Experience if you have any questions about this medical document.     Objective Initial Evaluation Data 10/26/2023:    Oswestry Disability Index Score  Score: 50 % (10/26/2023  3:16 PM)    Gait:        Deviations: antalgic off R LE       Devices: none       Assistance: none      Posture 10/26/2023   Alignment L lat shift, min B pronation, flattened thoracic and lumbar spine, significant L lumbar scoliosis        Sensation 10/26/2023   Dermatomes Light Touch    Proximal medial thigh R (L2)  wnl   Proximal medial thigh L (L2) wnl   Above knee R (L3) wnl   Above knee L (L3) wnl   Medial arch R(L4) wnl   Medial arch L (L4) wnl   Between 1st - 2nd toes R (L5) wnl   Between 1st - 2nd toes L (L5) wnl   Lateral border of foot R (S1) wnl   Lateral border of foot L (S1) wnl   Popliteal fossa R (S2) wnl   Popliteal fossa L(S2) wnl       Abdominals 10/26/2023   Tone  good     Diastasis in fingers 10/26/2023   12 cm above umbilicus 1   6 cm above umbilicus 1   At umbilicus 1   6 cm below umbilicus 1   12 cm above umbilicus 1      Lumbopelvic 10/26/2023   Control  Fair d/t lack of lumbar mobility       ROM Lumbar 10/26/2023   Flexion min   Extension max   R SB min   L SB min   R Rotation wnl   L Rotation wnl   * pain limiting    ROM Hip 10/26/2023   Flex R 125 130   Flex L 125 130   ER R 45 45   ER L 45 50   IR R 40 28   IR L 40 40   *pain limiting     MMT 5/5 10/26/2023   L2- hip flex R 5   Hip flex L 5   L3- knee ext R 5   Knee ext L 5   L4- ankle DF R 5   Ankle DF L 5   L5- EHL R 5   EHL L 4 surg   S1- ankle PF R 5   Ankle PF L 5* LBP   S2- Hams R 5   Hams L 5   *pain limiting    LE flexibility 10/26/2023   Piriformis R min   Piriformis L wnl   Hams R wnl   Hams L wnl   *pain limiting    Neural mobility 10/26/2023   SLR R (+) 70 deg   SLR L (-) to 80 deg      DANIELLE 10/26/2023   R (-) min-mod loss of ROM   L (-) min-mod loss of ROM        Imaging:     PROCEDURE: MRI SPINE LUMBAR (CPT=72148)     COMPARISON: [facility] [email or web address], XR LUMBAR SPINE AP LAT FLEX EXT (CPT=72110), 6/01/2022, 1:58 PM.  [facility], [facility], MRI [address] [place], 6/18/2013, 12:27 PM.     INDICATIONS: Chronic bilateral low back pain radiating to right hip and leg. Spinal stenosis. History of uterine cancer     TECHNIQUE: A variety of imaging planes and parameters were utilized for visualization of suspected pathology. FINDINGS:  NUMERATION: For the purposes of this examination, the lowest fully formed disc space is designated as L5-S1.  ALIGNMENT: There is preservation of the expected lumbar lordosis. Moderate levoscoliosis; degenerative-type grade I anterolisthesis of L4 relative to L5  BONES: No acute lumbar spine fracture. Endplate edematous degenerative changes at L2-L3. CORD/CAUDA EQUINA: The distal cord and nerve roots have normal caliber, contour, and signal intensity. PARASPINAL AREA: No visible mass.     OTHER: There is a 5.7 cm left pelvic cyst.  Probable 1.4 cm hepatic cyst noted on localizer sequence. LUMBAR DISC LEVELS:  L1-L2: Diffuse disc bulge with ligamentum flavum redundancy, dorsal epidural lipomatosis, and bilateral facet arthropathy. Moderate left and mild right neural foraminal with mild spinal canal stenosis. L2-L3: Right eccentric disc bulge with ligamentum flavum redundancy, mild dorsal epidural lipomatosis, and mild right greater than left facet arthropathy. Moderate right and minor left neural foraminal with mild spinal canal stenosis. L3-L4: Left eccentric disc bulge with left greater than right facet arthropathy, ligamentum flavum redundancy, and mild dorsal epidural lipomatosis. Mild-to-moderate bilateral neural foraminal and mild spinal canal stenosis. L4-L5: Large diffuse disc bulge with left greater than right hypertrophic facet arthropathy in addition to ligamentum flavum redundancy. Moderate to severe multifactorial spinal canal with moderate to severe left greater than right neural foraminal  stenosis. L5-S1: Left eccentric disc bulge with left greater than right facet arthropathy, which results in moderate left neural foraminal stenosis with no additional significant neural compromise. Impression   CONCLUSION:     1. Advanced multilevel degenerative changes of the lumbar spine as detailed. Notable levels as follows:     2. L4-L5:  Moderate to severe spinal canal with moderate to severe left greater than right neural foraminal stenosis. 3. L5-S1:  Moderate left neural foraminal stenosis. 4. L3-L4:  Mild-to-moderate bilateral neural foraminal and mild spinal canal stenosis. 5. L2-L3:  Moderate right and minor left neural foraminal with mild spinal canal stenosis. 6. L1-L2:  Moderate right and mild left neural foraminal stenosis. 7. Moderate levoscoliosis of the lumbar spine.      8. Degenerative-type grade I anterolisthesis of L4 relative to L5.     9. Endplate edematous degenerative changes at L2-L3. 10. Incidentally noted 6.8 cm left pelvic cyst.  This appears relatively similar in size as compared with prior abdominal CT from December, 2019. 11. Lesser incidental findings as above. Dictated by (CST): Ricardo Ray MD on 6/01/2022 at 3:38 PM      Finalized by (CST): Ricardo Ray MD on 6/01/2022 at 3:46 PM       PROCEDURE: XR LUMBAR SPINE AP LAT FLEX EXT EM (CPT=72120)     COMPARISON: [facility], [facility] [employer], XR LUMBAR SPINE (MIN 2 VIEWS) (CPT=72100), 11/02/2020, 8:40 AM.  [facility] [employer], MRI SPINE LUMBAR (OFX=[id number]), 6/01/2022, 1:49 PM.     INDICATIONS: Chronic atraumatic bilateral lower back pain radiating to right hip and tailbone. TECHNIQUE: Lumbar spine radiographs with flexion and extension views       FINDINGS:     ALIGNMENT:   Moderate mid lumbar rotary levoscoliosis. Mild grade 1 anterolisthesis of L4 in relation L5. VERTEBRAL BODIES:   No acute appearing fracture. Biconcave appearance to L1-L2 probably chronic. DISC SPACES: Severe intervertebral disc space narrowing at L2-L3, L4-L5 and L5-S1 and moderate narrowing at L1-L2 and L3-L4. Endplate sclerosis and bony proliferative change at L2-L3 and L5-S1. SACROILIAC JOINTS: Normal.    FLEXION/EXTENSION VIEWS:   Normal range of motion. No subluxation during flexion and extension. OTHER:   Multiple clips in the right and left hemipelvis. Impression   CONCLUSION:  1. Redemonstration of a moderate mid lumbar rotary levoscoliosis. No significant acute fracture or new subluxation. Grade 1 anterolisthesis of L4 in relation L5 without significant movement during flexion or extension. Moderate multilevel spondylosis  as discussed above fairly stable.            Dictated by (CST): Anna Abraham MD on 6/02/2022 at 4:51 PM      Finalized by (CST): Anna Abraham MD on 6/02/2022 at 4:55 PM

## 2023-11-14 ENCOUNTER — TELEPHONE (OUTPATIENT)
Dept: INTERNAL MEDICINE CLINIC | Facility: CLINIC | Age: 78
End: 2023-11-14

## 2023-11-15 NOTE — TELEPHONE ENCOUNTER
Please let patient know that I thought her labs came out good. Her EKG also came out good. Slightly low heart rate but nothing to worry about.   I am satisfied with her results

## 2023-11-20 ENCOUNTER — OFFICE VISIT (OUTPATIENT)
Dept: PHYSICAL THERAPY | Facility: HOSPITAL | Age: 78
End: 2023-11-20
Attending: PHYSICAL MEDICINE & REHABILITATION
Payer: MEDICARE

## 2023-11-20 PROCEDURE — 97110 THERAPEUTIC EXERCISES: CPT

## 2023-11-20 PROCEDURE — 97530 THERAPEUTIC ACTIVITIES: CPT

## 2023-11-20 PROCEDURE — 97112 NEUROMUSCULAR REEDUCATION: CPT

## 2023-11-20 NOTE — PROGRESS NOTES
Caitlin Rajput    6/14/1945  Referring Physician:  Saleem Adrian  Diagnosis: Spinal stenosis of lumbar region with neurogenic claudication (M48.062)  Lumbar disc disease (M51.9)  Lumbar foraminal stenosis (M48.061)  Lumbar radiculopathy (M54.16)  Other idiopathic scoliosis, thoracolumbar region (M41.25)  Spondylolisthesis of lumbar region (M43.16)  Malignant neoplasm of corpus uteri, except isthmus (HCC) (C54.9)  Initial Evaluation Date: 10/26/2023  Date of Surgery: None for this diagnosis   Authorized # of visits NA by 10/2024    Precautions/Hx: arthritis, DVT, HTN, L 1st MTP surg, HLD, parathyroid removed, TAHBSO for uterine cancer, ulcerative colitis, appy, scoliosis  3/28/2023 R L4 & L5 TFESI  7/10/2023 R L4 & L5 TFESI  11/17/2023 R L4 & L5 TFESI    SUBJECTIVE:     Pt reports that she is feeling better than she was before. She has not had any sharp pain, only moderate pain. She states that she is pacing herself and using her exercises. Pt notes that she recently had a fall going forward when her foot slipped in old slippers. She hit her L chest on a piece of furniture. She has a small bruise, but otherwise unharmed.       Visit count 1/24/2024 1/8 2/8 3/8 4/8 5/8   Date 10/26/2023 10/30/2023  11/6/2023  11/13/2023  11/20/2023    LBP/ R LE        Pain Range 5 to 10/10 4-10/10 0-8/10 0-5/10 0-5/10   Pain Ave 7/10 6-7/10 5-6/10 3-4/10 3/10      OBJECTIVE:     Treatment performed this date:    Therapeutic Exercise:  Visit #   3/8 4/8 5/8   Position Exercise HEP 11/6/2023 11/13/2023 11/20/2023    Supine  SKC H       DKC H       LTR H        Lat reach stretch isp H       Lat reach stretch contra        Sciatic nerve glide H       Piriformis stretch H       Trunk elongation L LE, R UE reach H       Trunk elongation B LE/UE reach H      Sidelying  R trunk rot nerve glide H      Sitting Piriformis stretch H X 3x X 3x X 5x    Leg pumps nerve glides H X 10x X 10x X 10x    Trunk flex  X 10x X 10x X 10x    Trunk flex w forearms to thighs  X 10x2 cues X 10x X 10x     Cross leg piriformis R   X 3x X 5x    Cross leg piriformis w trunk rot  X 3x X 3x     Functional squat h x X 19\" 5x X 19\" 3x, 17\" 5x, 15\" 5x - mild R sx   Standing Step ups 6\"    X 10x    Step ups 6\" opp knee lift H   X 10x mild R sx by end of reps w R step up. Step ups 6\" opp knee lift    X 5x   Gait Pattern    X     Neuro Re-ed  X see assessment  X see assessment  X see assessment    Ther Act function  X sit>stand  X stairs   H = HEP. Pt given copies of this exercise for home program.  X = Exercises done this date - pt verbalized understanding and demonstrated competence. All exercises done B unless otherwise indicated. Pt advised to discontinue exercises that increase pain and to call or return to therapist to discuss. Each intervention above is specifically prescribed to address the patients identified impairments, activity limitations, and participation restrictions. ASSESSMENT:     Pt doing well w mod pain and low average pain level. Pt doing well to modify activity and use her HEP for self care. Pt instructed in correct transfers from floor to stance. Verbal cues needed, but she demonstrates good strength to rise. Pt educated on adaptations for climbing stairs when she is in flare. Pt requires re-teaching on some concepts previously discussed. Physical Therapy Goals: From 10/26/2023 to 1/24/2024  - Created with patient input during initial evaluation   1. Pt will be independent in beginning level of HEP for stretching, posture and strengthening. 2. Pt will be able to walk for 15 min without increased symptoms. 3. Pt will be able to stand to make a small meal without increased symptoms. 4. Pt will be able to ascend/descend stairs reciprocally without increased symptoms. 5. Pt will be able to lift grocery bags without increased symptoms. PLAN OF CARE:      Assess response to leg strengthening w step ups.  Continue PT per original plan for therapeutic exercises, posture retraining, therapeutic activities, manual treatment, neuromuscular reeducation, therapeutic pain neuroscience education, patient education, self care home management, home exercise program, and modalities as needed. Charged Units Units Minutes    Ther Ex 2 35   Neuro 1 15   Ther Activity 1 10   Gait     Man Tx          Total Timed  60   Total Tx Time  60          __________________________________________________________________________________________      21st Century Cures Act Notice to Patient: Medical documents like this are made available to patients in the interest of transparency. However, be advised this is a medical document and it is intended as [id number] communication between your medical providers. This medical document may contain abbreviations, assessments, medical data, and results or other terms that are unfamiliar. Medical documents are intended to carry relevant information, facts as evident, and the clinical opinion of the practitioner. As such, this medical document may be written in language that appears blunt or direct. You are encouraged to contact your medical provider and/or [facility] Patient Experience if you have any questions about this medical document.     Objective Initial Evaluation Data 10/26/2023:    Oswestry Disability Index Score  Score: 50 % (10/26/2023  3:16 PM)    Gait:        Deviations: antalgic off R LE       Devices: none       Assistance: none      Posture 10/26/2023   Alignment L lat shift, min B pronation, flattened thoracic and lumbar spine, significant L lumbar scoliosis        Sensation 10/26/2023   Dermatomes Light Touch    Proximal medial thigh R (L2)  wnl   Proximal medial thigh L (L2) wnl   Above knee R (L3) wnl   Above knee L (L3) wnl   Medial arch R(L4) wnl   Medial arch L (L4) wnl   Between 1st - 2nd toes R (L5) wnl   Between 1st - 2nd toes L (L5) wnl   Lateral border of foot R (S1) wnl   Lateral border of foot L (S1) wnl   Popliteal fossa R (S2) wnl   Popliteal fossa L(S2) wnl       Abdominals 10/26/2023   Tone  good     Diastasis in fingers 10/26/2023   12 cm above umbilicus 1   6 cm above umbilicus 1   At umbilicus 1   6 cm below umbilicus 1   12 cm above umbilicus 1      Lumbopelvic 10/26/2023   Control  Fair d/t lack of lumbar mobility       ROM Lumbar 10/26/2023   Flexion min   Extension max   R SB min   L SB min   R Rotation wnl   L Rotation wnl   * pain limiting    ROM Hip 10/26/2023   Flex R 125 130   Flex L 125 130   ER R 45 45   ER L 45 50   IR R 40 28   IR L 40 40   *pain limiting     MMT 5/5 10/26/2023   L2- hip flex R 5   Hip flex L 5   L3- knee ext R 5   Knee ext L 5   L4- ankle DF R 5   Ankle DF L 5   L5- EHL R 5   EHL L 4 surg   S1- ankle PF R 5   Ankle PF L 5* LBP   S2- Hams R 5   Hams L 5   *pain limiting    LE flexibility 10/26/2023   Piriformis R min   Piriformis L wnl   Hams R wnl   Hams L wnl   *pain limiting    Neural mobility 10/26/2023   SLR R (+) 70 deg   SLR L (-) to 80 deg      DANIELLE 10/26/2023   R (-) min-mod loss of ROM   L (-) min-mod loss of ROM        Imaging:     PROCEDURE: MRI SPINE LUMBAR (CPT=72148)     COMPARISON: [facility] [employer], XR LUMBAR SPINE AP LAT FLEX EXT (CPT=72110), 6/01/2022, 1:58 PM.  [facility], MRI [address] [place], 6/18/2013, 12:27 PM.     INDICATIONS: Chronic bilateral low back pain radiating to right hip and leg. Spinal stenosis. History of uterine cancer     TECHNIQUE: A variety of imaging planes and parameters were utilized for visualization of suspected pathology. FINDINGS:  NUMERATION: For the purposes of this examination, the lowest fully formed disc space is designated as L5-S1.  ALIGNMENT: There is preservation of the expected lumbar lordosis. Moderate levoscoliosis; degenerative-type grade I anterolisthesis of L4 relative to L5  BONES: No acute lumbar spine fracture.   Endplate edematous degenerative changes at L2-L3. CORD/CAUDA EQUINA: The distal cord and nerve roots have normal caliber, contour, and signal intensity. PARASPINAL AREA: No visible mass. OTHER: There is a 5.7 cm left pelvic cyst.  Probable 1.4 cm hepatic cyst noted on localizer sequence. LUMBAR DISC LEVELS:  L1-L2: Diffuse disc bulge with ligamentum flavum redundancy, dorsal epidural lipomatosis, and bilateral facet arthropathy. Moderate left and mild right neural foraminal with mild spinal canal stenosis. L2-L3: Right eccentric disc bulge with ligamentum flavum redundancy, mild dorsal epidural lipomatosis, and mild right greater than left facet arthropathy. Moderate right and minor left neural foraminal with mild spinal canal stenosis. L3-L4: Left eccentric disc bulge with left greater than right facet arthropathy, ligamentum flavum redundancy, and mild dorsal epidural lipomatosis. Mild-to-moderate bilateral neural foraminal and mild spinal canal stenosis. L4-L5: Large diffuse disc bulge with left greater than right hypertrophic facet arthropathy in addition to ligamentum flavum redundancy. Moderate to severe multifactorial spinal canal with moderate to severe left greater than right neural foraminal  stenosis. L5-S1: Left eccentric disc bulge with left greater than right facet arthropathy, which results in moderate left neural foraminal stenosis with no additional significant neural compromise. Impression   CONCLUSION:     1. Advanced multilevel degenerative changes of the lumbar spine as detailed. Notable levels as follows:     2. L4-L5:  Moderate to severe spinal canal with moderate to severe left greater than right neural foraminal stenosis. 3. L5-S1:  Moderate left neural foraminal stenosis. 4. L3-L4:  Mild-to-moderate bilateral neural foraminal and mild spinal canal stenosis. 5. L2-L3:  Moderate right and minor left neural foraminal with mild spinal canal stenosis.   6. L1-L2:  Moderate right and mild left neural foraminal stenosis. 7. Moderate levoscoliosis of the lumbar spine. 8. Degenerative-type grade I anterolisthesis of L4 relative to L5.     9. Endplate edematous degenerative changes at L2-L3. 10. Incidentally noted 6.8 cm left pelvic cyst.  This appears relatively similar in size as compared with prior abdominal CT from December, 2019. 11. Lesser incidental findings as above. Dictated by (CST): Geneva Tucker MD on 6/01/2022 at 3:38 PM      Finalized by (CST): Geneva Tucker MD on 6/01/2022 at 3:46 PM       PROCEDURE: XR LUMBAR SPINE AP LAT FLEX EXT EM (CPT=72120)     COMPARISON: [facility] [employer], XR LUMBAR SPINE (MIN 2 VIEWS) (CPT=72100), 11/02/2020, 8:40 AM.  [facility] [employer], MRI SPINE LUMBAR (TGS=[id number]), 6/01/2022, 1:49 PM.     INDICATIONS: Chronic atraumatic bilateral lower back pain radiating to right hip and tailbone. TECHNIQUE: Lumbar spine radiographs with flexion and extension views       FINDINGS:     ALIGNMENT:   Moderate mid lumbar rotary levoscoliosis. Mild grade 1 anterolisthesis of L4 in relation L5. VERTEBRAL BODIES:   No acute appearing fracture. Biconcave appearance to L1-L2 probably chronic. DISC SPACES: Severe intervertebral disc space narrowing at L2-L3, L4-L5 and L5-S1 and moderate narrowing at L1-L2 and L3-L4. Endplate sclerosis and bony proliferative change at L2-L3 and L5-S1. SACROILIAC JOINTS: Normal.    FLEXION/EXTENSION VIEWS:   Normal range of motion. No subluxation during flexion and extension. OTHER:   Multiple clips in the right and left hemipelvis. Impression   CONCLUSION:  1. Redemonstration of a moderate mid lumbar rotary levoscoliosis. No significant acute fracture or new subluxation. Grade 1 anterolisthesis of L4 in relation L5 without significant movement during flexion or extension. Moderate multilevel spondylosis  as discussed above fairly stable. Dictated by (CST): Rubin Peterson MD on 6/02/2022 at 4:51 PM      Finalized by (CST): Rubin Peterson MD on 6/02/2022 at 4:55 PM

## 2023-11-27 ENCOUNTER — OFFICE VISIT (OUTPATIENT)
Dept: PHYSICAL THERAPY | Facility: HOSPITAL | Age: 78
End: 2023-11-27
Attending: PHYSICAL MEDICINE & REHABILITATION
Payer: MEDICARE

## 2023-11-27 PROCEDURE — 97530 THERAPEUTIC ACTIVITIES: CPT

## 2023-11-27 PROCEDURE — 97110 THERAPEUTIC EXERCISES: CPT

## 2023-11-27 PROCEDURE — 97140 MANUAL THERAPY 1/> REGIONS: CPT

## 2023-11-27 NOTE — TELEPHONE ENCOUNTER
Ayad Teague. Patient came today for office visit. She developed a rash after several days of Levaquin. I stopped her Levaquin. She will be treated with Benadryl. She still has a barking cough. Feels lightheaded. Somewhat weak.   Maybe the Levaquin is cau [id number]

## 2023-11-27 NOTE — PROGRESS NOTES
Carmen Ko    6/14/1945  Referring Physician:  Hanh Adrian  Diagnosis: Spinal stenosis of lumbar region with neurogenic claudication (M48.062)  Lumbar disc disease (M51.9)  Lumbar foraminal stenosis (M48.061)  Lumbar radiculopathy (M54.16)  Other idiopathic scoliosis, thoracolumbar region (M41.25)  Spondylolisthesis of lumbar region (M43.16)  Malignant neoplasm of corpus uteri, except isthmus (HCC) (C54.9)  Initial Evaluation Date: 10/26/2023  Date of Surgery: None for this diagnosis   Authorized # of visits NA by 10/2024    Precautions/Hx: arthritis, DVT, HTN, L 1st MTP surg, HLD, parathyroid removed, TAHBSO for uterine cancer, ulcerative colitis, appy, scoliosis  3/28/2023 R L4 & L5 TFESI  7/10/2023 R L4 & L5 TFESI  11/17/2023 R L4 & L5 TFESI    SUBJECTIVE:     Pt reports that she has had low average pain with no severe groin pain. She notes she is having some L scapular pain which has mildly been present since she fell forward onto her L ant shoulder.        Visit count 1/24/2024 1/8 2/8 3/8 4/8 5/8 6/8   Date 10/26/2023 10/30/2023  11/6/2023  11/13/2023  11/20/2023  11/27/2023    LBP/ R LE         Pain Range 5 to 10/10 4-10/10 0-8/10 0-5/10 0-5/10 0-5/10   Pain Ave 7/10 6-7/10 5-6/10 3-4/10 3/10 2-3/10      OBJECTIVE:     Treatment performed this date:    Therapeutic Exercise:  Visit #   4/8 5/8 6/8   Position Exercise HEP 11/13/2023 11/20/2023 11/27/2023    Supine  SKC H   3x    DKC H   3x    LTR H    7x    Lat reach stretch isp H       Lat reach stretch contra        Sciatic nerve glide H   10x    Piriformis stretch H   3x    Trunk elongation L LE, R UE reach H   2x    Trunk elongation B LE/UE reach H       Cervical rot on ball H   X 10x2   Sidelying  R trunk rot nerve glide H      Sitting Piriformis stretch H X 3x X 5x     Leg pumps nerve glides H X 10x X 10x     Trunk flex  X 10x X 10x     Trunk flex w forearms to thighs  X 10x X 10x      Cross leg piriformis R  X 3x X 5x     Cross leg piriformis w trunk rot  X 3x      Functional squat h X 19\" 5x X 19\" 3x, 17\" 5x, 15\" 5x - mild R sx    Standing Step ups 6\"   X 10x     Step ups 6\" opp knee lift H  X 10x mild R sx by end of reps w R step up. Step ups 6\" opp knee lift   X 5x    Gait Pattern   X      Neuro Re-ed  X see assessment  X see assessment     Ther Act function   X stairs X reaching low   Man Tx Suboccpital release    X     Man cerv traction    X     MET    X R AC IR, L AC add, IR. C1-2 R rot; C4 ERSR   H = HEP. Pt given copies of this exercise for home program.  X = Exercises done this date - pt verbalized understanding and demonstrated competence. All exercises done B unless otherwise indicated. Pt advised to discontinue exercises that increase pain and to call or return to therapist to discuss. Each intervention above is specifically prescribed to address the patients identified impairments, activity limitations, and participation restrictions. ASSESSMENT:     Pt continues to do well with decreased average pain levels. She no longer reports the severe pain in to the R groin area. Pt educated on correct movement pattern for reach to lower cabinets. Pt assessed for possible mobility limitations from her recent fall. Pt demonstrates segmental mobility limitations as noted above and tolerated manual therapy with >50% improvement in mobility after treatment. Pt advised that man tx goal is to allow improved and ongoing mobility and strength. Physical Therapy Goals: From 10/26/2023 to 1/24/2024  - Created with patient input during initial evaluation   1. Pt will be independent in beginning level of HEP for stretching, posture and strengthening. 2. Pt will be able to walk for 15 min without increased symptoms. 3. Pt will be able to stand to make a small meal without increased symptoms. 4. Pt will be able to ascend/descend stairs reciprocally without increased symptoms.   5. Pt will be able to lift grocery bags without increased symptoms. PLAN OF CARE:      Assess response to cerv rotation on ball. Continue PT per original plan for therapeutic exercises, posture retraining, therapeutic activities, manual treatment, neuromuscular reeducation, therapeutic pain neuroscience education, patient education, self care home management, home exercise program, and modalities as needed. Charged Units Units Minutes    Ther Ex 1 20   Neuro     Ther Activity 1 10   Gait     Man Tx 2 25        Total Timed  55   Total Tx Time  55          __________________________________________________________________________________________      21st Century Cures Act Notice to Patient: Medical documents like this are made available to patients in the interest of transparency. However, be advised this is a medical document and it is intended as [id number]-nc-[id number] communication between your medical providers. This medical document may contain abbreviations, assessments, medical data, and results or other terms that are unfamiliar. Medical documents are intended to carry relevant information, facts as evident, and the clinical opinion of the practitioner. As such, this medical document may be written in language that appears blunt or direct. You are encouraged to contact your medical provider and/or [facility] if you have any questions about this medical document.     Objective Initial Evaluation Data 10/26/2023:    Oswestry Disability Index Score  Score: 50 % (10/26/2023  3:16 PM)    Gait:        Deviations: antalgic off R LE       Devices: none       Assistance: none      Posture 10/26/2023   Alignment L lat shift, min B pronation, flattened thoracic and lumbar spine, significant L lumbar scoliosis        Sensation 10/26/2023   Dermatomes Light Touch    Proximal medial thigh R (L2)  wnl   Proximal medial thigh L (L2) wnl   Above knee R (L3) wnl   Above knee L (L3) wnl   Medial arch R(L4) wnl   Medial arch L (L4) wnl   Between 1st - 2nd toes R (L5) wnl   Between 1st - 2nd toes L (L5) wnl   Lateral border of foot R (S1) wnl   Lateral border of foot L (S1) wnl   Popliteal fossa R (S2) wnl   Popliteal fossa L(S2) wnl       Abdominals 10/26/2023   Tone  good     Diastasis in fingers 10/26/2023   12 cm above umbilicus 1   6 cm above umbilicus 1   At umbilicus 1   6 cm below umbilicus 1   12 cm above umbilicus 1      Lumbopelvic 10/26/2023   Control  Fair d/t lack of lumbar mobility       ROM Lumbar 10/26/2023   Flexion min   Extension max   R SB min   L SB min   R Rotation wnl   L Rotation wnl   * pain limiting    ROM Hip 10/26/2023   Flex R 125 130   Flex L 125 130   ER R 45 45   ER L 45 50   IR R 40 28   IR L 40 40   *pain limiting     MMT 5/5 10/26/2023   L2- hip flex R 5   Hip flex L 5   L3- knee ext R 5   Knee ext L 5   L4- ankle DF R 5   Ankle DF L 5   L5- EHL R 5   EHL L 4 surg   S1- ankle PF R 5   Ankle PF L 5* LBP   S2- Hams R 5   Hams L 5   *pain limiting    LE flexibility 10/26/2023   Piriformis R min   Piriformis L wnl   Hams R wnl   Hams L wnl   *pain limiting    Neural mobility 10/26/2023   SLR R (+) 70 deg   SLR L (-) to 80 deg      DANIELLE 10/26/2023   R (-) min-mod loss of ROM   L (-) min-mod loss of ROM        11/27/2023   B sternoclavicular joint w nl abd/elev,    AC jt - R wnl except for mild loss of ER, L mild limit abd and IR, min-mod limit ER      Imaging:     PROCEDURE: MRI SPINE LUMBAR (CPT=72148)     COMPARISON: [facility], [facility], XR LUMBAR SPINE AP LAT FLEX EXT (CPT=72110), 6/01/2022, 1:58 PM.  [facility], [facility] [place], MRI [address] [place], 6/18/2013, 12:27 PM.     INDICATIONS: Chronic bilateral low back pain radiating to right hip and leg. Spinal stenosis. History of uterine cancer     TECHNIQUE: A variety of imaging planes and parameters were utilized for visualization of suspected pathology.      FINDINGS:  NUMERATION: For the purposes of this examination, the lowest fully formed disc space is designated as L5-S1.  ALIGNMENT: There is preservation of the expected lumbar lordosis. Moderate levoscoliosis; degenerative-type grade I anterolisthesis of L4 relative to L5  BONES: No acute lumbar spine fracture. Endplate edematous degenerative changes at L2-L3. CORD/CAUDA EQUINA: The distal cord and nerve roots have normal caliber, contour, and signal intensity. PARASPINAL AREA: No visible mass. OTHER: There is a 5.7 cm left pelvic cyst.  Probable 1.4 cm hepatic cyst noted on localizer sequence. LUMBAR DISC LEVELS:  L1-L2: Diffuse disc bulge with ligamentum flavum redundancy, dorsal epidural lipomatosis, and bilateral facet arthropathy. Moderate left and mild right neural foraminal with mild spinal canal stenosis. L2-L3: Right eccentric disc bulge with ligamentum flavum redundancy, mild dorsal epidural lipomatosis, and mild right greater than left facet arthropathy. Moderate right and minor left neural foraminal with mild spinal canal stenosis. L3-L4: Left eccentric disc bulge with left greater than right facet arthropathy, ligamentum flavum redundancy, and mild dorsal epidural lipomatosis. Mild-to-moderate bilateral neural foraminal and mild spinal canal stenosis. L4-L5: Large diffuse disc bulge with left greater than right hypertrophic facet arthropathy in addition to ligamentum flavum redundancy. Moderate to severe multifactorial spinal canal with moderate to severe left greater than right neural foraminal  stenosis. L5-S1: Left eccentric disc bulge with left greater than right facet arthropathy, which results in moderate left neural foraminal stenosis with no additional significant neural compromise. Impression   CONCLUSION:     1. Advanced multilevel degenerative changes of the lumbar spine as detailed. Notable levels as follows:     2. L4-L5:  Moderate to severe spinal canal with moderate to severe left greater than right neural foraminal stenosis. 3.  L5-S1: Moderate left neural foraminal stenosis. 4. L3-L4:  Mild-to-moderate bilateral neural foraminal and mild spinal canal stenosis. 5. L2-L3:  Moderate right and minor left neural foraminal with mild spinal canal stenosis. 6. L1-L2:  Moderate right and mild left neural foraminal stenosis. 7. Moderate levoscoliosis of the lumbar spine. 8. Degenerative-type grade I anterolisthesis of L4 relative to L5.     9. Endplate edematous degenerative changes at L2-L3. 10. Incidentally noted 6.8 cm left pelvic cyst.  This appears relatively similar in size as compared with prior abdominal CT from December, 2019. 11. Lesser incidental findings as above. Dictated by (CST): Suszanne Soulier, MD on 6/01/2022 at 3:38 PM      Finalized by (CST): Suszanne Soulier, MD on 6/01/2022 at 3:46 PM       PROCEDURE: XR LUMBAR SPINE AP LAT FLEX EXT EM (CPT=72120)     COMPARISON: [facility] [place], XR LUMBAR SPINE (MIN 2 VIEWS) (CPT=72100), 11/02/2020, 8:40 AM.  [facility], MRI SPINE LUMBAR (UHM=[id number]), 6/01/2022, 1:49 PM.     INDICATIONS: Chronic atraumatic bilateral lower back pain radiating to right hip and tailbone. TECHNIQUE: Lumbar spine radiographs with flexion and extension views       FINDINGS:     ALIGNMENT:   Moderate mid lumbar rotary levoscoliosis. Mild grade 1 anterolisthesis of L4 in relation L5. VERTEBRAL BODIES:   No acute appearing fracture. Biconcave appearance to L1-L2 probably chronic. DISC SPACES: Severe intervertebral disc space narrowing at L2-L3, L4-L5 and L5-S1 and moderate narrowing at L1-L2 and L3-L4. Endplate sclerosis and bony proliferative change at L2-L3 and L5-S1. SACROILIAC JOINTS: Normal.    FLEXION/EXTENSION VIEWS:   Normal range of motion. No subluxation during flexion and extension. OTHER:   Multiple clips in the right and left hemipelvis. Impression   CONCLUSION:  1.  Redemonstration of a moderate mid lumbar rotary levoscoliosis. No significant acute fracture or new subluxation. Grade 1 anterolisthesis of L4 in relation L5 without significant movement during flexion or extension. Moderate multilevel spondylosis  as discussed above fairly stable.            Dictated by (CST): Ema Boas, MD on 6/02/2022 at 4:51 PM      Finalized by (CST): Ema Boas, MD on 6/02/2022 at 4:55 PM

## 2023-12-04 ENCOUNTER — OFFICE VISIT (OUTPATIENT)
Dept: PHYSICAL THERAPY | Facility: HOSPITAL | Age: 78
End: 2023-12-04
Attending: PHYSICAL MEDICINE & REHABILITATION
Payer: MEDICARE

## 2023-12-04 PROCEDURE — 97110 THERAPEUTIC EXERCISES: CPT

## 2023-12-04 PROCEDURE — 97112 NEUROMUSCULAR REEDUCATION: CPT

## 2023-12-04 NOTE — PROGRESS NOTES
Evie Flair    6/14/1945  Referring Physician:  Leo Adrian  Diagnosis: Spinal stenosis of lumbar region with neurogenic claudication (M48.062)  Lumbar disc disease (M51.9)  Lumbar foraminal stenosis (M48.061)  Lumbar radiculopathy (M54.16)  Other idiopathic scoliosis, thoracolumbar region (M41.25)  Spondylolisthesis of lumbar region (M43.16)  Malignant neoplasm of corpus uteri, except isthmus (HCC) (C54.9)  Initial Evaluation Date: 10/26/2023  Date of Surgery: None for this diagnosis   Authorized # of visits NA by 10/2024    Precautions/Hx: arthritis, DVT, HTN, L 1st MTP surg, HLD, parathyroid removed, TAHBSO for uterine cancer, ulcerative colitis, appy, scoliosis  3/28/2023 R L4 & L5 TFESI  7/10/2023 R L4 & L5 TFESI  11/17/2023 R L4 & L5 TFESI    SUBJECTIVE:     Pt reports that she continues to have L parascapular pain.  0-5/10, ave 2/10. She has intermittent R leg pain at less intensity. She is managing the pain with her HEP.       Visit count 1/24/2024 1/8 2/8 3/8 4/8 5/8 6/8 7/8   Date 10/26/2023 10/30/2023  11/6/2023  11/13/2023  11/20/2023  11/27/2023  12/4/2023    LBP/ R LE          Pain Range 5 to 10/10 4-10/10 0-8/10 0-5/10 0-5/10 0-5/10 0-4/10   Pain Ave 7/10 6-7/10 5-6/10 3-4/10 3/10 2-3/10 2/10      OBJECTIVE:     Treatment performed this date:    Therapeutic Exercise:  Visit #   5/8 6/8 7/8   Position Exercise HEP 11/20/2023 11/27/2023 12/4/2023    Supine  SKC H  3x X 5x    DKC H  3x X 10x    LTR H   7x X 10x    Lat reach stretch isp H       Lat reach stretch contra        Sciatic nerve glide H  10x     Piriformis stretch H  3x     Trunk elongation L LE, R UE reach H  2x     Trunk elongation B LE/UE reach H       Cervical rot on ball H  X 10x2     Pelvic tilt    X     HILDA T8 extn arm 120 full exhale H   X 10x cues   Sidelying  R trunk rot nerve glide H      Sitting Piriformis stretch H X 5x  X 5x    Leg pumps nerve glides H X 10x  X 10x    Trunk flex  X 10x  X 3x30s    Trunk flex w forearms to thighs  X 10x       Cross leg piriformis R  X 5x      Cross leg piriformis w trunk rot        Functional squat h X 19\" 3x, 17\" 5x, 15\" 5x - mild R sx  X 19 \" 3x very slow control. Standing Step ups 6\"  X 10x      Step ups 6\" opp knee lift H X 10x mild R sx by end of reps w R step up. Step ups 6\" opp knee lift  X 5x     Gait Pattern        Neuro Re-ed  X see assessment   X see assessment    Ther Act function  X stairs X reaching low    Man Tx Suboccpital release   X      Man cerv traction   X      MET   X R AC IR, L AC add, IR. C1-2 R rot; C4 ERSR     Brachial chain -  upper and lower, B combo upper/lower w hold and soft diaphragmatic breathing     X    H = HEP. Pt given copies of this exercise for home program.  X = Exercises done this date - pt verbalized understanding and demonstrated competence. All exercises done B unless otherwise indicated. Pt advised to discontinue exercises that increase pain and to call or return to therapist to discuss. Each intervention above is specifically prescribed to address the patients identified impairments, activity limitations, and participation restrictions. ASSESSMENT:     Pt educated on importance of continued use of R LE for ADL's like climbing the stairs. Pt advised to lead w R LE and climb reciprocally unless she feels radicular sx. Then she can climb w L lead only. Pt demonstrates chest wall flare positioning w good costal mobility. Pt tolerated brachial chain mobilization well to upper and lower ribs w education on full mobility with breath work. Pt educated on importance of positioning of chest wall for abdominal core activation and control. Discussed position of thoracic spine and chest wall affecting cervical spine. Pt had good deep release to man tx as noted above. Physical Therapy Goals: From 10/26/2023 to 1/24/2024  - Created with patient input during initial evaluation   1.  Pt will be independent in beginning level of HEP for stretching, posture and strengthening. 2. Pt will be able to walk for 15 min without increased symptoms. 3. Pt will be able to stand to make a small meal without increased symptoms. 4. Pt will be able to ascend/descend stairs reciprocally without increased symptoms. 5. Pt will be able to lift grocery bags without increased symptoms. PLAN OF CARE:      Assess response to  Continue PT per original plan for therapeutic exercises, posture retraining, therapeutic activities, manual treatment, neuromuscular reeducation, therapeutic pain neuroscience education, patient education, self care home management, home exercise program, and modalities as needed. Charged Units Units Minutes    Ther Ex 1 20   Neuro 1 10   Ther Activity     Gait     Man Tx 1 15        Total Timed  45   Total Tx Time  45          __________________________________________________________________________________________      21st Century Cures Act Notice to Patient: Medical documents like this are made available to patients in the interest of transparency. However, be advised this is a medical document and it is intended as afut-xh-iymb communication between your medical providers. This medical document may contain abbreviations, assessments, medical data, and results or other terms that are unfamiliar. Medical documents are intended to carry relevant information, facts as evident, and the clinical opinion of the practitioner. As such, this medical document may be written in language that appears blunt or direct. You are encouraged to contact your medical provider and/or [facility] if you have any questions about this medical document.     Objective Initial Evaluation Data 10/26/2023:    Oswestry Disability Index Score  Score: 50 % (10/26/2023  3:16 PM)    Gait:        Deviations: antalgic off R LE       Devices: none       Assistance: none      Posture 10/26/2023   Alignment L lat shift, min B pronation, flattened thoracic and lumbar spine, significant L lumbar scoliosis        Sensation 10/26/2023   Dermatomes Light Touch    Proximal medial thigh R (L2)  wnl   Proximal medial thigh L (L2) wnl   Above knee R (L3) wnl   Above knee L (L3) wnl   Medial arch R(L4) wnl   Medial arch L (L4) wnl   Between 1st - 2nd toes R (L5) wnl   Between 1st - 2nd toes L (L5) wnl   Lateral border of foot R (S1) wnl   Lateral border of foot L (S1) wnl   Popliteal fossa R (S2) wnl   Popliteal fossa L(S2) wnl       Abdominals 10/26/2023   Tone  good     Diastasis in fingers 10/26/2023   12 cm above umbilicus 1   6 cm above umbilicus 1   At umbilicus 1   6 cm below umbilicus 1   12 cm above umbilicus 1      Lumbopelvic 10/26/2023   Control  Fair d/t lack of lumbar mobility       ROM Lumbar 10/26/2023   Flexion min   Extension max   R SB min   L SB min   R Rotation wnl   L Rotation wnl   * pain limiting    ROM Hip 10/26/2023   Flex R 125 130   Flex L 125 130   ER R 45 45   ER L 45 50   IR R 40 28   IR L 40 40   *pain limiting     MMT 5/5 10/26/2023   L2- hip flex R 5   Hip flex L 5   L3- knee ext R 5   Knee ext L 5   L4- ankle DF R 5   Ankle DF L 5   L5- EHL R 5   EHL L 4 surg   S1- ankle PF R 5   Ankle PF L 5* LBP   S2- Hams R 5   Hams L 5   *pain limiting    LE flexibility 10/26/2023   Piriformis R min   Piriformis L wnl   Hams R wnl   Hams L wnl   *pain limiting    Neural mobility 10/26/2023   SLR R (+) 70 deg   SLR L (-) to 80 deg      DANIELLE 10/26/2023   R (-) min-mod loss of ROM   L (-) min-mod loss of ROM        11/27/2023   B sternoclavicular joint w nl abd/elev,    AC jt - R wnl except for mild loss of ER, L mild limit abd and IR, min-mod limit ER      Chest wall mobility 12/4/2023       Upper chest R wnl   Upper chest L wnl   Lower chest R wnl   Lower chest L wnl         Imaging:     PROCEDURE: MRI SPINE LUMBAR (CPT=72148)     COMPARISON: [facility], [facility]. for [facility], XR LUMBAR SPINE AP LAT FLEX EXT (CPT=72110), 6/01/2022, 1:58 PM.  [facility], [facility]. [facility], MRI [address] [place], 6/18/2013, 12:27 PM.     INDICATIONS: Chronic bilateral low back pain radiating to right hip and leg. Spinal stenosis. History of uterine cancer     TECHNIQUE: A variety of imaging planes and parameters were utilized for visualization of suspected pathology. FINDINGS:  NUMERATION: For the purposes of this examination, the lowest fully formed disc space is designated as L5-S1.  ALIGNMENT: There is preservation of the expected lumbar lordosis. Moderate levoscoliosis; degenerative-type grade I anterolisthesis of L4 relative to L5  BONES: No acute lumbar spine fracture. Endplate edematous degenerative changes at L2-L3. CORD/CAUDA EQUINA: The distal cord and nerve roots have normal caliber, contour, and signal intensity. PARASPINAL AREA: No visible mass. OTHER: There is a 5.7 cm left pelvic cyst.  Probable 1.4 cm hepatic cyst noted on localizer sequence. LUMBAR DISC LEVELS:  L1-L2: Diffuse disc bulge with ligamentum flavum redundancy, dorsal epidural lipomatosis, and bilateral facet arthropathy. Moderate left and mild right neural foraminal with mild spinal canal stenosis. L2-L3: Right eccentric disc bulge with ligamentum flavum redundancy, mild dorsal epidural lipomatosis, and mild right greater than left facet arthropathy. Moderate right and minor left neural foraminal with mild spinal canal stenosis. L3-L4: Left eccentric disc bulge with left greater than right facet arthropathy, ligamentum flavum redundancy, and mild dorsal epidural lipomatosis. Mild-to-moderate bilateral neural foraminal and mild spinal canal stenosis. L4-L5: Large diffuse disc bulge with left greater than right hypertrophic facet arthropathy in addition to ligamentum flavum redundancy. Moderate to severe multifactorial spinal canal with moderate to severe left greater than right neural foraminal  stenosis.   L5-S1: Left eccentric disc bulge with left greater than right facet arthropathy, which results in moderate left neural foraminal stenosis with no additional significant neural compromise. Impression   CONCLUSION:     1. Advanced multilevel degenerative changes of the lumbar spine as detailed. Notable levels as follows:     2. L4-L5:  Moderate to severe spinal canal with moderate to severe left greater than right neural foraminal stenosis. 3. L5-S1:  Moderate left neural foraminal stenosis. 4. L3-L4:  Mild-to-moderate bilateral neural foraminal and mild spinal canal stenosis. 5. L2-L3:  Moderate right and minor left neural foraminal with mild spinal canal stenosis. 6. L1-L2:  Moderate right and mild left neural foraminal stenosis. 7. Moderate levoscoliosis of the lumbar spine. 8. Degenerative-type grade I anterolisthesis of L4 relative to L5.     9. Endplate edematous degenerative changes at L2-L3. 10. Incidentally noted 6.8 cm left pelvic cyst.  This appears relatively similar in size as compared with prior abdominal CT from December, 2019. 11. Lesser incidental findings as above. Dictated by (CST): Dennise Donovan MD on 6/01/2022 at 3:38 PM      Finalized by (CST): Dennise Donovan MD on 6/01/2022 at 3:46 PM       PROCEDURE: XR LUMBAR SPINE AP LAT FLEX EXT EM (CPT=72120)     COMPARISON: [email or web address], XR LUMBAR SPINE (MIN 2 VIEWS) (CPT=72100), 11/02/2020, 8:40 AM.  [facility], MRI SPINE LUMBAR (VBI=[id number]), 6/01/2022, 1:49 PM.     INDICATIONS: Chronic atraumatic bilateral lower back pain radiating to right hip and tailbone. TECHNIQUE: Lumbar spine radiographs with flexion and extension views       FINDINGS:     ALIGNMENT:   Moderate mid lumbar rotary levoscoliosis. Mild grade 1 anterolisthesis of L4 in relation L5. VERTEBRAL BODIES:   No acute appearing fracture. Biconcave appearance to L1-L2 probably chronic.   DISC SPACES: Severe intervertebral disc space narrowing at L2-L3, L4-L5 and L5-S1 and moderate narrowing at L1-L2 and L3-L4. Endplate sclerosis and bony proliferative change at L2-L3 and L5-S1. SACROILIAC JOINTS: Normal.    FLEXION/EXTENSION VIEWS:   Normal range of motion. No subluxation during flexion and extension. OTHER:   Multiple clips in the right and left hemipelvis. Impression   CONCLUSION:  1. Redemonstration of a moderate mid lumbar rotary levoscoliosis. No significant acute fracture or new subluxation. Grade 1 anterolisthesis of L4 in relation L5 without significant movement during flexion or extension. Moderate multilevel spondylosis  as discussed above fairly stable.            Dictated by (CST): Juan Bonilla MD on 6/02/2022 at 4:51 PM      Finalized by (CST): Juan Bonilla MD on 6/02/2022 at 4:55 PM

## 2023-12-05 PROBLEM — M16.0 PRIMARY OSTEOARTHRITIS OF BOTH HIPS: Status: ACTIVE | Noted: 2023-12-05

## 2023-12-11 ENCOUNTER — OFFICE VISIT (OUTPATIENT)
Dept: PHYSICAL THERAPY | Facility: HOSPITAL | Age: 78
End: 2023-12-11
Attending: PHYSICAL MEDICINE & REHABILITATION
Payer: MEDICARE

## 2023-12-11 ENCOUNTER — NURSE ONLY (OUTPATIENT)
Dept: INTERNAL MEDICINE CLINIC | Facility: CLINIC | Age: 78
End: 2023-12-11

## 2023-12-11 DIAGNOSIS — E53.8 VITAMIN B12 DEFICIENCY: Primary | ICD-10-CM

## 2023-12-11 PROCEDURE — 96372 THER/PROPH/DIAG INJ SC/IM: CPT | Performed by: INTERNAL MEDICINE

## 2023-12-11 PROCEDURE — 97530 THERAPEUTIC ACTIVITIES: CPT

## 2023-12-11 PROCEDURE — 97110 THERAPEUTIC EXERCISES: CPT

## 2023-12-11 PROCEDURE — 97112 NEUROMUSCULAR REEDUCATION: CPT

## 2023-12-11 RX ADMIN — CYANOCOBALAMIN 1000 MCG: 1000 INJECTION, SOLUTION INTRAMUSCULAR; SUBCUTANEOUS at 10:00:00

## 2023-12-11 NOTE — PROGRESS NOTES
Discharge Summary    Pt has attended 8, cancelled 0, and no shown 8 visits in Physical Therapy. Ed Avilez    6/14/1945  Referring Physician:  Belinda Adrian  Diagnosis: Spinal stenosis of lumbar region with neurogenic claudication (M48.062)  Lumbar disc disease (M51.9)  Lumbar foraminal stenosis (M48.061)  Lumbar radiculopathy (M54.16)  Other idiopathic scoliosis, thoracolumbar region (M41.25)  Spondylolisthesis of lumbar region (M43.16)  Malignant neoplasm of corpus uteri, except isthmus (HCC) (C54.9)  Initial Evaluation Date: 10/26/2023  Date of Surgery: None for this diagnosis   Authorized # of visits NA by 10/2024    Precautions/Hx: arthritis, DVT, HTN, L 1st MTP surg, HLD, parathyroid removed, TAHBSO for uterine cancer, ulcerative colitis, appy, scoliosis  3/28/2023 R L4 & L5 TFESI  7/10/2023 R L4 & L5 TFESI  11/17/2023 R L4 & L5 TFESI    SUBJECTIVE:     Pt reports that she has been doing well with low average pain level. She has been able to climb the stairs reciprocally now. Visit count 1/24/2024 1/8 8/8   Date 10/26/2023 12/11/2023    LBP/ R LE     Pain Range 5 to 10/10 0-4/10   Pain Ave 7/10 2/10     Pt has completed skilled physical therapy intervention with good decrease of pain complaints. Pt displays improved: lumbar ROM; B hip ROM; no further LBP w L ankle PF in stance; R piriformis stretch; and R sciatic nerve glide. Pt continues to have large scoliosis. Pt educated on her stenosis w importance of immediate lumbar position change w onset of radicular symptoms    Pt demonstrates improved function as noted as progression towards goals and improved functional score. See objective discharge data below in tables with initial evaluation data. Pt has met goals, is independent with HEP and ready to be discharged to [facility].      Post Oswestry Disability Index Score  Post Score: 32 % (12/17/2023 11:22 PM)    18 % improvement      OBJECTIVE:     Treatment performed this date:    Therapeutic Exercise:  Visit #   6/8 7/8 8/8   Position Exercise HEP 11/27/2023 12/4/2023 12/11/2023    Supine  SKC H 3x X 5x 3x    DKC H 3x X 10x 10x    LTR H  7x X 10x 10x    Lat reach stretch isp H       Lat reach stretch contra        Sciatic nerve glide H 10x  10x    Piriformis stretch H 3x  3x    Trunk elongation L LE, R UE reach H 2x      Trunk elongation B LE/UE reach H       Cervical rot on ball H X 10x2      Pelvic tilt   X      HILDA T8 extn arm 120 full exhale H  X 10x cues    Sidelying  R trunk rot nerve glide H      Sitting Piriformis stretch H  X 5x X 3x    Leg pumps nerve glides H  X 10x X 10x    Trunk flex   X 3x30s X 3x30s    Trunk flex w forearms to thighs        Cross leg piriformis R        Cross leg piriformis w trunk rot        Functional squat h  X 19 \" 3x very slow control. X 18\" 5x    Standing Step ups 6\"        Step ups 6\" opp knee lift H       Step ups 6\" opp knee lift    10x2   Gait Pattern        Neuro Re-ed   X see assessment  X avoidance of triggering radicular sxs including in supine. Ther Act function  X reaching low  X sleeping, knee cushion, SL w flex   Man Tx Suboccpital release  X       Man cerv traction  X       MET  X R AC IR, L AC add, IR. C1-2 R rot; C4 ERSR      Brachial chain -  upper and lower, B combo upper/lower w hold and soft diaphragmatic breathing    X     H = HEP. Pt given copies of this exercise for home program.  X = Exercises done this date - pt verbalized understanding and demonstrated competence. All exercises done B unless otherwise indicated. Pt advised to discontinue exercises that increase pain and to call or return to therapist to discuss. Each intervention above is specifically prescribed to address the patients identified impairments, activity limitations, and participation restrictions. ASSESSMENT:     Physical Therapy Goals: From 10/26/2023 to 1/24/2024, assessed 12/11/2023   - Created with patient input during initial evaluation   1.  Pt will be independent in beginning level of HEP for stretching, posture and strengthening. MET  2. Pt will be able to walk for 15 min without increased symptoms. MET  3. Pt will be able to stand to make a small meal without increased symptoms. MET  4. Pt will be able to ascend/descend stairs reciprocally without increased symptoms. MET  5. Pt will be able to lift grocery bags without increased symptoms. Meets intermittently     PLAN OF CARE:      Plan:  Discharge to [facility]    Thank you for your referral. If you have any questions, please contact me at Dept: [phone]. Sincerely,  Electronically signed by therapist: Neo Salvador PT        Charged Units Units Minutes   Ther Ex 1 30   Neuro 1 10   Ther Activity     Gait     Man Tx          Total Timed  40   Total Tx Time  40          __________________________________________________________________________________________      21st Century Cures Act Notice to Patient: Medical documents like this are made available to patients in the interest of transparency. However, be advised this is a medical document and it is intended as ztla-vy-wyaj communication between your medical providers. This medical document may contain abbreviations, assessments, medical data, and results or other terms that are unfamiliar. Medical documents are intended to carry relevant information, facts as evident, and the clinical opinion of the practitioner. As such, this medical document may be written in language that appears blunt or direct. You are encouraged to contact your medical provider and/or [facility] Patient Experience if you have any questions about this medical document.     Objective Initial Evaluation Data 10/26/2023:    Oswestry Disability Index Score  Score: 50 % (10/26/2023  3:16 PM)    Gait:        Deviations: antalgic off R LE       Devices: none       Assistance: none      Posture 10/26/2023   Alignment L lat shift, min B pronation, flattened thoracic and lumbar spine, significant L lumbar scoliosis        Sensation 10/26/2023   Dermatomes Light Touch    Proximal medial thigh R (L2)  wnl   Proximal medial thigh L (L2) wnl   Above knee R (L3) wnl   Above knee L (L3) wnl   Medial arch R(L4) wnl   Medial arch L (L4) wnl   Between 1st - 2nd toes R (L5) wnl   Between 1st - 2nd toes L (L5) wnl   Lateral border of foot R (S1) wnl   Lateral border of foot L (S1) wnl   Popliteal fossa R (S2) wnl   Popliteal fossa L(S2) wnl       Abdominals 10/26/2023   Tone  good     Diastasis in fingers 10/26/2023   12 cm above umbilicus 1   6 cm above umbilicus 1   At umbilicus 1   6 cm below umbilicus 1   12 cm above umbilicus 1      Lumbopelvic 10/26/2023   Control  Fair d/t lack of lumbar mobility       ROM Lumbar 10/26/2023 12/11/2023    Flexion min min   Extension max Mod-max   R SB min min   L SB min min   R Rotation wnl wnl   L Rotation wnl wnl   * pain limiting    ROM Hip 10/26/2023 12/11/2023    Flex R 125 130 130   Flex L 125 130 130   ER R 45 45 48   ER L 45 50 50   IR R 40 28 30   IR L 40 40 40   *pain limiting     MMT 5/5 10/26/2023 12/11/2023    L2- hip flex R 5 5   Hip flex L 5 5   L3- knee ext R 5 5   Knee ext L 5 5   L4- ankle DF R 5 5   Ankle DF L 5 5   L5- EHL R 5 5   EHL L 4 surg 4 surg   S1- ankle PF R 5 5   Ankle PF L 5* LBP 5   S2- Hams R 5 5   Hams L 5 5   *pain limiting    LE flexibility 10/26/2023 12/11/2023    Piriformis R min wnl   Piriformis L wnl wnl   Hams R wnl wnl   Hams L wnl wnl   *pain limiting    Neural mobility 10/26/2023 12/11/2023    SLR R (+) 70 deg (-) 80 deg   SLR L (-) to 80 deg (-) 80 deg      DANIELLE 10/26/2023   R (-) min-mod loss of ROM   L (-) min-mod loss of ROM        11/27/2023   B sternoclavicular joint w nl abd/elev,    AC jt - R wnl except for mild loss of ER, L mild limit abd and IR, min-mod limit ER      Chest wall mobility 12/4/2023       Upper chest R wnl   Upper chest L wnl   Lower chest R wnl   Lower chest L wnl         Imaging:     PROCEDURE: MRI SPINE LUMBAR (CPT=72148)     COMPARISON: [facility], [facility]. [facility], XR LUMBAR SPINE AP LAT FLEX EXT (CPT=72110), 6/01/2022, 1:58 PM.  [facility], [facility]. [facility], [address] [place] [address], 6/18/2013, 12:27 PM.     INDICATIONS: Chronic bilateral low back pain radiating to right hip and leg. Spinal stenosis. History of uterine cancer     TECHNIQUE: A variety of imaging planes and parameters were utilized for visualization of suspected pathology. FINDINGS:  NUMERATION: For the purposes of this examination, the lowest fully formed disc space is designated as L5-S1.  ALIGNMENT: There is preservation of the expected lumbar lordosis. Moderate levoscoliosis; degenerative-type grade I anterolisthesis of L4 relative to L5  BONES: No acute lumbar spine fracture. Endplate edematous degenerative changes at L2-L3. CORD/CAUDA EQUINA: The distal cord and nerve roots have normal caliber, contour, and signal intensity. PARASPINAL AREA: No visible mass. OTHER: There is a 5.7 cm left pelvic cyst.  Probable 1.4 cm hepatic cyst noted on localizer sequence. LUMBAR DISC LEVELS:  L1-L2: Diffuse disc bulge with ligamentum flavum redundancy, dorsal epidural lipomatosis, and bilateral facet arthropathy. Moderate left and mild right neural foraminal with mild spinal canal stenosis. L2-L3: Right eccentric disc bulge with ligamentum flavum redundancy, mild dorsal epidural lipomatosis, and mild right greater than left facet arthropathy. Moderate right and minor left neural foraminal with mild spinal canal stenosis. L3-L4: Left eccentric disc bulge with left greater than right facet arthropathy, ligamentum flavum redundancy, and mild dorsal epidural lipomatosis. Mild-to-moderate bilateral neural foraminal and mild spinal canal stenosis. L4-L5: Large diffuse disc bulge with left greater than right hypertrophic facet arthropathy in addition to ligamentum flavum redundancy.   Moderate to severe multifactorial spinal canal with moderate to severe left greater than right neural foraminal  stenosis. L5-S1: Left eccentric disc bulge with left greater than right facet arthropathy, which results in moderate left neural foraminal stenosis with no additional significant neural compromise. Impression   CONCLUSION:     1. Advanced multilevel degenerative changes of the lumbar spine as detailed. Notable levels as follows:     2. L4-L5:  Moderate to severe spinal canal with moderate to severe left greater than right neural foraminal stenosis. 3. L5-S1:  Moderate left neural foraminal stenosis. 4. L3-L4:  Mild-to-moderate bilateral neural foraminal and mild spinal canal stenosis. 5. L2-L3:  Moderate right and minor left neural foraminal with mild spinal canal stenosis. 6. L1-L2:  Moderate right and mild left neural foraminal stenosis. 7. Moderate levoscoliosis of the lumbar spine. 8. Degenerative-type grade I anterolisthesis of L4 relative to L5.     9. Endplate edematous degenerative changes at L2-L3. 10. Incidentally noted 6.8 cm left pelvic cyst.  This appears relatively similar in size as compared with prior abdominal CT from December, 2019. 11. Lesser incidental findings as above. Dictated by (CST): Merlyn Sevilla MD on 6/01/2022 at 3:38 PM      Finalized by (CST): Merlyn Sevilla MD on 6/01/2022 at 3:46 PM       PROCEDURE: XR LUMBAR SPINE AP LAT FLEX EXT EM (CPT=72120)     COMPARISON: [facility], XR LUMBAR SPINE (MIN 2 VIEWS) (CPT=72100), 11/02/2020, 8:40 AM.  [facility], MRI SPINE LUMBAR (YCQ=42503), 6/01/2022, 1:49 PM.     INDICATIONS: Chronic atraumatic bilateral lower back pain radiating to right hip and tailbone. TECHNIQUE: Lumbar spine radiographs with flexion and extension views       FINDINGS:     ALIGNMENT:   Moderate mid lumbar rotary levoscoliosis.   Mild grade 1 anterolisthesis of L4 in relation L5.  VERTEBRAL BODIES:   No acute appearing fracture. Biconcave appearance to L1-L2 probably chronic. DISC SPACES: Severe intervertebral disc space narrowing at L2-L3, L4-L5 and L5-S1 and moderate narrowing at L1-L2 and L3-L4. Endplate sclerosis and bony proliferative change at L2-L3 and L5-S1. SACROILIAC JOINTS: Normal.    FLEXION/EXTENSION VIEWS:   Normal range of motion. No subluxation during flexion and extension. OTHER:   Multiple clips in the right and left hemipelvis. Impression   CONCLUSION:  1. Redemonstration of a moderate mid lumbar rotary levoscoliosis. No significant acute fracture or new subluxation. Grade 1 anterolisthesis of L4 in relation L5 without significant movement during flexion or extension. Moderate multilevel spondylosis  as discussed above fairly stable.            Dictated by (CST): Kizzy Lehman MD on 6/02/2022 at 4:51 PM      Finalized by (CST): Kizzy Lehman MD on 6/02/2022 at 4:55 PM

## 2023-12-11 NOTE — PROGRESS NOTES
Patient present for Vitamin B12 injection. Patient's name, , and order verified. Injection well tolerated to right deltoid with no adverse reactions noted.

## 2023-12-19 ENCOUNTER — MED REC SCAN ONLY (OUTPATIENT)
Dept: NEUROLOGY | Facility: CLINIC | Age: 78
End: 2023-12-19

## 2023-12-19 ENCOUNTER — TELEPHONE (OUTPATIENT)
Dept: NEUROLOGY | Facility: CLINIC | Age: 78
End: 2023-12-19

## 2023-12-19 NOTE — TELEPHONE ENCOUNTER
Pt , Criselda Cleveland, presented to office with pt POA paperwork. POA paperwork reviewed and scanned into pt chart. Pt dropped off a form of updates on the pt to give to Dr. Kike Miranda prior to her appointment.

## 2023-12-19 NOTE — TELEPHONE ENCOUNTER
Incoming call from patient's  Criselda Cleveland requesting a call back from a nurse .  will like to give condition update before the appointment but only want to speak with a nurse .   Please assist

## 2023-12-21 ENCOUNTER — OFFICE VISIT (OUTPATIENT)
Dept: NEUROLOGY | Facility: CLINIC | Age: 78
End: 2023-12-21
Payer: MEDICARE

## 2023-12-21 VITALS — BODY MASS INDEX: 23 KG/M2 | WEIGHT: 125 LBS | HEIGHT: 62 IN

## 2023-12-21 DIAGNOSIS — M79.2 NEUROPATHIC PAIN: ICD-10-CM

## 2023-12-21 DIAGNOSIS — F03.A0 MILD DEMENTIA, UNSPECIFIED DEMENTIA TYPE, UNSPECIFIED WHETHER BEHAVIORAL, PSYCHOTIC, OR MOOD DISTURBANCE OR ANXIETY (HCC): Primary | ICD-10-CM

## 2023-12-21 DIAGNOSIS — M48.061 SPINAL STENOSIS OF LUMBAR REGION, UNSPECIFIED WHETHER NEUROGENIC CLAUDICATION PRESENT: ICD-10-CM

## 2023-12-21 PROCEDURE — 99214 OFFICE O/P EST MOD 30 MIN: CPT | Performed by: OTHER

## 2023-12-21 RX ORDER — GABAPENTIN 300 MG/1
300 CAPSULE ORAL 2 TIMES DAILY
Qty: 180 CAPSULE | Refills: 0 | Status: SHIPPED | OUTPATIENT
Start: 2023-12-21 | End: 2024-03-20

## 2023-12-21 RX ORDER — DULOXETIN HYDROCHLORIDE 30 MG/1
CAPSULE, DELAYED RELEASE ORAL
COMMUNITY
Start: 2023-11-09

## 2023-12-21 RX ORDER — TRIAMCINOLONE ACETONIDE 1 MG/G
CREAM TOPICAL
COMMUNITY
Start: 2023-11-30

## 2023-12-21 RX ORDER — MEMANTINE HYDROCHLORIDE 5 MG/1
TABLET ORAL
Qty: 318 TABLET | Refills: 0 | Status: SHIPPED | OUTPATIENT
Start: 2023-12-21 | End: 2024-03-20

## 2024-01-03 ENCOUNTER — TELEPHONE (OUTPATIENT)
Dept: NEUROLOGY | Facility: CLINIC | Age: 79
End: 2024-01-03

## 2024-01-03 NOTE — TELEPHONE ENCOUNTER
Phone call returned to pt , Basil, after his letter inquiry. Advised Basil that it is normal for a pt to be seen by a NP in a psychiatric practice to determine the best provider for the pt.

## 2024-01-12 ENCOUNTER — NURSE ONLY (OUTPATIENT)
Dept: INTERNAL MEDICINE CLINIC | Facility: CLINIC | Age: 79
End: 2024-01-12

## 2024-01-12 DIAGNOSIS — E53.8 VITAMIN B12 DEFICIENCY: Primary | ICD-10-CM

## 2024-01-12 PROCEDURE — 96372 THER/PROPH/DIAG INJ SC/IM: CPT | Performed by: INTERNAL MEDICINE

## 2024-01-12 RX ADMIN — CYANOCOBALAMIN 1000 MCG: 1000 INJECTION, SOLUTION INTRAMUSCULAR; SUBCUTANEOUS at 08:52:00

## 2024-01-24 RX ORDER — AMLODIPINE BESYLATE 10 MG/1
TABLET ORAL
Qty: 90 TABLET | Refills: 3 | Status: SHIPPED | OUTPATIENT
Start: 2024-01-24

## 2024-01-24 NOTE — TELEPHONE ENCOUNTER
Refill request is for a maintenance medication and has met the criteria specified in the Ambulatory Medication Refill Standing Order for eligibility, visits, laboratory, alerts and was sent to the requested pharmacy.    Requested Prescriptions     Signed Prescriptions Disp Refills    amLODIPine 10 MG Oral Tab 90 tablet 3     Sig: TAKE ONE TABLET BY MOUTH ONE TIME DAILY     Authorizing Provider: NORIS MENDEZ     Ordering User: TANJA JIMÉNEZ

## 2024-01-31 ENCOUNTER — TELEPHONE (OUTPATIENT)
Dept: PHYSICAL MEDICINE AND REHAB | Facility: CLINIC | Age: 79
End: 2024-01-31

## 2024-01-31 ENCOUNTER — MED REC SCAN ONLY (OUTPATIENT)
Dept: PHYSICAL MEDICINE AND REHAB | Facility: CLINIC | Age: 79
End: 2024-01-31

## 2024-01-31 NOTE — TELEPHONE ENCOUNTER
Pts  dropped off Short form power of  for healthcare. Sent off to [employer] to be uploaded into pts chart.

## 2024-02-01 ENCOUNTER — TELEPHONE (OUTPATIENT)
Dept: PHYSICAL MEDICINE AND REHAB | Facility: CLINIC | Age: 79
End: 2024-02-01

## 2024-02-01 ENCOUNTER — OFFICE VISIT (OUTPATIENT)
Dept: PHYSICAL MEDICINE AND REHAB | Facility: CLINIC | Age: 79
End: 2024-02-01
Payer: MEDICARE

## 2024-02-01 VITALS — BODY MASS INDEX: 22.82 KG/M2 | HEIGHT: 62 IN | WEIGHT: 124 LBS

## 2024-02-01 DIAGNOSIS — M48.061 LUMBAR FORAMINAL STENOSIS: ICD-10-CM

## 2024-02-01 DIAGNOSIS — M41.25 OTHER IDIOPATHIC SCOLIOSIS, THORACOLUMBAR REGION: ICD-10-CM

## 2024-02-01 DIAGNOSIS — M48.062 SPINAL STENOSIS OF LUMBAR REGION WITH NEUROGENIC CLAUDICATION: Primary | ICD-10-CM

## 2024-02-01 DIAGNOSIS — M54.16 LUMBAR RADICULOPATHY: ICD-10-CM

## 2024-02-01 DIAGNOSIS — M43.16 SPONDYLOLISTHESIS OF LUMBAR REGION: ICD-10-CM

## 2024-02-01 DIAGNOSIS — M54.16 LUMBAR RADICULOPATHY: Primary | ICD-10-CM

## 2024-02-01 DIAGNOSIS — M51.9 LUMBAR DISC DISEASE: ICD-10-CM

## 2024-02-01 PROCEDURE — 99214 OFFICE O/P EST MOD 30 MIN: CPT | Performed by: PHYSICAL MEDICINE & REHABILITATION

## 2024-02-01 PROCEDURE — 1125F AMNT PAIN NOTED PAIN PRSNT: CPT | Performed by: PHYSICAL MEDICINE & REHABILITATION

## 2024-02-01 NOTE — PROGRESS NOTES
Low Back Pain H & P    Chief Complaint:   Chief Complaint   Patient presents with    Follow - Up     LOV 10/25/2023 Patient follows up on Left L4 + L5 TFESI done 2023, admits to about 25% relief from this. Continues to have right side low back pain radiating down her leg. Not currently in PT however finished in December and felt this was helping so would like to continue. Denies new sx. LOP 5/10     Nursing note reviewed and verified.    Patient was last seen on 10/25/2023.  On 2023, I did right L4 and L5 TFESI's which helped for a couple of days.  She has completed the PT with Madelaine which was very helpful for her.  She has been doing her HEP.  She has seen Dr. Harrington who has diagnosed her with dementia and has prescribed Cymbalta for anxiety and depression and neuropathic pain.  She has been referred to Hank Ramirez for cognitive work up.  He has also placed her on gabapentin 300 mg in the morning and 600 mg at night.  She has been placed on Memantine for her memory and Namenda.    Description of the Pain  The pain is located in the right low back.    The pain radiates to the right buttock, right anterior thigh, right anterior lower leg, right lateral ankle and right anterior ankle.  The pain at its best is 4/10. The pain at its worst is 10/10. The pain is currently  4/10.      The pain is worse when bending, walking, and going up stairs.    The pain is better  at times with bending .    Past Medical History   Past Medical History:   Diagnosis Date    Arthritis     Back pain     Cough 2011    Deep vein thrombosis (HCC)     Dementia (HCC)     Essential hypertension     Family history of colon cancer 10/21/2014     0     PARA ZERO    H/O foot surgery ,    LEFT FOOT SURGERY, PINS PLACED IN TOE    H/O oral surgery 2013    GUM/MOUTH SURGERY    Hallux rigidus 2009    LEFT FOOT, YURY PROCEDURE, 1ST LEFT METARSOPHALANGEAL JOINT    Heart palpitations 2012    HEART MONITOR 2012     High blood pressure     High cholesterol     History of DVT (deep vein thrombosis)     Hypercholesterolemia     Hyperlipidemia     Hyperparathyroidism (HCC) 2004    PAIR OF PARATHYROID REMOVED    Hyperparathyroidism (HCC)     PAIR OF PARATHYROID REMOVED     Hypertension     Left carotid artery stenosis 2018    Migraine 2012    OCCULAR MIGRAINE    Migraine     OCCULAR MIGRAINE     Neuroma 2007    2 LT, HALLUX RIGIDUS LT, PER. NG.    Osteoarthritis     Other ill-defined conditions(799.89)     TAHBSO MGMT.    Other ill-defined conditions(799.89)     CHRONIC LEFT CYSTIC PELVIC MASS    Ovarian cyst     REMOVED PER NG.    Painful breasts 10/21/2014    Pneumonia     HOSPITALIZED    Pneumonia due to organism     Spinal stenosis     Spinal stenosis 2016    Tonsillitis     Ulcerative colitis (HCC) 1976    Uterine cancer (HCC) 2002       Past Surgical History   Past Surgical History:   Procedure Laterality Date    APPENDECTOMY      patient doenst rememeber    CATARACT Bilateral     Left Eye: 23, Right Eye: 23    COLONOSCOPY N/A 2016    Procedure: COLONOSCOPY;  Surgeon: Velma Herrera MD;  Location: [facility] ENDOSCOPY    COLONOSCOPY N/A 2018    Procedure: COLONOSCOPY;  Surgeon: Velma Herrera MD;  Location: [facility] ENDOSCOPY    COLONOSCOPY  10/2022    COLONOSCOPY N/A 10/14/2022    Procedure: COLONOSCOPY;  Surgeon: Michael López MD;  Location: [facility] ENDOSCOPY    HYSTERECTOMY      OTHER SURGICAL HISTORY  ?    Parathyroid removal    OTHER SURGICAL HISTORY  2017    left foot surgery plate removed    TONSILLECTOMY         Family History   Family History   Problem Relation Age of Onset    Cancer Father 73        COLON, CAUSE OF DEATH    Cancer Mother 64        COLON, 2nd primary 94    Cancer Maternal Cousin Male 60        prostate/patient denies     Cancer Self 56        uterine    Prostate Cancer Maternal Uncle         age unknown/ at 94    Kidney Disease Neg          UROLITHIASIS    Breast Cancer Neg     Ovarian Cancer Neg        Social History   Social History     Socioeconomic History    Marital status:      Spouse name: Not on file    Number of children: Not on file    Years of education: Not on file    Highest education level: Not on file   Occupational History    Not on file   Tobacco Use    Smoking status: Never     Passive exposure: Never    Smokeless tobacco: Never   Vaping Use    Vaping Use: Never used   Substance and Sexual Activity    Alcohol use: Yes     Alcohol/week: 2.0 standard drinks of alcohol     Types: 2 Glasses of wine per week     Comment: socially    Drug use: No    Sexual activity: Not on file   Other Topics Concern     Service Not Asked    Blood Transfusions Not Asked    Caffeine Concern Yes     Comment: SODA/TEA 1 CAN/DAY    Occupational Exposure Not Asked    Hobby Hazards Not Asked    Sleep Concern Not Asked    Stress Concern Not Asked    Weight Concern Not Asked    Special Diet Not Asked    Back Care Not Asked    Exercise Not Asked    Bike Helmet Not Asked    Seat Belt Not Asked    Self-Exams Not Asked   Social History Narrative    Not on file     Social Determinants of Health     Financial Resource Strain: Not on file   Food Insecurity: Not on file   Transportation Needs: Not on file   Physical Activity: Not on file   Stress: Not on file   Social Connections: Not on file   Housing Stability: Not on file       PE:  The patient does appear in her stated age in no distress.  The patient is well groomed.    Psychiatric:  The patient is alert and oriented x 3.  The patient has a normal affect and mood.      Respiratory:  No acute respiratory distress. Patient does not have a cough.    HEENT:  Extraocular muscles are intact. There is no kern icterus. Pupils are equal, round, and reactive to light. No redness or discharge bilaterally.    Skin:  There are no rashes or lesions.    Vitals:  There were no vitals filed for this visit.    Gait:     Gait: Normal gait   Sit to Stand: no difficulty      Vascular lower extremity:   Dorsalis pedis pulse-RIGHT 2+   Dorsalis pedis pulse-LEFT 2+   Tibialis posterior pulse-RIGHT 2+   Tibialis posterior pulse-LEFT 2+     Neurological Lower Extremity:    Light Touch Sensation: Intact in bilateral Lower Extremities   LE Muscle Strength: All LE strength measurements 5/5 except:  Hamstring RIGHT:   3+/5  Hamstring LEFT:   4/5   RIGHT plantar reflexes: downward response   LEFT plantar reflexes: downward response   Reflexes: 2+ in bilateral lower extremities except:  Right Achilles tendon which are absent  Left Achilles tendon which are absent     Assessment  1. L4-5 severe, L3-4 mod, L2-3 mod, L1-2 mod-severe central stenosis    2. L5-S1 left large far lateral & mild central, L4-5 large diffuse, L3-4 mild-mod diffuse & left mod far lateral, L2-3 mod diffuse, L1-2 mod diffuse & right mod-large foraminal bulging discs     3. L5-S1 left severe/right moderate, L4-5 bilateral moderate, L3-4 left moderate-severe/right moderate, L2-3 right mderate-severe, L1-2 bilateral moderate-severe foraminal stenosis    4. Other idiopathic scoliosis, thoracolumbar region    5. L4-5 unstable grade 2-3, L5-S1 stable grade 1 spondylolisthesis    6. right > left S1 radiculopathy with right L4 radiculitis         Plan  I will perform right L4 and L5 TFESI(s).    She will get back into the PT with Madelaine.    The patient will continue with their current pain medications.    The patient will follow up in 3 months, but the patient will call me 2 weeks after having the injection to let me know how the injection worked.    The patient understands and agrees with the stated plan.  Tristan Adrian MD  2/1/2024

## 2024-02-01 NOTE — H&P (VIEW-ONLY)
Low Back Pain H & P    Chief Complaint:   Chief Complaint   Patient presents with    Follow - Up     LOV 10/25/2023 Patient follows up on Left L4 + L5 TFESI done 2023, admits to about 25% relief from this. Continues to have right side low back pain radiating down her leg. Not currently in PT however finished in December and felt this was helping so would like to continue. Denies new sx. LOP 5/10     Nursing note reviewed and verified.    Patient was last seen on 10/25/2023.  On 2023, I did right L4 and L5 TFESI's which helped for a couple of days.  She has completed the PT with Madelaine which was very helpful for her.  She has been doing her HEP.  She has seen Dr. Harrington who has diagnosed her with dementia and has prescribed Cymbalta for anxiety and depression and neuropathic pain.  She has been referred to Hank Ramirez for cognitive work up.  He has also placed her on gabapentin 300 mg in the morning and 600 mg at night.  She has been placed on Memantine for her memory and Namenda.    Description of the Pain  The pain is located in the right low back.    The pain radiates to the right buttock, right anterior thigh, right anterior lower leg, right lateral ankle and right anterior ankle.  The pain at its best is 4/10. The pain at its worst is 10/10. The pain is currently  4/10.      The pain is worse when bending, walking, and going up stairs.    The pain is better  at times with bending .    Past Medical History   Past Medical History:   Diagnosis Date    Arthritis     Back pain     Cough 2011    Deep vein thrombosis (HCC)     Dementia (HCC)     Essential hypertension     Family history of colon cancer 10/21/2014     0     PARA ZERO    H/O foot surgery ,    LEFT FOOT SURGERY, PINS PLACED IN TOE    H/O oral surgery 2013    GUM/MOUTH SURGERY    Hallux rigidus 2009    LEFT FOOT, YURY PROCEDURE, 1ST LEFT METARSOPHALANGEAL JOINT    Heart palpitations 2012    HEART MONITOR 2012     High blood pressure     High cholesterol     History of DVT (deep vein thrombosis)     Hypercholesterolemia     Hyperlipidemia     Hyperparathyroidism (HCC) 2004    PAIR OF PARATHYROID REMOVED    Hyperparathyroidism (HCC)     PAIR OF PARATHYROID REMOVED     Hypertension     Left carotid artery stenosis 2018    Migraine 2012    OCCULAR MIGRAINE    Migraine     OCCULAR MIGRAINE     Neuroma 2007    2 LT, HALLUX RIGIDUS LT, PER. NG.    Osteoarthritis     Other ill-defined conditions(799.89)     TAHBSO MGMT.    Other ill-defined conditions(799.89)     CHRONIC LEFT CYSTIC PELVIC MASS    Ovarian cyst     REMOVED PER NG.    Painful breasts 10/21/2014    Pneumonia     HOSPITALIZED    Pneumonia due to organism     Spinal stenosis     Spinal stenosis 2016    Tonsillitis     Ulcerative colitis (HCC) 1976    Uterine cancer (HCC) 2002       Past Surgical History   Past Surgical History:   Procedure Laterality Date    APPENDECTOMY      patient doenst rememeber    CATARACT Bilateral     Left Eye: 23, Right Eye: 23    COLONOSCOPY N/A 2016    Procedure: COLONOSCOPY;  Surgeon: Velma Herrera MD;  Location: [facility]COPY    COLONOSCOPY N/A 2018    Procedure: COLONOSCOPY;  Surgeon: Velma Herrera MD;  Location: [facility] ENDOSCOPY    COLONOSCOPY  10/2022    COLONOSCOPY N/A 10/14/2022    Procedure: COLONOSCOPY;  Surgeon: Michael López MD;  Location: [facility] ENDOSCOPY    HYSTERECTOMY      OTHER SURGICAL HISTORY  ?    Parathyroid removal    OTHER SURGICAL HISTORY  2017    left foot surgery plate removed    TONSILLECTOMY         Family History   Family History   Problem Relation Age of Onset    Cancer Father 73        COLON, CAUSE OF DEATH    Cancer Mother 64        COLON, 2nd primary 94    Cancer Maternal Cousin Male 60        prostate/patient denies     Cancer Self 56        uterine    Prostate Cancer Maternal Uncle         age unknown/ at 94    Kidney Disease Neg          UROLITHIASIS    Breast Cancer Neg     Ovarian Cancer Neg        Social History   Social History     Socioeconomic History    Marital status:      Spouse name: Not on file    Number of children: Not on file    Years of education: Not on file    Highest education level: Not on file   Occupational History    Not on file   Tobacco Use    Smoking status: Never     Passive exposure: Never    Smokeless tobacco: Never   Vaping Use    Vaping Use: Never used   Substance and Sexual Activity    Alcohol use: Yes     Alcohol/week: 2.0 standard drinks of alcohol     Types: 2 Glasses of wine per week     Comment: socially    Drug use: No    Sexual activity: Not on file   Other Topics Concern     Service Not Asked    Blood Transfusions Not Asked    Caffeine Concern Yes     Comment: SODA/TEA 1 CAN/DAY    Occupational Exposure Not Asked    Hobby Hazards Not Asked    Sleep Concern Not Asked    Stress Concern Not Asked    Weight Concern Not Asked    Special Diet Not Asked    Back Care Not Asked    Exercise Not Asked    Bike Helmet Not Asked    Seat Belt Not Asked    Self-Exams Not Asked   Social History Narrative    Not on file     Social Determinants of Health     Financial Resource Strain: Not on file   Food Insecurity: Not on file   Transportation Needs: Not on file   Physical Activity: Not on file   Stress: Not on file   Social Connections: Not on file   Housing Stability: Not on file       PE:  The patient does appear in her stated age in no distress.  The patient is well groomed.    Psychiatric:  The patient is alert and oriented x 3.  The patient has a normal affect and mood.      Respiratory:  No acute respiratory distress. Patient does not have a cough.    HEENT:  Extraocular muscles are intact. There is no kern icterus. Pupils are equal, round, and reactive to light. No redness or discharge bilaterally.    Skin:  There are no rashes or lesions.    Vitals:  There were no vitals filed for this visit.    Gait:     Gait: Normal gait   Sit to Stand: no difficulty      Vascular lower extremity:   Dorsalis pedis pulse-RIGHT 2+   Dorsalis pedis pulse-LEFT 2+   Tibialis posterior pulse-RIGHT 2+   Tibialis posterior pulse-LEFT 2+     Neurological Lower Extremity:    Light Touch Sensation: Intact in bilateral Lower Extremities   LE Muscle Strength: All LE strength measurements 5/5 except:  Hamstring RIGHT:   3+/5  Hamstring LEFT:   4/5   RIGHT plantar reflexes: downward response   LEFT plantar reflexes: downward response   Reflexes: 2+ in bilateral lower extremities except:  Right Achilles tendon which are absent  Left Achilles tendon which are absent     Assessment  1. L4-5 severe, L3-4 mod, L2-3 mod, L1-2 mod-severe central stenosis    2. L5-S1 left large far lateral & mild central, L4-5 large diffuse, L3-4 mild-mod diffuse & left mod far lateral, L2-3 mod diffuse, L1-2 mod diffuse & right mod-large foraminal bulging discs     3. L5-S1 left severe/right moderate, L4-5 bilateral moderate, L3-4 left moderate-severe/right moderate, L2-3 right mderate-severe, L1-2 bilateral moderate-severe foraminal stenosis    4. Other idiopathic scoliosis, thoracolumbar region    5. L4-5 unstable grade 2-3, L5-S1 stable grade 1 spondylolisthesis    6. right > left S1 radiculopathy with right L4 radiculitis         Plan  I will perform right L4 and L5 TFESI(s).    She will get back into the PT with Madelaine.    The patient will continue with their current pain medications.    The patient will follow up in 3 months, but the patient will call me 2 weeks after having the injection to let me know how the injection worked.    The patient understands and agrees with the stated plan.  Tristan Adrian MD  2/1/2024

## 2024-02-01 NOTE — TELEPHONE ENCOUNTER
Per CMS Guidelines -no authorization is required for Right L4 and Right L5 TFESIs CPT 88554, 83446     Status: Authorization is not required based on medical necessity however may be subject to review once claim is submitted-Covered Benefit     Per CMS Guidelines-ESIs are limited to a maximum of four (4) sessions per spinal region in a rolling twelve (12) month period.    Lumbar MEHREEN done 3/28/23, 8/18/23 and 11/17/23    Fyi-[facility] may have a MAX of 3 lumbar mehreen's in a 12 month period

## 2024-02-01 NOTE — PATIENT INSTRUCTIONS
Plan  I will perform right L4 and L5 TFESI(s).    She will get back into the PT with Madelaine.    The patient will continue with their current pain medications.    The patient will follow up in 3 months, but the patient will call me 2 weeks after having the injection to let me know how the injection worked.

## 2024-02-02 ENCOUNTER — TELEPHONE (OUTPATIENT)
Dept: INTERNAL MEDICINE CLINIC | Facility: CLINIC | Age: 79
End: 2024-02-02

## 2024-02-02 NOTE — TELEPHONE ENCOUNTER
Status of Anticoag  [x] Clearance pending to hold ASA 81mg for 5 days prior to right L4 + L5 transforaminal epidural steroid injection faxed to Dr. Mansfield. F: [phone]  [] Clearance approved  [] Clearance denied

## 2024-02-02 NOTE — TELEPHONE ENCOUNTER
Forms faxed to DR. Adrian at [phone]-( to hold ASA)confirmation received -to scanning,copy in weekly folder

## 2024-02-02 NOTE — TELEPHONE ENCOUNTER
Received via fax from Dr. Adrian clearance to hold ASA for injection.to be signed and faxed back.    Placed in Dr. Mansfield mailbox

## 2024-02-05 ENCOUNTER — MED REC SCAN ONLY (OUTPATIENT)
Dept: PHYSICAL MEDICINE AND REHAB | Facility: CLINIC | Age: 79
End: 2024-02-05

## 2024-02-06 NOTE — TELEPHONE ENCOUNTER
Patient has been scheduled for  Right L4 and Right L5 TFESI  on 3/22/2024 at the Endo with .   -Anesthesia type: local.  -If scheduling [facility] covid testing required for all procedures whether patient is vaccinated or not.  -Patient informed not to eat or drink anything after midnight the night prior to the procedure, if being sedated. (For afternoon injections: Patient to fast 6-8 hours prior to procedure with IVCS/MAC. )  -Patient was advised that if he/she does receive the covid vaccine it needs to be at least 2 weeks before or after the injection.  -Medications and allergies reviewed.  -Patient reminded to hold NSAIDs (Ibuprofen, ASA 81, Aleve, Naproxen, Mobic, Diclofenac, Etodolac, Celebrex etc.) for 3 days prior to LUMBAR FACET JOINT INJECTIONS OR MEDIAL BRANCH BLOCK INJECTIONS  if BMI is greater than 35. For Cervical injections only hold multivitamins, Vitamin E, Fish Oil, Phentermine (Lomaira) for 7 days prior to injection and NSAIDS.   mg to be held for 7 days prior to injections.  -If patient is receiving MAC/IVCS Phentermine (Lomaira), Adlyxin (Lixisenatide), Bydureon BCise (Exenatide-release), Byetta (Exenatide), Mounjaro (Tirezepatide), Ozempic (Semaglutide), Rybelsus (oral demaglutide), Saxenda (Liraglutide), Trulicity (Dulaglutide), Victoriza (Liraglutide), Wegovy (Semaglutide), Berberine (oral natures ozempic) will need to be held for 7 days prior to injection.  -If patient's BMI is greater than 50. Patient is unable to get IVCS/MAC at [facility]. (Options: Patient can go to [facility] under MAC or ENDO under IVCS-reminder physician's slots at [facility] are limited. OR Patient can have the procedure done under local anesthesia at [facility] with Valium ( per physician's preference.)    -If on blood thinner clearance has been received to hold this medication by provider.  Clearance received and placed for scanning.   -Patient informed he/she will need a  to and from procedure. EFFECTIVE 12/1/23- Per [facility]: \"  Our PAT team will notify the patient that their ride MUST remain onsite for the entirety of their visit if the ride is unable to do so the procedure will be canceled. \"    -[facility] is located in the [address] floor. Patient may park in the yellow or purple parking.    Patient verbalized understanding and agrees with plan.  -----> Scheduled in Epic: Yes  -----> Scheduled in [employer]/Surgical Case Request: Yes

## 2024-02-10 DIAGNOSIS — F03.A0 MILD DEMENTIA, UNSPECIFIED DEMENTIA TYPE, UNSPECIFIED WHETHER BEHAVIORAL, PSYCHOTIC, OR MOOD DISTURBANCE OR ANXIETY (HCC): ICD-10-CM

## 2024-02-10 DIAGNOSIS — M79.2 NEUROPATHIC PAIN: ICD-10-CM

## 2024-02-12 RX ORDER — DONEPEZIL HYDROCHLORIDE 10 MG/1
10 TABLET, FILM COATED ORAL DAILY
Qty: 90 TABLET | Refills: 0 | Status: SHIPPED | OUTPATIENT
Start: 2024-02-12

## 2024-02-12 RX ORDER — DULOXETIN HYDROCHLORIDE 30 MG/1
CAPSULE, DELAYED RELEASE ORAL
Qty: 270 CAPSULE | Refills: 0 | Status: SHIPPED | OUTPATIENT
Start: 2024-02-12

## 2024-02-12 NOTE — TELEPHONE ENCOUNTER
Medication Quantity Refills Start End   DULoxetine 30 MG Oral Cap DR Particles -- -- 11/9/2023 --   Sig:   Take 1 capsule by mouth daily for 90 days, THEN 2 capsules daily.     Route:   (none)     Class:   Historical     Order #:   [id number]         Please review and sign if appropriate     LOV:12/21/2023  NOV: none    Last refill/[place]: 11/09/2023 (none noted)    Medication Quantity Refills Start End   donepezil 10 MG Oral Tab 90 tablet 1 9/5/2023 --   Sig:   Take 1 tablet (10 mg total) by mouth daily.     Route:   Oral     Order #:   [id number]         LOV:12/21/2023  NOV: none    Last refill/[place]: 09/05/2023 (#90 / refill 1)

## 2024-02-23 ENCOUNTER — LAB ENCOUNTER (OUTPATIENT)
Dept: LAB | Age: 79
End: 2024-02-23
Attending: INTERNAL MEDICINE
Payer: MEDICARE

## 2024-02-23 ENCOUNTER — TELEPHONE (OUTPATIENT)
Dept: INTERNAL MEDICINE CLINIC | Facility: CLINIC | Age: 79
End: 2024-02-23

## 2024-02-23 ENCOUNTER — OFFICE VISIT (OUTPATIENT)
Dept: INTERNAL MEDICINE CLINIC | Facility: CLINIC | Age: 79
End: 2024-02-23
Payer: MEDICARE

## 2024-02-23 VITALS
WEIGHT: 133.63 LBS | TEMPERATURE: 98 F | OXYGEN SATURATION: 94 % | DIASTOLIC BLOOD PRESSURE: 60 MMHG | SYSTOLIC BLOOD PRESSURE: 142 MMHG | HEIGHT: 62 IN | BODY MASS INDEX: 24.59 KG/M2 | HEART RATE: 76 BPM

## 2024-02-23 DIAGNOSIS — E04.2 MULTIPLE THYROID NODULES: ICD-10-CM

## 2024-02-23 DIAGNOSIS — Z98.890 S/P PARATHYROIDECTOMY: ICD-10-CM

## 2024-02-23 DIAGNOSIS — Z85.42 HISTORY OF UTERINE CANCER: ICD-10-CM

## 2024-02-23 DIAGNOSIS — Z90.89 S/P PARATHYROIDECTOMY: ICD-10-CM

## 2024-02-23 DIAGNOSIS — Z00.00 ROUTINE HEALTH MAINTENANCE: ICD-10-CM

## 2024-02-23 DIAGNOSIS — Z87.19 HISTORY OF CHRONIC ULCERATIVE COLITIS: ICD-10-CM

## 2024-02-23 DIAGNOSIS — Z80.0 FAMILY HISTORY OF COLON CANCER: ICD-10-CM

## 2024-02-23 DIAGNOSIS — D51.0 PERNICIOUS ANEMIA: ICD-10-CM

## 2024-02-23 DIAGNOSIS — I10 ESSENTIAL HYPERTENSION: Primary | ICD-10-CM

## 2024-02-23 DIAGNOSIS — I10 ESSENTIAL HYPERTENSION: ICD-10-CM

## 2024-02-23 DIAGNOSIS — M81.8 OTHER OSTEOPOROSIS, UNSPECIFIED PATHOLOGICAL FRACTURE PRESENCE: ICD-10-CM

## 2024-02-23 DIAGNOSIS — R41.3 MEMORY DEFICIT: ICD-10-CM

## 2024-02-23 LAB
ALBUMIN SERPL-MCNC: 4.6 G/DL (ref 3.2–4.8)
ALBUMIN/GLOB SERPL: 1.6 {RATIO} (ref 1–2)
ALP LIVER SERPL-CCNC: 69 U/L
ALT SERPL-CCNC: 19 U/L
ANION GAP SERPL CALC-SCNC: 7 MMOL/L (ref 0–18)
AST SERPL-CCNC: 29 U/L (ref ?–34)
BASOPHILS # BLD AUTO: 0.05 X10(3) UL (ref 0–0.2)
BASOPHILS NFR BLD AUTO: 0.8 %
BILIRUB SERPL-MCNC: 0.4 MG/DL (ref 0.2–1.1)
BUN BLD-MCNC: 20 MG/DL (ref 9–23)
BUN/CREAT SERPL: 22.7 (ref 10–20)
CALCIUM BLD-MCNC: 10 MG/DL (ref 8.7–10.4)
CHLORIDE SERPL-SCNC: 101 MMOL/L (ref 98–112)
CHOLEST SERPL-MCNC: 181 MG/DL (ref ?–200)
CO2 SERPL-SCNC: 25 MMOL/L (ref 21–32)
CREAT BLD-MCNC: 0.88 MG/DL
DEPRECATED RDW RBC AUTO: 41.7 FL (ref 35.1–46.3)
EGFRCR SERPLBLD CKD-EPI 2021: 66 ML/MIN/1.73M2 (ref 60–?)
EOSINOPHIL # BLD AUTO: 0.25 X10(3) UL (ref 0–0.7)
EOSINOPHIL NFR BLD AUTO: 3.9 %
ERYTHROCYTE [DISTWIDTH] IN BLOOD BY AUTOMATED COUNT: 11.9 % (ref 11–15)
FASTING PATIENT LIPID ANSWER: NO
FASTING STATUS PATIENT QL REPORTED: NO
GLOBULIN PLAS-MCNC: 2.8 G/DL (ref 2.8–4.4)
GLUCOSE BLD-MCNC: 92 MG/DL (ref 70–99)
HCT VFR BLD AUTO: 35 %
HDLC SERPL-MCNC: 88 MG/DL (ref 40–59)
HGB BLD-MCNC: 11.9 G/DL
IMM GRANULOCYTES # BLD AUTO: 0.02 X10(3) UL (ref 0–1)
IMM GRANULOCYTES NFR BLD: 0.3 %
LDLC SERPL CALC-MCNC: 82 MG/DL (ref ?–100)
LYMPHOCYTES # BLD AUTO: 1.24 X10(3) UL (ref 1–4)
LYMPHOCYTES NFR BLD AUTO: 19.3 %
MCH RBC QN AUTO: 32.8 PG (ref 26–34)
MCHC RBC AUTO-ENTMCNC: 34 G/DL (ref 31–37)
MCV RBC AUTO: 96.4 FL
MONOCYTES # BLD AUTO: 0.87 X10(3) UL (ref 0.1–1)
MONOCYTES NFR BLD AUTO: 13.5 %
NEUTROPHILS # BLD AUTO: 4 X10 (3) UL (ref 1.5–7.7)
NEUTROPHILS # BLD AUTO: 4 X10(3) UL (ref 1.5–7.7)
NEUTROPHILS NFR BLD AUTO: 62.2 %
NONHDLC SERPL-MCNC: 93 MG/DL (ref ?–130)
OSMOLALITY SERPL CALC.SUM OF ELEC: 278 MOSM/KG (ref 275–295)
PLATELET # BLD AUTO: 268 10(3)UL (ref 150–450)
POTASSIUM SERPL-SCNC: 4.6 MMOL/L (ref 3.5–5.1)
PROT SERPL-MCNC: 7.4 G/DL (ref 5.7–8.2)
RBC # BLD AUTO: 3.63 X10(6)UL
SODIUM SERPL-SCNC: 133 MMOL/L (ref 136–145)
TRIGL SERPL-MCNC: 57 MG/DL (ref 30–149)
VLDLC SERPL CALC-MCNC: 9 MG/DL (ref 0–30)
WBC # BLD AUTO: 6.4 X10(3) UL (ref 4–11)

## 2024-02-23 PROCEDURE — 36415 COLL VENOUS BLD VENIPUNCTURE: CPT

## 2024-02-23 PROCEDURE — 85025 COMPLETE CBC W/AUTO DIFF WBC: CPT

## 2024-02-23 PROCEDURE — 99215 OFFICE O/P EST HI 40 MIN: CPT | Performed by: INTERNAL MEDICINE

## 2024-02-23 PROCEDURE — 80053 COMPREHEN METABOLIC PANEL: CPT

## 2024-02-23 PROCEDURE — 96372 THER/PROPH/DIAG INJ SC/IM: CPT | Performed by: INTERNAL MEDICINE

## 2024-02-23 PROCEDURE — 80061 LIPID PANEL: CPT

## 2024-02-23 RX ORDER — TRIAMCINOLONE ACETONIDE 1 MG/G
CREAM TOPICAL 2 TIMES DAILY
Qty: 45 G | Refills: 1 | Status: SHIPPED
Start: 2024-02-23

## 2024-02-23 RX ADMIN — CYANOCOBALAMIN 1000 MCG: 1000 INJECTION, SOLUTION INTRAMUSCULAR; SUBCUTANEOUS at 10:53:00

## 2024-02-23 NOTE — PROGRESS NOTES
Karyn Cobos is a 78 year old female.  HPI:     Chief Complaint   Patient presents with    Checkup     4 month follow up, back and leg pain, trouble using the restroom- using colace      Karyn is here with her partner Basil    She will get labs today.  She has hypertension.  Blood pressure systolic is just a little bit higher than I would like but for now we will continue to monitor.    We discussed that she should keep an appointment with Dr. Patton for breast exams.  She had a mammogram August 2023.  She verbalized understanding.  I believe Dr. Patton will be better at this point to do her breast exams then I would.    She has had her flu vaccine COVID booster RSV vaccine and Shingrix.  She is up-to-date with her pneumonia vaccine as she did have PCV 20.    She has been evaluated for lower lumbar pain.  Right buttock pain and radiation of pain down her right leg.  She will be getting an injection by .    She has seen Dr. Healy at [place].  She has a history of osteoporosis.  She is on Prolia.  She also has a history of thyroid nodules.  It was felt that she is up-to-date with her thyroid ultrasound and thyroid ultrasound can be next year.    She has a history of some total parathyroidectomy for hyperparathyroidism.  She had been on Fosamax for 10 years and had a drug holiday off Fosamax.  Her Fosamax was resumed in 2015.  She is now on Prolia.  She is off of Fosamax.    Does have memory deficit and I did review on note from Dr. Harrington her neurologist dated December 21, 2023 indicating that Karyn has dementia.  Greatest suspicion is for Alzheimer's dementia.  It was noted that her neuropsychological report suggests she may primarily have vascular dementia.  It was felt that while almost all types of dementia have a component of cerebrovascular disease Dr. Harrington indicated he did not suspect that this was the main  in her neurodegenerative disease.    She has a remote history of uterine  carcinoma status post hysterectomy.    She has had a consultation with Dr. López in December 2023 discussing pros and cons of colonoscopy.  It was noted that she wished to proceed with colonoscopy 2024.  Family history of colon cancer in mother.  Current Outpatient Medications   Medication Sig Dispense Refill    denosumab 60 MG/ML Subcutaneous Solution Prefilled Syringe Inject 1 mL (60 mg total) into the skin one time.      DULoxetine 30 MG Oral Cap DR Particles Take 1 capsule  by mouth daily for 90 days, THEN 2 capsules  daily. 270 capsule 0    donepezil 10 MG Oral Tab Take 1 tablet (10 mg total) by mouth daily. 90 tablet 0    amLODIPine 10 MG Oral Tab TAKE ONE TABLET BY MOUTH ONE TIME DAILY 90 tablet 3    triamcinolone 0.1 % External Cream Apply to the affected areas on hands and neck twice daily for 3 weeks as needed      memantine 5 MG Oral Tab Take 1 tablet (5 mg total) by mouth daily for 7 days, THEN 2 tablets (10 mg total) daily for 7 days, THEN 3 tablets (15 mg total) daily for 7 days, THEN 4 tablets (20 mg total) daily. 318 tablet 0    gabapentin 300 MG Oral Cap Take 1 capsule (300 mg total) by mouth in the morning and 1 capsule (300 mg total) before bedtime. 180 capsule 0    telmisartan 80 MG Oral Tab TAKE ONE TABLET BY MOUTH IN THE MORNING 90 tablet 3    omeprazole 20 MG Oral Capsule Delayed Release Take 1 capsule (20 mg total) by mouth every morning.      atorvastatin 40 MG Oral Tab TAKE ONE TABLET BY MOUTH EVERY DAY AT NIGHTLY. 90 tablet 3    METOPROLOL SUCCINATE ER 50 MG Oral Tablet 24 Hr TAKE ONE TABLET BY MOUTH ONE TIME DAILY 90 tablet 3    hydroCHLOROthiazide 12.5 MG Oral Tab Take 1 tablet (12.5 mg total) by mouth Every Monday, Wednesday, and Friday. 39 tablet 3    SULFASALAZINE 500 MG Oral Tab TAKE 3 TABLETS BY MOUTH TWICE A  tablet 0    aspirin 81 MG Oral Tab EC Take 1 tablet (81 mg total) by mouth daily.  0    folic acid 1 MG Oral Tab Take 1 tablet (1 mg total) by mouth daily. 90 tablet 3     Calcium Citrate-Vitamin D 315-250 MG-UNIT Oral Tab Take 2 tablets by mouth 2 (two) times daily with meals.      cyanocobalamin (VITAMIN B12) 1000 MCG/ML Injection Solution Inject 1 mL (1,000 mcg total) into the muscle every 30 (thirty) days.      Vitamin D, Ergocalciferol, (DRISDOL) 28865 UNITS Oral Cap Take 1 capsule (50,000 Units total) by mouth As Directed. Every 5 days.      Multiple Vitamin (MULTI-VITAMINS) Oral Tab Take 1 tablet by mouth daily with breakfast.        Past Medical History:   Diagnosis Date    Arthritis     Back pain     Cough 2011    Deep vein thrombosis (HCC)     Dementia (HCC)     Essential hypertension     Family history of colon cancer 10/21/2014     0     PARA ZERO    H/O foot surgery ,    LEFT FOOT SURGERY, PINS PLACED IN TOE    H/O oral surgery     GUM/MOUTH SURGERY    Hallux rigidus 2009    LEFT FOOT, YURY PROCEDURE, 1ST LEFT METARSOPHALANGEAL JOINT    Heart palpitations     HEART MONITOR 2012    High blood pressure     High cholesterol     History of DVT (deep vein thrombosis)     Hypercholesterolemia     Hyperlipidemia     Hyperparathyroidism (HCC)     PAIR OF PARATHYROID REMOVED    Hyperparathyroidism (HCC)     PAIR OF PARATHYROID REMOVED     Hypertension     Left carotid artery stenosis 2018    Migraine 2012    OCCULAR MIGRAINE    Migraine     OCCULAR MIGRAINE     Neuroma 2007    2 LT, HALLUX RIGIDUS LT, PER. NG.    Osteoarthritis     Other ill-defined conditions(799.89)     TAHBSO MGMT.    Other ill-defined conditions(799.89)     CHRONIC LEFT CYSTIC PELVIC MASS    Ovarian cyst     REMOVED PER NG.    Painful breasts 10/21/2014    Pneumonia     HOSPITALIZED    Pneumonia due to organism     Spinal stenosis     Spinal stenosis 2016    Tonsillitis     Ulcerative colitis (HCC) 1976    Uterine cancer (HCC) 2002      Social History:  Social History     Socioeconomic History    Marital status:    Tobacco Use    Smoking  status: Never     Passive exposure: Never    Smokeless tobacco: Never   Vaping Use    Vaping Use: Never used   Substance and Sexual Activity    Alcohol use: Yes     Alcohol/week: 2.0 standard drinks of alcohol     Types: 2 Glasses of wine per week     Comment: socially    Drug use: No   Other Topics Concern    Caffeine Concern Yes     Comment: SODA/TEA 1 CAN/DAY        REVIEW OF SYSTEMS:   GENERAL HEALTH:  feels well otherwise  RESPIRATORY:  Voices no shortness of breath with exertion or cough  CARDIOVASCULAR:  Voices no chest pain on exertion or shortness of breath  GI:   Voices no abdominal pain or changes of bowels   :Viices no urning or frequency of urination.  NEURO:  Voices no  headaches or dizziness    EXAM:   /60   Pulse 76   Temp 97.8 °F (36.6 °C)   Ht 5' 2\" (1.575 m)   Wt 133 lb 9.6 oz (60.6 kg)   SpO2 94%   BMI 24.44 kg/m²     GENERAL:  well developed, well nourished, in no apparent distress  SKIN:  no rashes ,   HEENT: atraumatic.   Pharynx normal without exudate.  EYES:  PERRL. Sclera anicteric.  NECK:  Supple,  no adenopathy,    LUNGS:  clear to auscultation.  Effort normal  CARDIO:  RRR without murmur.   S1 and S2 normal  GI:  good BS's,  no masses,   HSM or tenderness  EXTREMITIES : no cyanosis, clubbing or edema    ASSESSMENT AND PLAN:     1. Essential hypertension  Blood pressure acceptable for now.  Will continue to monitor.  Follow-up in 4 months  - CBC With Differential With Platelet; Future  - Comp Metabolic Panel (14); Future  - Lipid Panel; Future    2. Pernicious anemia  Pernicious anemia.  Status post vitamin B12 injection    3. Memory deficit  Memory deficit.  Per neurology felt to be Alzheimer's type of dementia    4. Family history of colon cancer  She follows Dr. López.  Per Dr. López's note patient has elected to have colonoscopy this year    5. Routine health maintenance  She is up-to-date with her vaccines.    6. History of chronic ulcerative colitis  History of  ulcerative colitis.  Managed on sulfasalazine.  She is also on folic acid.    7. History of uterine cancer  Remote history of uterine carcinoma.  No evidence of recurrence    8. Other osteoporosis, unspecified pathological fracture presence  On Prolia.  Followed by Dr. Healy    9. Multiple thyroid nodules  Multiple thyroid nodules.  Followed by Dr. Healy.    10. S/P parathyroidectomy  Stable.    This visit was 30 minutes.  I spent 10 minutes before visit preparing and reviewing old records.  Greater than 50% of the visit was engaged in counseling and review of past data.    Follow-up in 4 months.  The patient indicates understanding of these issues and agrees to the plan.    Pan Mansfield MD  2/23/2024  11:18 AM

## 2024-02-23 NOTE — TELEPHONE ENCOUNTER
ASSESSMENT/PLAN:    Osteoporosis (based on + fractures)-S/p parathyroidectomy/ulcerative colitis- S/p 10 years of treatment until (Oct 2006). Resumed therapy with alendronate in 03/15. - started drug holiday Oct 2019. Multiple fractures this year, resumed therapy with alendronate in 2020. Stable DXA in 2021. Given recent fracture, S/P PROLIA, started on 3/1/22, last given in 12/19/23.    Continue citracal-D 2 tabs BID. Continue Vitamin D(ergocalciferol) 50,000 international units every 5 days. She has ulcerative colitis    Thyroid nodules- known since 2004- 3 nodules - biggest one- is .9. Biopsy attempted- 2018 - stable nodules on US 2020- US next year    RTC 12 months  Electronically signed by Monica Garza PA at 02/20/2024 3:22 PM CST

## 2024-03-01 ENCOUNTER — TELEPHONE (OUTPATIENT)
Dept: INTERNAL MEDICINE CLINIC | Facility: CLINIC | Age: 79
End: 2024-03-01

## 2024-03-01 DIAGNOSIS — I10 ESSENTIAL HYPERTENSION: Primary | ICD-10-CM

## 2024-03-01 NOTE — TELEPHONE ENCOUNTER
Spoke to patient and relayed MD message.  Sched for BP check on 4/1 at 10 am with Gretchen.  Pt verbalized understanding and agrees with plan.

## 2024-03-01 NOTE — TELEPHONE ENCOUNTER
Please call patient with the following in regards to her test results    All in all I was satisfied with her results.  There were a few minor abnormality    2.  Her blood count is good.  She just missed being normal on her blood count by 1/10 of a point.  I am satisfied however with that result    3.  Her sodium count is just a little bit low.  Nothing major.  However sometimes even a mildly low sodium count can alter our memory and thought processes.    4.   For that reason I would like her to stop the hydrochlorothiazide.  That may be contributing to the low sodium count.    5.   Please ask her to see Gretchen for blood pressure check in 1 month.  At that time she can have her blood test repeated to see if her sodium count comes up to normal.    6.   This has nothing to do with sodium that she takes in in her diet.  It has more to do with the hydrochlorothiazide water pill that she is on.    ( ABIGAIL Godinez. )

## 2024-03-18 DIAGNOSIS — F03.A0 MILD DEMENTIA, UNSPECIFIED DEMENTIA TYPE, UNSPECIFIED WHETHER BEHAVIORAL, PSYCHOTIC, OR MOOD DISTURBANCE OR ANXIETY (HCC): ICD-10-CM

## 2024-03-18 DIAGNOSIS — M79.2 NEUROPATHIC PAIN: ICD-10-CM

## 2024-03-18 RX ORDER — GABAPENTIN 300 MG/1
300 CAPSULE ORAL 2 TIMES DAILY
Qty: 180 CAPSULE | Refills: 0 | Status: SHIPPED | OUTPATIENT
Start: 2024-03-18

## 2024-03-18 RX ORDER — MEMANTINE HYDROCHLORIDE 5 MG/1
10 TABLET ORAL DAILY
Qty: 60 TABLET | Refills: 0 | Status: SHIPPED | OUTPATIENT
Start: 2024-03-18

## 2024-03-18 NOTE — TELEPHONE ENCOUNTER
Medication Quantity Refills Start End   gabapentin 300 MG Oral Cap 180 capsule 0 12/21/2023 3/20/2024   Sig:   Take 1 capsule (300 mg total) by mouth in the morning and 1 capsule (300 mg total) before bedtime.     Route:   Oral     Order #:   [id number]              memantine 5 MG Oral Tab 318 tablet 0 12/21/2023 3/20/2024   Sig:   Take 1 tablet (5 mg total) by mouth daily for 7 days, THEN 2 tablets (10 mg total) daily for 7 days, THEN 3 tablets (15 mg total) daily for 7 days, THEN 4 tablets (20 mg total) daily.     Route:   Oral     Order #:   [id number]     Dosage Start Date End Date        5 mg Daily 12/21/23 12/27/23        10 mg Daily 12/28/23 01/03/24        15 mg Daily 01/04/24 01/10/24        20 mg Daily 01/11/24 03/19/24                PSR- please call patient and schedule an appointment , Thank  you.  Dr. Harrington-Please review and sign if appropriate        LOV:12/21/2023   NOV: none    Last refill/[place]: 12/21/2023 (#180 /0 refills)       12/21/2023 (#318 /0 refills)

## 2024-03-22 ENCOUNTER — HOSPITAL ENCOUNTER (OUTPATIENT)
Facility: HOSPITAL | Age: 79
Setting detail: HOSPITAL OUTPATIENT SURGERY
Discharge: HOME | End: 2024-03-22
Attending: PHYSICAL MEDICINE & REHABILITATION | Admitting: PHYSICAL MEDICINE & REHABILITATION
Payer: MEDICARE

## 2024-03-22 ENCOUNTER — APPOINTMENT (OUTPATIENT)
Dept: GENERAL RADIOLOGY | Facility: HOSPITAL | Age: 79
End: 2024-03-22
Attending: PHYSICAL MEDICINE & REHABILITATION
Payer: MEDICARE

## 2024-03-22 VITALS
HEART RATE: 58 BPM | SYSTOLIC BLOOD PRESSURE: 148 MMHG | DIASTOLIC BLOOD PRESSURE: 63 MMHG | WEIGHT: 135 LBS | HEIGHT: 62 IN | OXYGEN SATURATION: 98 % | BODY MASS INDEX: 24.84 KG/M2 | RESPIRATION RATE: 17 BRPM

## 2024-03-22 PROCEDURE — 3E0S3BZ INTRODUCTION OF ANESTHETIC AGENT INTO EPIDURAL SPACE, PERCUTANEOUS APPROACH: ICD-10-PCS | Performed by: PHYSICAL MEDICINE & REHABILITATION

## 2024-03-22 PROCEDURE — 3E0S33Z INTRODUCTION OF ANTI-INFLAMMATORY INTO EPIDURAL SPACE, PERCUTANEOUS APPROACH: ICD-10-PCS | Performed by: PHYSICAL MEDICINE & REHABILITATION

## 2024-03-22 PROCEDURE — 64483 NJX AA&/STRD TFRM EPI L/S 1: CPT | Performed by: PHYSICAL MEDICINE & REHABILITATION

## 2024-03-22 PROCEDURE — 64484 NJX AA&/STRD TFRM EPI L/S EA: CPT | Performed by: PHYSICAL MEDICINE & REHABILITATION

## 2024-03-22 RX ORDER — IOPAMIDOL 408 MG/ML
INJECTION, SOLUTION INTRATHECAL
Status: DISCONTINUED | OUTPATIENT
Start: 2024-03-22 | End: 2024-03-22

## 2024-03-22 RX ORDER — LIDOCAINE HYDROCHLORIDE 10 MG/ML
INJECTION, SOLUTION EPIDURAL; INFILTRATION; INTRACAUDAL; PERINEURAL
Status: DISCONTINUED | OUTPATIENT
Start: 2024-03-22 | End: 2024-03-22

## 2024-03-22 RX ORDER — TRIAMCINOLONE ACETONIDE 40 MG/ML
INJECTION, SUSPENSION INTRA-ARTICULAR; INTRAMUSCULAR
Status: DISCONTINUED | OUTPATIENT
Start: 2024-03-22 | End: 2024-03-22

## 2024-03-22 NOTE — OPERATIVE REPORT
[facility]    2-LEVEL LUMBAR TRANSFORAMINAL   NAME:  Karyn Cobos    MR #:    [id number] :  1945     PHYSICIAN:  Tristan Adrian MD        Operative Report    DATE OF PROCEDURE: 3/22/2024   PREOPERATIVE DIAGNOSES: Problem   right > left S1 radiculopathy with right L4 radiculitis   L5-S1 left large far lateral & mild central, L4-5 large diffuse, L3-4 mild-mod diffuse & left mod far lateral, L2-3 mod diffuse, L1-2 mod diffuse & right mod-large foraminal bulging discs    L4-5 severe, L3-4 mod, L2-3 mod, L1-2 mod-severe central stenosis       POSTOPERATIVE DIAGNOSES:   same   PROCEDURES: Right L4 and L5 transforaminal epidural steroid injections done under fluoroscopic guidance with contrast enhancement.   SURGEON: Tristan Adrian MD   ANESTHESIA: Local   INDICATIONS:      OPERATIVE PROCEDURE:  Written consent was obtained from the patient.  The patient was brought into the operating room and placed in the prone position on the fluoroscopy table with pillow underneath the abdomen.  The patient's skin was cleaned and draped in a normal sterile fashion.  Using AP fluoroscopy, all five lumbar vertebrae were identified.  When the fourth and fifth vertebrae were identified, fluoroscopy was left anterior obliqued opening up the right L4-5 and right L5-S1 intervertebral foramen.  At this point in time, each site was anesthetized with 1% PF lidocaine without epinephrine.  Then, 3.5 inch, 22-gauge spinal needles were inserted and directed towards the right L4-5 and right L5-S1 intervertebral foramen.  When they felt to be in good position, AP fluoroscopy was used to advance the needles to the 6 o'clock position on the right L4 and right L5 pedicles.  At this point in time, Omnipaque-240 contrast was used to obtain a good epidurogram indicating correct needle placement at each level.  Then, aspiration was performed.  No blood, fluid, or air was aspirated.  Then, the patient was injected with a 3  cc solution of 1.5 cc of 40 mg/cc of Kenalog and 1.5 cc of 1% PF lidocaine without epinephrine at each site.  After this, the needles were removed.  Each site was cleaned.  Band-Aids were applied.  The patient was transferred to the cart and into [facility].  The patient was given discharge instructions and will follow up in the clinic as scheduled.  Throughout the whole procedure, the patient's pulse oximetry and vital signs were monitored and they remained completely stable.  Also, throughout the whole procedure, prior to injection of any medication, aspiration was performed.  No blood, fluid, or air was aspirated at anytime.

## 2024-03-22 NOTE — INTERVAL H&P NOTE
Pre-op Diagnosis: Lumbar radiculopathy    The above referenced H&P was reviewed by Tristan Adrian MD on 3/22/2024, the patient was examined and no significant changes have occurred in the patient's condition since the H&P was performed.  I discussed with the patient and/or legal representative the potential benefits, risks and side effects of this procedure; the likelihood of the patient achieving goals; and potential problems that might occur during recuperation.  I discussed reasonable alternatives to the procedure, including risks, benefits and side effects related to the alternatives and risks related to not receiving this procedure.  We will proceed with procedure as planned.

## 2024-03-22 NOTE — DISCHARGE INSTRUCTIONS
[facility] ENDOSCOPY LAB SUITES  [address]  [facility] [address]  Dept: [phone]  Loc: [phone]    No name on file       Post Injection/Procedure Instructions    PAIN:  Your usual pain should gradually decrease in 2-3 days, but relief may sometimes take up to two weeks after your procedure.  A temporary flare-up of pain for 1-2 days after a procedure is also normal.  Please take your usual pain medicines if this happens.      ACTIVITY LEVELS:  Day of Procedure:  Take it easy.  We recommend no new activities or heavy work.  Avoid sitting or standing for long periods of time and change your position as needed.  Day 2:  You may return to normal activities within reason.  If your physician gives you specific instructions, please follow these.  You may return to physical therapy.      DIET:  Return to your normal diet as tolerated.    MEDICATIONS:  Aspirin and Blood-thinners:  Follow specific instructions your doctor gave you.  Otherwise, refrain from taking aspirin and other blood-thinning medications for 6 hours after your injection.  Then resume your next scheduled dose.    Resume your other medications the day of the injection.    BANDAGE:  Remove after 24 hours.    SHOWERING:  You may shower the following day.  No bathing, swimming, or hot tub for 2 days after the procedure to reduce the risk of infection.    COLD PACKS:  You may use ice compresses over the injection site - 20 minutes on, then 40 minutes off as needed.  To avoid skin injury, place a thin cloth between cold pack and the skin.  Do not apply cold packs to numb areas following injection.    Contact [facility] immediately if you experience any of the following:  Fever (over 100 degrees F), chills, drainage, excessive swelling or redness from the injection site, problems with bowel or bladder control, or new weakness or new severe pain.  If you cannot reach your physician, please go to the nearest  emergency room.      COMMON SIDE EFFECTS:  Numbness for 4 to 8 hours due to the local anesthetic.  Minor pain at the injection site.  A small amount of bleeding at the injection site.  If a steroid medication was used during the procedure, possible side effects include redness of the face, headache, trouble sleeping, indigestion, increased appetite and/or a low grade fever (less that 100 degrees F).  All side effects should disappear within 1 to 3 days after the procedure.    POST-PROCEDURAL HEADACHE:  Mild headaches may be a normal reaction after a steroid injection.  Lie down as much as possible for the first 24 hours.  You can take Tylenol up to 3 grams per day in doses of 1 gram every 8 hours.  Drink plenty of caffeinated beverages.  If you continue to have headaches after 24 hours following the procedure, or experience severe headaches, please contact our office.

## 2024-04-01 ENCOUNTER — NURSE ONLY (OUTPATIENT)
Dept: INTERNAL MEDICINE CLINIC | Facility: CLINIC | Age: 79
End: 2024-04-01

## 2024-04-01 ENCOUNTER — TELEPHONE (OUTPATIENT)
Dept: INTERNAL MEDICINE CLINIC | Facility: CLINIC | Age: 79
End: 2024-04-01

## 2024-04-01 ENCOUNTER — LAB ENCOUNTER (OUTPATIENT)
Dept: LAB | Age: 79
End: 2024-04-01
Attending: INTERNAL MEDICINE
Payer: MEDICARE

## 2024-04-01 VITALS — DIASTOLIC BLOOD PRESSURE: 58 MMHG | SYSTOLIC BLOOD PRESSURE: 146 MMHG

## 2024-04-01 DIAGNOSIS — E53.8 B12 DEFICIENCY: Primary | ICD-10-CM

## 2024-04-01 DIAGNOSIS — I10 ESSENTIAL HYPERTENSION: ICD-10-CM

## 2024-04-01 LAB
ANION GAP SERPL CALC-SCNC: 3 MMOL/L (ref 0–18)
BUN BLD-MCNC: 21 MG/DL (ref 9–23)
BUN/CREAT SERPL: 25 (ref 10–20)
CALCIUM BLD-MCNC: 9 MG/DL (ref 8.7–10.4)
CHLORIDE SERPL-SCNC: 109 MMOL/L (ref 98–112)
CO2 SERPL-SCNC: 25 MMOL/L (ref 21–32)
CREAT BLD-MCNC: 0.84 MG/DL
EGFRCR SERPLBLD CKD-EPI 2021: 70 ML/MIN/1.73M2 (ref 60–?)
FASTING STATUS PATIENT QL REPORTED: YES
GLUCOSE BLD-MCNC: 92 MG/DL (ref 70–99)
OSMOLALITY SERPL CALC.SUM OF ELEC: 287 MOSM/KG (ref 275–295)
POTASSIUM SERPL-SCNC: 4 MMOL/L (ref 3.5–5.1)
SODIUM SERPL-SCNC: 137 MMOL/L (ref 136–145)

## 2024-04-01 PROCEDURE — 80048 BASIC METABOLIC PNL TOTAL CA: CPT

## 2024-04-01 PROCEDURE — 36415 COLL VENOUS BLD VENIPUNCTURE: CPT

## 2024-04-01 RX ADMIN — CYANOCOBALAMIN 1000 MCG: 1000 INJECTION, SOLUTION INTRAMUSCULAR; SUBCUTANEOUS at 10:23:00

## 2024-04-01 NOTE — PROGRESS NOTES
Karyn Cobos is a 78 year old female who has an elevated blood pressure reading at visit today.   She had been checking home blood pressures but has recently not been. She denies chest pain, dizziness, dyspnea, edema, fatigue, and urinary frequency.  She is exercising pilates still and does not restrict her sodium intake.    Good compliance with meds         Blood Pressure and Cardiac Medications            amLODIPine 10 MG Oral Tab    telmisartan 80 MG Oral Tab    METOPROLOL SUCCINATE ER 50 MG Oral Tablet 24 Hr          BP Readings from Last 3 Encounters:   04/01/24 146/58   03/22/24 148/63   02/23/24 142/60          There is no height or weight on file to calculate BMI.   ASSESSMENT:     HTN  - for age acceptable    PLAN:   HTN  - since stopping the hydrochlorothiazide there does not seem to be a sig change in BP  For pt age, DBP is low so will remain conservative and not add any new medications  Continue Telmisartan, amlodipine, and metoprolol.  Continue home monitoring and call if average BP > 150s  BMP to be drawn today    No follow-ups on file.   Radha Leone, PharmD, 4/1/2024, 9:58 AM

## 2024-04-01 NOTE — TELEPHONE ENCOUNTER
Please let Karyn know that her electrolytes now are now all normal.  We recently took her off the hydrochlorothiazide.  She should stay off the hydrochlorothiazide and otherwise no other changes are needed.

## 2024-05-02 ENCOUNTER — OFFICE VISIT (OUTPATIENT)
Dept: PHYSICAL THERAPY | Facility: HOSPITAL | Age: 79
End: 2024-05-02
Attending: PHYSICAL MEDICINE & REHABILITATION
Payer: MEDICARE

## 2024-05-02 DIAGNOSIS — M48.062 SPINAL STENOSIS OF LUMBAR REGION WITH NEUROGENIC CLAUDICATION: Primary | ICD-10-CM

## 2024-05-02 DIAGNOSIS — M51.9 LUMBAR DISC DISEASE: ICD-10-CM

## 2024-05-02 DIAGNOSIS — M54.16 LUMBAR RADICULOPATHY: ICD-10-CM

## 2024-05-02 DIAGNOSIS — M48.061 LUMBAR FORAMINAL STENOSIS: ICD-10-CM

## 2024-05-02 DIAGNOSIS — M41.25 OTHER IDIOPATHIC SCOLIOSIS, THORACOLUMBAR REGION: ICD-10-CM

## 2024-05-02 DIAGNOSIS — M43.16 SPONDYLOLISTHESIS OF LUMBAR REGION: ICD-10-CM

## 2024-05-02 PROCEDURE — 97110 THERAPEUTIC EXERCISES: CPT

## 2024-05-02 PROCEDURE — 97162 PT EVAL MOD COMPLEX 30 MIN: CPT

## 2024-05-02 NOTE — PROGRESS NOTES
LUMBAR SPINE EVALUATION:   Karyn Cobos    1945  Diagnosis: Spinal stenosis of lumbar region with neurogenic claudication (M48.062)  Lumbar disc disease (M51.9)  Lumbar foraminal stenosis (M48.061)  Other idiopathic scoliosis, thoracolumbar region (M41.25)  Spondylolisthesis of lumbar region (M43.16)  Lumbar radiculopathy (M54.16)  Referring Physician:  Tristan Tyson MD visit: none  Initial Evaluation Date: 2024  Date of Surgery: None for this diagnosis\  Insurance: Medicare  Authorized # of visits:  NA by 2025  Plan of care to: 2024    Precautions/Hx: arthritis, DVT, HTN, L 1st MTP surg, HLD, parathyroid removed, TAHBSO for uterine cancer, ulcerative colitis, appy, scoliosis, Alzheimer's dementia.   R L4-L5 TFESI 3/28/2023 and 7/10/2023 and 3/22/2024    Past medical history was reviewed with Karyn.   Past Medical History:    Arthritis    Back pain    Cough    Deep vein thrombosis (HCC)    Dementia (HCC)    Essential hypertension    Family history of colon cancer     0    PARA ZERO    H/O foot surgery    LEFT FOOT SURGERY, PINS PLACED IN TOE    H/O oral surgery    GUM/MOUTH SURGERY    Hallux rigidus    LEFT FOOT, YURY PROCEDURE, 1ST LEFT METARSOPHALANGEAL JOINT    Heart palpitations    HEART MONITOR 2012    High blood pressure    High cholesterol    History of DVT (deep vein thrombosis)    Hypercholesterolemia    Hyperlipidemia    Hyperparathyroidism (HCC)    PAIR OF PARATHYROID REMOVED    Hyperparathyroidism (HCC)    PAIR OF PARATHYROID REMOVED     Hypertension    Left carotid artery stenosis    Migraine    OCCULAR MIGRAINE    Migraine    OCCULAR MIGRAINE     Neuroma    2 LT, HALLUX RIGIDUS LT, PER. NG.    Osteoarthritis    Other ill-defined conditions(799.89)    TAHBSO MGMT.    Other ill-defined conditions(799.89)    CHRONIC LEFT CYSTIC PELVIC MASS    Ovarian cyst    REMOVED PER NG.    Painful breasts    Pneumonia    HOSPITALIZED    Pneumonia due to organism     Spinal stenosis    Spinal stenosis    Tonsillitis    Ulcerative colitis (HCC)    Uterine cancer (HCC)     Past Surgical History:   Procedure Laterality Date    Appendectomy      patient doenst rememeber    Cataract Bilateral     Left Eye: 03/21/23, Right Eye: 01/25/23    Colonoscopy N/A 12/05/2016    Procedure: COLONOSCOPY;  Surgeon: Velma Herrera MD;  Location: [facility] ENDOSCOPY    Colonoscopy N/A 05/24/2018    Procedure: COLONOSCOPY;  Surgeon: Velma Herrera MD;  Location: [facility] ENDOSCOPY    Colonoscopy  10/2022    Colonoscopy N/A 10/14/2022    Procedure: COLONOSCOPY;  Surgeon: Michael López MD;  Location: [facility] ENDOSCOPY    Hysterectomy  2002    Other surgical history  2004?    Parathyroid removal    Other surgical history  08/2017    left foot surgery plate removed    Tonsillectomy         PATIENT SUMMARY   Karyn Cobos is a 78 year old y/o female who presents to therapy today with complaints of L gluteal pain.  She has long hx of LBP w scoliosis and stenosis.  She states that she does not have pain into the R LE.   History of current condition: chronic pain w recent flare up.   Current functional limitations include, but are not limited to walking, sitting, pulling up covers, getting into car.   Prior treatments: injections, medications, PT  Recent accidents or falls: no  Karyn describes prior level of function as able to perform all desired ADL's with some difficulty w recent worsening . Pt goals included as physical therapy goals below.     ASSESSMENT:   Pt presents with subjective complaints and functional limitations as noted above.  Pt's function and objective finding are consistent with physician's diagnosis.  The results of the objective tests and measures demonstrate the following limitations: postural alterations w known scoliosis; gait deviations; decreased trunk ROM; min decreased B ankle PF strength and L radicular pain w L hip flex strength testing; and good neural mobility, but w  ongoing radicular pain and paraesthesias.     Pt and PT discussed evaluation findings, pathology, POC and HEP.  Pt voiced understanding and performs HEP correctly without reported pain. Skilled Physical Therapy is medically necessary to address the above impairments and reach functional goals.    In agreement with functional score and clinical rationale, this evaluation involved Moderate Complexity decision making due to 1-2 personal factors/comorbidities, 3 body structures involved/activity limitations, and evolving symptoms including changing pain levels.     SUBJECTIVE:     Visit count      Date 2024     L gluteals      Pain Range 1 to 5/10     Pain Ave 3/10     Pain Current 1-2/10           R LBP, ant/lat thigh      Pain Range 3-4 to 9/10     Pain Ave 5/10     Pain Current 3-4/10       Better: sitting, lying  Sensation: none  Night: falls asleep - yes ; Position - back w R leg up sometimes, L side; Bedtime - 10; Hours of sleep - 8; Wakes - bathroom1-3x; Sweats - no.  Incontinence: mild urge  Saddle Anesthesia: no  OB-Gyn:  NVD0  GI: ulcerative colitis  Stress: high  Work:  Teacher, , book publishing co manager  Living environment: house,   Stairs: 2 steps in, flight to bedroom, flight to basement, has chair lift  Unexplained wt loss: no  Increased pain w coughing, sneezing, straining in the bathroom: no  Blood thinners: no  Diabetes: no  Latex Allergy: no  Pacemaker: no     OBJECTIVE:     Objective Initial Evaluation Data 2024:    Oswestry Disability Index Score  Score: 52 % (2024 10:09 AM)     Gait:        Deviations: trunk hip flex       Devices: spc       Assistance: none  Transfer:  Sit to stand - immediate increased radicular pain B      Posture 2024   Alignment L trunk shift, B shldr protraction, flattened lumbar spine       Sensation 2024   Dermatomes Light Touch    Proximal medial thigh R (L2)  wnl   Proximal medial thigh L (L2) wnl   Above knee R (L3)  wnl   Above knee L (L3) wnl   Medial arch R(L4) wnl   Medial arch L (L4) wnl   Between 1st - 2nd toes R (L5) wnl   Between 1st - 2nd toes L (L5) wnl   Lateral border of foot R (S1) wnl   Lateral border of foot L (S1) wnl   Popliteal fossa R (S2) wnl   Popliteal fossa L(S2) wnl       ROM Lumbar 5/2/2024   Flexion min   Extension Max * R LE   R SB Min * R LE   L SB Min * R LE   R Rotation min   L Rotation min   * pain limiting    ROM Hip 5/2/2024   Flex R 125 140   Flex L 125 135   ER R 45 45   ER L 45 47   IR R 40 35   IR L 40 45   *pain limiting     MMT 5/5 5/2/2024   L2- hip flex R 5   Hip flex L 5* L glut   L3- knee ext R 5   Knee ext L 5   L4- ankle DF R 5   Ankle DF L 5   L5- EHL R 5   EHL L 5   S1- ankle PF R 5-   Ankle PF L 5-   S2- Hams R 5   Hams L 5   *pain limiting    LE flexibility 5/2/2024   Piriformis R wnl   Piriformis L wnl   Hams R -9   Hams L -5   *pain limiting    Neural mobility 5/2/2024   SLR R (-) 70 deg   SLR L (-) 70 deg      DANIELLE 5/2/2024   R (-) min ROM loss   L (-) min ROM loss        Imaging:     PROCEDURE: MRI SPINE LUMBAR (CPT=72148)     COMPARISON: [facility], XR LUMBAR SPINE AP LAT FLEX EXT (CPT=72110), 6/01/2022, 1:58 PM.  [facility], MRI LUMBAR SPINE WO CONTRAST, 6/18/2013, 12:27 PM.     INDICATIONS: Chronic bilateral low back pain radiating to right hip and leg. Spinal stenosis. History of uterine cancer     TECHNIQUE: A variety of imaging planes and parameters were utilized for visualization of suspected pathology.     FINDINGS:  NUMERATION: For the purposes of this examination, the lowest fully formed disc space is designated as L5-S1.  ALIGNMENT: There is preservation of the expected lumbar lordosis.  Moderate levoscoliosis; degenerative-type grade I anterolisthesis of L4 relative to L5  BONES: No acute lumbar spine fracture.  Endplate edematous degenerative changes at L2-L3.  CORD/CAUDA EQUINA: The distal cord and nerve roots  have normal caliber, contour, and signal intensity.  PARASPINAL AREA: No visible mass.    OTHER: There is a 5.7 cm left pelvic cyst.  Probable 1.4 cm hepatic cyst noted on localizer sequence.     LUMBAR DISC LEVELS:  L1-L2: Diffuse disc bulge with ligamentum flavum redundancy, dorsal epidural lipomatosis, and bilateral facet arthropathy.  Moderate left and mild right neural foraminal with mild spinal canal stenosis.  L2-L3: Right eccentric disc bulge with ligamentum flavum redundancy, mild dorsal epidural lipomatosis, and mild right greater than left facet arthropathy.  Moderate right and minor left neural foraminal with mild spinal canal stenosis.  L3-L4: Left eccentric disc bulge with left greater than right facet arthropathy, ligamentum flavum redundancy, and mild dorsal epidural lipomatosis.  Mild-to-moderate bilateral neural foraminal and mild spinal canal stenosis.  L4-L5: Large diffuse disc bulge with left greater than right hypertrophic facet arthropathy in addition to ligamentum flavum redundancy.  Moderate to severe multifactorial spinal canal with moderate to severe left greater than right neural foraminal  stenosis.  L5-S1: Left eccentric disc bulge with left greater than right facet arthropathy, which results in moderate left neural foraminal stenosis with no additional significant neural compromise.               Impression   CONCLUSION:     1. Advanced multilevel degenerative changes of the lumbar spine as detailed.  Notable levels as follows:     2. L4-L5:  Moderate to severe spinal canal with moderate to severe left greater than right neural foraminal stenosis.  3. L5-S1:  Moderate left neural foraminal stenosis.  4. L3-L4:  Mild-to-moderate bilateral neural foraminal and mild spinal canal stenosis.  5. L2-L3:  Moderate right and minor left neural foraminal with mild spinal canal stenosis.  6. L1-L2:  Moderate right and mild left neural foraminal stenosis.     7. Moderate levoscoliosis of the lumbar  spine.     8. Degenerative-type grade I anterolisthesis of L4 relative to L5.     9. Endplate edematous degenerative changes at L2-L3.     10. Incidentally noted 6.8 cm left pelvic cyst.  This appears relatively similar in size as compared with prior abdominal CT from December, 2019.     11. Lesser incidental findings as above.          Dictated by (CST): Hernesto Emmanuel MD on 6/01/2022 at 3:38 PM      Finalized by (CST): Hernesto Emmanuel MD on 6/01/2022 at 3:46 PM         Treatment performed this date:    Therapeutic Exercise:  Visit #   1/8   Position Exercise HEP 5/2/2024   Supine DKC H X     LTR H X    Leg pumps nerve glide H X   Sitting Trunk flex H X    Sciatic nerve glide H X         H = HEP. Pt given copies of this exercise for home program.  X = Exercises done this date - pt verbalized understanding and demonstrated competence. All exercises done B unless otherwise indicated.   Pt advised to discontinue exercises that increase pain and to call or return to therapist to discuss.  Each intervention above is specifically prescribed to address the patient's identified impairments, activity limitations, and participation restrictions.    ASSESSMENT:     Physical Therapy Goals: From 5/2/2024 to 7/31/2024  - Created with patient input during initial evaluation   1. Pt will be independent in beginning level of HEP for stretching, posture and strengthening.   2. Pt will be able to getting into the care without increased symptoms.  3. Pt will be able to walk for 15 min w AD without increased symptoms.  4. Pt will be able to sit for meal without increased symptoms.  5. Pt will be able to pull up covers on bed without increased symptoms.     PLAN OF CARE:      Frequency / Duration: Patient will be seen for 1-2x/week decreasing to every other week for a total of 18 visits over a 90 day period for therapeutic exercises, posture retraining, therapeutic activities, manual treatment, neuromuscular reeducation, therapeutic  pain neuroscience education, patient education, modalities as needed, home exercise program.    Education or treatment limitation: Cognition  Rehab Potential:good    Patient was advised of these findings, precautions, goals of treatment, plan of care, beginning self care and treatment options and has agreed to actively participate in planning and for this course of care. Pt educated on possible soreness after evaluation w use of modalities as needed (ice/heat), postural corrections and the importance of staying active.    Charges: PT Eval 2, TE 1    Total Timed treatment: 12 min      Total Treatment Time: 45 min    Thank you for your referral. Please co-sign or sign and return this letter via fax as soon as possible to [phone]. If you have any questions, please contact me at Dept: [phone]    Sincerely,  Electronically signed by therapist: Madelaine Price PT    Physician's certification required: Yes  I certify the need for these services furnished under this plan of treatment and while under my care.    X___________________________________________________ Date____________________    Certification From: 5/2/2024  To:7/31/2024        __________________________________________________________________________________________      21st Century Cures Act Notice to Patient: Medical documents like this are made available to patients in the interest of transparency. However, be advised this is a medical document and it is intended as [id number] communication between your medical providers. This medical document may contain abbreviations, assessments, medical data, and results or other terms that are unfamiliar. Medical documents are intended to carry relevant information, facts as evident, and the clinical opinion of the practitioner. As such, this medical document may be written in language that appears blunt or direct. You are encouraged to contact your medical provider and/or [facility] Patient  Experience if you have any questions about this medical document.

## 2024-05-03 ENCOUNTER — NURSE ONLY (OUTPATIENT)
Dept: INTERNAL MEDICINE CLINIC | Facility: CLINIC | Age: 79
End: 2024-05-03
Payer: MEDICARE

## 2024-05-03 DIAGNOSIS — E53.8 VITAMIN B12 DEFICIENCY: Primary | ICD-10-CM

## 2024-05-03 RX ADMIN — CYANOCOBALAMIN 1000 MCG: 1000 INJECTION, SOLUTION INTRAMUSCULAR; SUBCUTANEOUS at 09:45:00

## 2024-05-09 ENCOUNTER — OFFICE VISIT (OUTPATIENT)
Dept: PHYSICAL THERAPY | Facility: HOSPITAL | Age: 79
End: 2024-05-09
Attending: PHYSICAL MEDICINE & REHABILITATION
Payer: MEDICARE

## 2024-05-09 PROCEDURE — 97110 THERAPEUTIC EXERCISES: CPT

## 2024-05-09 PROCEDURE — 97112 NEUROMUSCULAR REEDUCATION: CPT

## 2024-05-09 NOTE — PROGRESS NOTES
Karyn Cobos    6/14/1945  Diagnosis: Spinal stenosis of lumbar region with neurogenic claudication (M48.062)  Lumbar disc disease (M51.9)  Lumbar foraminal stenosis (M48.061)  Other idiopathic scoliosis, thoracolumbar region (M41.25)  Spondylolisthesis of lumbar region (M43.16)  Lumbar radiculopathy (M54.16)  Referring Physician:  Tristan Adrian  Next MD visit: none  Initial Evaluation Date: 5/2/2024  Date of Surgery: None for this diagnosis\  Insurance: Medicare  Authorized # of visits:  NA by 1/31/2025  Plan of care to: 7/31/2024    Precautions/Hx: arthritis, DVT, HTN, L 1st MTP surg, HLD, parathyroid removed, TAHBSO for uterine cancer, ulcerative colitis, appy, scoliosis, Alzheimer's dementia.   R L4-L5 TFESI 3/28/2023 and 7/10/2023 and 3/22/2024    SUBJECTIVE:     Pt reports that she  had increased leg pain waiting for the shower this am so she stopped and did her exercise and was able to get the pain out of her leg.  She has not had L gluteal pain recently.     Visit count 1/8 2/8    Date 5/2/2024 5/9/2024     L gluteals      Pain Range 1 to 5/10 0/10    Pain Ave 3/10 0/10    Pain Current 1-2/10 0/10          R LBP, ant/lat thigh      Pain Range 3-4 to 9/10 1-2 to 9/10    Pain Ave 5/10 5/10    Pain Current 3-4/10 3/10       OBJECTIVE:       Treatment performed this date:    Therapeutic Exercise:  Visit #   1/8 2/8   Position Exercise HEP 5/2/2024 5/9/2024    Supine DKC H X  X 5x    SKC H  X 3x    LTR H X     Leg pumps nerve glide H X X 10x    R UE L LE reach H  X 5x   Sitting Trunk flex H X     Sciatic nerve glide H X     Hip ER cross leg H  X 5x    Piriformis cross leg H  X 3x   4 point Cat-cow H  X 10x    Child pose H  X 1x 30s   Neuro Re-ed   X see assessment    H = HEP. Pt given copies of this exercise for home program.  X = Exercises done this date - pt verbalized understanding and demonstrated competence. All exercises done B unless otherwise indicated.   Pt advised to discontinue exercises that  increase pain and to call or return to therapist to discuss.  Each intervention above is specifically prescribed to address the patient's identified impairments, activity limitations, and participation restrictions.    ASSESSMENT:     Pt doing well with relief of L LE pain.  She continues to do well to use her HEP for pain relief when it occurs.  Pt brought in all old PT HEP papers for review.  Many removed for further assessment of need.  Pt advised that she is to discontinue any exercise that increases radicular symptoms.  Pt instructed in simplified supine elongation stretch for scoliosis and tolerated well.  Pt benefits from review of concepts for self management of stenosis symptoms..      Physical Therapy Goals: From 5/2/2024 to 7/31/2024  - Created with patient input during initial evaluation   1. Pt will be independent in beginning level of HEP for stretching, posture and strengthening.   2. Pt will be able to getting into the care without increased symptoms.  3. Pt will be able to walk for 15 min w AD without increased symptoms.  4. Pt will be able to sit for meal without increased symptoms.  5. Pt will be able to pull up covers on bed without increased symptoms.     PLAN OF CARE:      Continue PT per original plan for therapeutic exercises, posture retraining, therapeutic activities, manual treatment, neuromuscular reeducation, therapeutic pain neuroscience education, patient education, self care home management, home exercise program, and modalities as needed.    Charged Units Units Minutes   Ther Ex 2 35   Neuro 1 10   Ther Activity     Gait     Man Tx          Total Timed  45   Total Tx Time  45           __________________________________________________________________________________________      21st Century Cures Act Notice to Patient: Medical documents like this are made available to patients in the interest of transparency. However, be advised this is a medical document and it is intended as  emmb-ki-uzhm communication between your medical providers. This medical document may contain abbreviations, assessments, medical data, and results or other terms that are unfamiliar. Medical documents are intended to carry relevant information, facts as evident, and the clinical opinion of the practitioner. As such, this medical document may be written in language that appears blunt or direct. You are encouraged to contact your medical provider and/or [facility] Patient Experience if you have any questions about this medical document.    Objective Initial Evaluation Data 5/2/2024:    Oswestry Disability Index Score  Score: 52 % (4/29/2024 10:09 AM)     Gait:        Deviations: trunk hip flex       Devices: spc       Assistance: none  Transfer:  Sit to stand - immediate increased radicular pain B      Posture 5/2/2024   Alignment L trunk shift, B shldr protraction, flattened lumbar spine       Sensation 5/2/2024   Dermatomes Light Touch    Proximal medial thigh R (L2)  wnl   Proximal medial thigh L (L2) wnl   Above knee R (L3) wnl   Above knee L (L3) wnl   Medial arch R(L4) wnl   Medial arch L (L4) wnl   Between 1st - 2nd toes R (L5) wnl   Between 1st - 2nd toes L (L5) wnl   Lateral border of foot R (S1) wnl   Lateral border of foot L (S1) wnl   Popliteal fossa R (S2) wnl   Popliteal fossa L(S2) wnl       ROM Lumbar 5/2/2024   Flexion min   Extension Max * R LE   R SB Min * R LE   L SB Min * R LE   R Rotation min   L Rotation min   * pain limiting    ROM Hip 5/2/2024   Flex R 125 140   Flex L 125 135   ER R 45 45   ER L 45 47   IR R 40 35   IR L 40 45   *pain limiting     MMT 5/5 5/2/2024   L2- hip flex R 5   Hip flex L 5* L glut   L3- knee ext R 5   Knee ext L 5   L4- ankle DF R 5   Ankle DF L 5   L5- EHL R 5   EHL L 5   S1- ankle PF R 5-   Ankle PF L 5-   S2- Hams R 5   Hams L 5   *pain limiting    LE flexibility 5/2/2024   Piriformis R wnl   Piriformis L wnl   Hams R -9   Hams L -5   *pain  limiting    Neural mobility 5/2/2024   SLR R (-) 70 deg   SLR L (-) 70 deg      DANIELLE 5/2/2024   R (-) min ROM loss   L (-) min ROM loss

## 2024-05-13 ENCOUNTER — TELEPHONE (OUTPATIENT)
Dept: INTERNAL MEDICINE CLINIC | Facility: CLINIC | Age: 79
End: 2024-05-13

## 2024-05-13 NOTE — TELEPHONE ENCOUNTER
Pt's  called asking Dr. Mansfield if he will give Christine a parking placard due to her severe scoliosis?

## 2024-05-14 ENCOUNTER — OFFICE VISIT (OUTPATIENT)
Dept: PHYSICAL THERAPY | Facility: HOSPITAL | Age: 79
End: 2024-05-14
Attending: PHYSICAL MEDICINE & REHABILITATION
Payer: MEDICARE

## 2024-05-14 PROCEDURE — 97112 NEUROMUSCULAR REEDUCATION: CPT

## 2024-05-14 PROCEDURE — 97110 THERAPEUTIC EXERCISES: CPT

## 2024-05-14 NOTE — PROGRESS NOTES
Karyn Cobos    6/14/1945  Diagnosis: Spinal stenosis of lumbar region with neurogenic claudication (M48.062)  Lumbar disc disease (M51.9)  Lumbar foraminal stenosis (M48.061)  Other idiopathic scoliosis, thoracolumbar region (M41.25)  Spondylolisthesis of lumbar region (M43.16)  Lumbar radiculopathy (M54.16)  Referring Physician:  Tristan Tyson MD visit: none  Initial Evaluation Date: 5/2/2024  Date of Surgery: None for this diagnosis  Insurance: Medicare  Authorized # of visits:  NA by 1/31/2025  Plan of care to: 7/31/2024    Precautions/Hx: arthritis, DVT, HTN, L 1st MTP surg, HLD, parathyroid removed, TAHBSO for uterine cancer, ulcerative colitis, appy, scoliosis, Alzheimer's dementia.   R L4-L5 TFESI 3/28/2023 and 7/10/2023 and 3/22/2024    SUBJECTIVE:     Pt reports that she remains free of L gluteal pain.  Pt has ongoing R gluteal pain. She notes some L parascapular pain in the last few days.  Pt's  reports that pt fell while planting flowers, but was uninjured.     Visit count 1/8 2/8 3/8   Date 5/2/2024 5/9/2024 5/14/2024    L gluteals      Pain Range 1 to 5/10 0/10 0/10   Pain Ave 3/10 0/10 0/10   Pain Current 1-2/10 0/10 0/10         R LBP, ant/lat thigh      Pain Range 3-4 to 9/10 1-2 to 9/10 1 to 5/10   Pain Ave 5/10 5/10 4/10   Pain Current 3-4/10 3/10 1/10      OBJECTIVE:       Treatment performed this date:    Therapeutic Exercise:  Visit #   1/8 2/8 3/8   Position Exercise HEP 5/2/2024 5/9/2024 5/14/2024    NuStep Seat , Arms , Resist     X    Supine DKC H X  X 5x     SKC H  X 3x     LTR H X      Leg pumps nerve glide H X X 10x     R UE L LE reach H  X 5x    Sitting Trunk flex H X  x    Sciatic nerve glide H X  10x    Hip ER cross leg H  X 5x 5x    Piriformis cross leg H  X 3x 5x   4 point Cat-cow H  X 10x     Child pose H  X 1x 30s    Standing Functional squat H   X 10x2    Corner tandem balance EO    X 1 min    Corner tandem balance EC H   X 1 min    SLS balancing H   X 5x 10sec     Step up 8\"    X 5x    Step up 8\" opp knee lift    X 7x   Neuro Re-ed   X see assessment  X see assessment    H = HEP. Pt given copies of this exercise for home program.  X = Exercises done this date - pt verbalized understanding and demonstrated competence. All exercises done B unless otherwise indicated.   Pt advised to discontinue exercises that increase pain and to call or return to therapist to discuss.  Each intervention above is specifically prescribed to address the patient's identified impairments, activity limitations, and participation restrictions.    ASSESSMENT:     Pt continues to progress well.  She did have a fall observe by her .  Pt demonstrates decreased SLS balance.  She did well with tandem balance in corner w eyes closed.  Patient instructed in body mechanics for squat lift. Pt started by lifting a pillow from 24\" height and practiced pivot turns. Instructed in the importance of engaging core w exhale prior to lift. Pt given further consolidated HEP papers w review for improved understanding.  Pt remains cooperative and motivated, performing HEP consistently.     Physical Therapy Goals: From 5/2/2024 to 7/31/2024  - Created with patient input during initial evaluation   1. Pt will be independent in beginning level of HEP for stretching, posture and strengthening.   2. Pt will be able to getting into the care without increased symptoms.  3. Pt will be able to walk for 15 min w AD without increased symptoms.  4. Pt will be able to sit for meal without increased symptoms.  5. Pt will be able to pull up covers on bed without increased symptoms.     PLAN OF CARE:      Continue PT per original plan for therapeutic exercises, posture retraining, therapeutic activities, manual treatment, neuromuscular reeducation, therapeutic pain neuroscience education, patient education, self care home management, home exercise program, and modalities as needed.    Charged Units Units Minutes    Ther Ex 2 35    Neuro 1 10   Ther Activity     Gait     Man Tx          Total Timed  45   Total Tx Time  45           __________________________________________________________________________________________      21st Century Cures Act Notice to Patient: Medical documents like this are made available to patients in the interest of transparency. However, be advised this is a medical document and it is intended as wrbf-jj-miza communication between your medical providers. This medical document may contain abbreviations, assessments, medical data, and results or other terms that are unfamiliar. Medical documents are intended to carry relevant information, facts as evident, and the clinical opinion of the practitioner. As such, this medical document may be written in language that appears blunt or direct. You are encouraged to contact your medical provider and/or [facility] Patient Experience if you have any questions about this medical document.    Objective Initial Evaluation Data 5/2/2024:    Oswestry Disability Index Score  Score: 52 % (4/29/2024 10:09 AM)     Gait:        Deviations: trunk hip flex       Devices: spc       Assistance: none  Transfer:  Sit to stand - immediate increased radicular pain B      Posture 5/2/2024   Alignment L trunk shift, B shldr protraction, flattened lumbar spine       Sensation 5/2/2024   Dermatomes Light Touch    Proximal medial thigh R (L2)  wnl   Proximal medial thigh L (L2) wnl   Above knee R (L3) wnl   Above knee L (L3) wnl   Medial arch R(L4) wnl   Medial arch L (L4) wnl   Between 1st - 2nd toes R (L5) wnl   Between 1st - 2nd toes L (L5) wnl   Lateral border of foot R (S1) wnl   Lateral border of foot L (S1) wnl   Popliteal fossa R (S2) wnl   Popliteal fossa L(S2) wnl       ROM Lumbar 5/2/2024   Flexion min   Extension Max * R LE   R SB Min * R LE   L SB Min * R LE   R Rotation min   L Rotation min   * pain limiting    ROM Hip 5/2/2024   Flex R 125 140   Flex L 125 135   ER R 45  45   ER L 45 47   IR R 40 35   IR L 40 45   *pain limiting     MMT 5/5 5/2/2024   L2- hip flex R 5   Hip flex L 5* L glut   L3- knee ext R 5   Knee ext L 5   L4- ankle DF R 5   Ankle DF L 5   L5- EHL R 5   EHL L 5   S1- ankle PF R 5-   Ankle PF L 5-   S2- Hams R 5   Hams L 5        5/14/2024   Hip abd R 4+   Hip abd L 4+   *pain limiting    LE flexibility 5/2/2024   Piriformis R wnl   Piriformis L wnl   Hams R -9   Hams L -5   *pain limiting    Neural mobility 5/2/2024   SLR R (-) 70 deg   SLR L (-) 70 deg      DANIELLE 5/2/2024   R (-) min ROM loss   L (-) min ROM loss      Balance  5/14/2024   SLS L 5 sec   SLS R 3.5 sec

## 2024-05-14 NOTE — TELEPHONE ENCOUNTER
I filled out form.  In outbox.  She can  form at     We can let patient know that she should  fill out part 1.    Part 2 and 4 are intentionally left blank.    She did [place] the form to .  Address on form.

## 2024-05-23 ENCOUNTER — OFFICE VISIT (OUTPATIENT)
Dept: PHYSICAL THERAPY | Facility: HOSPITAL | Age: 79
End: 2024-05-23
Attending: PHYSICAL MEDICINE & REHABILITATION
Payer: MEDICARE

## 2024-05-23 PROCEDURE — 97110 THERAPEUTIC EXERCISES: CPT

## 2024-05-23 PROCEDURE — 97112 NEUROMUSCULAR REEDUCATION: CPT

## 2024-05-23 NOTE — PROGRESS NOTES
Karyn Cobos    6/14/1945  Diagnosis: Spinal stenosis of lumbar region with neurogenic claudication (M48.062)  Lumbar disc disease (M51.9)  Lumbar foraminal stenosis (M48.061)  Other idiopathic scoliosis, thoracolumbar region (M41.25)  Spondylolisthesis of lumbar region (M43.16)  Lumbar radiculopathy (M54.16)  Referring Physician:  Tristan Tyson MD visit: none  Initial Evaluation Date: 5/2/2024  Date of Surgery: None for this diagnosis  Insurance: Medicare  Authorized # of visits:  NA by 1/31/2025  Plan of care to: 7/31/2024    Precautions/Hx: arthritis, DVT, HTN, L 1st MTP surg, HLD, parathyroid removed, TAHBSO for uterine cancer, ulcerative colitis, appy, scoliosis, Alzheimer's dementia.   R L4-L5 TFESI 3/28/2023 and 7/10/2023 and 3/22/2024    SUBJECTIVE:     Pt reports that she continues to have mostly low level of pain with sudden increases in pain.  She remains free of pain in the L gluteals.  She continues to have R sided lower back pain that radiates into the R gluteals and thigh.  She was able to walk for 1.5 hours w her rollator in a botanical garden     Visit count 1/8 2/8 3/8 4/8   Date 5/2/2024 5/9/2024 5/14/2024 5/23/2024    L gluteals       Pain Range 1 to 5/10 0/10 0/10 0/10   Pain Ave 3/10 0/10 0/10 0/10   Pain Current 1-2/10 0/10 0/10 0/10          R LBP, ant/lat thigh       Pain Range 3-4 to 9/10 1-2 to 9/10 1 to 5/10 0 to 6/10   Pain Ave 5/10 5/10 4/10 3-4/10   Pain Current 3-4/10 3/10 1/10 1-2/10      OBJECTIVE:       Treatment performed this date:    Therapeutic Exercise:  Visit #   2/8 3/8 4/8   Position Exercise HEP 5/9/2024 5/14/2024 5/23/2024    NuStep Seat , Arms , Resist    X     Supine DKC H X 5x      SKC H X 3x      LTR H       Leg pumps nerve glide H X 10x      R UE L LE reach H X 5x  X 5x   90-90 Lumbar decompression H   X 5 min   Sitting Trunk flex H  x X 3x30x    Sciatic nerve glide H  10x X 10x    Hip ER cross leg H X 5x 5x X 5x    Piriformis cross leg H X 3x 5x X 5x    4 point Cat-cow H X 10x      Child pose H X 1x 30s     Standing Functional squat H  X 10x2     Corner tandem balance EO   X 1 min     Corner tandem balance EC H  X 1 min     SLS balancing H  X 5x 10sec     Step up 8\"   X 5x     Step up 8\" opp knee lift   X 7x    Neuro Re-ed  X see assessment  X see assessment  X see assessment    H = HEP. Pt given copies of this exercise for home program.  X = Exercises done this date - pt verbalized understanding and demonstrated competence. All exercises done B unless otherwise indicated.   Pt advised to discontinue exercises that increase pain and to call or return to therapist to discuss.  Each intervention above is specifically prescribed to address the patient's identified impairments, activity limitations, and participation restrictions.    ASSESSMENT:     Pt continues to do well to manage her pain at a low to mod level.  Pt doing well to tolerate high level of activity w use of her rollator.  Pt brought in her new assistant to learn more about her care and HEP.  Pt educated on the importance of addressing pain flare ups early with self care tools and preferably prior to a 5/10 pain level as this will allow for recovery with less time and treatments.  Reviewed pt's imaging w discussion of concepts of stenosis and scoliosis. Pt demonstrates all previously instructed exercises with proficiency.  Pt instructed in 90-90 positioning for use to gain lumbar decompression and full spinal ps release.  Pt able to perform beginning level abdominal core exercises in the position.  Instructed in performance at home especially for acute spinal symptoms.   Pt educated on the importance of pacing activities to avoid overdoing w boom and bust to allow continued progression of exercise and functional activities.  Pt educated on dermatomal patterns with discussion of changing spinal alignment when experiencing radicular symptoms.  Pt's assistant did well to gain understanding of many issues  to improve her ability to understand pt's self maintenance.        Physical Therapy Goals: From 5/2/2024 to 7/31/2024  - Created with patient input during initial evaluation   1. Pt will be independent in beginning level of HEP for stretching, posture and strengthening.   2. Pt will be able to getting into the care without increased symptoms.  3. Pt will be able to walk for 15 min w AD without increased symptoms.  4. Pt will be able to sit for meal without increased symptoms.  5. Pt will be able to pull up covers on bed without increased symptoms.     PLAN OF CARE:      Assess response to use of pulleys at home. Continue PT per original plan for therapeutic exercises, posture retraining, therapeutic activities, manual treatment, neuromuscular reeducation, therapeutic pain neuroscience education, patient education, self care home management, home exercise program, and modalities as needed.    Charged Units Units Minutes    Ther Ex 2 25   Neuro 1 20   Ther Activity     Gait     Man Tx          Total Timed  45   Total Tx Time  45           __________________________________________________________________________________________      21st Century Cures Act Notice to Patient: Medical documents like this are made available to patients in the interest of transparency. However, be advised this is a medical document and it is intended as ceow-ka-uars communication between your medical providers. This medical document may contain abbreviations, assessments, medical data, and results or other terms that are unfamiliar. Medical documents are intended to carry relevant information, facts as evident, and the clinical opinion of the practitioner. As such, this medical document may be written in language that appears blunt or direct. You are encouraged to contact your medical provider and/or [facility] Patient Experience if you have any questions about this medical document.    Objective Initial Evaluation Data  5/2/2024:    Oswestry Disability Index Score  Score: 52 % (4/29/2024 10:09 AM)     Gait:        Deviations: trunk hip flex       Devices: spc       Assistance: none  Transfer:  Sit to stand - immediate increased radicular pain B      Posture 5/2/2024   Alignment L trunk shift, B shldr protraction, flattened lumbar spine       Sensation 5/2/2024   Dermatomes Light Touch    Proximal medial thigh R (L2)  wnl   Proximal medial thigh L (L2) wnl   Above knee R (L3) wnl   Above knee L (L3) wnl   Medial arch R(L4) wnl   Medial arch L (L4) wnl   Between 1st - 2nd toes R (L5) wnl   Between 1st - 2nd toes L (L5) wnl   Lateral border of foot R (S1) wnl   Lateral border of foot L (S1) wnl   Popliteal fossa R (S2) wnl   Popliteal fossa L(S2) wnl       ROM Lumbar 5/2/2024   Flexion min   Extension Max * R LE   R SB Min * R LE   L SB Min * R LE   R Rotation min   L Rotation min   * pain limiting    ROM Hip 5/2/2024   Flex R 125 140   Flex L 125 135   ER R 45 45   ER L 45 47   IR R 40 35   IR L 40 45   *pain limiting     MMT 5/5 5/2/2024   L2- hip flex R 5   Hip flex L 5* L glut   L3- knee ext R 5   Knee ext L 5   L4- ankle DF R 5   Ankle DF L 5   L5- EHL R 5   EHL L 5   S1- ankle PF R 5-   Ankle PF L 5-   S2- Hams R 5   Hams L 5        5/14/2024   Hip abd R 4+   Hip abd L 4+   *pain limiting    LE flexibility 5/2/2024   Piriformis R wnl   Piriformis L wnl   Hams R -9   Hams L -5   *pain limiting    Neural mobility 5/2/2024   SLR R (-) 70 deg   SLR L (-) 70 deg      DANIELLE 5/2/2024   R (-) min ROM loss   L (-) min ROM loss      Balance  5/14/2024   SLS L 5 sec   SLS R 3.5 sec

## 2024-05-28 RX ORDER — METOPROLOL SUCCINATE 50 MG/1
TABLET, EXTENDED RELEASE ORAL
Qty: 90 TABLET | Refills: 3 | Status: SHIPPED | OUTPATIENT
Start: 2024-05-28

## 2024-05-28 NOTE — TELEPHONE ENCOUNTER
Refill request is for a maintenance medication and has met the criteria specified in the Ambulatory Medication Refill Standing Order for eligibility, visits, laboratory, alerts and was sent to the requested pharmacy.    Requested Prescriptions     Signed Prescriptions Disp Refills    metoprolol succinate ER 50 MG Oral Tablet 24 Hr 90 tablet 3     Sig: TAKE ONE TABLET BY MOUTH ONE TIME DAILY     Authorizing Provider: NORIS MENDEZ     Ordering User: TANJA JIMÉNEZ

## 2024-05-30 ENCOUNTER — TELEPHONE (OUTPATIENT)
Dept: INTERNAL MEDICINE CLINIC | Facility: CLINIC | Age: 79
End: 2024-05-30

## 2024-05-30 ENCOUNTER — OFFICE VISIT (OUTPATIENT)
Dept: PHYSICAL THERAPY | Facility: HOSPITAL | Age: 79
End: 2024-05-30
Attending: PHYSICAL MEDICINE & REHABILITATION
Payer: MEDICARE

## 2024-05-30 PROCEDURE — 97110 THERAPEUTIC EXERCISES: CPT

## 2024-05-30 PROCEDURE — 97112 NEUROMUSCULAR REEDUCATION: CPT

## 2024-05-30 PROCEDURE — 97140 MANUAL THERAPY 1/> REGIONS: CPT

## 2024-05-30 NOTE — TELEPHONE ENCOUNTER
Spouse, Basil, called to speak with Dr Mansfield's nurse   Re: concerns he has about patient's need for oral surgery (under general anesthetic)   for leukoplakia      [phone]

## 2024-05-30 NOTE — TELEPHONE ENCOUNTER
Called and spoke with Basil/spouse ( HIPAA verified) and informed him to reach out to surgeon office for his questions/concerns. He will do so today

## 2024-05-30 NOTE — PROGRESS NOTES
Karyn Cobos    6/14/1945  Diagnosis: Spinal stenosis of lumbar region with neurogenic claudication (M48.062)  Lumbar disc disease (M51.9)  Lumbar foraminal stenosis (M48.061)  Other idiopathic scoliosis, thoracolumbar region (M41.25)  Spondylolisthesis of lumbar region (M43.16)  Lumbar radiculopathy (M54.16)  Referring Physician:  Tristan Tyson MD visit: none  Initial Evaluation Date: 5/2/2024  Date of Surgery: None for this diagnosis  Insurance: Medicare  Authorized # of visits:  NA by 1/31/2025  Plan of care to: 7/31/2024    Precautions/Hx: arthritis, DVT, HTN, L 1st MTP surg, HLD, parathyroid removed, TAHBSO for uterine cancer, ulcerative colitis, appy, scoliosis, Alzheimer's dementia.   R L4-L5 TFESI 3/28/2023 and 7/10/2023 and 3/22/2024    SUBJECTIVE:     Pt reports that she remains free of pain on the L lower back and LE.  She states that she was able to walk in today from the parking lot to our department ~5 min without triggering significant pain in the R LE.  She was pleased that she had no symptoms.  She was using cane and also had her hand lightly on her 's arm.  She was having increased pain yesterday.      Visit count 1/8 2/8 3/8 4/8 5/8   Date 5/2/2024 5/9/2024 5/14/2024 5/23/2024 5/30/2024    L gluteals        Pain Range 1 to 5/10 0/10 0/10 0/10 0/10   Pain Ave 3/10 0/10 0/10 0/10 0/10   Pain Current 1-2/10 0/10 0/10 0/10 0/10           R LBP, ant/lat thigh        Pain Range 3-4 to 9/10 1-2 to 9/10 1 to 5/10 0 to 6/10 0-7/10   Pain Ave 5/10 5/10 4/10 3-4/10 3-4/10   Pain Current 3-4/10 3/10 1/10 1-2/10 0/10      OBJECTIVE:       Treatment performed this date:    Therapeutic Exercise:  Visit #   3/8 4/8 5/8   Position Exercise HEP 5/14/2024 5/23/2024 5/30/2024    NuStep Seat , Arms , Resist   X      Supine DKC H   X 5x    SKC H   X 5x    LTR H   X 10x    Leg pumps nerve glide H       R UE L LE reach H  X 5x X     Pelvic tilt    X 10x    Breath work full exhale hands on ribs H   X  10x   90-90 Lumbar decompression H  X 5 min X 5 min    Pelvic tilt    X 10x    Abdom set    X 10x   Sitting Trunk flex H x X 3x30x     Sciatic nerve glide H 10x X 10x     Hip ER cross leg H 5x X 5x     Piriformis cross leg H 5x X 5x    4 point Cat-cow H       Child pose H      Standing Functional squat H X 10x2      Corner tandem balance EO  X 1 min      Corner tandem balance EC H X 1 min      SLS balancing H X 5x 10sec      Step up 8\"  X 5x      Step up 8\" opp knee lift  X 7x     Neuro Re-ed  X see assessment  X see assessment     Ther Act Function    X further education on condition as noted below    Man Tx Brachial chain - B upper, lower, then both w end range hold     X    H = HEP. Pt given copies of this exercise for home program.  X = Exercises done this date - pt verbalized understanding and demonstrated competence. All exercises done B unless otherwise indicated.   Pt advised to discontinue exercises that increase pain and to call or return to therapist to discuss.  Each intervention above is specifically prescribed to address the patient's identified impairments, activity limitations, and participation restrictions.    ASSESSMENT:     Pt doing well with no L sided pain.  Pt demonstrates chest wall flare positioning w decreased costal mobility.  Pt tolerated brachial chain mobilization well to upper and lower ribs w education on full mobility with breath work. Pt displays poor-fair breath work and upper abdominal activation.  Pt benefits from review of concepts of stenosis w discussion of use of flexion exercises and positioning.  Also reviewed use of rotation towards her affected leg for neural glides and use of 90-90 positioning.  Pt does well with consistent use of her printed HEP papers to recall and perform her HEP.    Physical Therapy Goals: From 5/2/2024 to 7/31/2024  - Created with patient input during initial evaluation   1. Pt will be independent in beginning level of HEP for stretching, posture  and strengthening.   2. Pt will be able to getting into the care without increased symptoms.  3. Pt will be able to walk for 15 min w AD without increased symptoms.  4. Pt will be able to sit for meal without increased symptoms.  5. Pt will be able to pull up covers on bed without increased symptoms.     PLAN OF CARE:      Assess response to upper abdominal activation. Continue PT per original plan for therapeutic exercises, posture retraining, therapeutic activities, manual treatment, neuromuscular reeducation, therapeutic pain neuroscience education, patient education, self care home management, home exercise program, and modalities as needed.    Charged Units Units Minutes    Ther Ex 2 25   Neuro 1 8   Ther Activity     Gait     Man Tx 1 12        Total Timed  45   Total Tx Time  45           __________________________________________________________________________________________      21st Century Cures Act Notice to Patient: Medical documents like this are made available to patients in the interest of transparency. However, be advised this is a medical document and it is intended as [id number] communication between your medical providers. This medical document may contain abbreviations, assessments, medical data, and results or other terms that are unfamiliar. Medical documents are intended to carry relevant information, facts as evident, and the clinical opinion of the practitioner. As such, this medical document may be written in language that appears blunt or direct. You are encouraged to contact your medical provider and/or [facility] Patient Experience if you have any questions about this medical document.    Objective Initial Evaluation Data 5/2/2024:    Oswestry Disability Index Score  Score: 52 % (4/29/2024 10:09 AM)     Gait:        Deviations: trunk hip flex       Devices: spc       Assistance: none  Transfer:  Sit to stand - immediate increased radicular pain B      Posture 5/2/2024    Alignment L trunk shift, B shldr protraction, flattened lumbar spine       Sensation 5/2/2024   Dermatomes Light Touch    Proximal medial thigh R (L2)  wnl   Proximal medial thigh L (L2) wnl   Above knee R (L3) wnl   Above knee L (L3) wnl   Medial arch R(L4) wnl   Medial arch L (L4) wnl   Between 1st - 2nd toes R (L5) wnl   Between 1st - 2nd toes L (L5) wnl   Lateral border of foot R (S1) wnl   Lateral border of foot L (S1) wnl   Popliteal fossa R (S2) wnl   Popliteal fossa L(S2) wnl       ROM Lumbar 5/2/2024   Flexion min   Extension Max * R LE   R SB Min * R LE   L SB Min * R LE   R Rotation min   L Rotation min   * pain limiting    ROM Hip 5/2/2024   Flex R 125 140   Flex L 125 135   ER R 45 45   ER L 45 47   IR R 40 35   IR L 40 45   *pain limiting     MMT 5/5 5/2/2024   L2- hip flex R 5   Hip flex L 5* L glut   L3- knee ext R 5   Knee ext L 5   L4- ankle DF R 5   Ankle DF L 5   L5- EHL R 5   EHL L 5   S1- ankle PF R 5-   Ankle PF L 5-   S2- Hams R 5   Hams L 5        5/14/2024   Hip abd R 4+   Hip abd L 4+   *pain limiting    LE flexibility 5/2/2024   Piriformis R wnl   Piriformis L wnl   Hams R -9   Hams L -5   *pain limiting    Neural mobility 5/2/2024   SLR R (-) 70 deg   SLR L (-) 70 deg      DANIELLE 5/2/2024   R (-) min ROM loss   L (-) min ROM loss      Balance  5/14/2024   SLS L 5 sec   SLS R 3.5 sec       Chest wall mobility 5/30/2024       Upper chest R min   Upper chest L wnl   Lower chest R mod   Lower chest L mod

## 2024-06-06 ENCOUNTER — OFFICE VISIT (OUTPATIENT)
Dept: PHYSICAL THERAPY | Facility: HOSPITAL | Age: 79
End: 2024-06-06
Attending: PHYSICAL MEDICINE & REHABILITATION
Payer: MEDICARE

## 2024-06-06 PROCEDURE — 97530 THERAPEUTIC ACTIVITIES: CPT

## 2024-06-06 PROCEDURE — 97110 THERAPEUTIC EXERCISES: CPT

## 2024-06-06 NOTE — PROGRESS NOTES
Karyn Cobos    6/14/1945  Diagnosis: Spinal stenosis of lumbar region with neurogenic claudication (M48.062)  Lumbar disc disease (M51.9)  Lumbar foraminal stenosis (M48.061)  Other idiopathic scoliosis, thoracolumbar region (M41.25)  Spondylolisthesis of lumbar region (M43.16)  Lumbar radiculopathy (M54.16)  Referring Physician:  Tristan Tyson MD visit: none  Initial Evaluation Date: 5/2/2024  Date of Surgery: None for this diagnosis  Insurance: Medicare  Authorized # of visits:  NA by 1/31/2025  Plan of care to: 7/31/2024    Precautions/Hx: arthritis, DVT, HTN, L 1st MTP surg, HLD, parathyroid removed, TAHBSO for uterine cancer, ulcerative colitis, appy, scoliosis, Alzheimer's dementia.   R L4-L5 TFESI 3/28/2023 and 7/10/2023 and 3/22/2024    SUBJECTIVE:     Pt reports that she has had recent worsening of pain into the R ant groin region.  She notes that the pain in the R gluteals and LE remain about the same.  She feels pain into the foot.   Pt had her assistant present w her    Visit count 1/8 2/8 3/8 4/8 5/8 6/8   Date 5/2/2024 5/9/2024 5/14/2024 5/23/2024 5/30/2024 6/6/2024    L gluteals         Pain Range 1 to 5/10 0/10 0/10 0/10 0/10 0/10   Pain Ave 3/10 0/10 0/10 0/10 0/10 0/10   Pain Current 1-2/10 0/10 0/10 0/10 0/10 0/10            R LBP, ant/lat thigh         Pain Range 3-4 to 9/10 1-2 to 9/10 1 to 5/10 0 to 6/10 0-7/10 0-7/10   Pain Ave 5/10 5/10 4/10 3-4/10 3-4/10 3-4/10   Pain Current 3-4/10 3/10 1/10 1-2/10 0/10 3/10      OBJECTIVE:       Treatment performed this date:    Therapeutic Exercise:  Visit #   4/8 5/8 6/8   Position Exercise HEP 5/23/2024 5/30/2024 6/6/2024    NuStep Seat , Arms , Resist        Supine DKC H  X 5x X 10x    SKC H  X 5x X 5x    LTR H  X 10x X 10x2    Leg pumps nerve glide H   X 10x    R UE L LE reach H X 5x X  X 3x    Pelvic tilt   X 10x X 10x    Breath work full exhale hands on ribs H  X 10x X 10x   90-90 Lumbar decompression H X 5 min X 5 min     Pelvic  tilt   X 10x     Abdom set   X 10x    Sitting Trunk flex H X 3x30x      Sciatic nerve glide H X 10x      Hip ER cross leg H X 5x      Piriformis cross leg H X 5x      Lumbopelvic shift towel under R ischium H   X 3 min    Lumbopelvic shift L ischium over edge of bed H   X 10x   4 point Cat-cow H       Child pose H      Standing Functional squat H       Corner tandem balance EO        Corner tandem balance EC H       SLS balancing H       Step up 8\"        Step up 8\" opp knee lift       Neuro Re-ed  X see assessment      Ther Act Function   X further education on condition as noted below  X further education on condition as noted below    Man Tx Brachial chain - B upper, lower, then both w end range hold    X     H = HEP. Pt given copies of this exercise for home program.  X = Exercises done this date - pt verbalized understanding and demonstrated competence. All exercises done B unless otherwise indicated.   Pt advised to discontinue exercises that increase pain and to call or return to therapist to discuss.  Each intervention above is specifically prescribed to address the patient's identified impairments, activity limitations, and participation restrictions.    ASSESSMENT:     Pt noting increased R ant hip pain.  Pt demonstrating R skc in supine w large mobility w force.  Pt advised that she does not need to do the exercise at that range or intensity.  Reviewed anatomy of hip w discussion of labrum and possible irritation from force in that position.  Her assistant reports that she will remain in that position also while sitting to watch TV.  Pt advised to use gently dkc to gain lumbar foraminal opening without hip irritation.  Pt instructed in options for lumbar self mobilization in frontal plane as noted above.     Physical Therapy Goals: From 5/2/2024 to 7/31/2024  - Created with patient input during initial evaluation   1. Pt will be independent in beginning level of HEP for stretching, posture and  strengthening.   2. Pt will be able to getting into the care without increased symptoms.  3. Pt will be able to walk for 15 min w AD without increased symptoms.  4. Pt will be able to sit for meal without increased symptoms.  5. Pt will be able to pull up covers on bed without increased symptoms.     PLAN OF CARE:      Assess response to decreased R hip flex self mob Continue PT per original plan for therapeutic exercises, posture retraining, therapeutic activities, manual treatment, neuromuscular reeducation, therapeutic pain neuroscience education, patient education, self care home management, home exercise program, and modalities as needed.    Charged Units Units Minutes    Ther Ex 2 30   Neuro     Ther Activity 1 15   Gait     Man Tx          Total Timed  45   Total Tx Time  45           __________________________________________________________________________________________      21st Century Cures Act Notice to Patient: Medical documents like this are made available to patients in the interest of transparency. However, be advised this is a medical document and it is intended as [id number] communication between your medical providers. This medical document may contain abbreviations, assessments, medical data, and results or other terms that are unfamiliar. Medical documents are intended to carry relevant information, facts as evident, and the clinical opinion of the practitioner. As such, this medical document may be written in language that appears blunt or direct. You are encouraged to contact your medical provider and/or [facility] Patient Experience if you have any questions about this medical document.    Objective Initial Evaluation Data 5/2/2024:    Oswestry Disability Index Score  Score: 52 % (4/29/2024 10:09 AM)     Gait:        Deviations: trunk hip flex       Devices: spc       Assistance: none  Transfer:  Sit to stand - immediate increased radicular pain B      Posture 5/2/2024    Alignment L trunk shift, B shldr protraction, flattened lumbar spine       Sensation 5/2/2024   Dermatomes Light Touch    Proximal medial thigh R (L2)  wnl   Proximal medial thigh L (L2) wnl   Above knee R (L3) wnl   Above knee L (L3) wnl   Medial arch R(L4) wnl   Medial arch L (L4) wnl   Between 1st - 2nd toes R (L5) wnl   Between 1st - 2nd toes L (L5) wnl   Lateral border of foot R (S1) wnl   Lateral border of foot L (S1) wnl   Popliteal fossa R (S2) wnl   Popliteal fossa L(S2) wnl       ROM Lumbar 5/2/2024   Flexion min   Extension Max * R LE   R SB Min * R LE   L SB Min * R LE   R Rotation min   L Rotation min   * pain limiting    ROM Hip 5/2/2024   Flex R 125 140   Flex L 125 135   ER R 45 45   ER L 45 47   IR R 40 35   IR L 40 45   *pain limiting     MMT 5/5 5/2/2024   L2- hip flex R 5   Hip flex L 5* L glut   L3- knee ext R 5   Knee ext L 5   L4- ankle DF R 5   Ankle DF L 5   L5- EHL R 5   EHL L 5   S1- ankle PF R 5-   Ankle PF L 5-   S2- Hams R 5   Hams L 5        5/14/2024   Hip abd R 4+   Hip abd L 4+   *pain limiting    LE flexibility 5/2/2024   Piriformis R wnl   Piriformis L wnl   Hams R -9   Hams L -5   *pain limiting    Neural mobility 5/2/2024   SLR R (-) 70 deg   SLR L (-) 70 deg      DANIELLE 5/2/2024   R (-) min ROM loss   L (-) min ROM loss      Balance  5/14/2024   SLS L 5 sec   SLS R 3.5 sec       Chest wall mobility 5/30/2024       Upper chest R min   Upper chest L wnl   Lower chest R mod   Lower chest L mod

## 2024-06-07 ENCOUNTER — NURSE ONLY (OUTPATIENT)
Dept: INTERNAL MEDICINE CLINIC | Facility: CLINIC | Age: 79
End: 2024-06-07

## 2024-06-07 DIAGNOSIS — E53.8 VITAMIN B12 DEFICIENCY: Primary | ICD-10-CM

## 2024-06-07 PROCEDURE — 96372 THER/PROPH/DIAG INJ SC/IM: CPT | Performed by: INTERNAL MEDICINE

## 2024-06-07 RX ORDER — HYDROCHLOROTHIAZIDE 12.5 MG/1
12.5 TABLET ORAL
Qty: 39 TABLET | Refills: 0 | OUTPATIENT
Start: 2024-06-07

## 2024-06-07 RX ADMIN — CYANOCOBALAMIN 1000 MCG: 1000 INJECTION, SOLUTION INTRAMUSCULAR; SUBCUTANEOUS at 09:44:00

## 2024-06-07 NOTE — TELEPHONE ENCOUNTER
Refill request has failed the Ambulatory Medication Refill Standing Order and is routed to the primary physician to review the following:    Requested Prescriptions     Refused Prescriptions Disp Refills    HYDROCHLOROTHIAZIDE 12.5 MG Oral Tab [Pharmacy Med Name: Hydrochlorothiazide 12.5 Mg Tab Acta] 39 tablet 0     Sig: TAKE 1 TABLET  BY MOUTH EVERY MONDAY, WEDNESDAY, AND FRIDAY.     Refused By: RAUL CRISTOBAL     Reason for Refusal: Refill not appropriate

## 2024-06-11 ENCOUNTER — TELEPHONE (OUTPATIENT)
Dept: PHYSICAL THERAPY | Facility: HOSPITAL | Age: 79
End: 2024-06-11

## 2024-06-13 ENCOUNTER — APPOINTMENT (OUTPATIENT)
Dept: PHYSICAL THERAPY | Facility: HOSPITAL | Age: 79
End: 2024-06-13
Attending: PHYSICAL MEDICINE & REHABILITATION
Payer: MEDICARE

## 2024-06-20 ENCOUNTER — OFFICE VISIT (OUTPATIENT)
Dept: PHYSICAL THERAPY | Facility: HOSPITAL | Age: 79
End: 2024-06-20
Attending: PHYSICAL MEDICINE & REHABILITATION
Payer: MEDICARE

## 2024-06-20 PROCEDURE — 97140 MANUAL THERAPY 1/> REGIONS: CPT

## 2024-06-20 PROCEDURE — 97530 THERAPEUTIC ACTIVITIES: CPT

## 2024-06-20 NOTE — PROGRESS NOTES
Karyn Cobos    6/14/1945  Diagnosis: Spinal stenosis of lumbar region with neurogenic claudication (M48.062)  Lumbar disc disease (M51.9)  Lumbar foraminal stenosis (M48.061)  Other idiopathic scoliosis, thoracolumbar region (M41.25)  Spondylolisthesis of lumbar region (M43.16)  Lumbar radiculopathy (M54.16)  Referring Physician:  Tristan Adrian  Next MD visit: none  Initial Evaluation Date: 5/2/2024  Date of Surgery: None for this diagnosis  Insurance: Medicare  Authorized # of visits:  NA by 1/31/2025  Plan of care to: 7/31/2024    Precautions/Hx: arthritis, DVT, HTN, L 1st MTP surg, HLD, parathyroid removed, TAHBSO for uterine cancer, ulcerative colitis, appy, scoliosis, Alzheimer's dementia.   R L4-L5 TFESI 3/28/2023 and 7/10/2023 and 3/22/2024    SUBJECTIVE:     Pt reports that she has had increased R ant groin pain into the ant upper thigh.  She will also have pain in the R gluteals towards ischial tuberosity.  Pt notes that she has to go for a biopsy in her mouth.     Visit count 1/8 2/8 3/8 4/8 5/8 6/8 7/8   Date 5/2/2024 5/9/2024 5/14/2024 5/23/2024 5/30/2024 6/6/2024 6/20/2024    L gluteals          Pain Range 1 to 5/10 0/10 0/10 0/10 0/10 0/10 0/10   Pain Ave 3/10 0/10 0/10 0/10 0/10 0/10 0/10   Pain Current 1-2/10 0/10 0/10 0/10 0/10 0/10 0/10             R LBP, ant/lat thigh          Pain Range 3-4 to 9/10 1-2 to 9/10 1 to 5/10 0 to 6/10 0-7/10 0-7/10 0-7/0   Pain Ave 5/10 5/10 4/10 3-4/10 3-4/10 3-4/10 4/10   Pain Current 3-4/10 3/10 1/10 1-2/10 0/10 3/10 0/10      OBJECTIVE:       Treatment performed this date:    Therapeutic Exercise:  Visit #   5/8 6/8 7/8   Position Exercise HEP 5/30/2024 6/6/2024 6/20/2024    NuStep Seat , Arms , Resist        Supine DKC H X 5x X 10x     SKC H X 5x X 5x     LTR H X 10x X 10x2     Leg pumps nerve glide H  X 10x     R UE L LE reach H X  X 3x     Pelvic tilt  X 10x X 10x     Breath work full exhale hands on ribs H X 10x X 10x    90-90 Lumbar decompression  H X 5 min      Pelvic tilt  X 10x      Abdom set  X 10x     Sitting Trunk flex H       Sciatic nerve glide H       Hip ER cross leg H       Piriformis cross leg H       Lumbopelvic shift towel under R ischium H  X 3 min X 3 min, 8 towel layers w her towel from home    Lumbopelvic shift L ischium over edge of bed H  X 10x    4 point Cat-cow H       Child pose H      Standing Functional squat H       Corner tandem balance EO        Corner tandem balance EC H       SLS balancing H       Step up 8\"        Step up 8\" opp knee lift       Neuro Re-ed    X see assessment    Ther Act Function  X further education on condition as noted below  X further education on condition as noted below     Man Tx Brachial chain - B upper, lower, then both w end range hold   X       Joint mobs    X R post vert sacral rotation mob, sacral extn, PA glide L5-1 grade 1-2, R LE distraction to lumbar spine.    H = HEP. Pt given copies of this exercise for home program.  X = Exercises done this date - pt verbalized understanding and demonstrated competence. All exercises done B unless otherwise indicated.   Pt advised to discontinue exercises that increase pain and to call or return to therapist to discuss.  Each intervention above is specifically prescribed to address the patient's identified impairments, activity limitations, and participation restrictions.    ASSESSMENT:     Pt having some continued pain in the R gluteals and ant thigh.  Pt demonstrated loss of R hip extn control after sit to stand and had missteps. Pt initially was walking without her cane from the lobby.  Pt stopped and advised that she needs to continue to use her cane consistently.  Discussed further risks of falling.  Pt did well with man tx as noted above.  Frequent stops and readjustments made to address mild triggering of radicular symptoms.  Pt advised to continue using flexion based exercises.  Discussed pt's condition w her  as well.     Physical Therapy  Goals: From 5/2/2024 to 7/31/2024  - Created with patient input during initial evaluation   1. Pt will be independent in beginning level of HEP for stretching, posture and strengthening.   2. Pt will be able to getting into the care without increased symptoms.  3. Pt will be able to walk for 15 min w AD without increased symptoms.  4. Pt will be able to sit for meal without increased symptoms.  5. Pt will be able to pull up covers on bed without increased symptoms.     PLAN OF CARE:      Assess response to man tx.  Continue PT per original plan for therapeutic exercises, posture retraining, therapeutic activities, manual treatment, neuromuscular reeducation, therapeutic pain neuroscience education, patient education, self care home management, home exercise program, and modalities as needed.    Charged Units Units Minutes   Ther Ex     Neuro     Ther Activity 1 20   Gait     Man Tx 2 25        Total Timed  45   Total Tx Time  45           __________________________________________________________________________________________      21st Century Cures Act Notice to Patient: Medical documents like this are made available to patients in the interest of transparency. However, be advised this is a medical document and it is intended as [id number]-wviw communication between your medical providers. This medical document may contain abbreviations, assessments, medical data, and results or other terms that are unfamiliar. Medical documents are intended to carry relevant information, facts as evident, and the clinical opinion of the practitioner. As such, this medical document may be written in language that appears blunt or direct. You are encouraged to contact your medical provider and/or [facility] Patient Experience if you have any questions about this medical document.    Objective Initial Evaluation Data 5/2/2024:    Oswestry Disability Index Score  Score: 52 % (4/29/2024 10:09 AM)     Gait:        Deviations:  trunk hip flex       Devices: spc       Assistance: none  Transfer:  Sit to stand - immediate increased radicular pain B      Posture 5/2/2024   Alignment L trunk shift, B shldr protraction, flattened lumbar spine       Sensation 5/2/2024   Dermatomes Light Touch    Proximal medial thigh R (L2)  wnl   Proximal medial thigh L (L2) wnl   Above knee R (L3) wnl   Above knee L (L3) wnl   Medial arch R(L4) wnl   Medial arch L (L4) wnl   Between 1st - 2nd toes R (L5) wnl   Between 1st - 2nd toes L (L5) wnl   Lateral border of foot R (S1) wnl   Lateral border of foot L (S1) wnl   Popliteal fossa R (S2) wnl   Popliteal fossa L(S2) wnl       ROM Lumbar 5/2/2024   Flexion min   Extension Max * R LE   R SB Min * R LE   L SB Min * R LE   R Rotation min   L Rotation min   * pain limiting    ROM Hip 5/2/2024   Flex R 125 140   Flex L 125 135   ER R 45 45   ER L 45 47   IR R 40 35   IR L 40 45   *pain limiting     MMT 5/5 5/2/2024   L2- hip flex R 5   Hip flex L 5* L glut   L3- knee ext R 5   Knee ext L 5   L4- ankle DF R 5   Ankle DF L 5   L5- EHL R 5   EHL L 5   S1- ankle PF R 5-   Ankle PF L 5-   S2- Hams R 5   Hams L 5        5/14/2024   Hip abd R 4+   Hip abd L 4+   *pain limiting    LE flexibility 5/2/2024   Piriformis R wnl   Piriformis L wnl   Hams R -9   Hams L -5   *pain limiting    Neural mobility 5/2/2024   SLR R (-) 70 deg   SLR L (-) 70 deg      DANIELLE 5/2/2024   R (-) min ROM loss   L (-) min ROM loss      Balance  5/14/2024   SLS L 5 sec   SLS R 3.5 sec       Chest wall mobility 5/30/2024       Upper chest R min   Upper chest L wnl   Lower chest R mod   Lower chest L mod

## 2024-06-28 ENCOUNTER — LAB ENCOUNTER (OUTPATIENT)
Dept: LAB | Age: 79
End: 2024-06-28
Attending: INTERNAL MEDICINE
Payer: MEDICARE

## 2024-06-28 ENCOUNTER — OFFICE VISIT (OUTPATIENT)
Dept: INTERNAL MEDICINE CLINIC | Facility: CLINIC | Age: 79
End: 2024-06-28

## 2024-06-28 VITALS
SYSTOLIC BLOOD PRESSURE: 140 MMHG | DIASTOLIC BLOOD PRESSURE: 62 MMHG | HEIGHT: 62 IN | HEART RATE: 60 BPM | OXYGEN SATURATION: 98 % | TEMPERATURE: 98 F | WEIGHT: 135 LBS | BODY MASS INDEX: 24.84 KG/M2

## 2024-06-28 DIAGNOSIS — I10 ESSENTIAL HYPERTENSION: ICD-10-CM

## 2024-06-28 DIAGNOSIS — M81.8 OTHER OSTEOPOROSIS, UNSPECIFIED PATHOLOGICAL FRACTURE PRESENCE: ICD-10-CM

## 2024-06-28 DIAGNOSIS — E04.2 MULTIPLE THYROID NODULES: ICD-10-CM

## 2024-06-28 DIAGNOSIS — D51.0 PERNICIOUS ANEMIA: ICD-10-CM

## 2024-06-28 DIAGNOSIS — Z87.19 HISTORY OF CHRONIC ULCERATIVE COLITIS: ICD-10-CM

## 2024-06-28 DIAGNOSIS — Z00.00 MEDICARE ANNUAL WELLNESS VISIT, SUBSEQUENT: Primary | ICD-10-CM

## 2024-06-28 DIAGNOSIS — Z85.42 HISTORY OF UTERINE CANCER: ICD-10-CM

## 2024-06-28 DIAGNOSIS — D64.9 ANEMIA, UNSPECIFIED TYPE: ICD-10-CM

## 2024-06-28 DIAGNOSIS — Z00.00 ROUTINE HEALTH MAINTENANCE: ICD-10-CM

## 2024-06-28 DIAGNOSIS — R41.3 MEMORY DEFICIT: ICD-10-CM

## 2024-06-28 DIAGNOSIS — Z80.0 FAMILY HISTORY OF COLON CANCER: ICD-10-CM

## 2024-06-28 LAB
BASOPHILS # BLD AUTO: 0.04 X10(3) UL (ref 0–0.2)
BASOPHILS NFR BLD AUTO: 0.5 %
DEPRECATED HBV CORE AB SER IA-ACNC: 51.3 NG/ML
DEPRECATED RDW RBC AUTO: 45.2 FL (ref 35.1–46.3)
EOSINOPHIL # BLD AUTO: 0.33 X10(3) UL (ref 0–0.7)
EOSINOPHIL NFR BLD AUTO: 4.2 %
ERYTHROCYTE [DISTWIDTH] IN BLOOD BY AUTOMATED COUNT: 12.3 % (ref 11–15)
HCT VFR BLD AUTO: 35.5 %
HGB BLD-MCNC: 11.6 G/DL
IMM GRANULOCYTES # BLD AUTO: 0.02 X10(3) UL (ref 0–1)
IMM GRANULOCYTES NFR BLD: 0.3 %
IRON SATN MFR SERPL: 19 %
IRON SERPL-MCNC: 67 UG/DL
LYMPHOCYTES # BLD AUTO: 1.61 X10(3) UL (ref 1–4)
LYMPHOCYTES NFR BLD AUTO: 20.4 %
MCH RBC QN AUTO: 32.4 PG (ref 26–34)
MCHC RBC AUTO-ENTMCNC: 32.7 G/DL (ref 31–37)
MCV RBC AUTO: 99.2 FL
MONOCYTES # BLD AUTO: 0.8 X10(3) UL (ref 0.1–1)
MONOCYTES NFR BLD AUTO: 10.2 %
NEUTROPHILS # BLD AUTO: 5.08 X10 (3) UL (ref 1.5–7.7)
NEUTROPHILS # BLD AUTO: 5.08 X10(3) UL (ref 1.5–7.7)
NEUTROPHILS NFR BLD AUTO: 64.4 %
PLATELET # BLD AUTO: 231 10(3)UL (ref 150–450)
RBC # BLD AUTO: 3.58 X10(6)UL
TIBC SERPL-MCNC: 358 UG/DL (ref 250–425)
TRANSFERRIN SERPL-MCNC: 240 MG/DL (ref 250–380)
VIT B12 SERPL-MCNC: 1071 PG/ML (ref 211–911)
WBC # BLD AUTO: 7.9 X10(3) UL (ref 4–11)

## 2024-06-28 PROCEDURE — 82607 VITAMIN B-12: CPT

## 2024-06-28 PROCEDURE — 85025 COMPLETE CBC W/AUTO DIFF WBC: CPT

## 2024-06-28 PROCEDURE — 82728 ASSAY OF FERRITIN: CPT

## 2024-06-28 PROCEDURE — 36415 COLL VENOUS BLD VENIPUNCTURE: CPT

## 2024-06-28 PROCEDURE — 99214 OFFICE O/P EST MOD 30 MIN: CPT | Performed by: INTERNAL MEDICINE

## 2024-06-28 PROCEDURE — G0439 PPPS, SUBSEQ VISIT: HCPCS | Performed by: INTERNAL MEDICINE

## 2024-06-28 PROCEDURE — 83540 ASSAY OF IRON: CPT

## 2024-06-28 PROCEDURE — 84466 ASSAY OF TRANSFERRIN: CPT

## 2024-06-28 NOTE — PROGRESS NOTES
HPI:   Karyn Cobos is a 79 year old female presents with the following problems.    Wt Readings from Last 3 Encounters:   06/28/24 135 lb (61.2 kg)   03/22/24 135 lb (61.2 kg)   02/23/24 133 lb 9.6 oz (60.6 kg)     Body mass index is 24.69 kg/m².     Karyn is here for Medicare annual wellness visit    Physically she seems to be doing well.  She does have some recurrent left lateral breast pain that she has had for couple years.  Her mammogram August 10, 2023 was unremarkable.  I did encourage her to make follow-up with Dr. Patton whom she has been seeing once a year.  Last visit with him was July 25, 2023.    She has hypertension.  For now I believe blood pressure is satisfactory    He does have memory loss.  This has been diagnosed.  She has seen Dr. Harrington.  She also is seeing Dr. Huddleston a geriatric psychiatrist.  Per Basil patient's  he has ordered Xanax for agitation.  I did relay that sometimes Xanax can create some confusion but he had not picked up the prescription yet and I did indicate that since it was prescribed by [place] it seems appropriate to take for agitation when needed.    She does not require any more colonoscopies.  She has a history of ulcerative colitis.  She had an 8 mm adenoma October 2022.  Dr. López indicated no more screening colonoscopies.  She is asymptomatic    Is have some constipation.  She takes Colace 2 a day.  I suggested MiraLAX.  She indicated in the past when she took MiraLAX she might have to move her bowels quickly.  I suggested just a half a dose of MiraLAX and stop the Colace and see how this works for her.    She has had RSV vaccine.  She has had Shingrix x 2.  She had PCV 20 May 2022.    Below is copy and pasted Dr. Patton note for reference.  ASSESSMENT/ PLAN:    Mastodynia, left breast; no evidence of suspicious breast lesions    The patient has left breast pain which remains improved and not associated with mammogram or ultrasound abnormalities.  She was reassured that there is no evidence of breast cancer at this time based on all the available evidence to date. NSAIDs and heating pads PRN were advised. In severe cases of mastodynia, medical treatment may be indicated with Tamoxifen or Danazol, but that is associated with potential unwanted side effects. We discussed alternative supplements that may alleviate this.  She has schedule mammogram. If normal, no need for screening mammogram past age 80  She will cont BSE and yearly exam by me per her wishes.    The patient has undergone genetic counseling due to her personal history of endometrial cancer and both parents having colon cancer. She decided against going through it but was very enlightened with the consultation.    Thank you for referring this wonderful patient  WESLEY CUNHA MD    Cc: Dr. Pan Mansfield      Electronically signed by Wesley Cunha MD at 07/25/2023 2:35 PM CDT     She also will be getting a biopsy of a \"white spot\" left lateral gumline.  I looked myself and could not see this area but she indicates that her dentist will be performing this.  She is a lifelong non-smoker    Also sees Dr. Healy from [place].  This is for a thyroid nodule.  Also she gets her DEXA scan and her DEXA scan 2/24/2024 shows risk of major osteoporotic fracture 21% hip fracture 7.5%.  Patient was osteopenia with associated moderate fracture risk.  Osteopenia in 1 or both femoral necks.    Is also getting physical therapy for pain right buttocks down her leg suggestive of sciatica.  He has seen  from physiatry.  She had transforaminal lumbar epidural steroid injection right L4 and right L5 transforaminal epidural steroid injection March 22, 2024    Current Outpatient Medications   Medication Sig Dispense Refill    metoprolol succinate ER 50 MG Oral Tablet 24 Hr TAKE ONE TABLET BY MOUTH ONE TIME DAILY 90 tablet 3    memantine 10 MG Oral Tab Take 1 tablet (10 mg total) by mouth 2 (two) times daily.  180 tablet 1    gabapentin 300 MG Oral Cap Take 1 capsule (300 mg total) by mouth 2 (two) times daily. 180 capsule 0    triamcinolone 0.1 % External Cream Apply topically 2 (two) times daily. 45 g 1    DULoxetine 30 MG Oral Cap DR Particles Take 1 capsule  by mouth daily for 90 days, THEN 2 capsules  daily. 270 capsule 0    donepezil 10 MG Oral Tab Take 1 tablet (10 mg total) by mouth daily. 90 tablet 0    amLODIPine 10 MG Oral Tab TAKE ONE TABLET BY MOUTH ONE TIME DAILY 90 tablet 3    telmisartan 80 MG Oral Tab TAKE ONE TABLET BY MOUTH IN THE MORNING 90 tablet 3    omeprazole 20 MG Oral Capsule Delayed Release Take 1 capsule (20 mg total) by mouth every morning.      atorvastatin 40 MG Oral Tab TAKE ONE TABLET BY MOUTH EVERY DAY AT NIGHTLY. 90 tablet 3    SULFASALAZINE 500 MG Oral Tab TAKE 3 TABLETS BY MOUTH TWICE A  tablet 0    aspirin 81 MG Oral Tab EC Take 1 tablet (81 mg total) by mouth daily.  0    folic acid 1 MG Oral Tab Take 1 tablet (1 mg total) by mouth daily. 90 tablet 3    Calcium Citrate-Vitamin D 315-250 MG-UNIT Oral Tab Take 2 tablets by mouth 2 (two) times daily with meals.      cyanocobalamin (VITAMIN B12) 1000 MCG/ML Injection Solution Inject 1 mL (1,000 mcg total) into the muscle every 30 (thirty) days.      Vitamin D, Ergocalciferol, (DRISDOL) 06519 UNITS Oral Cap Take 1 capsule (50,000 Units total) by mouth As Directed. Every 5 days.      Multiple Vitamin (MULTI-VITAMINS) Oral Tab Take 1 tablet by mouth daily with breakfast.      denosumab 60 MG/ML Subcutaneous Solution Prefilled Syringe Inject 1 mL (60 mg total) into the skin one time.        Past Medical History:    Arthritis    Back pain    Cough    Deep vein thrombosis (HCC)    Dementia (HCC)    Essential hypertension    Family history of colon cancer     0    PARA ZERO    H/O foot surgery    LEFT FOOT SURGERY, PINS PLACED IN TOE    H/O oral surgery    GUM/MOUTH SURGERY    Hallux rigidus    LEFT FOOT, YURY PROCEDURE, 1ST  LEFT METARSOPHALANGEAL JOINT    Heart palpitations    HEART MONITOR 2012    High blood pressure    High cholesterol    History of DVT (deep vein thrombosis)    Hypercholesterolemia    Hyperlipidemia    Hyperparathyroidism (HCC)    PAIR OF PARATHYROID REMOVED    Hyperparathyroidism (HCC)    PAIR OF PARATHYROID REMOVED     Hypertension    Left carotid artery stenosis    Migraine    OCCULAR MIGRAINE    Migraine    OCCULAR MIGRAINE     Neuroma    2 LT, HALLUX RIGIDUS LT, PER. NG.    Osteoarthritis    Other ill-defined conditions(799.89)    TAHBSO MGMT.    Other ill-defined conditions(799.89)    CHRONIC LEFT CYSTIC PELVIC MASS    Ovarian cyst    REMOVED PER NG.    Painful breasts    Pneumonia    HOSPITALIZED    Pneumonia due to organism    Spinal stenosis    Spinal stenosis    Tonsillitis    Ulcerative colitis (HCC)    Uterine cancer (HCC)      Past Surgical History:   Procedure Laterality Date    Appendectomy      patient doenst rememeber    Cataract Bilateral     Left Eye: 23, Right Eye: 23    Colonoscopy N/A 2016    Procedure: COLONOSCOPY;  Surgeon: Velma Herrera MD;  Location: [facility] ENDOSCOPY    Colonoscopy N/A 2018    Procedure: COLONOSCOPY;  Surgeon: Velma Herrera MD;  Location: [facility] ENDOSCOPY    Colonoscopy  10/2022    Colonoscopy N/A 10/14/2022    Procedure: COLONOSCOPY;  Surgeon: Michael López MD;  Location: [facility] ENDOSCOPY    Hysterectomy      Other surgical history  ?    Parathyroid removal    Other surgical history  2017    left foot surgery plate removed    Tonsillectomy        Family History   Problem Relation Age of Onset    Cancer Father 73        COLON, CAUSE OF DEATH    Cancer Mother 64        COLON, 2nd primary 94    Cancer Maternal Cousin Male 60        prostate/patient denies     Cancer Self 58        uterine    Prostate Cancer Maternal Uncle         age unknown/ at 94    Kidney Disease Neg         UROLITHIASIS    Breast Cancer Neg      Ovarian Cancer Neg      Results for orders placed or performed in visit on 04/01/24   Basic Metabolic Panel (8)   Result Value Ref Range    Glucose 92 70 - 99 mg/dL    Sodium 137 136 - 145 mmol/L    Potassium 4.0 3.5 - 5.1 mmol/L    Chloride 109 98 - 112 mmol/L    CO2 25.0 21.0 - 32.0 mmol/L    Anion Gap 3 0 - 18 mmol/L    BUN 21 9 - 23 mg/dL    Creatinine 0.84 0.55 - 1.02 mg/dL    BUN/CREA Ratio 25.0 (H) 10.0 - 20.0    Calcium, Total 9.0 8.7 - 10.4 mg/dL    Calculated Osmolality 287 275 - 295 mOsm/kg    eGFR-Cr 70 >=60 mL/min/1.73m2    Patient Fasting for BMP? Yes       Social History:   Social History     Socioeconomic History    Marital status:    Tobacco Use    Smoking status: Never     Passive exposure: Never    Smokeless tobacco: Never   Vaping Use    Vaping status: Never Used   Substance and Sexual Activity    Alcohol use: Yes     Alcohol/week: 2.0 standard drinks of alcohol     Types: 2 Glasses of wine per week     Comment: socially    Drug use: No   Other Topics Concern    Caffeine Concern Yes     Comment: SODA/TEA 1 CAN/DAY          REVIEW OF SYSTEMS:   GENERAL:  feels well.  No acute distress.   EYES: Voices no blurred vision or eye pain  HEENT:  Voices no nasal congestion, sinus pain or sore throat  LUNGS:  Voices no shortness of breath or cough  CARDIOVASCULAR:  Voices no chest pain or palpitations  GI:  Voices no abdominal pain, blood in stool, changes in bowels  :  Voices no dysuria or frequency  MUSCULOSKELETAL:  see above  NEURO:  see above  PSYCHE:see above      EXAM:   /62 (BP Location: Right arm, Patient Position: Sitting, Cuff Size: adult)   Pulse 60   Temp 98.3 °F (36.8 °C) (Oral)   Ht 5' 2\" (1.575 m)   Wt 135 lb (61.2 kg)   SpO2 98%   BMI 24.69 kg/m²     GENERAL:  well developed, well nourished.  in no apparent distress  SKIN:  no rashes.   HEENT:  Atraumatic.   pharynx without exudate or erythema  EYES:  PERRL . conjunctiva clear.  NECK:  supple, no adenopathy, thyroid  normal  BREAST:  no dominant or suspicious masses, nipple discharge, or axillary adenopathy B/L.  Specifically no masses left lateral breast where she has her chronic breast pain.  LUNGS:  clear to auscultation. Effort normal  CARDIO:  RRR without murmur.  S1 and S2 normal.   GI:  good BS's. no masses, organomegaly or tenderness  EXTREMITIES:  no cyanosis, clubbing or edema.   NEURO:  Awake and aware.      General Health     In the past six months, have you lost more than 10 pounds without trying?: 2 - No    Has your appetite been poor?: No    Type of Diet: Balanced    How does the patient maintain a good energy level?: Appropriate Exercise;Stretching    How would you describe your daily physical activity?: Light    How would you describe your current health state?: Fair    How do you maintain positive mental well-being?: Social Interaction;Visiting Family;Visiting Friends         Have you had any immunizations at another office such as Influenza, Hepatitis B, Tetanus, or Pneumococcal?: No     Functional Ability     Bathing or Showering: Able without help    Toileting: Able without help    Dressing: Able without help    Eating: Able without help    Driving: Does not drive    Preparing your meals: Able without help    Managing money/bills: Able without help    Taking medications as prescribed: Able without help    Are you able to afford your medications?: Yes    Hearing Problems?: No     Functional Status     Hearing Problems?: No    Vision Problems? : No    Difficulty walking?: Yes (Pain in right leg)    Difficulty dressing or bathing?: No    Problems with daily activities? : No    Memory Problems?: Yes      Fall/Risk Assessment                                                              Depression Screening (PHQ-2/PHQ-9): Over the LAST 2 WEEKS                      Advance Directives     Do you have a healthcare power of ?: Yes    Do you have a living will?: Yes     Hearing Assessment (Required for AWV/SWV)       Hearing Screening    Time taken: 6/28/2024 10:09 AM  Entry User: Trista Al RN  Screening Method: Whisper Test  Whisper Test Result: Pass         Visual Acuity                   Cognitive Assessment     What day of the week is this?: Correct    What month is it?: Correct    What year is it?: Correct    Recall \"Ball\": Incorrect    Recall \"Flag\": Incorrect    Recall \"Tree\": Incorrect         Karyn Cobos's SCREENING SCHEDULE   Tests on this list are recommended by your physician but may not be covered, or covered at this frequency, by your insurer. Please check with your insurance carrier before scheduling to verify coverage.   PREVENTATIVE SERVICES  INDICATIONS AND SCHEDULE Internal Lab or Procedure External Lab or Procedure   Diabetes Screening      HbgA1C   Annually Glycohemoglobin (HgA1c) (%)   Date Value   06/07/2017 5.0         No data to display                Fasting Blood Sugar (FSB)Annually Glucose (mg/dL)   Date Value   04/01/2024 92         No data to display               Cardiovascular Disease Screening     LDL Annually LDL Cholesterol (mg/dL)   Date Value   02/23/2024 82        EKG One Time     Colorectal Cancer Screening      Colonoscopy Screen every 10 years No recommendations at this time Update Health Maintenance if applicable    Flex Sigmoidoscopy Screen every 5 years No results found for this or any previous visit.      No data to display                 Fecal Occult Blood Annually No results found for: \"FOB\", \"OCCULTSTOOL\"      No data to display                Glaucoma Screening      Ophthalmology Visit Annually     Bone Density Screening      Dexascan Every two years No results found for this or any previous visit.        No data to display               Pap and Pelvic      Pap: Every 3 yrs age 21-65 or Pap+HPV every 5 yrs age 30-65, age 65 and older at high risk   No recommendations at this time Update Health Maintenance if applicable    Chlamydia  Annually if high risk No  results found for: \"CHLAMYDIA\"      No data to display                Screening Mammogram      Mammogram  Annually No recommendations at this time Update Health Maintenance if applicable   Immunizations      Influenza No orders found for this or any previous visit. Update Immunization Activity if applicable    Pneumococcal Orders placed or performed in visit on 09/15/15    PNEUMOCOCCAL VACC, 13 ROWENA IM    Update Immunization Activity if applicable    Hepatitis B No orders found for this or any previous visit. Update Immunization Activity if applicable    Tetanus No orders found for this or any previous visit. Update Immunization Activity if applicable    Zoster (Not covered by Medicare Part B) No orders found for this or any previous visit. Update Immunization Activity if applicable     SPECIFIC DISEASE MONITORING Internal Lab or Procedure External Lab or Procedure   Annual Monitoring of Persistent     Medications (ACE/ARB, digoxin, diuretics)    Potassium  Annually Potassium (mmol/L)   Date Value   04/01/2024 4.0     POTASSIUM (P) (mmol/L)   Date Value   04/19/2016 3.9         No data to display                Creatinine  Annually Creatinine (mg/dL)   Date Value   04/01/2024 0.84         No data to display                Digoxin Serum Conc  Annually No results found for: \"DIGOXIN\"      No data to display                Diabetes      HgbA1C  Annually Glycohemoglobin (HgA1c) (%)   Date Value   06/07/2017 5.0         No data to display                Creat/alb ratio  Annually      LDL  Annually LDL Cholesterol (mg/dL)   Date Value   02/23/2024 82         No data to display                 Dilated Eye exam  Annually      No data to display                   No data to display                COPD      Spirometry Testing Annually No results found for this or any previous visit.      No data to display                       ASSESSMENT AND PLAN:         1. Medicare annual wellness visit, subsequent  Medicare annual wellness  visit.    2. Essential hypertension  Blood pressure acceptable for now.    3. Pernicious anemia  Multivitamin B12 injection.    4. Memory deficit  Memory deficit.  Per Dr. Harrington she does have dementia    5. Family history of colon cancer  Family history of colon cancer.  Per Dr. López no more colonoscopies needed.  Last colonoscopy October 2000 22nd    6. Routine health maintenance  She is up-to-date with PCV 20 vaccine.  She has had Shingrix x 2 and RSV vaccine.    7. History of chronic ulcerative colitis  Asymptomatic.    8. History of uterine cancer  Asymptomatic.    9. Other osteoporosis, unspecified pathological fracture presence  Follows with Dr. Healy.    10. Multiple thyroid nodules  Follows with Dr. Healy.    11. Anemia, unspecified type  Update CBC.  - CBC With Differential With Platelet; Future  - Iron And Tibc; Future  - Ferritin; Future  - Vitamin B12 with Reflex to MMA; Future        This visit was 30 minutes.  I spent 10 minutes before visit preparing and reviewing old records.  Greater than 50% of the visit was engaged in counseling and review of past data.    Problem list as noted in electronic health record reviewed.  Stable.  Will be followed.    I reviewed the problem list.  Stable.  Will keep problems on list for reference.  Will be followed.    Follow-up in 3 months.    Pan Mansfield MD  6/28/2024  10:42 AM

## 2024-06-30 ENCOUNTER — TELEPHONE (OUTPATIENT)
Dept: INTERNAL MEDICINE CLINIC | Facility: CLINIC | Age: 79
End: 2024-06-30

## 2024-06-30 NOTE — TELEPHONE ENCOUNTER
Please let patient know that her labs look good to me.  Her iron and and vitamin B12 levels are good.  She has just a very mild anemia that she has had in the past.  I do not think we have to worry about this.  We can continue to check her labs intermittently.  All in all on pleased with her recent labs.

## 2024-07-01 NOTE — TELEPHONE ENCOUNTER
Patient called and relayed Dr Mansfield's message. Patient verbalized understanding with no further questions noted.

## 2024-07-05 ENCOUNTER — NURSE ONLY (OUTPATIENT)
Dept: INTERNAL MEDICINE CLINIC | Facility: CLINIC | Age: 79
End: 2024-07-05

## 2024-07-05 DIAGNOSIS — E53.8 VITAMIN B12 DEFICIENCY: Primary | ICD-10-CM

## 2024-07-05 PROCEDURE — 96372 THER/PROPH/DIAG INJ SC/IM: CPT | Performed by: INTERNAL MEDICINE

## 2024-07-05 RX ADMIN — CYANOCOBALAMIN 1000 MCG: 1000 INJECTION, SOLUTION INTRAMUSCULAR; SUBCUTANEOUS at 09:34:00

## 2024-07-06 DIAGNOSIS — F03.A0 MILD DEMENTIA, UNSPECIFIED DEMENTIA TYPE, UNSPECIFIED WHETHER BEHAVIORAL, PSYCHOTIC, OR MOOD DISTURBANCE OR ANXIETY (HCC): ICD-10-CM

## 2024-07-06 DIAGNOSIS — M79.2 NEUROPATHIC PAIN: ICD-10-CM

## 2024-07-08 RX ORDER — DONEPEZIL HYDROCHLORIDE 10 MG/1
10 TABLET, FILM COATED ORAL DAILY
Qty: 90 TABLET | Refills: 0 | Status: SHIPPED | OUTPATIENT
Start: 2024-07-08

## 2024-07-08 RX ORDER — GABAPENTIN 300 MG/1
300 CAPSULE ORAL 2 TIMES DAILY
Qty: 180 CAPSULE | Refills: 0 | Status: SHIPPED | OUTPATIENT
Start: 2024-07-08

## 2024-07-08 RX ORDER — ATORVASTATIN CALCIUM 40 MG/1
40 TABLET, FILM COATED ORAL NIGHTLY
Qty: 90 TABLET | Refills: 3 | Status: SHIPPED | OUTPATIENT
Start: 2024-07-08

## 2024-07-08 NOTE — TELEPHONE ENCOUNTER
Refill request is for a maintenance medication and has met the criteria specified in the Ambulatory Medication Refill Standing Order for eligibility, visits, laboratory, alerts and was sent to the requested pharmacy.    Requested Prescriptions     Signed Prescriptions Disp Refills    atorvastatin 40 MG Oral Tab 90 tablet 3     Sig: TAKE ONE TABLET BY MOUTH AT BEDTIME     Authorizing Provider: NORIS MENDEZ     Ordering User: ROSSY HYMAN

## 2024-07-08 NOTE — TELEPHONE ENCOUNTER
Requested Prescriptions     Pending Prescriptions Disp Refills    GABAPENTIN 300 MG Oral Cap [Pharmacy Med Name: Gabapentin 300 Mg Cap Nort] 180 capsule 0     Sig: Take 1 capsule by mouth 2 times daily.    DONEPEZIL 10 MG Oral Tab [Pharmacy Med Name: Donepezil Hcl 10 Mg Tab Camb] 90 tablet 0     Sig: Take 1 tablet by mouth daily.     Last OV: 12/21/2023 with a return in about 6 months (around 6/21/2024).  Next OV: None    [place]/;

## 2024-07-17 NOTE — PROGRESS NOTES
Dx: Closed nondisplaced transverse fracture of right patella with routine healing, subsequent encounter (C38.973Z)  Closed nondisplaced fracture of acromial end of right clavicle with routine healing, subsequent encounter (S42.034D)  Contusion of left hand I spoke with Thelma continue to improve function. -progressing  Pt will demonstrate decreased pain to <2/10 during functional tasks and ADL's within 6 weeks.    Pt will be able to ambulate on even and uneven surfaces with good balance and proper gait mechanics with min marybethu hip abd and add isometrics, saq's, mini squats    Charges: Manual - 1, TherEx - 2       Total Timed Treatment: 44 min    Total Treatment Time: 45 min

## 2024-08-02 ENCOUNTER — NURSE ONLY (OUTPATIENT)
Dept: INTERNAL MEDICINE CLINIC | Facility: CLINIC | Age: 79
End: 2024-08-02

## 2024-08-02 RX ADMIN — CYANOCOBALAMIN 1000 MCG: 1000 INJECTION, SOLUTION INTRAMUSCULAR; SUBCUTANEOUS at 09:18:00

## 2024-08-06 NOTE — TELEPHONE ENCOUNTER
[facility] Hospitalist Progress Note    SYNOPSIS: Patient admitted on 2024 for Syncope and collapse    This is an 80-year-old lady with past medical history of arthritis, A-fib with pacemaker, CHF, ESRD on hemodialysis, CAD, HLD, HTN, COPD presented hemodialysis center after she was altered, short of breath and passed out.  Patient was altered at dialysis and only received 1 hour of dialysis prior to being sent to ER.  She was recently discharged from [facility] on 2024 and was treated for possible COPD exacerbation versus fluid overload.  Cardiology, nephrology and EP has been consulted.    SUBJECTIVE:  Stable overnight. No other overnight issues reported.   Patient seen and examined  Records reviewed.         Temp (24hrs), Av.9 °F (36.6 °C), Min:96.8 °F (36 °C), Max:98.8 °F (37.1 °C)    DIET: ADULT DIET; Regular; Low Potassium (Less than 3000 mg/day); Low Phosphorus (Less than 1000 mg)  ADULT ORAL NUTRITION SUPPLEMENT; Breakfast, Lunch, Dinner; Standard High Calorie/High Protein Oral Supplement  CODE: Full Code    Intake/Output Summary (Last 24 hours) at 2024 1509  Last data filed at 2024 1504  Gross per 24 hour   Intake 100 ml   Output --   Net 100 ml       Review of Systems  All bolded are positive; please see HPI  General:  Fever, chills, diaphoresis, fatigue, malaise, night sweats, weight loss  Psychological:  Anxiety, disorientation, hallucinations.  ENT:  Epistaxis, headaches, vertigo, visual changes.  Cardiovascular:  Chest pain, irregular heartbeats, palpitations, paroxysmal nocturnal dyspnea.  Respiratory:  Shortness of breath, coughing, sputum production, hemoptysis, wheezing, orthopnea.  Gastrointestinal:  Nausea, vomiting, diarrhea, heartburn, constipation, abdominal pain, hematemesis, hematochezia, melena, acholic stools  Genito-Urinary:  Dysuria, urgency, frequency, hematuria  Musculoskeletal:  Joint pain, joint stiffness, joint swelling,         [facility] Hospitalist Progress Note    SYNOPSIS: Patient admitted on 2024 for Syncope and collapse    This is an 80-year-old lady with past medical history of arthritis, A-fib with pacemaker, CHF, ESRD on hemodialysis, CAD, HLD, HTN, COPD presented hemodialysis center after she was altered, short of breath and passed out.  Patient was altered at dialysis and only received 1 hour of dialysis prior to being sent to ER.  She was recently discharged from [facility] on 2024 and was treated for possible COPD exacerbation versus fluid overload.  Cardiology, nephrology and EP has been consulted.  Pacemaker interrogation has revealed ventricular tachycardia at 164 bpm which terminated after ATP after 41 seconds on 2024 at 10:07 AM.  This is likely cause of her syncopal episode at her dialysis unit.  Pacemaker has been reprogrammed.  Low-dose of Synthroid 25 mcg daily has been started.  She is currently waiting for pre-CERT, pending placement    SUBJECTIVE:  Stable overnight. No other overnight issues reported.   Patient seen and examined  Records reviewed.         Temp (24hrs), Av.5 °F (36.4 °C), Min:97 °F (36.1 °C), Max:98 °F (36.7 °C)    DIET: ADULT DIET; Regular; Low Potassium (Less than 3000 mg/day); Low Phosphorus (Less than 1000 mg)  ADULT ORAL NUTRITION SUPPLEMENT; Breakfast, Lunch, Dinner; Standard High Calorie/High Protein Oral Supplement  CODE: Full Code    Intake/Output Summary (Last 24 hours) at 2024 8822  Last data filed at 2024 1329  Gross per 24 hour   Intake 340 ml   Output --   Net 340 ml       Review of Systems  All bolded are positive; please see HPI  General:  Fever, chills, diaphoresis, fatigue, malaise, night sweats, weight loss  Psychological:  Anxiety, disorientation, hallucinations.  ENT:  Epistaxis, headaches, vertigo, visual changes.  Cardiovascular:  Chest pain, irregular heartbeats, palpitations, paroxysmal nocturnal         [facility] Hospitalist Progress Note    SYNOPSIS: Patient admitted on 2024 for Syncope and collapse    This is an 80-year-old lady with past medical history of arthritis, A-fib with pacemaker, CHF, ESRD on hemodialysis, CAD, HLD, HTN, COPD presented hemodialysis center after she was altered, short of breath and passed out.  Patient was altered at dialysis and only received 1 hour of dialysis prior to being sent to ER.  She was recently discharged from [facility] on 2024 and was treated for possible COPD exacerbation versus fluid overload.  Cardiology, nephrology and EP has been consulted.  Pacemaker interrogation has revealed ventricular tachycardia at 164 bpm which terminated after ATP after 41 seconds on 2024 at 10:07 AM.  This is likely cause of her syncopal episode at her dialysis unit.  Pacemaker has been reprogrammed.  Low-dose of Synthroid 25 mcg daily has been started.     patient's pre-CERT has been obtained and evaluated through the weekend.  She had an episode of V. tach at around 1540 7 PM which was terminated by ATP.  She has been started on mexiletine by EP and wanted to observe for 24 hours to ensure no more VT occurs prior to discharge.  8/3 tolerating hemodialysis.  No overnight events.   low blood pressure this morning could be effect of amiodarone and mexiletine.  EP following, cardiology following.  Referral has been made to [facility] as she did not want to go to [facility].    SUBJECTIVE:  Stable overnight. No other overnight issues reported.   Patient seen and examined  Records reviewed.         Temp (24hrs), Av °F (36.1 °C), Min:96.8 °F (36 °C), Max:97.4 °F (36.3 °C)    DIET: ADULT DIET; Regular; Low Potassium (Less than 3000 mg/day); Low Phosphorus (Less than 1000 mg)  ADULT ORAL NUTRITION SUPPLEMENT; Breakfast, Lunch, Dinner; Standard High Calorie/High Protein Oral Supplement  CODE: Full Code    Intake/Output Summary (Last 24         [facility] Hospitalist Progress Note    SYNOPSIS: Patient admitted on 2024 for Syncope and collapse    This is an 80-year-old lady with past medical history of arthritis, A-fib with pacemaker, CHF, ESRD on hemodialysis, CAD, HLD, HTN, COPD presented hemodialysis center after she was altered, short of breath and passed out.  Patient was altered at dialysis and only received 1 hour of dialysis prior to being sent to ER.  She was recently discharged from [facility] on 2024 and was treated for possible COPD exacerbation versus fluid overload.  Cardiology, nephrology and EP has been consulted.  Pacemaker interrogation has revealed ventricular tachycardia at 164 bpm which terminated after ATP after 41 seconds on 2024 at 10:07 AM.  This is likely cause of her syncopal episode at her dialysis unit.  Pacemaker has been reprogrammed.  Low-dose of Synthroid 25 mcg daily has been started.     patient's pre-CERT has been obtained and evaluated through the weekend.  She had an episode of V. tach at around 1540 7 PM which was terminated by ATP.  She has been started on mexiletine by EP and wanted to observe for 24 hours to ensure no more VT occurs prior to discharge.    SUBJECTIVE:  Stable overnight. No other overnight issues reported.   Patient seen and examined  Records reviewed.         Temp (24hrs), Av.8 °F (36.6 °C), Min:97.1 °F (36.2 °C), Max:98.8 °F (37.1 °C)    DIET: ADULT DIET; Regular; Low Potassium (Less than 3000 mg/day); Low Phosphorus (Less than 1000 mg)  ADULT ORAL NUTRITION SUPPLEMENT; Breakfast, Lunch, Dinner; Standard High Calorie/High Protein Oral Supplement  CODE: Full Code    Intake/Output Summary (Last 24 hours) at 2024 1333  Last data filed at 2024 1615  Gross per 24 hour   Intake 300 ml   Output 1700 ml   Net -1400 ml       Review of Systems  All bolded are positive; please see HPI  General:  Fever, chills, diaphoresis, fatigue, malaise, night  4 Eyes Skin Assessment     NAME:  Marge Callejas  YOB: 1944  MEDICAL RECORD NUMBER:  [id number]    The patient is being assessed for  Admission    I agree that at least one RN has performed a thorough Head to Toe Skin Assessment on the patient. ALL assessment sites listed below have been assessed.      Areas assessed by both nurses:    Head, Face, Ears, Shoulders, Back, Chest, Arms, Elbows, Hands, Sacrum. Buttock, Coccyx, Ischium, and Legs. Feet and Heels        Does the Patient have a Wound? Yes;  Estuardo Prevention initiated by RN: Yes  Wound Care Orders initiated by RN: No    Pressure Injury (Stage 3,4, Unstageable, DTI, NWPT, and Complex wounds) if present, place Wound referral order by RN under : No    New Ostomies, if present place, Ostomy referral order under : No     Nurse 1 eSignature: Electronically signed by SANTANA MCCORD RN on 7/30/24 at 6:45 PM EDT    **SHARE this note so that the co-signing nurse can place an eSignature**    Nurse 2 eSignature: Electronically signed by Maryellen Guido RN on 8/1/24 at 1:29 PM EDT   [facility]  MD Murtaza White MD Ali Hassan, MD Lisa Kniska, CNP   Lucille Beth, JOSE Fernandez, CNP  Progress Note    Patient's Name: Marge Callejas  4:32 PM  8/4/2024    Subjective  7/26: Seen on HD tolerating treatment UF goal 2 L Vitals stable     7/27: Resting comfortably.  Denies complaint.  In good spirits.  Good appetite and intake.    7/28: No new issue or complaint.  Resting comfortably, asleep, easily awakened.  Breathing is unlabored on nasal cannula    7/29: Laying in bed, no acute distress. She is being discharged today at 4 pm. Tolerated dialysis today without issues. She denies any dyspnea, chest pain, or palpitations.     7/30: Ms. Callejas was readmitted today as she had a syncopal episode one hour into her dialysis treatment. She was also hypoxic. CXR in the ED was negative for an acute process. She is seen while in the ED and does not appear to be in acute distress. She is euvolemic. She is being admitted under observation. Vital signs have been stable since arrival to the hospital.     7/31: Laying in bed, no acute distress. For hemodialysis this afternoon. Denies dyspnea, chest pain, or palpitations. She denies any syncope. Still upset that she is getting dialysis 5 days per week as an outpatient.     8/2: Sitting up in bed, family present at the bedside. No acute distress. Denies dyspnea, chest pain, or palpitations. She did have Vtach towards the end of her dialysis treatment yesterday. She did feel dizzy but did not have a syncopal episode.     8/3 charts reviewed     8/4 on o2/nc swelling much improved , appear comfortable however she does not like dialysis 5 x/week .       History of Present Ilness:    80 y.o. year old female  who  has a past medical history of Arthritis, Atrial fibrillation (HCC), Blood circulation, collateral, CHF (congestive heart failure) ([facility]), Chronic renal insufficiency, stage IV (severe) ([facility]), Coronary artery  [facility]  MD Murtaza White MD Ali Hassan, MD Lisa Kniska, CNP   Lucille Beth, JOSE Fernandez, CNP  Progress Note    Patient's Name: Marge Callejas  8:33 PM  8/5/2024    Subjective  7/26: Seen on HD tolerating treatment UF goal 2 L Vitals stable     7/27: Resting comfortably.  Denies complaint.  In good spirits.  Good appetite and intake.    7/28: No new issue or complaint.  Resting comfortably, asleep, easily awakened.  Breathing is unlabored on nasal cannula    7/29: Laying in bed, no acute distress. She is being discharged today at 4 pm. Tolerated dialysis today without issues. She denies any dyspnea, chest pain, or palpitations.     7/30: Ms. Callejas was readmitted today as she had a syncopal episode one hour into her dialysis treatment. She was also hypoxic. CXR in the ED was negative for an acute process. She is seen while in the ED and does not appear to be in acute distress. She is euvolemic. She is being admitted under observation. Vital signs have been stable since arrival to the hospital.     7/31: Laying in bed, no acute distress. For hemodialysis this afternoon. Denies dyspnea, chest pain, or palpitations. She denies any syncope. Still upset that she is getting dialysis 5 days per week as an outpatient.     8/2: Sitting up in bed, family present at the bedside. No acute distress. Denies dyspnea, chest pain, or palpitations. She did have Vtach towards the end of her dialysis treatment yesterday. She did feel dizzy but did not have a syncopal episode.     8/3 charts reviewed     8/4 on o2/nc swelling much improved , appear comfortable however she does not like dialysis 5 x/week .     8/5: Laying in bed, no acute distress. Denies any dyspnea, chest pain, or palpitations. Vital signs are stable.     History of Present Ilness:    80 y.o. year old female  who  has a past medical history of Arthritis, Atrial fibrillation (HCC), Blood circulation,  [facility]  MD Murtaza White, MD Krista Mendoza, CNP   Lucille Beth, JOSE Fernandez, CNP  Progress Note    Patient's Name: Marge Callejas  1:56 PM  8/3/2024    Subjective  7/26: Seen on HD tolerating treatment UF goal 2 L Vitals stable     7/27: Resting comfortably.  Denies complaint.  In good spirits.  Good appetite and intake.    7/28: No new issue or complaint.  Resting comfortably, asleep, easily awakened.  Breathing is unlabored on nasal cannula    7/29: Laying in bed, no acute distress. She is being discharged today at 4 pm. Tolerated dialysis today without issues. She denies any dyspnea, chest pain, or palpitations.     7/30: Ms. Callejas was readmitted today as she had a syncopal episode one hour into her dialysis treatment. She was also hypoxic. CXR in the ED was negative for an acute process. She is seen while in the ED and does not appear to be in acute distress. She is euvolemic. She is being admitted under observation. Vital signs have been stable since arrival to the hospital.     7/31: Laying in bed, no acute distress. For hemodialysis this afternoon. Denies dyspnea, chest pain, or palpitations. She denies any syncope. Still upset that she is getting dialysis 5 days per week as an outpatient.     8/2: Sitting up in bed, family present at the bedside. No acute distress. Denies dyspnea, chest pain, or palpitations. She did have Vtach towards the end of her dialysis treatment yesterday. She did feel dizzy but did not have a syncopal episode.     8/3 charts reviewed       History of Present Ilness:    80 y.o. year old female  who  has a past medical history of Arthritis, Atrial fibrillation (HCC), Blood circulation, collateral, CHF (congestive heart failure) (HCC), Chronic renal insufficiency, stage IV (severe) (HCC), Coronary artery disease involving native coronary artery of native heart without angina pectoris, History of cardiovascular  [facility]  MD Murtaza White, MD Krista Mendoza, CNP   Lucille Beth, JOSE Fernandez, CNP  Progress Note    Patient's Name: Marge Callejas  2:04 PM  8/2/2024    Subjective  7/26: Seen on HD tolerating treatment UF goal 2 L Vitals stable     7/27: Resting comfortably.  Denies complaint.  In good spirits.  Good appetite and intake.    7/28: No new issue or complaint.  Resting comfortably, asleep, easily awakened.  Breathing is unlabored on nasal cannula    7/29: Laying in bed, no acute distress. She is being discharged today at 4 pm. Tolerated dialysis today without issues. She denies any dyspnea, chest pain, or palpitations.     7/30: Ms. Callejas was readmitted today as she had a syncopal episode one hour into her dialysis treatment. She was also hypoxic. CXR in the ED was negative for an acute process. She is seen while in the ED and does not appear to be in acute distress. She is euvolemic. She is being admitted under observation. Vital signs have been stable since arrival to the hospital.     7/31: Laying in bed, no acute distress. For hemodialysis this afternoon. Denies dyspnea, chest pain, or palpitations. She denies any syncope. Still upset that she is getting dialysis 5 days per week as an outpatient.     8/2: Sitting up in bed, family present at the bedside. No acute distress. Denies dyspnea, chest pain, or palpitations. She did have Vtach towards the end of her dialysis treatment yesterday. She did feel dizzy but did not have a syncopal episode.     History of Present Ilness:    80 y.o. year old female  who  has a past medical history of Arthritis, Atrial fibrillation (HCC), Blood circulation, collateral, CHF (congestive heart failure) (HCC), Chronic renal insufficiency, stage IV (severe) (HCC), Coronary artery disease involving native coronary artery of native heart without angina pectoris, History of cardiovascular stress test, History of  [facility]  MD Murtaza White, MD Krista Mendoza, CNP   Lucille Beth, JOSE Fernandez, CNP  Progress Note    Patient's Name: Marge Callejas  4:48 PM  7/30/2024    Subjective  7/26: Seen on HD tolerating treatment UF goal 2 L Vitals stable     7/27: Resting comfortably.  Denies complaint.  In good spirits.  Good appetite and intake.    7/28: No new issue or complaint.  Resting comfortably, asleep, easily awakened.  Breathing is unlabored on nasal cannula    7/29: Laying in bed, no acute distress. She is being discharged today at 4 pm. Tolerated dialysis today without issues. She denies any dyspnea, chest pain, or palpitations.     7/30: Ms. Callejas was readmitted today as she had a syncopal episode one hour into her dialysis treatment. She was also hypoxic. CXR in the ED was negative for an acute process. She is seen while in the ED and does not appear to be in acute distress. She is euvolemic. She is being admitted under observation. Vital signs have been stable since arrival to the hospital.     History of Present Ilness:    80 y.o. year old female  who  has a past medical history of Arthritis, Atrial fibrillation ([facility]), Blood circulation, collateral, CHF (congestive heart failure) (HCC), Chronic renal insufficiency, stage IV (severe) (HCC), Coronary artery disease involving native coronary artery of native heart without angina pectoris, History of cardiovascular stress test, History of echocardiogram, Hyperlipidemia, Hypertension, and Mixed restrictive and obstructive lung disease (HCC).      Patient was sent from nursing facility via EMS for concern of respiratory distress  Pt known to me from office and hospital admission   Most recently she had prolonged hospital stay at [facility]   He has hx of HF and her HD schedule was adjusted to MWF to help with her volume overload status  BP has been a limiting factor in terms of achieiving UF   She is seen in her  [facility]  MD Murtaza White, MD Krista Mendoza, CNP   Lucille Beth, JOSE Fernandez, CNP  Progress Note    Patient's Name: Marge Callejas  4:49 PM  8/1/2024    Subjective  7/26: Seen on HD tolerating treatment UF goal 2 L Vitals stable     7/27: Resting comfortably.  Denies complaint.  In good spirits.  Good appetite and intake.    7/28: No new issue or complaint.  Resting comfortably, asleep, easily awakened.  Breathing is unlabored on nasal cannula    7/29: Laying in bed, no acute distress. She is being discharged today at 4 pm. Tolerated dialysis today without issues. She denies any dyspnea, chest pain, or palpitations.     7/30: Ms. Callejas was readmitted today as she had a syncopal episode one hour into her dialysis treatment. She was also hypoxic. CXR in the ED was negative for an acute process. She is seen while in the ED and does not appear to be in acute distress. She is euvolemic. She is being admitted under observation. Vital signs have been stable since arrival to the hospital.     7/31: Laying in bed, no acute distress. For hemodialysis this afternoon. Denies dyspnea, chest pain, or palpitations. She denies any syncope. Still upset that she is getting dialysis 5 days per week as an outpatient.     History of Present Ilness:    80 y.o. year old female  who  has a past medical history of Arthritis, Atrial fibrillation ([facility]), Blood circulation, collateral, CHF (congestive heart failure) ([facility]), Chronic renal insufficiency, stage IV (severe) ([facility]), Coronary artery disease involving native coronary artery of native heart without angina pectoris, History of cardiovascular stress test, History of echocardiogram, Hyperlipidemia, Hypertension, and Mixed restrictive and obstructive lung disease ([facility]).      Patient was sent from nursing facility via EMS for concern of respiratory distress  Pt known to me from office and hospital admission   Most  Call placed to dialysis and EP to relay that patient had 25 beats of vtach. Telemetry strips signed and placed in soft cart.    Cardiology consult sent via perfectservice to [facility] add to treatment team.    EP consult sent via perfect serve.    GENERAL SURGERY  DAILY PROGRESS NOTE  8/3/2024    Chief Complaint   Patient presents with    Altered Mental Status     AMS at dialysis. Only received 1 hr       Subjective:  Doing well this morning.  She states she had a bowel movement yesterday and was able to relieve the impacted stool and feels much better.    Objective:  /64   Pulse 76   Temp 97.2 °F (36.2 °C) (Temporal)   Resp 16   Ht 1.626 m (5' 4\")   Wt 60.6 kg (133 lb 9.6 oz)   SpO2 98%   BMI 22.93 kg/m²     GENERAL:  Laying in bed, awake, alert, cooperative, no apparent distress  HEAD: Normocephalic, atraumatic  EYES: No sclera icterus, pupils equal  LUNGS:  No increased work of breathing  CARDIOVASCULAR:  RR  ABDOMEN:  Soft, non-tender, non-distended  EXTREMITIES: No edema or swelling  SKIN: Warm and dry    Assessment/Plan:  80 y.o. female with constipation, fecal impaction.  Did not tolerate manual disimpaction but this was resolved with bowel regimen    -Continue bowel regimen  -Diet as tolerated  -No further surgical plans, will sign off at this time.  Please call if needed    Electronically signed by Wendy Gayle MD on 8/3/2024 at 8:34 AM    Having bowel movements  Abdomen benign  C/w bowel reg    Surgery will s/o, please call if needed    Elodia Estrella MD, MSc, FACS  8/3/2024  4:29 PM       General surgery consult sent via perfectservice to [facility] add to treatment team. Resident also notified .   [facility] Progress Note:    Spoke with patient's daughter and she is requesting EP consultation (normally follows with EP at [place]) but would like to switch to [facility] EP.  Hx of VT (recently admitted at [facility] and had recurrent VT and was placed on Amiodarone (per nursing facility she was not receiving this medication. ICD interrogation has been ordered and is pending.     Electronically signed by MADELAINE Webb CNP on 7/31/24 at 2:30 PM EDT     [facility]  Inpatient progress note  PATIENT: Marge Callejas  MEDICAL RECORD NUMBER: [id number]  DATE OF SERVICE:  8/3/2024  ATTENDING ELECTROPHYSIOLOGIST: Paula Lara MD  PRIMARY ELECTROPHYSIOLOGIST: Paula Lara MD  REFERRING PHYSICIAN: Marek Andres DO  CHIEF COMPLAINT: Syncope    HPI: This is a 80 y.o. 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    Hypoxemia requiring supplemental oxygen    Acute GI bleeding    Chest pain    Cardiac resynchronization therapy defibrillator (CRT-D) in place    Cardiomyopathy (HCC)    Coronary artery disease involving native coronary artery of native heart without angina pectoris    Abnormal EKG    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 The patient has history of chronic HFrEF, mixed ischemic and nonischemic cardiomyopathy, CRT-D in situ, coronary artery disease, persistent atrial fibrillation, chronic RBBB/LAFB, AAA, hypertension, hyperlipidemia,  Nephrology consult sent via [facility] to Dr. Dawson add to treatment team.   Nurse to nurse attempted x2. No answer. Note left with envelope for nurse to call unit   OCCUPATIONAL THERAPY INITIAL EVALUATION    [facility]* [address], [place]     Date:2024                                                  Patient Name: Marge Callejas    MRN: [id number]    : 1944    Room: [address] [place] [address]      Evaluating OT: Vicenta Squires, OTR/L [id number]      Referring Provider: Licnoln Serna MD     Specific Provider Orders/Date:  2024 OT eval and treat       Diagnosis:    Syncope and collapse R55       Surgery: none this admission      Pertinent Medical History:  Arthritis, Atrial fibrillation (HCC), Blood circulation, collateral, CHF (congestive heart failure) (HCC), Chronic renal insufficiency, stage IV (severe) (HCC), Coronary artery disease involving native coronary artery of native heart without angina pectoris, History of cardiovascular stress test, History of echocardiogram, Hyperlipidemia, Hypertension, and Mixed restrictive and obstructive lung disease (HCC).      Past Medical History:   Diagnosis Date    Arthritis     Atrial fibrillation (HCC)     Blood circulation, collateral     CHF (congestive heart failure) (HCC)     Chronic renal insufficiency, stage IV (severe) (HCC) 2016    Coronary artery disease involving native coronary artery of native heart without angina pectoris 2024    History of cardiovascular stress test 2015    Lexiscan stress test    History of echocardiogram 2015    EF 29%    Hyperlipidemia     Hypertension     Mixed restrictive and obstructive lung disease (HCC) 2023         Past Surgical History:   Procedure Laterality Date    COLONOSCOPY N/A 4/3/2021    COLONOSCOPY POLYPECTOMY HOT SNARE performed by Lucio Nelson DO at [facility] ENDOSCOPY    COLONOSCOPY  4/3/2021    COLONOSCOPY WITH BIOPSY performed by Lucio Nelson DO at [facility] ENDOSCOPY    COLONOSCOPY  4/3/2021    COLONOSCOPY CONTROL HEMORRHAGE performed by Lucio Nelson DO at [facility] ENDOSCOPY    COLONOSCOPY   Patient has no iv access loss of site during bed change with aides, IV TEAM consulted    Per Dr. Melvin, she would like patient's medication list from cardiologist. Her cardiologist is Dr. Vu Mclaughlin and the phone number is  [phone]     Possible fax # is [phone]  verify with call in morning    Perfect serve sent to Dr. Medley regarding medication updates from Daughter Jonathan who received them from cardiologist as of 7/19 patient should be taking     Isosorbide 20mg TID   Eliquis 2.5mg bid  Metoprolol 25 daily  Hydralazine 37.5mg daily  Amiodarone 400mg daily       Physical Therapy  Initial Assessment     Name: Marge Callejas  : 1944  MRN: [id number]      Date of Service: 2024    Evaluating PT: Roberto Caruso, PT, DPT [id number]      Room #:  8205/8205-A  Diagnosis:  Syncope and collapse [R55]  PMHx/PSHx:   has a past medical history of Arthritis, Atrial fibrillation ([facility]), Blood circulation, collateral, CHF (congestive heart failure) ([facility]), Chronic renal insufficiency, stage IV (severe) (HCC), Coronary artery disease involving native coronary artery of native heart without angina pectoris, History of cardiovascular stress test, History of echocardiogram, Hyperlipidemia, Hypertension, and Mixed restrictive and obstructive lung disease ([facility]).  Precautions:  Fall risk, O2, Alarm    SUBJECTIVE:    Pt was admitted from [facility]. Pt has been in and out of the hospital since April. Pt was standing at  at rehab but was nonambulatory.     OBJECTIVE:   Initial Evaluation  Date: 24 Treatment Date: Short Term/ Long Term   Goals   AM-PAC 6 Clicks      Was pt agreeable to Eval/treatment? With encouragement     Does pt have pain? No complaints of pain     Bed Mobility  Rolling: NT  Supine to sit: Michael  Sit to supine: Michael  Scooting: Michael to EOB  Rolling: Independent   Supine to sit: Independent   Sit to supine: Independent   Scooting: Independent    Transfers Sit to stand: ModA  Stand to sit: ModA  Stand pivot: NT  Sit to stand: Supervision  Stand to sit: Supervision  Stand pivot: SBA with WW   Ambulation   NT  >10 feet with WW with SBA   Stair negotiation: ascended and descended NT  NA   ROM BUE: Refer to OT note  BLE: WFL     Strength BUE: Refer to OT note  BLE: WFL     Balance Sitting EOB: SBA  Dynamic Standing: Michael with WW  Sitting EOB: Independent   Dynamic Standing: SBA with WW     Pt is A & O x: 4 to person, place, month/year, and situation.   Sensation: Pt denies numbness and tingling of extremities.   Edema: Unremarkable    Patient education  Pt educated on PT  Reach out to Dr. Mclaughlin office with a cardiology consult and was told to add pt to [facility] cardiology due to Dr not coming to the hospital. Office staff also will like updated on plan of care and medication changes.   Records Review    PMH: ESRD on HD, Chronic HFrEF, s/p Bi-V ICD 4/2022 , PAF, OAC with Eliquis, HTN, T2DM, HLD, COPD on chronic O2 via NC, anemia, 25 pack year smoker quit in 2021    Admission [facility] 6/10/2024-6/24/2024 sent to ED for ICD firing 5 times on 6/7/2024 (while @ HD)    While in ED had more VT and ICD shocks  3 or 4 additional times. Received IV magnesium and Placed on Amiodarone gtt    6/12/2024 TTE Dr Mclaughlin:  Dilated LV EF 27%. Mild CLVH. NWM. Normal RV size with reduced function. Mild to moderate MR. No stenosis. No AS/AI. Mild TR. No PHTN.   6/13/2024 [facility] @ H: Normal R and L filling pressures. Moderate to severe PHTN 49/35 Mean 41 Raphael CI 2.0  6/13/2024 LHC (VT) @ Critical access hospital: 50% stenosis of Ostial 1st OMB. Tandem 70 and 90% mid vessel and 80% distal RCA stenosis. The distal branches fill competitively via L>R collaterals. Compared to prior LHC 8/27/2021 there has been No significant change.    6/13/2024 that evening (1730) code blue PEA, agonal respirations. VT on monitor> ACID fired> SR. Placed on Lidocaine gtt in addition to Amiodarone gtt until 6/20/204 then placed on PO Amiodarone 400 mg QD and Lidocaine gt was stopped    6/24/2024 Due to daughter unable to care for her mother patient was sent to [facility]. Hgb 8.5, WBC 11.7, Na 130, K+ 4.8, Bun/Cr 50/6.2.    Discharge Medications: Amiodarone 400 mg QD, Coreg 6.25 mg BID, Eliquis 5 mg BID, ASA 81 mg QD, Hydralazine 37.5 mg TID, Isordil 20 mg TID, mag oxide 400 mg QD, Lipitor 40 mg QD. Bumex 2 mg QD was discontinued.     Admission [facility] 7/8/2024-7/19/2024  for body aches and SOB. Required 15 liter NRB upon arrival to ED and was in AF CVR. > Discharged on 6 liters NC. Also found to have shingles.     7/19/2024 Hgb 7.7, Plt 288, K+ 5.5, Na 134, Bun/Cr 49/4.1, magnesium 3.0.     Discharge Coreg was stopped and placed on Toprol XL 25 mg QD, Eliquis decreased to 2.5 mg BID, Bumex 2 mg QD resumed, Isordil and hydralazine continued along with amiodarone 400 mg  This RN provided educ regarding turning on bed; tried to place wedges on to turn but pt refused; pillow placed, instead   To    1.4-0.6 % ophthalmic solution 1 drop, PRN  sodium chloride (OCEAN, BABY AYR) 0.65 % nasal spray 1 spray, PRN  sodium chloride flush 0.9 % injection 5-40 mL, 2 times per day  sodium chloride flush 0.9 % injection 5-40 mL, PRN  0.9 % sodium chloride infusion, PRN  polyethylene glycol (GLYCOLAX) packet 17 g, Daily PRN  acetaminophen (TYLENOL) tablet 650 mg, Q6H PRN   Or  acetaminophen (TYLENOL) suppository 650 mg, Q6H PRN  prochlorperazine (COMPAZINE) injection 10 mg, Q6H PRN   Or  prochlorperazine (COMPAZINE) tablet 10 mg, Q8H PRN  apixaban (ELIQUIS) tablet 2.5 mg, BID  aspirin EC tablet 81 mg, Daily  atorvastatin (LIPITOR) tablet 40 mg, Nightly  bisacodyl (DULCOLAX) suppository 10 mg, Daily PRN  arformoterol 15 mcg-budesonide 0.5 mg neb solution, BID RT  bumetanide (BUMEX) tablet 2 mg, Daily  [Held by provider] calcium acetate (PHOSLO) capsule 667 mg, TID WC  doxycycline hyclate (VIBRAMYCIN) capsule 100 mg, BID  ipratropium 0.5 mg-albuterol 2.5 mg (DUONEB) nebulizer solution 1 Dose, Q6H PRN  isosorbide dinitrate (ISORDIL) tablet 5 mg, BID  lactulose (CHRONULAC) 10 GM/15ML solution 10 g, Daily PRN  magnesium hydroxide (MILK OF MAGNESIA) 400 MG/5ML suspension 30 mL, Daily PRN  meclizine (ANTIVERT) tablet 12.5 mg, TID PRN  melatonin disintegrating tablet 5 mg, Nightly  metoprolol succinate (TOPROL XL) extended release tablet 12.5 mg, Daily  pantoprazole (PROTONIX) tablet 40 mg, QAM AC  polyethylene glycol (GLYCOLAX) packet 17 g, Daily  predniSONE (DELTASONE) tablet 15 mg, Daily  sennosides-docusate sodium (SENOKOT-S) 8.6-50 MG tablet 1 tablet, BID PRN  sodium zirconium cyclosilicate (LOKELMA) oral suspension 10 g, TID  white petrolatum ointment, BID PRN    acyclovir (ZOVIRAX) 285 mg in sodium chloride 0.9 % 50 mL IVPB, Once        Review of systems:     Heart: as above   Lungs: as above   Eyes: denies changes in vision or discharge.    Ears: denies changes in hearing or pain.   Nose: denies epistaxis or masses   Throat:  ESRD on HD, COPD, chronic hypoxic respiratory failure, CVA and GERD. The patient has been in and out of the hospital and nursing home since April 2024 when she had HZV infection. Per daughter (I have no access to her record at [facility]), the patient was admitted to June 2024 at [facility] due to ICD shocks and was admitted in ICU. She was started on IV Lidocaine which was later on transited to oral Amiodarone. She was discharged to the NH and presented back to [facility] on 7/1/24 due to chest pain and was found to have VT at 147 bpm which later on terminated with ATP. The CRT-D was interrogated and was noted that the VT detection rate was programmed at 150 bpm after 90 beats of VT. She was started on IV Amiodarone which transited to oral Amiodarone. The device was not reprogrammed at that time. She presented to the hospital on 7/23/24 due to shortness of breath and hypoxia. She was diagnosed with COPD exacerbation and fluid retention. She was discharged home in 7/29/24 and Amiodarone was not given while she was in the hospital and when she was discharged to the NH. Yesterday during hemodialysis around 10.00 AM while she was sitting in the chair she suddenly passed out. Ambulance was called and the patient reports that she passed out again in the ambulance. She denies any prodromal symptoms but reports occasional lightheadedness and dizziness. No VT noted overnight. CRT-D interrogation showed episode of sustained ventricular tachycardia at 164 bpm which terminated after ATP after 41 seconds on 7/30/24 at 10.07 AM. Also VT noted on 7/22/24 for 45 seconds after terminated with ATP. Cardiac electrophysiology service is consulted for syncope.    8/1/24: Seen at bedside, resting in bed in no acute distress. CRT- D was reprogrammed yesterday. Programming details listed below. No arrhythmias overnight. No new cardiac complaints.     8/2/24: Patient seen and examined at bedside. Patient was at dialysis yesterday and went into MM VT  that was  PRN  benzonatate (TESSALON) capsule 100 mg, TID PRN  calcium carbonate (TUMS) chewable tablet 500 mg, TID PRN  hydrALAZINE (APRESOLINE) injection 10 mg, Q6H PRN  Polyvinyl Alcohol-Povidone PF (REFRESH) 1.4-0.6 % ophthalmic solution 1 drop, PRN  sodium chloride (OCEAN, BABY AYR) 0.65 % nasal spray 1 spray, PRN  sodium chloride flush 0.9 % injection 5-40 mL, 2 times per day  sodium chloride flush 0.9 % injection 5-40 mL, PRN  0.9 % sodium chloride infusion, PRN  polyethylene glycol (GLYCOLAX) packet 17 g, Daily PRN  acetaminophen (TYLENOL) tablet 650 mg, Q6H PRN   Or  acetaminophen (TYLENOL) suppository 650 mg, Q6H PRN  prochlorperazine (COMPAZINE) injection 10 mg, Q6H PRN   Or  prochlorperazine (COMPAZINE) tablet 10 mg, Q8H PRN  apixaban (ELIQUIS) tablet 2.5 mg, BID  aspirin EC tablet 81 mg, Daily  atorvastatin (LIPITOR) tablet 40 mg, Nightly  bisacodyl (DULCOLAX) suppository 10 mg, Daily PRN  arformoterol 15 mcg-budesonide 0.5 mg neb solution, BID RT  bumetanide (BUMEX) tablet 2 mg, Daily  [Held by provider] calcium acetate (PHOSLO) capsule 667 mg, TID WC  doxycycline hyclate (VIBRAMYCIN) capsule 100 mg, BID  ipratropium 0.5 mg-albuterol 2.5 mg (DUONEB) nebulizer solution 1 Dose, Q6H PRN  isosorbide dinitrate (ISORDIL) tablet 5 mg, BID  lactulose (CHRONULAC) 10 GM/15ML solution 10 g, Daily PRN  magnesium hydroxide (MILK OF MAGNESIA) 400 MG/5ML suspension 30 mL, Daily PRN  meclizine (ANTIVERT) tablet 12.5 mg, TID PRN  melatonin disintegrating tablet 5 mg, Nightly  metoprolol succinate (TOPROL XL) extended release tablet 12.5 mg, Daily  pantoprazole (PROTONIX) tablet 40 mg, QAM AC  polyethylene glycol (GLYCOLAX) packet 17 g, Daily  predniSONE (DELTASONE) tablet 15 mg, Daily  sennosides-docusate sodium (SENOKOT-S) 8.6-50 MG tablet 1 tablet, BID PRN  white petrolatum ointment, BID PRN    acyclovir (ZOVIRAX) 285 mg in sodium chloride 0.9 % 50 mL IVPB, Once        Review of systems:     Heart: as above   Lungs: as  treated with 1 round of ATP. She stated that she felt near syncope when this occurred. Mexiletine 150mg TID has since been added to her regimen. Still remains on Amiodarone 400mg BID. No further episodes noted overnight.     Patient Active Problem List    Diagnosis Date Noted    VT (ventricular tachycardia) ([facility]) 07/31/2024    Syncope and collapse 07/30/2024    Acute respiratory failure with hypoxia ([facility]) 07/24/2024    Acute on chronic respiratory failure with hypoxemia ([facility]) 07/23/2024    Ventricular arrhythmia 07/01/2024    Paroxysmal ventricular tachycardia ([facility]) 07/01/2024    Hypotension 05/08/2024    Herpes zoster with nervous system complication 04/27/2024    Chest pain 04/17/2024    Cardiac resynchronization therapy defibrillator (CRT-D) in place 04/17/2024    Cardiomyopathy ([facility]) 04/17/2024    Coronary artery disease involving native coronary artery of native heart without angina pectoris 04/17/2024    Abnormal EKG 04/17/2024    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    Acute GI bleeding 09/18/2023    Mixed restrictive and obstructive lung disease ([facility]) 07/14/2023     Overview Note:     With moderate ventilatory impairment and airflow obstructive mechanics with severe gas transfer impairment consequences chronic congestive heart failure & COPD      Severe tobacco dependence in early remission 07/14/2023    Hypoxemia requiring supplemental oxygen 07/14/2023    Acute systolic CHF (congestive heart failure) ([facility]) 10/06/2019    Acute on chronic combined systolic and diastolic CHF (congestive heart failure) ([facility]) 10/03/2019    Tobacco abuse counseling 11/22/2016    Abnormal chest x-ray 11/21/2016    Anemia of chronic disease 11/21/2016    Atrial fibrillation ([facility])     Hypertension     Shortness of breath 11/19/2016    Chronic combined systolic and diastolic congestive heart failure ([facility]) 11/19/2016    Chronic renal insufficiency, stage IV (severe) ([facility]) 11/19/2016     Pulmonic Valve   The pulmonic valve was not well visualized.   Physiologic and/or trace pulmonic regurgitation present.      Pericardial Effusion   No evidence of pericardial effusion.      Aorta   Aortic root dimension within normal limits.      Conclusions      Summary   Compared to prior echo, no significant changes noted. Technically adequate   study.   Severe asymmetric septal hypertrophy.   Ejection fraction is visually estimated at 20-25%.   inferior wall akinesis   Indeterminate diastolic function   No evidence of left ventricular mass or thrombus noted.   Right ventricle global systolic function is mildly reduced .   Moderate to severe mitral regurgitation is present.   Mild tricuspid regurgitation.      Signature      ----------------------------------------------------------------   Electronically signed by Deysi Mack MD(Interpreting   physician) on 11/22/2016 06:02 PM   ----------------------------------------------------------------     Cardiac Catheterization 6/27/21:     Nuclear Stress Test 4/19/24:     Stress Function: Normal left ventricle size. Ejection fraction is 42% with global hypokinesis.    Perfusion Comments: Large-sized fixed perfusion defect in the inferior wall. Moderate-sized fixed perfusion defect in the apex. Moderate-sized partially reversible perfusion defect in the inferolateral wall.    ECG: The ECG was not diagnostic due to baseline ECG abnormality.    Nuclear Stress Test 7/28/15:  FINDINGS:  The patient received 12.1 mCi of Cardiolite for the  resting study, followed by standard SPECT imaging. The patient was  stressed pharmacologically. Then, 37.5 mCi of Cardiolite was  injected with repeat comparison imaging.      The left ventricle appears to be dilated on both stress and rest  images, with a thin wall. There is a mild fixed photopenic defect  over the inferior wall on stress and rest images suggesting a  scar.     IMPRESSION:    1. Fixed defect over the inferior wall on

## 2024-08-12 ENCOUNTER — OFFICE VISIT (OUTPATIENT)
Dept: PULMONOLOGY | Facility: CLINIC | Age: 79
End: 2024-08-12
Payer: MEDICARE

## 2024-08-12 VITALS
DIASTOLIC BLOOD PRESSURE: 60 MMHG | WEIGHT: 135 LBS | HEART RATE: 66 BPM | OXYGEN SATURATION: 96 % | BODY MASS INDEX: 24.84 KG/M2 | SYSTOLIC BLOOD PRESSURE: 118 MMHG | HEIGHT: 62 IN

## 2024-08-12 DIAGNOSIS — Z87.01 PERSONAL HISTORY OF PNEUMONIA (RECURRENT): Primary | ICD-10-CM

## 2024-08-12 PROCEDURE — 99213 OFFICE O/P EST LOW 20 MIN: CPT | Performed by: INTERNAL MEDICINE

## 2024-08-12 RX ORDER — PREDNISONE 20 MG/1
TABLET ORAL
Qty: 10 TABLET | Refills: 0 | Status: SHIPPED | OUTPATIENT
Start: 2024-08-12

## 2024-08-12 NOTE — PROGRESS NOTES
The patient is an 79-year-old female who I know well from prior evaluation comes in now to follow-up mild bronchiectasis.  She notes that her breathing is okay and the cough is minimal now.  She has a sciatica syndrome with spinal stenosis.    Review of Systems:  Vision normal. Ear nose and throat normal. Bowel normal. Bladder function normal. No depression. No thyroid disease. No lymphatic system concerns.  No rash. Muscles and joints unremarkable. No weight loss no weight gain.    Physical Examination:  Vital signs normal. HEENT examination is unremarkable with pupils equal round and reactive to light and accommodation. Neck without adenopathy, thyromegaly, JVD nor bruit. Lungs clear to auscultation and percussion. Cardiac regular rate and rhythm no murmur. Abdomen nontender, without hepatosplenomegaly and no mass appreciable. Extremities and Musculoskeletal without clubbing cyanosis nor edema, and mobility acceptable. Neurologic grossly intact with symmetric tone and strength and reflex.    Assessment and plan:  1.  Mild bronchiectasis-doing well clinically.  Conservative management at this juncture and patient to see me again in the office at the 1 year interval or sooner if needed.  Does not need Acapella now.    2.  Sciatica syndrome-patient has a few months prior to her office visit with physiatry.  Will give a short course prednisone in the meantime.

## 2024-08-16 ENCOUNTER — TELEPHONE (OUTPATIENT)
Dept: PULMONOLOGY | Facility: CLINIC | Age: 79
End: 2024-08-16

## 2024-08-16 RX ORDER — PREDNISONE 20 MG/1
TABLET ORAL
Qty: 10 TABLET | Refills: 0 | OUTPATIENT
Start: 2024-08-16

## 2024-08-16 RX ORDER — PREDNISONE 20 MG/1
TABLET ORAL
Qty: 10 TABLET | Refills: 0 | Status: SHIPPED | OUTPATIENT
Start: 2024-08-16

## 2024-08-16 NOTE — TELEPHONE ENCOUNTER
Spoke with patient's  Basil, informed him of Dr. Anderson's order, Basil verbalized understanding.

## 2024-08-16 NOTE — TELEPHONE ENCOUNTER
Spoke with patient's , Basil, states prednisone is helping greatly with patient's sciatic pain, she will be seeing Dr. Adrian in late October and will be going out of town the week of August 26, 2024    Dr. Anderson - Patient requesting another short course of prednisone to take while she is out of town. Pended below, if agreeable.

## 2024-08-16 NOTE — TELEPHONE ENCOUNTER
Patient is wondering if she can get refill on predniSONE 20 MG Oral Tab it is helping patient please follow up

## 2024-09-06 ENCOUNTER — NURSE ONLY (OUTPATIENT)
Dept: INTERNAL MEDICINE CLINIC | Facility: CLINIC | Age: 79
End: 2024-09-06

## 2024-09-06 DIAGNOSIS — E53.8 B12 DEFICIENCY: Primary | ICD-10-CM

## 2024-09-06 PROCEDURE — 96372 THER/PROPH/DIAG INJ SC/IM: CPT | Performed by: INTERNAL MEDICINE

## 2024-09-06 RX ADMIN — CYANOCOBALAMIN 1000 MCG: 1000 INJECTION, SOLUTION INTRAMUSCULAR; SUBCUTANEOUS at 09:09:00

## 2024-09-20 ENCOUNTER — LAB ENCOUNTER (OUTPATIENT)
Dept: LAB | Age: 79
End: 2024-09-20
Attending: INTERNAL MEDICINE
Payer: MEDICARE

## 2024-09-20 ENCOUNTER — OFFICE VISIT (OUTPATIENT)
Dept: INTERNAL MEDICINE CLINIC | Facility: CLINIC | Age: 79
End: 2024-09-20
Payer: MEDICARE

## 2024-09-20 VITALS
DIASTOLIC BLOOD PRESSURE: 58 MMHG | WEIGHT: 142 LBS | SYSTOLIC BLOOD PRESSURE: 122 MMHG | HEART RATE: 78 BPM | TEMPERATURE: 98 F | BODY MASS INDEX: 26.13 KG/M2 | OXYGEN SATURATION: 95 % | HEIGHT: 62 IN

## 2024-09-20 DIAGNOSIS — D51.0 PERNICIOUS ANEMIA: ICD-10-CM

## 2024-09-20 DIAGNOSIS — Z87.19 HISTORY OF CHRONIC ULCERATIVE COLITIS: ICD-10-CM

## 2024-09-20 DIAGNOSIS — M81.8 OTHER OSTEOPOROSIS, UNSPECIFIED PATHOLOGICAL FRACTURE PRESENCE: ICD-10-CM

## 2024-09-20 DIAGNOSIS — Z00.00 ROUTINE HEALTH MAINTENANCE: ICD-10-CM

## 2024-09-20 DIAGNOSIS — Z85.42 HISTORY OF UTERINE CANCER: ICD-10-CM

## 2024-09-20 DIAGNOSIS — E04.2 MULTIPLE THYROID NODULES: ICD-10-CM

## 2024-09-20 DIAGNOSIS — E53.8 VITAMIN B12 DEFICIENCY: Primary | ICD-10-CM

## 2024-09-20 DIAGNOSIS — M85.80 OSTEOPENIA, UNSPECIFIED LOCATION: ICD-10-CM

## 2024-09-20 DIAGNOSIS — I10 ESSENTIAL HYPERTENSION: ICD-10-CM

## 2024-09-20 DIAGNOSIS — Z80.0 FAMILY HISTORY OF COLON CANCER: ICD-10-CM

## 2024-09-20 DIAGNOSIS — R41.3 MEMORY DEFICIT: ICD-10-CM

## 2024-09-20 DIAGNOSIS — Z12.31 VISIT FOR SCREENING MAMMOGRAM: ICD-10-CM

## 2024-09-20 LAB
ANION GAP SERPL CALC-SCNC: 4 MMOL/L (ref 0–18)
BASOPHILS # BLD AUTO: 0.06 X10(3) UL (ref 0–0.2)
BASOPHILS NFR BLD AUTO: 0.9 %
BUN BLD-MCNC: 24 MG/DL (ref 9–23)
BUN/CREAT SERPL: 26.1 (ref 10–20)
CALCIUM BLD-MCNC: 10.2 MG/DL (ref 8.7–10.4)
CHLORIDE SERPL-SCNC: 105 MMOL/L (ref 98–112)
CHOLEST SERPL-MCNC: 179 MG/DL (ref ?–200)
CO2 SERPL-SCNC: 29 MMOL/L (ref 21–32)
CREAT BLD-MCNC: 0.92 MG/DL
DEPRECATED RDW RBC AUTO: 45.2 FL (ref 35.1–46.3)
EGFRCR SERPLBLD CKD-EPI 2021: 63 ML/MIN/1.73M2 (ref 60–?)
EOSINOPHIL # BLD AUTO: 0.35 X10(3) UL (ref 0–0.7)
EOSINOPHIL NFR BLD AUTO: 5.2 %
ERYTHROCYTE [DISTWIDTH] IN BLOOD BY AUTOMATED COUNT: 12.7 % (ref 11–15)
FASTING PATIENT LIPID ANSWER: NO
FASTING STATUS PATIENT QL REPORTED: NO
GLUCOSE BLD-MCNC: 108 MG/DL (ref 70–99)
HCT VFR BLD AUTO: 34.2 %
HDLC SERPL-MCNC: 67 MG/DL (ref 40–59)
HGB BLD-MCNC: 11.4 G/DL
IMM GRANULOCYTES # BLD AUTO: 0.03 X10(3) UL (ref 0–1)
IMM GRANULOCYTES NFR BLD: 0.4 %
LDLC SERPL CALC-MCNC: 90 MG/DL (ref ?–100)
LYMPHOCYTES # BLD AUTO: 1.21 X10(3) UL (ref 1–4)
LYMPHOCYTES NFR BLD AUTO: 17.8 %
MCH RBC QN AUTO: 32.1 PG (ref 26–34)
MCHC RBC AUTO-ENTMCNC: 33.3 G/DL (ref 31–37)
MCV RBC AUTO: 96.3 FL
MONOCYTES # BLD AUTO: 0.79 X10(3) UL (ref 0.1–1)
MONOCYTES NFR BLD AUTO: 11.6 %
NEUTROPHILS # BLD AUTO: 4.35 X10 (3) UL (ref 1.5–7.7)
NEUTROPHILS # BLD AUTO: 4.35 X10(3) UL (ref 1.5–7.7)
NEUTROPHILS NFR BLD AUTO: 64.1 %
NONHDLC SERPL-MCNC: 112 MG/DL (ref ?–130)
OSMOLALITY SERPL CALC.SUM OF ELEC: 291 MOSM/KG (ref 275–295)
PLATELET # BLD AUTO: 251 10(3)UL (ref 150–450)
POTASSIUM SERPL-SCNC: 5.2 MMOL/L (ref 3.5–5.1)
RBC # BLD AUTO: 3.55 X10(6)UL
SODIUM SERPL-SCNC: 138 MMOL/L (ref 136–145)
TRIGL SERPL-MCNC: 126 MG/DL (ref 30–149)
VLDLC SERPL CALC-MCNC: 20 MG/DL (ref 0–30)
WBC # BLD AUTO: 6.8 X10(3) UL (ref 4–11)

## 2024-09-20 PROCEDURE — 36415 COLL VENOUS BLD VENIPUNCTURE: CPT

## 2024-09-20 PROCEDURE — 80061 LIPID PANEL: CPT

## 2024-09-20 PROCEDURE — 85025 COMPLETE CBC W/AUTO DIFF WBC: CPT

## 2024-09-20 PROCEDURE — 80048 BASIC METABOLIC PNL TOTAL CA: CPT

## 2024-09-20 PROCEDURE — 99215 OFFICE O/P EST HI 40 MIN: CPT | Performed by: INTERNAL MEDICINE

## 2024-09-20 RX ORDER — PREDNISONE 20 MG/1
TABLET ORAL
Qty: 10 TABLET | Refills: 0 | Status: SHIPPED | OUTPATIENT
Start: 2024-09-20

## 2024-09-20 RX ORDER — ALPRAZOLAM 0.25 MG
0.25 TABLET ORAL
COMMUNITY
Start: 2024-08-22

## 2024-09-20 NOTE — PROGRESS NOTES
Karyn Cobos is a 79 year old female.  HPI:     Chief Complaint   Patient presents with    Follow - Up     3 mo follow up, Rt leg pain (difficulty walking due to pain)       Karyn comes in with her  [place]    She does have memory loss.  However she was able to confirm her symptoms to me adequately.    She has hypertension.  Blood pressure under good control    Will get updated labs.    We talked about vaccines and she will get flu and COVID-vaccine.  She has had RSV vaccine.  She has had PCV 20 May 2022 and Shingrix x 2 in the past    She did see Dr. Patton in July of this year and no more mammograms per guidelines are needed.  I did convey this to Karyn.  I did copy and paste Dr. Patton's note below for reference.  ASSESSMENT/ PLAN:    Mastodynia, left breast; no evidence of suspicious breast lesions    The patient has left breast pain which remains stable and not associated with mammogram or ultrasound abnormalities. She was reassured that there is no evidence of breast cancer at this time based on all the available evidence to date. NSAIDs and heating pads PRN were advised. In severe cases of mastodynia, medical treatment may be indicated with Tamoxifen or Danazol, but that is associated with potential unwanted side effects. We discussed alternative supplements that may alleviate this.  No need for screening mammogram past age 80  She will cont BSE and yearly exam by me per her wishes.    The patient has undergone genetic counseling due to her personal history of endometrial cancer and both parents having colon cancer. She decided against going through it but was very enlightened with the consultation.    Thank you for referring this wonderful patient  ANGEL CUNHA MD    Cc: Dr. Pan Mansfield      Electronically signed by Angel Cunha MD at 07/18/2024 1:42 PM CDT     Therefore I discussed with Karyn I will not order any more mammograms based on the above recommendation.    She will  continue with Dr. Patton for annual breast exams with the next July 2025    She follows with Dr. Healy for osteopenia    She will be seeing  coming up in October.  From physiatry.  She has right lower lumbar pain that radiates down to her right thigh and right lower leg.  She has low-grade chronic pain that suggestive of lumbar radiculopathy but then she will get sharp episodes of pain.  She indicated that in the past she was given prednisone by another physician and thought if she could get another prescription for that and I think that is reasonable.  It was prednisone 20 mg tablets.  2 tablets a day for 3 days then 1 tablet a day for the treatment of 7 days.  I think this is reasonable.    She has pernicious anemia and gets monthly B12 injections    She has a family history of colon cancer.  She follows with Dr. López.  Per Dr. López no more colonoscopies for screening needed.  Last colonoscopy October 2022.    Has a history of ulcerative colitis.  However she is asymptomatic.  History of uterine cancer.  History of left pelvic cyst that is chronic and she had followed for years with     History of multiple thyroid nodules followed by Dr. Healy    Current Outpatient Medications   Medication Sig Dispense Refill    ALPRAZolam 0.25 MG Oral Tab Take 1 tablet (0.25 mg total) by mouth daily as needed.      predniSONE 20 MG Oral Tab Take two tablets a day for three days then one a day till gone 10 tablet 0    atorvastatin 40 MG Oral Tab TAKE ONE TABLET BY MOUTH AT BEDTIME 90 tablet 3    GABAPENTIN 300 MG Oral Cap Take 1 capsule by mouth 2 times daily. 180 capsule 0    DONEPEZIL 10 MG Oral Tab Take 1 tablet by mouth daily. 90 tablet 0    metoprolol succinate ER 50 MG Oral Tablet 24 Hr TAKE ONE TABLET BY MOUTH ONE TIME DAILY 90 tablet 3    memantine 10 MG Oral Tab Take 1 tablet (10 mg total) by mouth 2 (two) times daily. 180 tablet 1    denosumab 60 MG/ML Subcutaneous Solution Prefilled Syringe  Inject 1 mL (60 mg total) into the skin one time.      triamcinolone 0.1 % External Cream Apply topically 2 (two) times daily. 45 g 1    DULoxetine 30 MG Oral Cap DR Particles Take 1 capsule  by mouth daily for 90 days, THEN 2 capsules  daily. 270 capsule 0    amLODIPine 10 MG Oral Tab TAKE ONE TABLET BY MOUTH ONE TIME DAILY 90 tablet 3    telmisartan 80 MG Oral Tab TAKE ONE TABLET BY MOUTH IN THE MORNING 90 tablet 3    omeprazole 20 MG Oral Capsule Delayed Release Take 1 capsule (20 mg total) by mouth every morning.      SULFASALAZINE 500 MG Oral Tab TAKE 3 TABLETS BY MOUTH TWICE A  tablet 0    aspirin 81 MG Oral Tab EC Take 1 tablet (81 mg total) by mouth daily.  0    folic acid 1 MG Oral Tab Take 1 tablet (1 mg total) by mouth daily. 90 tablet 3    Calcium Citrate-Vitamin D 315-250 MG-UNIT Oral Tab Take 2 tablets by mouth 2 (two) times daily with meals.      cyanocobalamin (VITAMIN B12) 1000 MCG/ML Injection Solution Inject 1 mL (1,000 mcg total) into the muscle every 30 (thirty) days.      Vitamin D, Ergocalciferol, (DRISDOL) 45839 UNITS Oral Cap Take 1 capsule (50,000 Units total) by mouth As Directed. Every 5 days.      Multiple Vitamin (MULTI-VITAMINS) Oral Tab Take 1 tablet by mouth daily with breakfast.      predniSONE 20 MG Oral Tab 2 tabs daily for 3 days then one tab daily until completed (Patient not taking: Reported on 2024) 10 tablet 0      Past Medical History:    Arthritis    Back pain    Cough    Deep vein thrombosis (HCC)    Dementia (HCC)    Essential hypertension    Family history of colon cancer     0    PARA ZERO    H/O foot surgery    LEFT FOOT SURGERY, PINS PLACED IN TOE    H/O oral surgery    GUM/MOUTH SURGERY    Hallux rigidus    LEFT FOOT, YURY PROCEDURE, 1ST LEFT METARSOPHALANGEAL JOINT    Heart palpitations    HEART MONITOR 2012    High blood pressure    High cholesterol    History of DVT (deep vein thrombosis)    Hypercholesterolemia    Hyperlipidemia     Hyperparathyroidism (HCC)    PAIR OF PARATHYROID REMOVED    Hyperparathyroidism (HCC)    PAIR OF PARATHYROID REMOVED     Hypertension    Left carotid artery stenosis    Migraine    OCCULAR MIGRAINE    Migraine    OCCULAR MIGRAINE     Neuroma    2 LT, HALLUX RIGIDUS LT, PER. NG.    Osteoarthritis    Other ill-defined conditions(799.89)    TAHBSO MGMT.    Other ill-defined conditions(799.89)    CHRONIC LEFT CYSTIC PELVIC MASS    Ovarian cyst    REMOVED PER NG.    Painful breasts    Pneumonia    HOSPITALIZED    Pneumonia due to organism    Spinal stenosis    Spinal stenosis    Tonsillitis    Ulcerative colitis (HCC)    Uterine cancer (HCC)      Social History:  Social History     Socioeconomic History    Marital status:    Tobacco Use    Smoking status: Never     Passive exposure: Never    Smokeless tobacco: Never   Vaping Use    Vaping status: Never Used   Substance and Sexual Activity    Alcohol use: Not Currently     Alcohol/week: 2.0 standard drinks of alcohol     Comment: socially    Drug use: Never   Other Topics Concern    Caffeine Concern Yes     Comment: SODA/TEA 1 CAN/DAY        REVIEW OF SYSTEMS:   GENERAL HEALTH:  feels well otherwise  RESPIRATORY:  Voices no shortness of breath with exertion or cough  CARDIOVASCULAR:  Voices no chest pain on exertion or shortness of breath  GI:   Voices no abdominal pain or changes of bowels   :Viices no urning or frequency of urination.  NEURO:  Voices no  headaches or dizziness    EXAM:   /58   Pulse 78   Temp 97.8 °F (36.6 °C) (Oral)   Ht 5' 2\" (1.575 m)   Wt 142 lb (64.4 kg)   SpO2 95%   BMI 25.97 kg/m²     GENERAL:  well developed, well nourished, in no apparent distress  SKIN:  no rashes ,  HEENT: atraumatic.  Pharynx normal without exudate.  EYES:  PERRL. Sclera anicteric.  NECK:  Supple,  no adenopathy,  thyroid normal  LUNGS:  clear to auscultation.  Effort normal  CARDIO:  RRR without murmur.   S1 and S2 normal  GI:  good BS's,  no masses,    HSM or tenderness  EXTREMITIES : no cyanosis, clubbing or edema  NEURO: Good motor power lower extremities.    ASSESSMENT AND PLAN:     1. Vitamin B12 deficiency  Vitamin B12 deficiency.  On monthly vitamin B12 injections    2. Essential hypertension  Blood pressure under good control.  Continue current therapy  - CBC With Differential With Platelet; Future  - Basic Metabolic Panel (8); Future  - Lipid Panel; Future    3. Pernicious anemia  On monthly vitamin B12 injections    4. Memory deficit  She follows with Dr. Harrington.  She has upcoming appointment    5. Family history of colon cancer  For now no further screening colonoscopies    6. Routine health maintenance  She will be getting flu and COVID vaccination    7. History of chronic ulcerative colitis  Asymptomatic.    8. History of uterine cancer  Status post hysterectomy.    9. Other osteoporosis, unspecified pathological fracture presence  Follows with Dr. Healy.    10. Multiple thyroid nodules  Follows with Dr. Healy.    11. Visit for screening mammogram  For now per Dr. Patton no more screening mammography is indicated.  She will keep her appoint with Dr. Patton yearly.  Next appointment July 2025    12. Osteopenia, unspecified location  She follows with Dr. Healy    This visit was 30 minutes.  I spent 10 minutes before visit preparing and reviewing old records.  Greater than 50% of the visit was engaged in counseling and review of past data.    Follow-up in 3 to 4 months    The patient indicates understanding of these issues and agrees to the plan.    Pan Mansfield MD  9/20/2024  12:38 PM

## 2024-09-21 ENCOUNTER — TELEPHONE (OUTPATIENT)
Dept: INTERNAL MEDICINE CLINIC | Facility: CLINIC | Age: 79
End: 2024-09-21

## 2024-09-23 ENCOUNTER — TELEPHONE (OUTPATIENT)
Dept: INTERNAL MEDICINE CLINIC | Facility: CLINIC | Age: 79
End: 2024-09-23

## 2024-09-23 DIAGNOSIS — I10 ESSENTIAL HYPERTENSION: Primary | ICD-10-CM

## 2024-09-23 RX ORDER — TELMISARTAN 80 MG/1
80 TABLET ORAL EVERY MORNING
Qty: 90 TABLET | Refills: 3 | Status: SHIPPED | OUTPATIENT
Start: 2024-09-23

## 2024-09-23 NOTE — TELEPHONE ENCOUNTER
Please let Karyn know that her labs came out fairly acceptable. (She does have memory loss and we may need to explain this to her  [place])    Her cholesterol was in a good range    2.    Her blood count just showed a very mild anemia.  Stable.    3.   One value on her electrolytes show that her potassium was just a little bit elevated.  Otherwise her kidney function was stable.    4.   One of her medications for blood pressure can elevate the potassium.  This is the telmisartan.  But I do not want her to stop it.    5.   I printed a resource that talks about higher potassium foods that she should avoid.  We can review these over the phone.  I think she is on vacation for couple weeks.  We can mail her the information for her to review.    6.   I placed order for BMP to recheck her potassium in 2 weeks or so.  She does not have to fast.

## 2024-09-23 NOTE — TELEPHONE ENCOUNTER
Called patient and relayed  message , also went over high and low potassium food - verbalized understanding . Information on low and high potassium food mailed to patient

## 2024-09-23 NOTE — TELEPHONE ENCOUNTER
I went ahead and sent in the telmisartan refill    We will call patient to advise her on low potassium diet and repeat labs in 2 or 3 weeks.

## 2024-10-04 ENCOUNTER — NURSE ONLY (OUTPATIENT)
Dept: INTERNAL MEDICINE CLINIC | Facility: CLINIC | Age: 79
End: 2024-10-04

## 2024-10-04 DIAGNOSIS — E53.8 VITAMIN B12 DEFICIENCY: Primary | ICD-10-CM

## 2024-10-04 RX ADMIN — CYANOCOBALAMIN 1000 MCG: 1000 INJECTION, SOLUTION INTRAMUSCULAR; SUBCUTANEOUS at 09:29:00

## 2024-10-04 NOTE — PROGRESS NOTES
Patient is here today for Vitamin B12 Injection. Patient has verified purpose of visit, their name and . Injection was drawn up and administered by LG,LILY. Patient tolerated IM injection (deltoid muscle) well. Pt is aware they are to return in 1 month for next injection. See MAR Additional Details ie. NDC, LOT & EXP of single dose vial.

## 2024-10-15 ENCOUNTER — TELEPHONE (OUTPATIENT)
Dept: NEUROLOGY | Facility: CLINIC | Age: 79
End: 2024-10-15

## 2024-10-16 DIAGNOSIS — M79.2 NEUROPATHIC PAIN: ICD-10-CM

## 2024-10-16 DIAGNOSIS — F03.A0 MILD DEMENTIA, UNSPECIFIED DEMENTIA TYPE, UNSPECIFIED WHETHER BEHAVIORAL, PSYCHOTIC, OR MOOD DISTURBANCE OR ANXIETY (HCC): ICD-10-CM

## 2024-10-16 RX ORDER — MEMANTINE HYDROCHLORIDE 10 MG/1
10 TABLET ORAL 2 TIMES DAILY
Qty: 180 TABLET | Refills: 0 | Status: CANCELLED | OUTPATIENT
Start: 2024-10-16

## 2024-10-16 RX ORDER — DULOXETIN HYDROCHLORIDE 30 MG/1
60 CAPSULE, DELAYED RELEASE ORAL DAILY
Qty: 180 CAPSULE | Refills: 0 | Status: CANCELLED | OUTPATIENT
Start: 2024-10-16

## 2024-10-16 NOTE — TELEPHONE ENCOUNTER
Pt requesting a refill of memantine 10mg tableets, donepezil 10mg tablets, duloxetine 30mg capsules, and GBP 300mg capsules.    Last office visit: 12/21/23    Next office visit: None scheduled. Appointment cancelled yesterday.     Assessment  Karyn Cobos is a 78 year old w/ a pmhx of   Hypertension, HLD, COVID-19 infection, Hx of DVTm multilevel lumbosacral spondylosis, B12 deficiency from pernicious anemia, ulcerative colitis, occular migraine,   who presents w/ her  to review her neuropsychological testing and the mri of her brain.     Karyn has dementia.  Greatest suspicion is for Alzheimer's dementia.  Her neuropsychological report suggests she may primarily have vascular dementia.  While almost all types dementia have a component of cerebrovascular disease, do not suspect that this is the main  of her neurodegenerative disease.  Will evaluate for any other causes of cognitive impairment including hearing loss.  Both she and her  deny any symptoms concerning for an untreated mood disorder.  They deny symptoms concerning for sleep apnea.  We will start her on donepezil.  Counseled on the potential adverse effects.     Her  is doing most of the driving.  Advised her that she could study for her written and on the road driving test.  If she passes both of these then she still is not cleared to drive independently, but can go for an on the road driving test.  I also advised her and her  that since her cognitive impairment is due to dementia, if she fails her on the road driving test she should not take any classes to remediate, because she ultimately  will not retain what she learns.     Will add Cymbalta to help w/ neuropathic pain and her anxiety/depression.             Functional Assessment Staging Test  The stages of Alzheimer's disease as defined by FAST are:      Stage   Stage Name  Characteristic     Pt's  Current   Stage  Expected   Untreated   AD   Duration    (months)  Mental Age   (years)  MMSE   (score)    1  Normal Aging  No deficits whatsoever    --   Adult  29-30    2  Possible Mild Cognitive   Impairment  Subjective functional deficit    --      28-29    3  Mild Cognitive Impairment  Objective functional deficit interferes with a person's most complex tasks    84  12+  24-28    4  Mild Dementia  IADLs become affected, such as bill paying, cooking, cleaning, traveling  x 24  8-12  19-20    5  Moderate Dementia  Needs help selecting proper attire    18  5-7  15    6a  Moderately Severe Dementia  Needs help putting on clothes    4.8  5  9    6b  Moderately Severe Dementia  Needs help bathing    4.8  4  8    6c  Moderately Severe Dementia  Needs help toileting    4.8  4  5    6d  Moderately Severe Dementia  Urinary incontinence    3.6  3-4  3    6e  Moderately Severe Dementia  Fecal incontinence    9.6  2-3  1    7a  Severe Dementia  Speaks 5-6 words during day    12  1.25  0    7b  Severe Dementia  Speaks only 1 word clearly    18  1  0    7c  Severe Dementia  Can no longer walk    12  1  0    7d  Severe Dementia  Can no longer sit up    12  0.5-0.8  0    7e  Severe Dementia  Can no longer smile    18  0.2-0.4  0    7f  Severe Dementia  Can no longer hold up head    12+  0-0.2  0                      Plan  1.  Dementia  Diagnostics:  Repeat neuropsychological testing in 18 months.  On the road driving evaluation if she even passes her written and on the road test from the [employer].  Even if she passes both of these tests she is not cleared to drive independently and would need an on the road driving evaluation before she can drive     Therapeutics  Added cymbalta for her mood/anxiety and for neuropathic pain.   Refer to geriatric psychiatry  Add namenda   - donepezil 10 MG Oral Tab; Take 0.5 tablets (5 mg total) by mouth daily for 30 days, THEN 1 tablet (10 mg total) daily.  Dispense: 75 tablet; Refill: 0  Treat contributing factors/comorbid conditions:  Visual  impairment: Recently seen by ophtho. The American Academy of Ophthalmology recommends comprehensive eye exam every one to three years for those aged 55 to 64, and every year or two after the age of 65  Hearing loss: Referred to audiology.The American Speech-Language-Hearing-Association recommends a hearing test  q3 years after the age of 50  Hepatic impairment: Per primary  Renal impairment: Per primary  Depression: Denies.  Will monitor  Sleep disturbances: Denies.  Can be readdressed in the future     Avoid anticholinergic medications in patients with mild cognitive impairment.  In particular the following medications which have a high anticholinergic activity:  Antihistamines including doxylamine, hydroxyzine, meclizine.  TCAs: amitriptyline, clomipramine, desipramine, doxepin, imipramine, nortriptyline.  First generation antipsychotics and clozapine.  Muscle relaxants: Tizanidine (lower anticholinergic affect is seen w/ cyclobenzaprine, baclofen, and methocarbamol.)  Antiemetics:  hydroxyzine, meclizine, promethazine, scopolamine.  Antispasmodics including atropine, clozapine, hycoasmine, glycopyrrolate.  Medications for overactive bladder including darifenacin oxybutynin, solifenacin (vesicare), tolterodine.  Consider stopping other neurologic agents that have low anticholinergic activity including carbamazepine, lithium, nefazodone, oxcarbazepine, phenelzine, and trazodone.     Non-medication management:  Any sudden changes in memory, thinking, or behavior warrant prompt follow up with a primary care provider to rule out any medical conditions.  Stroke prevention strategies are important for brain health and include: blood pressure control, diabetes management, cholesterol reduction, and smoking cessation.  Regular aerobic and anaerobic exercise and a balanced and nutritious diet such as the Mediterranean and the MIND diet  Social and cognitively engaging activities   Fall risk reduction  Good sleep hygiene    We encouraged completion of the documents power of  for health and financial decisions if not already done      1. Mild dementia, unspecified dementia type, unspecified whether behavioral, psychotic, or mood disturbance or anxiety (HCC)  - memantine 5 MG Oral Tab; Take 1 tablet (5 mg total) by mouth daily for 7 days, THEN 2 tablets (10 mg total) daily for 7 days, THEN 3 tablets (15 mg total) daily for 7 days, THEN 4 tablets (20 mg total) daily.  Dispense: 318 tablet; Refill: 0  - SPECIALTY (OTHER) - EXTERNAL     2. Spinal stenosis of lumbar region, unspecified whether neurogenic claudication present     3. Neuropathic pain  - DULoxetine 30 MG Oral Cap DR Particles; Take 1 capsule by mouth daily for 90 days, THEN 2 capsules daily.  - gabapentin 300 MG Oral Cap; Take 1 capsule (300 mg total) by mouth in the morning and 1 capsule (300 mg total) before bedtime.  Dispense: 180 capsule; Refill: 0           AAN dementia management quality measures set  quality measure addressed at today's visit    patient informed of diagnosis  Yes     education and support of caregivers provided  Yes     functional assessment  No     screening for behavioral and psychiatric symptoms of dementia  yes   screening for safety concerns  Yes     driving screening and follow up  no     advanced care planning  No     screening for pain  Yes     pharmacological treatment of dementia  Yes              Education Provided  Education/Instructions given to: patient   Barriers to Learning:  cognitive impairment for patient.  None for her .  Content: Care partner [OR spouse, informant, caregiver] educated on diagnosis, prognosis, warning signs, and treatment plan options.Refer to note above.  Evaluation/Outcome: Verbalized understanding     This document is not intended to support charting by exception.  Sections left blank in a completed note should be presumed not to have been done.        Disclaimer:   This record was dictated  using  Dragon software. There may be errors due to voice recognition problems that were not realized and corrected during the completion of the note.              Thank you for allowing me to participate in the care of your patient.     Antonio Harrington DO  12/21/23

## 2024-10-17 ENCOUNTER — TELEPHONE (OUTPATIENT)
Dept: NEUROLOGY | Facility: CLINIC | Age: 79
End: 2024-10-17

## 2024-10-17 DIAGNOSIS — F03.A0 MILD DEMENTIA, UNSPECIFIED DEMENTIA TYPE, UNSPECIFIED WHETHER BEHAVIORAL, PSYCHOTIC, OR MOOD DISTURBANCE OR ANXIETY (HCC): ICD-10-CM

## 2024-10-17 DIAGNOSIS — M79.2 NEUROPATHIC PAIN: ICD-10-CM

## 2024-10-17 RX ORDER — DONEPEZIL HYDROCHLORIDE 10 MG/1
10 TABLET, FILM COATED ORAL DAILY
Qty: 90 TABLET | Refills: 0 | OUTPATIENT
Start: 2024-10-17

## 2024-10-17 RX ORDER — DULOXETIN HYDROCHLORIDE 60 MG/1
CAPSULE, DELAYED RELEASE ORAL
Qty: 90 CAPSULE | Refills: 3 | Status: SHIPPED | OUTPATIENT
Start: 2024-10-17 | End: 2024-10-18

## 2024-10-17 RX ORDER — MEMANTINE HYDROCHLORIDE 10 MG/1
10 TABLET ORAL 2 TIMES DAILY
Qty: 180 TABLET | Refills: 3 | Status: SHIPPED | OUTPATIENT
Start: 2024-10-17

## 2024-10-17 RX ORDER — GABAPENTIN 300 MG/1
300 CAPSULE ORAL 2 TIMES DAILY
Qty: 180 CAPSULE | Refills: 3 | Status: SHIPPED | OUTPATIENT
Start: 2024-10-17

## 2024-10-17 RX ORDER — GABAPENTIN 300 MG/1
300 CAPSULE ORAL 2 TIMES DAILY
Qty: 180 CAPSULE | Refills: 0 | OUTPATIENT
Start: 2024-10-17

## 2024-10-17 RX ORDER — DONEPEZIL HYDROCHLORIDE 10 MG/1
10 TABLET, FILM COATED ORAL DAILY
Qty: 90 TABLET | Refills: 3 | Status: SHIPPED | OUTPATIENT
Start: 2024-10-17

## 2024-10-18 ENCOUNTER — OFFICE VISIT (OUTPATIENT)
Dept: NEUROLOGY | Facility: CLINIC | Age: 79
End: 2024-10-18
Payer: MEDICARE

## 2024-10-18 VITALS — HEART RATE: 85 BPM | SYSTOLIC BLOOD PRESSURE: 164 MMHG | OXYGEN SATURATION: 97 % | DIASTOLIC BLOOD PRESSURE: 73 MMHG

## 2024-10-18 DIAGNOSIS — F03.A3 MILD DEMENTIA WITH MOOD DISTURBANCE, UNSPECIFIED DEMENTIA TYPE (HCC): Primary | ICD-10-CM

## 2024-10-18 PROCEDURE — G2211 COMPLEX E/M VISIT ADD ON: HCPCS | Performed by: OTHER

## 2024-10-18 PROCEDURE — 99214 OFFICE O/P EST MOD 30 MIN: CPT | Performed by: OTHER

## 2024-10-18 RX ORDER — CITALOPRAM HYDROBROMIDE 10 MG/1
10 TABLET ORAL DAILY
Qty: 90 TABLET | Refills: 1 | Status: SHIPPED | OUTPATIENT
Start: 2024-10-18 | End: 2025-04-16

## 2024-10-18 NOTE — PROGRESS NOTES
[facility] [address], [address]  [facility] [address]  [phone]          Neurology Clinic Follow Up Note    Chief Complaint: follow up for neurodegenerative disease/dementia;Neurologic Problem (LOV 12/21/2023 Mild Dementia. Patient presents here today following up for mild dementia along with patient is her .  reported Neuropsychic has been schedule for a future appointment . Current medication memantine 10 MG. )      HPI:  Karyn Cobos is a 79 year old  woman w/ a pmhx of   Hypertension, HLD, COVID-19 infection, Hx of DVTm multilevel lumbosacral spondylosis, B12 deficiency from pernicious anemia, ulcerative colitis, occular migraine,   who presents w/ her  for dementia.    Patient initially started following with neurology because she was the victim of a financial scam on vacation in [place].  She signed a contract for a $31,000 facial without having read the contract.    Per neuropsychological testing \"her current symptoms may be consistent with vascular dementia or a mixed dementia picture with necessity to rule out cortical dementia such as Alzheimer's disease.\"    Based on the report, the patient was diagnosed with a moderate neuropsychological impairment.  Noted to have bihemispheric involvement.  Patient was noted to have deficits in visual memory, verbal memory, prospective memory, attention, verbal learning, word retrieval, executive functioning, and suboptimal judgment/insight in the backdrop of some average and low average performance intellectual functioning.    Started on Aricept.    Prior failed medications for neuropathic pain/radicular pain:  Gabapentin - no relief  Cymbalta 60 mg - some relief  Lyrica 25mg; discontinued on 5/18/17        10/18/24 interval history/subjective:  Here in follow up for dementia.  She fell at her last manicure/pedicure.  She slipped getting her feet out of the tub for the pedicure.  Her leg \"hurt quite a bit\"  after she fell.  3 weeks ago she fell in the dining room.  Walking is difficult.  She has a rollator; uses it \"sometimes.\"  Went on 2 trips at the end of August and September.  Her 's, [employer], provides most of the HPI.  \"Lots of good things going on.\"  +strong social support group.  She has a home health aide 5 days a week.    She has PT with [facility].    She is going to see Dr. Adrian next week for her low back pain.   She has significant pain near her right inguinal ligament.      ADLS (Telles Index)  Activities Points (1 or 0) [place] (1 Point) NO supervision, direction or personal assistance. Dependence (0 Points) WITH supervision, direction, personal assistance or total care.    BATHING Bathes self completely or needs help in bathing only a single part of the body such as the back, genital area or disabled extremity. (0 POINTS) Need help with bathing more than one part of the body, getting in or out of the tub or shower. Requires total bathing 1   DRESSING Get clothes from closets and drawers and puts on clothes and outer garments complete with fasteners. May have help tying shoes. Needs help with dressing self or needs to be completely dressed. 1   TOILETING Goes to toilet, gets on and off, arranges clothes, cleans genital area without help. Needs help transferring to the toilet, cleaning self or uses bedpan or commode. 1   TRANSFERRING Moves in and out of bed or chair unassisted. Mechanical transfer aids are acceptable Needs help in moving from bed to chair or requires a complete transfer. 1   CONTINENCE Exercises complete self control over urination and defecation. Is partially or totally incontinent of bowel or bladder 1   FEEDING Gets food from plate into mouth without help. Preparation of food may be done by another person. Needs partial or total help with feeding or requires parenteral feeding. 1   Total 6 = high (patient independent) 0 = Low (very dependent) 6         12/21/23 interval  history/subjective:  Has right buttocks/lateral hip pain that radiates/ wraps around posteriorly to anterior to her groin  She fell 1 mo ago. Slipped w. Her slipper.  She struck a \"side by side.\"    She was limping/dragging her right leg when walking [place].   Her right pain is worse.    She had a LESI on 11.17.23      08/15/23 Interval History/Subjective :  Presents today with her .  Necessary medications that Christine can take to help reduce her smooth out Feelings of confusion, frustration, agitation, and despair.  Memory loss is not the most serious problem she is having.  The most serious problem is how distraught she is that all of this is happening to her.    Her step-son  recently moved out.  Her PHQ-2 was 0.  Will see audiology.  She had LASIK.      Pain assessment: Denies being in acute pain. She reports she has chronic back pain. She will get a spinal injection on Friday.    Current pharmacological treatment/adverse effects: Aricept. No benefit yet. No AE.         06/09/23 Interval History/Subjective :  Presents today with her .    Evaluation for safety concerns:    Driving:   does most of the driving.  She has to have a repeat written and on the road driving test per the [facility].  They want to know if they should proceed.  Explained that in the future there will be a time when she cannot drive.  She already may not be appropriate to drive.  Advised the patient and her  that she should study for her written driving test.  She should go for the test at the [facility].  If she can pass the written driving test and the on the road test then she needs to schedule an on the road driving evaluation.  She is not allowed to drive aside from her on the road test with the DM.  If she cannot pass either the written test or the on the road test on her own then she should not pursue an on the road driving evaluation.  Lastly explained that if she failed her on  the road driving evaluation she could go to a driving skills class to see if there is any improvement.  However, if patients have deficits due to a neurodegenerative disorder/dementia then an on the road driving class is not beneficial because as their disease progresses they will lose any new skills or knowledge they gained from the class.    Of note patient had a severe impairment on Trail making part a and part B.    Pain assessment: Denies being in acute pain.    Current pharmacological treatment/adverse effects: None    ROS: Pertinent positive and negatives per HPI.  All others were reviewed and negative.       Medications:  Current Outpatient Medications   Medication Instructions    ALPRAZolam (XANAX) 0.25 mg, Oral, Daily as needed    amLODIPine 10 MG Oral Tab TAKE ONE TABLET BY MOUTH ONE TIME DAILY    aspirin 81 mg, Oral, Daily    atorvastatin (LIPITOR) 40 mg, Oral, Nightly    Calcium Citrate-Vitamin D 315-250 MG-UNIT Oral Tab 2 tablets, Oral, 2 times daily with meals    cyanocobalamin (VITAMIN B12) 1000 MCG/ML Injection Solution 1 mL, Intramuscular, Every 30 days    denosumab (PROLIA) 60 mg, Subcutaneous, Once    donepezil (ARICEPT) 10 mg, Oral, Daily    DULoxetine 60 MG Oral Cap DR Particles Take 1 capsule  by mouth daily    folic acid (FOLVITE) 1 mg, Oral, Daily    gabapentin (NEURONTIN) 300 mg, Oral, 2 times daily    memantine (NAMENDA) 10 mg, Oral, 2 times daily    metoprolol succinate ER 50 MG Oral Tablet 24 Hr TAKE ONE TABLET BY MOUTH ONE TIME DAILY    Multiple Vitamin (MULTI-VITAMINS) Oral Tab 1 tablet, Oral, Daily with breakfast    omeprazole (PRILOSEC) 20 mg, Oral, Every morning    predniSONE 20 MG Oral Tab 2 tabs daily for 3 days then one tab daily until completed    predniSONE 20 MG Oral Tab Take two tablets a day for three days then one a day till gone    SULFASALAZINE 500 MG Oral Tab TAKE 3 TABLETS BY MOUTH TWICE A DAY    telmisartan (MICARDIS) 80 mg, Oral, Every morning    triamcinolone 0.1 %  External Cream Topical, 2 times daily    Vitamin D, Ergocalciferol, (DRISDOL) 55495 UNITS Oral Cap 1 capsule, Oral, As Directed, Every 5 days.          Objective:  Last vitals and weight :  There is no height or weight on file to calculate BMI.   Vitals:    10/18/24 0938   Patient Position: Sitting   BP Location: Left arm        Exam:  Alert and attentive.   Pleasant and cooperative.  Speech is spontaneous, fluent, and prosodic.   Phrase length and rate are normal. Does not perseverate.. No paraphasic errors .No neologisms. No anomia.  No obvious symptoms of aphasia during conversation.  (+) anosognosia. Poor insight into her deficits. She did not recall that she had the diagnosis of dementia.  No obvious neglect.    Affect:appropriate wih Normal Range/Congruency  Mood: appropriate wih Normal Range/Congruency  Motivation:   Thought content/organization of thoughts: Normal.  No flight of ideas.  No tangentiality.  Did not observe any distractibility, fidgetiness, restlessness, perseverations, obsessive thinking, paranoid or psychotic thinking, impulsivity, or other behavioral phenomenon.    Able to provide history:  no.  Elemental examination:  - General: appears stated age and no distress     - Pulmonary: No sign of respiratory distress.  Neurologic Exam  - Cranial Nerves: Gaze is conjugate..No ptosis. Normal masticatory muscle bulk .Facial expression was unremarkable without hypomimia and there was no obvious pathological facial weakness. No pathological facial asymmetry. No flattening of the nasolabial fold. .  Hearing grossly intact.     Motor Strength    - Motor:  normal tone/bulk. No interosseous wasting. No flattening of hypothenar eminences.      Right Left     Motor Strength   Deltoids 5 5  Triceps 5 5  Biceps 5 5  Wrist Extensors   5 5   Hip Flexors 5 5   Knee extensors 5 5  Knee flexors 5 5  Plantar flexion 4+ 4+  Dorsiflexion 4+ 4+  Eversion 5 5  Inversion 5 4+  EHL        Asterixis: No asterixis  noted.   Tremor: None    - Cerebellum: No truncal ataxia. No titubations.  Proprioception:  impaired JPS  on he LEFT. Intact on the right  Vibration: gradient loss  Temperature: intact in LE    - Gait/station: Normal gait and station. Symmetric arm swing.   She can tandem w/ some difficulty.  Romberg: sways but does not fall or lose her balance.       Most recent lab results:   Reviewed and assessed  Vitamin B12   Date Value Ref Range Status   06/28/2024 1,071 (H) 211 - 911 pg/mL Final     Comment:     Vitamin B12 Reference Range   Deficient:      <150 pg/mL   Indeterminate   150 - 211 pg/mL  Normal:         211 - 911 pg/mL        VITAMIN B12 (S)   Date Value Ref Range Status   03/19/2015 825 181 - 914 pg/mL Final     Folate (Folic Acid)   Date Value Ref Range Status   04/19/2023 >20.0 >=8.7 ng/mL Final     Comment:     This test may exhibit interference when a sample is collected from a person who is consuming high dose of biotin (a.k.a., vitamin B7, vitamin H, coenzyme R) supplements resulting in serum concentrations >100 ng/mL.  Intake of the recommended daily allowance (RDA) for biotin (0.03 mg) has not been shown to typically cause significant interference; however, high dose daily dietary supplements may contain biotin concentrations greater than 150 times (5-10 mg) the RDA.  It is recommended that physicians ask all patients who may be on biotin supplementation to stop biotin consumption at least 72 hours prior to collection of a new sample.       FOLATE (S)   Date Value Ref Range Status   08/26/2011 > 20.0 ng/mL Final     Comment:     Folate Reference Ranges  Deficient:        <2.5   NG/ML  Indeterminate:  2.5-3.0  NG/ML  Normal:           >3.0   NG/ML       Methylmalonic Acid   Date Value Ref Range Status   04/19/2023 209 0 - 378 nmol/L Final     TSH   Date Value Ref Range Status   04/19/2023 1.240 0.358 - 3.740 mIU/mL Final     Comment:     This test may exhibit interference when a sample is collected from  a person who is consuming high dose of biotin (a.k.a., vitamin B7, vitamin H, coenzyme R) supplements resulting in serum concentrations >100 ng/mL.  Intake of the recommended daily allowance (RDA) for biotin (0.03 mg) has not been shown to typically cause significant interference; however, high dose daily dietary supplements may contain biotin concentrations greater than 150 times (5-10 mg) the RDA.  It is recommended that physicians ask all patients who may be on biotin supplementation to stop biotin consumption at least 72 hours prior to collection of a new sample.       Glycohemoglobin (HgA1c)   Date Value Ref Range Status   06/07/2017 5.0 4.0 - 6.0 % Final   =    Diagnostic studies:  Performed an independent visualization of MRI of the brain  Imaging revealed: Agree with radiology read.  Diffuse cortical atrophy.  Bilateral hippocampal atrophy.    Assessment   Karyn Cobos is a 79 year old w/ a pmhx of   Hypertension, HLD, COVID-19 infection, Hx of DVTm multilevel lumbosacral spondylosis, B12 deficiency from pernicious anemia, ulcerative colitis, occular migraine,   who presents w/ her  to review her neuropsychological testing and the mri of her brain.    Karyn has dementia.  Greatest suspicion is for Alzheimer's dementia.  Her neuropsychological report suggests she may primarily have vascular dementia.  While almost all types dementia have a component of cerebrovascular disease, do not suspect that this is the main  of her neurodegenerative disease.  Will evaluate for any other causes of cognitive impairment including hearing loss.  Both she and her  deny any symptoms concerning for an untreated mood disorder.  They deny symptoms concerning for sleep apnea.  We will start her on donepezil.  Counseled on the potential adverse effects.    Her  is doing most of the driving.  Advised her that she could study for her written and on the road driving test.  If she passes both of these  then she still is not cleared to drive independently, but can go for an on the road driving test.  I also advised her and her  that since her cognitive impairment is due to dementia, if she fails her on the road driving test she should not take any classes to remediate, because she ultimately  will not retain what she learns.    Previously added Cymbalta to help w/ neuropathic pain and her anxiety/depression.  Unclear if it is helping.    Her refills have been ordered.       Functional Assessment Staging Test  The stages of Alzheimer's disease as defined by FAST are:      Stage   Stage Name  Characteristic    Pt's  Current   Stage  Expected   Untreated   AD   Duration   (months)  Mental Age   (years)  MMSE   (score)    1  Normal Aging  No deficits whatsoever   --   Adult  29-30    2  Possible Mild Cognitive   Impairment  Subjective functional deficit   --      28-29    3  Mild Cognitive Impairment  Objective functional deficit interferes with a person's most complex tasks   84  12+  24-28    4  Mild Dementia  IADLs become affected, such as bill paying, cooking, cleaning, traveling  x 24  8-12  19-20    5  Moderate Dementia  Needs help selecting proper attire   18  5-7  15    6a  Moderately Severe Dementia  Needs help putting on clothes   4.8  5  9    6b  Moderately Severe Dementia  Needs help bathing   4.8  4  8    6c  Moderately Severe Dementia  Needs help toileting   4.8  4  5    6d  Moderately Severe Dementia  Urinary incontinence   3.6  3-4  3    6e  Moderately Severe Dementia  Fecal incontinence   9.6  2-3  1    7a  Severe Dementia  Speaks 5-6 words during day   12  1.25  0    7b  Severe Dementia  Speaks only 1 word clearly   18  1  0    7c  Severe Dementia  Can no longer walk   12  1  0    7d  Severe Dementia  Can no longer sit up   12  0.5-0.8  0    7e  Severe Dementia  Can no longer smile   18  0.2-0.4  0    7f  Severe Dementia  Can no longer hold up head   12+  0-0.2  0              Plan    1. Mild  dementia, unspecified dementia type, unspecified whether behavioral, psychotic, or mood disturbance or anxiety (HCC)  Diagnostics:  Repeat neuropsychological testing  pending       Therapeutics  Cont. cymbalta for her mood/anxiety and for neuropathic pain.   She will see geriatric psychiatry  Cont. namenda   - donepezil 10 MG Oral Tab; Take 0.5 tablets (5 mg total) by mouth daily for 30 days, THEN 1 tablet (10 mg total) daily.  Dispense: 75 tablet; Refill: 0  Treat contributing factors/comorbid conditions:  Visual impairment: Recently seen by ophtho. The American Academy of Ophthalmology recommends comprehensive eye exam every one to three years for those aged 55 to 64, and every year or two after the age of 65  Hearing loss: Referred to audiology.The American Speech-Language-Hearing-Association recommends a hearing test  q3 years after the age of 50  Hepatic impairment: Per primary  Renal impairment: Per primary  Depression: Denies.  Will monitor  Sleep disturbances: Denies.  Can be readdressed in the future    Avoid anticholinergic medications in patients with mild cognitive impairment.  In particular the following medications which have a high anticholinergic activity:  Antihistamines including doxylamine, hydroxyzine, meclizine.  TCAs: amitriptyline, clomipramine, desipramine, doxepin, imipramine, nortriptyline.  First generation antipsychotics and clozapine.  Muscle relaxants: Tizanidine (lower anticholinergic affect is seen w/ cyclobenzaprine, baclofen, and methocarbamol.)  Antiemetics:  hydroxyzine, meclizine, promethazine, scopolamine.  Antispasmodics including atropine, clozapine, hycoasmine, glycopyrrolate.  Medications for overactive bladder including darifenacin oxybutynin, solifenacin (vesicare), tolterodine.  Consider stopping other neurologic agents that have low anticholinergic activity including carbamazepine, lithium, nefazodone, oxcarbazepine, phenelzine, and trazodone.    Non-medication  management:  Any sudden changes in memory, thinking, or behavior warrant prompt follow up with a primary care provider to rule out any medical conditions.  Stroke prevention strategies are important for brain health and include: blood pressure control, diabetes management, cholesterol reduction, and smoking cessation.  Regular aerobic and anaerobic exercise and a balanced and nutritious diet such as the Mediterranean and the MIND diet  Social and cognitively engaging activities   Fall risk reduction  Good sleep hygiene   We encouraged completion of the documents power of  for health and financial decisions if not already done              AAN dementia management quality measures set  quality measure addressed at today's visit    patient informed of diagnosis  Yes     education and support of caregivers provided  Yes     functional assessment  No     screening for behavioral and psychiatric symptoms of dementia  yes   screening for safety concerns  Yes     driving screening and follow up  no     advanced care planning  No     screening for pain  Yes     pharmacological treatment of dementia  Yes          Education Provided  Education/Instructions given to: patient   Barriers to Learning:  cognitive impairment for patient.  None for her .  Content: Care partner [OR spouse, informant, caregiver] educated on diagnosis, prognosis, warning signs, and treatment plan options.Refer to note above.  Evaluation/Outcome: Verbalized understanding    This document is not intended to support charting by exception.  Sections left blank in a completed note should be presumed not to have been done.       Disclaimer:   This record was dictated using  Dragon software. There may be errors due to voice recognition problems that were not realized and corrected during the completion of the note.           Thank you for allowing me to participate in the care of your patient.    Antonio Harrington DO  10/18/24

## 2024-10-24 ENCOUNTER — OFFICE VISIT (OUTPATIENT)
Dept: PHYSICAL MEDICINE AND REHAB | Facility: CLINIC | Age: 79
End: 2024-10-24
Payer: MEDICARE

## 2024-10-24 ENCOUNTER — TELEPHONE (OUTPATIENT)
Dept: PHYSICAL MEDICINE AND REHAB | Facility: CLINIC | Age: 79
End: 2024-10-24

## 2024-10-24 VITALS — HEIGHT: 62 IN | BODY MASS INDEX: 26.13 KG/M2 | WEIGHT: 142 LBS

## 2024-10-24 DIAGNOSIS — M51.9 LUMBAR DISC DISEASE: ICD-10-CM

## 2024-10-24 DIAGNOSIS — Z85.42 HISTORY OF ENDOMETRIAL CANCER: ICD-10-CM

## 2024-10-24 DIAGNOSIS — M48.062 SPINAL STENOSIS OF LUMBAR REGION WITH NEUROGENIC CLAUDICATION: Primary | ICD-10-CM

## 2024-10-24 DIAGNOSIS — M41.25 OTHER IDIOPATHIC SCOLIOSIS, THORACOLUMBAR REGION: ICD-10-CM

## 2024-10-24 DIAGNOSIS — M16.0 PRIMARY OSTEOARTHRITIS OF BOTH HIPS: ICD-10-CM

## 2024-10-24 DIAGNOSIS — M43.16 SPONDYLOLISTHESIS OF LUMBAR REGION: ICD-10-CM

## 2024-10-24 DIAGNOSIS — M54.16 LUMBAR RADICULOPATHY: ICD-10-CM

## 2024-10-24 DIAGNOSIS — M25.551 CHRONIC RIGHT HIP PAIN: ICD-10-CM

## 2024-10-24 DIAGNOSIS — M48.061 LUMBAR FORAMINAL STENOSIS: ICD-10-CM

## 2024-10-24 DIAGNOSIS — G89.29 CHRONIC RIGHT HIP PAIN: ICD-10-CM

## 2024-10-24 DIAGNOSIS — C54.9 MALIGNANT NEOPLASM OF CORPUS UTERI, EXCEPT ISTHMUS (HCC): ICD-10-CM

## 2024-10-24 PROCEDURE — 99214 OFFICE O/P EST MOD 30 MIN: CPT | Performed by: PHYSICAL MEDICINE & REHABILITATION

## 2024-10-24 PROCEDURE — 20611 DRAIN/INJ JOINT/BURSA W/US: CPT | Performed by: PHYSICAL MEDICINE & REHABILITATION

## 2024-10-24 RX ORDER — LIDOCAINE HYDROCHLORIDE 10 MG/ML
6.5 INJECTION, SOLUTION INFILTRATION; PERINEURAL ONCE
Status: COMPLETED | OUTPATIENT
Start: 2024-10-24 | End: 2024-10-24

## 2024-10-24 RX ORDER — TRIAMCINOLONE ACETONIDE 40 MG/ML
60 INJECTION, SUSPENSION INTRA-ARTICULAR; INTRAMUSCULAR ONCE
Status: COMPLETED | OUTPATIENT
Start: 2024-10-24 | End: 2024-10-24

## 2024-10-24 NOTE — TELEPHONE ENCOUNTER
Per CMS Guidelines -no authorization is required for Right hip injection under ultrasound guidance   CPT codes: 91315,   Completed in the office       Status: Authorization is not required based on medical necessity however is not a guarantee of payment and may be subject to review once claim is submitted-Covered Benefit.

## 2024-10-24 NOTE — PATIENT INSTRUCTIONS
Plan  I did a right hip injection in the office today under ultrasound guidance.  (See procedure note)    She will see if the above helps with the right buttock, groin, anterior thigh, and right anterior knee pain.    If the above does not help or the relief is not long lasting, then she will need to have a new right hip x-ray.    She will continue with the gabapentin for now.    She will let me know in 2 weeks how she is doing after having the injection.    She will follow up in 3-4 months or sooner if needed.

## 2024-10-24 NOTE — PROGRESS NOTES
Low Back Pain H & P    Chief Complaint:   Chief Complaint   Patient presents with    Follow - Up     Right L4 and Right L5 Transforaminal Epidural Steroid injection on 3/22/24. 0% improvement following injection. Pain 2/10, 10/10 when walking. Pain originates in right hip and radiates down leg and into back. Denies N/T. Admits weakness. Patient takes Gabapentin daily with no improvement. No tx.      Nursing note reviewed and verified.    Patient was last seen on 2024.  On 3/22/2024, I did the right L4 and L5 TFESI's which did not help.  The pain is increasing and she is having more weakness.  She is having more difficulty walking.  She does the best with the Rolator.  She is still falling.  She saw Dr. Anderson who gave her prednisone 40 mg a day for 3 days twice on 2 different occasions.  This helped her, but the second time the relief was not as great as it was the first time.  She did do PT with [facility] in May and .  She is not consistent with her HEP.  She will have pain with some of the HEP.  She has found that sitting on a folded towel on the right side, that she will feel better.    Description of the Pain  The pain is located in the right buttock.    The pain radiates to the right groin and right anterior thigh..  She has right anterior knee pain which is better with the salon pas patch.  The pain is described as a(n) aching sensation.    The pain is worse standing and walking.    The pain is better sitting.  There is no numbness.  There is no tingling in the legs.  There is weakness in the right leg when she has the pain.     Past Medical History   Past Medical History:    Arthritis    Back pain    Cough    Deep vein thrombosis (HCC)    Dementia (HCC)    Essential hypertension    Family history of colon cancer     0    PARA ZERO    H/O foot surgery    LEFT FOOT SURGERY, PINS PLACED IN TOE    H/O oral surgery    GUM/MOUTH SURGERY    Hallux rigidus    LEFT FOOT, YURY PROCEDURE, 1ST LEFT  METARSOPHALANGEAL JOINT    Heart palpitations    HEART MONITOR 2012    High blood pressure    High cholesterol    History of DVT (deep vein thrombosis)    Hypercholesterolemia    Hyperlipidemia    Hyperparathyroidism (HCC)    PAIR OF PARATHYROID REMOVED    Hyperparathyroidism (HCC)    PAIR OF PARATHYROID REMOVED     Hypertension    Left carotid artery stenosis    Migraine    OCCULAR MIGRAINE    Migraine    OCCULAR MIGRAINE     Neuroma    2 LT, HALLUX RIGIDUS LT, PER. NG.    Osteoarthritis    Other ill-defined conditions(799.89)    TAHBSO MGMT.    Other ill-defined conditions(799.89)    CHRONIC LEFT CYSTIC PELVIC MASS    Ovarian cyst    REMOVED PER NG.    Painful breasts    Pneumonia    HOSPITALIZED    Pneumonia due to organism    Spinal stenosis    Spinal stenosis    Tonsillitis    Ulcerative colitis (HCC)    Uterine cancer (HCC)       Past Surgical History   Past Surgical History:   Procedure Laterality Date    Appendectomy      patient doenst rememeber    Cataract Bilateral     Left Eye: 23, Right Eye: 23    Colonoscopy N/A 2016    Procedure: COLONOSCOPY;  Surgeon: Velma Herrera MD;  Location: [facility] ENDOSCOPY    Colonoscopy N/A 2018    Procedure: COLONOSCOPY;  Surgeon: Velma Herrera MD;  Location: [facility] ENDOSCOPY    Colonoscopy  10/2022    Colonoscopy N/A 10/14/2022    Procedure: COLONOSCOPY;  Surgeon: Michael López MD;  Location: [facility] ENDOSCOPY    Hysterectomy      Other surgical history  ?    Parathyroid removal    Other surgical history  2017    left foot surgery plate removed    Tonsillectomy         Family History   Family History   Problem Relation Age of Onset    Cancer Father 73        COLON, CAUSE OF DEATH    Cancer Mother 64        COLON, 2nd primary 94    Cancer Maternal Cousin Male 60        prostate/patient denies     Cancer Self 56        uterine    Prostate Cancer Maternal Uncle         age unknown/ at 94    Kidney Disease Neg          UROLITHIASIS    Breast Cancer Neg     Ovarian Cancer Neg        Social History   Social History     Socioeconomic History    Marital status:      Spouse name: Not on file    Number of children: Not on file    Years of education: Not on file    Highest education level: Not on file   Occupational History    Not on file   Tobacco Use    Smoking status: Never     Passive exposure: Never    Smokeless tobacco: Never   Vaping Use    Vaping status: Never Used   Substance and Sexual Activity    Alcohol use: Not Currently     Alcohol/week: 2.0 standard drinks of alcohol     Comment: socially    Drug use: Never    Sexual activity: Not on file   Other Topics Concern     Service Not Asked    Blood Transfusions Not Asked    Caffeine Concern Yes     Comment: SODA/TEA 1 CAN/DAY    Occupational Exposure Not Asked    Hobby Hazards Not Asked    Sleep Concern Not Asked    Stress Concern Not Asked    Weight Concern Not Asked    Special Diet Not Asked    Back Care Not Asked    Exercise Not Asked    Bike Helmet Not Asked    Seat Belt Not Asked    Self-Exams Not Asked   Social History Narrative    Not on file     Social Drivers of Health     Financial Resource Strain: Not on file   Food Insecurity: Not on file   Transportation Needs: Not on file   Physical Activity: Not on file   Stress: Not on file   Social Connections: Not on file   Housing Stability: Not on file       PE:  The patient does appear in her stated age in no distress.  The patient is well groomed.    Psychiatric:  The patient is alert and oriented x 3, but forgetful.  The patient has a normal affect and mood.      Respiratory:  No acute respiratory distress. Patient does not have a cough.    HEENT:  Extraocular muscles are intact. There is no kern icterus. Pupils are equal, round, and reactive to light. No redness or discharge bilaterally.    Skin:  There are no rashes or lesions.    Vitals:  There were no vitals filed for this visit.    Gait:    Gait: Normal  gait   Sit to Stand: no difficulty      Vascular lower extremity:   Dorsalis pedis pulse-RIGHT 2+   Dorsalis pedis pulse-LEFT 2+   Tibialis posterior pulse-RIGHT 2+   Tibialis posterior pulse-LEFT 2+     Neurological Lower Extremity:    Light Touch Sensation: Intact in bilateral LE except:  Decreased in the  medial Right thigh    LE Muscle Strength: All LE strength measurements 5/5 except:  Hip Flexion RIGHT:  4+/5   RIGHT plantar reflexes: downward response   LEFT plantar reflexes: downward response   Reflexes: 2+ in bilateral lower extremities     Hip: Hips are stable.    RIGHT hip ROM mildly limited   LEFT hip ROM mildly limited   Right hip flexion Negative pain   LEFT hip flexion Negative pain   RIGHT hip DANIELLE test Positive for right groin pain   LEFT hip DANIELLE test Negative for pain   RIGHT hip internal rotation Positive for right groin pain   LEFT hip internal rotation Negative for pain   Laying flat with the right hip extended gives her right groin pain.    Assessment  1. L4-5 severe, L3-4 mod, L2-3 mod, L1-2 mod-severe central stenosis    2. L5-S1 left large far lateral & mild central, L4-5 large diffuse, L3-4 mild-mod diffuse & left mod far lateral, L2-3 mod diffuse, L1-2 mod diffuse & right mod-large foraminal bulging discs     3. L5-S1 left severe/right moderate, L4-5 bilateral moderate, L3-4 left moderate-severe/right moderate, L2-3 right mderate-severe, L1-2 bilateral moderate-severe foraminal stenosis    4. right > left S1 radiculopathy with right L4 radiculitis    5. Other idiopathic scoliosis, thoracolumbar region    6. L4-5 unstable grade 2-3, L5-S1 stable grade 1 spondylolisthesis    7. History of endometrial cancer    8. Malignant neoplasm of corpus uteri, except isthmus (HCC)    9. Chronic right hip pain    10. Primary osteoarthritis of both hips: mild         Plan  I did a right hip injection in the office today under ultrasound guidance.  (See procedure note)    She will see if the above helps  with the right buttock, groin, anterior thigh, and right anterior knee pain.    If the above does not help or the relief is not long lasting, then she will need to have a new right hip x-ray.    She will continue with the gabapentin for now.    She will let me know in 2 weeks how she is doing after having the injection.    She will follow up in 3-4 months or sooner if needed.    The patient understands and agrees with the stated plan.  Tristan Adrian MD  10/24/2024

## 2024-10-24 NOTE — PROCEDURES
The patient is here for a right hip injection done under ultrasound guidance.    Under ultrasound guidance the right femoral-acetabular joint was identified and a roxane was placed on the patient's skin.The skin was cleaned with betadyne swabs x 3 and anesthetized with 1% lidocaine without epinephrine.  Then a 22 gauge needle was inserted under ultrasound guidance into the right femoral-acetabular joint.  Aspiration was performed and no blood, fluid, or air was aspirated.  The patient was then injected with 5 ml of 1.5 ml of 40 mg of Kenalog/ml and 3.5 ml of 1% lidocaine without epinephrine.  The needle was removed and a band aid was applied along with triple antibiotic ointment.  The patient will follow up in 2 weeks with a phone call.    These images are permanently stored in the ultrasound unit or PACS system.

## 2024-11-04 ENCOUNTER — NURSE ONLY (OUTPATIENT)
Dept: INTERNAL MEDICINE CLINIC | Facility: CLINIC | Age: 79
End: 2024-11-04

## 2024-11-04 DIAGNOSIS — E53.8 VITAMIN B12 DEFICIENCY: Primary | ICD-10-CM

## 2024-11-04 RX ADMIN — CYANOCOBALAMIN 1000 MCG: 1000 INJECTION, SOLUTION INTRAMUSCULAR; SUBCUTANEOUS at 09:36:00

## 2024-11-05 ENCOUNTER — PATIENT MESSAGE (OUTPATIENT)
Dept: PHYSICAL MEDICINE AND REHAB | Facility: CLINIC | Age: 79
End: 2024-11-05

## 2024-11-06 ENCOUNTER — TELEPHONE (OUTPATIENT)
Dept: PHYSICAL MEDICINE AND REHAB | Facility: CLINIC | Age: 79
End: 2024-11-06

## 2024-11-06 NOTE — TELEPHONE ENCOUNTER
Elisha Mcgee calling to speak to  regarding the patient care. She can be reach at her cell phone [phone]

## 2024-11-08 NOTE — TELEPHONE ENCOUNTER
I called Dr. Elizondo and she stated that she just saw this patient as a new PCP.  She had her get a hip x-ray which showed severe OA with a mild subchondral fracture.  We both agreed that she needed to see an orthopedic surgeon.  Please give her the information for Dr. Pearson or Ifeanyi or Eulalio Streeter.

## 2024-11-11 ENCOUNTER — TELEPHONE (OUTPATIENT)
Dept: ORTHOPEDICS CLINIC | Facility: CLINIC | Age: 79
End: 2024-11-11

## 2024-11-11 NOTE — PROGRESS NOTES
Dx: Age-related osteoporosis with current pathological fracture, initial encounter (M80.00XA)  Closed trimalleolar fracture of right ankle with routine healing, subsequent encounter (B92.910A)     Insurance (Authorized # of Visits):  Mdcr (10 per POC) Pt needs an appointment with Dr. Branham or DENIS for more refills. Last office visit was 09/2023. Please call to schedule next available appointment. It pt declines pt can follow up with PCP for refills.     Will send a courtesy refill    ADL's. - progressing   Pt will display improved score on the outcome measure to 75% to demonstrate increased functional ability. - progressing   Pt will be able to amb up and down steps with reciprocal pattern and min to no pain.  - partially met, able to p Neuro Jane:  A/P and M/L taps on rockerboard with one finger support on bar B - 2 min ea  Single leg balance on foam pad with taps on cones - 10 B    HEP: SLS at home; clams     Charges: Man - 1, TherEx - 2       Total Timed Treatment: 45 min  Total Treatm

## 2024-11-11 NOTE — TELEPHONE ENCOUNTER
S/w patient's spouse- He states that patient had a questionable fall about 3 weeks ago. She had xrays ordered by PCP 2 weeks ago and they were informed that there is concern for fracture. I informed him that I could get her in with Dr Pearson on 11/13/24 at 11:20. He accepted. I asked if he has xray images, he states that he does not have them, but he will try to obtain disc. He was agreeable to getting new ones if he can't obtain them. She had no other questions at this time

## 2024-11-11 NOTE — TELEPHONE ENCOUNTER
Dr.Jeffrey Pearson or Dr. Raul Suggs  [address] [facility] [address]  [place] [address]  Phone #: [phone]      Dr.Ryan Streeter  [address] [place] [address] [address]  [place] [address]  Phone #: [phone]    Spoke with spouse Basil and relayed 's message below as well as the information for the orthopedic surgeons.     Spouse was very appreciative and had no further questions.

## 2024-11-11 NOTE — TELEPHONE ENCOUNTER
Pt's  called.  Pt is being referred for a right hip fracture.  Wants to see Dr. Pearson.  No sooner appointments available.  Please call

## 2024-11-13 ENCOUNTER — HOSPITAL ENCOUNTER (OUTPATIENT)
Dept: GENERAL RADIOLOGY | Facility: HOSPITAL | Age: 79
Discharge: HOME | End: 2024-11-13
Attending: ORTHOPAEDIC SURGERY
Payer: MEDICARE

## 2024-11-13 ENCOUNTER — OFFICE VISIT (OUTPATIENT)
Dept: ORTHOPEDICS CLINIC | Facility: CLINIC | Age: 79
End: 2024-11-13
Payer: MEDICARE

## 2024-11-13 DIAGNOSIS — R52 PAIN: ICD-10-CM

## 2024-11-13 DIAGNOSIS — M16.11 PRIMARY OSTEOARTHRITIS OF RIGHT HIP: ICD-10-CM

## 2024-11-13 DIAGNOSIS — R52 PAIN: Primary | ICD-10-CM

## 2024-11-13 DIAGNOSIS — S79.911A INJURY OF RIGHT HIP, INITIAL ENCOUNTER: ICD-10-CM

## 2024-11-13 PROCEDURE — 73562 X-RAY EXAM OF KNEE 3: CPT | Performed by: ORTHOPAEDIC SURGERY

## 2024-11-13 PROCEDURE — 73502 X-RAY EXAM HIP UNI 2-3 VIEWS: CPT | Performed by: ORTHOPAEDIC SURGERY

## 2024-11-13 PROCEDURE — 99203 OFFICE O/P NEW LOW 30 MIN: CPT | Performed by: ORTHOPAEDIC SURGERY

## 2024-11-13 NOTE — PROGRESS NOTES
NURSING INTAKE COMMENTS:   Chief Complaint   Patient presents with    Hip Pain     Pt has had several falls in the past an xray was completed by PCP unable to bring disc of right hip       HPI: This 79 year old female presents today with complaints of pain in the right groin and hip region.  This was exacerbated following a fall.  She ambulates with a cane.  She has pain from the hip down the thigh to the knee.  She has a history of dementia.  She is conversant and answers questions appropriately.  She is here with her .  She had x-rays elsewhere, and a question of an insufficiency fracture was raised.    Past Medical History:    Arthritis    Back pain    Cough    Deep vein thrombosis (HCC)    Dementia (HCC)    Essential hypertension    Family history of colon cancer     0    PARA ZERO    H/O foot surgery    LEFT FOOT SURGERY, PINS PLACED IN TOE    H/O oral surgery    GUM/MOUTH SURGERY    Hallux rigidus    LEFT FOOT, YURY PROCEDURE, 1ST LEFT METARSOPHALANGEAL JOINT    Heart palpitations    HEART MONITOR 2012    High blood pressure    High cholesterol    History of DVT (deep vein thrombosis)    Hypercholesterolemia    Hyperlipidemia    Hyperparathyroidism (HCC)    PAIR OF PARATHYROID REMOVED    Hyperparathyroidism (HCC)    PAIR OF PARATHYROID REMOVED     Hypertension    Left carotid artery stenosis    Migraine    OCCULAR MIGRAINE    Migraine    OCCULAR MIGRAINE     Neuroma    2 LT, HALLUX RIGIDUS LT, PER. NG.    Osteoarthritis    Other ill-defined conditions(799.89)    TAHBSO MGMT.    Other ill-defined conditions(799.89)    CHRONIC LEFT CYSTIC PELVIC MASS    Ovarian cyst    REMOVED PER NG.    Painful breasts    Pneumonia    HOSPITALIZED    Pneumonia due to organism    Spinal stenosis    Spinal stenosis    Tonsillitis    Ulcerative colitis (HCC)    Uterine cancer (HCC)     Past Surgical History:   Procedure Laterality Date    Appendectomy      patient doenst rememeber    Cataract Bilateral      Left Eye: 03/21/23, Right Eye: 01/25/23    Colonoscopy N/A 12/05/2016    Procedure: COLONOSCOPY;  Surgeon: Velma Herrera MD;  Location: [facility] ENDOSCOPY    Colonoscopy N/A 05/24/2018    Procedure: COLONOSCOPY;  Surgeon: Velma Herrera MD;  Location: [facility] ENDOSCOPY    Colonoscopy  10/2022    Colonoscopy N/A 10/14/2022    Procedure: COLONOSCOPY;  Surgeon: Michael López MD;  Location: [facility] ENDOSCOPY    Hysterectomy  2002    Other surgical history  2004?    Parathyroid removal    Other surgical history  08/2017    left foot surgery plate removed    Tonsillectomy       Current Outpatient Medications   Medication Sig Dispense Refill    citalopram 10 MG Oral Tab Take 1 tablet (10 mg total) by mouth daily. 90 tablet 1    donepezil 10 MG Oral Tab Take 1 tablet (10 mg total) by mouth daily. 90 tablet 3    gabapentin 300 MG Oral Cap Take 1 capsule (300 mg total) by mouth 2 (two) times daily. 180 capsule 3    memantine 10 MG Oral Tab Take 1 tablet (10 mg total) by mouth 2 (two) times daily. 180 tablet 3    telmisartan 80 MG Oral Tab TAKE ONE TABLET BY MOUTH IN THE MORNING 90 tablet 3    ALPRAZolam 0.25 MG Oral Tab Take 1 tablet (0.25 mg total) by mouth daily as needed.      predniSONE 20 MG Oral Tab Take two tablets a day for three days then one a day till gone 10 tablet 0    predniSONE 20 MG Oral Tab 2 tabs daily for 3 days then one tab daily until completed 10 tablet 0    atorvastatin 40 MG Oral Tab TAKE ONE TABLET BY MOUTH AT BEDTIME 90 tablet 3    metoprolol succinate ER 50 MG Oral Tablet 24 Hr TAKE ONE TABLET BY MOUTH ONE TIME DAILY 90 tablet 3    denosumab 60 MG/ML Subcutaneous Solution Prefilled Syringe Inject 1 mL (60 mg total) into the skin one time.      triamcinolone 0.1 % External Cream Apply topically 2 (two) times daily. 45 g 1    amLODIPine 10 MG Oral Tab TAKE ONE TABLET BY MOUTH ONE TIME DAILY 90 tablet 3    omeprazole 20 MG Oral Capsule Delayed Release Take 1 capsule (20 mg total) by mouth every  morning.      SULFASALAZINE 500 MG Oral Tab TAKE 3 TABLETS BY MOUTH TWICE A  tablet 0    aspirin 81 MG Oral Tab EC Take 1 tablet (81 mg total) by mouth daily.  0    folic acid 1 MG Oral Tab Take 1 tablet (1 mg total) by mouth daily. 90 tablet 3    Calcium Citrate-Vitamin D 315-250 MG-UNIT Oral Tab Take 2 tablets by mouth 2 (two) times daily with meals.      cyanocobalamin (VITAMIN B12) 1000 MCG/ML Injection Solution Inject 1 mL (1,000 mcg total) into the muscle every 30 (thirty) days.      Vitamin D, Ergocalciferol, (DRISDOL) 57908 UNITS Oral Cap Take 1 capsule (50,000 Units total) by mouth As Directed. Every 5 days.      Multiple Vitamin (MULTI-VITAMINS) Oral Tab Take 1 tablet by mouth daily with breakfast.      LUTEIN OR Take by mouth As Directed.       Allergies[1]  Family History   Problem Relation Age of Onset    Cancer Father 73        COLON, CAUSE OF DEATH    Cancer Mother 64        COLON, 2nd primary 94    Cancer Maternal Cousin Male 60        prostate/patient denies     Cancer Self 56        uterine    Prostate Cancer Maternal Uncle         age unknown/ at 94    Kidney Disease Neg         UROLITHIASIS    Breast Cancer Neg     Ovarian Cancer Neg        Social History     Occupational History    Not on file   Tobacco Use    Smoking status: Never     Passive exposure: Never    Smokeless tobacco: Never   Vaping Use    Vaping status: Never Used   Substance and Sexual Activity    Alcohol use: Not Currently     Alcohol/week: 2.0 standard drinks of alcohol     Comment: socially    Drug use: Never    Sexual activity: Not on file        Review of Systems:  GENERAL: feels generally well, no significant weight loss or weight gain  SKIN: no ulcerated or worrisome skin lesions  EYES:denies blurred vision or double vision  HEENT: denies new nasal congestion, sinus pain or ST  LUNGS: denies shortness of breath  CARDIOVASCULAR: denies chest pain  GI: no hematemesis, no worsening heartburn, no diarrhea  : no  dysuria, no blood in urine, no difficulty urinating, no incontinence  MUSCULOSKELETAL: no other musculoskeletal complaints other than in HPI  NEURO: no numbness or tingling, no weakness or balance disorder  PSYCHE: no depression or anxiety  HEMATOLOGIC: no hx of blood dyscrasia  ENDOCRINE: no thyroid or diabetes issues  ALL/ASTHMA: no new hx of severe allergy or asthma    Physical Examination:    There were no vitals taken for this visit.  Constitutional: appears well hydrated, alert and responsive, no acute distress noted  Extremities: Antalgic gait on the right side.  She has a cane.  Stinchfield test is positive.  Pain with rotation of the right hip.    Neurological: Active plantarflexion dorsiflexion of the foot.  Pulses: 2+ posterior tibial    Imaging: Osteopenia with advanced osteoarthritis of the right hip and a subchondral cyst.  Moderate osteoarthritis of the right knee..     Lab Results   Component Value Date    WBC 6.8 09/20/2024    HGB 11.4 (L) 09/20/2024    .0 09/20/2024      Lab Results   Component Value Date     (H) 09/20/2024    BUN 24 (H) 09/20/2024    CREATSERUM 0.92 09/20/2024    GFRNAA 53 (L) 05/23/2022    GFRAA 62 05/23/2022        Assessment and Plan:  Diagnoses and all orders for this visit:    Pain  -     XR KNEE (3 VIEWS), RIGHT (CPT=73562); Future  -     XR HIP W OR WO PELVIS 2 OR 3 VIEWS, RIGHT (CPT=73502) [[id number]] - Orthopedic providers only; Future        Assessment: She has advanced osteoarthritis of her right hip.  Because of her fall, pain, and a question of an insufficiency fracture I have ordered an MRI scan of the hip to rule out an occult fracture of the femoral neck.    Plan: We talked about total hip arthroplasty is an elective procedure.  I advised her use of her walker rather than a cane and limit weightbearing to the right hip pending the results of the MRI.  She will follow-up with me when the MRI has been completed.    The above note was creating using Dragon  speech recognition technology. Please excuse any typos.    LYNETTE MCPHERSON MD       [1]   Allergies  Allergen Reactions    Fentanyl     Levaquin [Levofloxacin] RASH     Patient developed diffuse rash macular in nature after about 5 days of Levaquin.  Suspect Levaquin causes a rash.    Meperidine NAUSEA AND VOMITING     Demerol    Morphine NAUSEA AND VOMITING    Zithromax [Azithromycin] RASH

## 2024-11-13 NOTE — TELEPHONE ENCOUNTER
S/w - He informed me that she would not be able to get disc of the images. He states that they will get xrays at appointment time. He did want to inform me that patient did have dementia and he would be present for appointment. I told him that would be just fine and that I would let staff know.

## 2024-11-14 ENCOUNTER — MED REC SCAN ONLY (OUTPATIENT)
Dept: PHYSICAL MEDICINE AND REHAB | Facility: CLINIC | Age: 79
End: 2024-11-14

## 2024-11-25 ENCOUNTER — MED REC SCAN ONLY (OUTPATIENT)
Dept: NEUROLOGY | Facility: CLINIC | Age: 79
End: 2024-11-25

## 2024-12-02 ENCOUNTER — NURSE ONLY (OUTPATIENT)
Dept: INTERNAL MEDICINE CLINIC | Facility: CLINIC | Age: 79
End: 2024-12-02
Payer: MEDICARE

## 2024-12-02 DIAGNOSIS — E53.8 VITAMIN B12 DEFICIENCY: Primary | ICD-10-CM

## 2024-12-02 PROCEDURE — 96372 THER/PROPH/DIAG INJ SC/IM: CPT | Performed by: INTERNAL MEDICINE

## 2024-12-02 RX ADMIN — CYANOCOBALAMIN 1000 MCG: 1000 INJECTION, SOLUTION INTRAMUSCULAR; SUBCUTANEOUS at 09:37:00

## 2024-12-03 ENCOUNTER — HOSPITAL ENCOUNTER (OUTPATIENT)
Dept: MRI IMAGING | Age: 79
Discharge: HOME | End: 2024-12-03
Attending: ORTHOPAEDIC SURGERY
Payer: MEDICARE

## 2024-12-03 DIAGNOSIS — S79.911A INJURY OF RIGHT HIP, INITIAL ENCOUNTER: ICD-10-CM

## 2024-12-03 DIAGNOSIS — M16.11 PRIMARY OSTEOARTHRITIS OF RIGHT HIP: ICD-10-CM

## 2024-12-03 PROCEDURE — 73721 MRI JNT OF LWR EXTRE W/O DYE: CPT | Performed by: ORTHOPAEDIC SURGERY

## 2024-12-09 ENCOUNTER — EKG ENCOUNTER (OUTPATIENT)
Dept: LAB | Age: 79
End: 2024-12-09
Attending: INTERNAL MEDICINE
Payer: MEDICARE

## 2024-12-09 ENCOUNTER — LAB ENCOUNTER (OUTPATIENT)
Dept: LAB | Age: 79
End: 2024-12-09
Attending: INTERNAL MEDICINE
Payer: MEDICARE

## 2024-12-09 ENCOUNTER — TELEPHONE (OUTPATIENT)
Dept: INTERNAL MEDICINE CLINIC | Facility: CLINIC | Age: 79
End: 2024-12-09

## 2024-12-09 ENCOUNTER — PATIENT MESSAGE (OUTPATIENT)
Dept: ORTHOPEDICS CLINIC | Facility: CLINIC | Age: 79
End: 2024-12-09

## 2024-12-09 ENCOUNTER — OFFICE VISIT (OUTPATIENT)
Dept: INTERNAL MEDICINE CLINIC | Facility: CLINIC | Age: 79
End: 2024-12-09
Payer: MEDICARE

## 2024-12-09 VITALS
BODY MASS INDEX: 25.62 KG/M2 | TEMPERATURE: 98 F | HEART RATE: 74 BPM | WEIGHT: 139.19 LBS | DIASTOLIC BLOOD PRESSURE: 50 MMHG | OXYGEN SATURATION: 94 % | SYSTOLIC BLOOD PRESSURE: 108 MMHG | HEIGHT: 62 IN

## 2024-12-09 DIAGNOSIS — Z01.818 PRE-OP EVALUATION: Primary | ICD-10-CM

## 2024-12-09 DIAGNOSIS — R94.31 ABNORMAL EKG: ICD-10-CM

## 2024-12-09 DIAGNOSIS — I10 ESSENTIAL HYPERTENSION: ICD-10-CM

## 2024-12-09 DIAGNOSIS — M87.051 AVASCULAR NECROSIS OF BONE OF HIP, RIGHT (HCC): ICD-10-CM

## 2024-12-09 DIAGNOSIS — Z80.0 FAMILY HISTORY OF COLON CANCER: ICD-10-CM

## 2024-12-09 DIAGNOSIS — D51.0 PERNICIOUS ANEMIA: ICD-10-CM

## 2024-12-09 DIAGNOSIS — Z00.00 ROUTINE HEALTH MAINTENANCE: ICD-10-CM

## 2024-12-09 DIAGNOSIS — N94.89 PELVIC CYST IN FEMALE: ICD-10-CM

## 2024-12-09 DIAGNOSIS — R41.3 MEMORY DEFICIT: ICD-10-CM

## 2024-12-09 DIAGNOSIS — Z87.19 HISTORY OF CHRONIC ULCERATIVE COLITIS: ICD-10-CM

## 2024-12-09 DIAGNOSIS — E04.2 MULTIPLE THYROID NODULES: ICD-10-CM

## 2024-12-09 DIAGNOSIS — M81.8 OTHER OSTEOPOROSIS, UNSPECIFIED PATHOLOGICAL FRACTURE PRESENCE: ICD-10-CM

## 2024-12-09 DIAGNOSIS — I10 HYPERTENSION, ESSENTIAL: Primary | ICD-10-CM

## 2024-12-09 DIAGNOSIS — I10 ESSENTIAL HYPERTENSION: Primary | ICD-10-CM

## 2024-12-09 DIAGNOSIS — Z85.42 HISTORY OF UTERINE CANCER: ICD-10-CM

## 2024-12-09 LAB
ATRIAL RATE: 67 BPM
P AXIS: 52 DEGREES
P-R INTERVAL: 156 MS
Q-T INTERVAL: 380 MS
QRS DURATION: 62 MS
QTC CALCULATION (BEZET): 401 MS
R AXIS: -11 DEGREES
T AXIS: 15 DEGREES
VENTRICULAR RATE: 67 BPM

## 2024-12-09 PROCEDURE — 93005 ELECTROCARDIOGRAM TRACING: CPT

## 2024-12-09 PROCEDURE — 93010 ELECTROCARDIOGRAM REPORT: CPT | Performed by: INTERNAL MEDICINE

## 2024-12-09 PROCEDURE — 99215 OFFICE O/P EST HI 40 MIN: CPT | Performed by: INTERNAL MEDICINE

## 2024-12-09 RX ORDER — CITALOPRAM HYDROBROMIDE 20 MG/1
20 TABLET ORAL DAILY
COMMUNITY
Start: 2024-12-08

## 2024-12-09 NOTE — TELEPHONE ENCOUNTER
Called patient and spoke with spouse Basil(on HIPAA) and scheduled Follow up appointment on 12/11 at 10am with Dr Pearson for MRI results. He had no further concerns.

## 2024-12-09 NOTE — PROGRESS NOTES
Karyn Cobos is a 79 year old female.  HPI:     Chief Complaint   Patient presents with    Checkup     3 month, MRI done last week       Karyn is here with her  Basil.    She has hypertension.  Blood pressure appears to be under good control.    She had recent MRI and hip x-rays by .  I did copy and paste results below for reference.    She has right hip and groin pain.  He also has seen .      Impression   CONCLUSION:     Severe right hip osteoarthritis.     Avascular necrosis of the right femoral head without collapse of the articular surface.     Tendinosis of the right gluteus minimus and medius with trochanteric bursitis.     Moderate right hip joint effusion with synovitis.     5.7 cm left pelvic cystic lesion is partially seen and incompletely characterized.  Pelvic ultrasound recommended for further evaluation.           Dictated by (CST): Ash Adames MD on 12/05/2024 at 11:26 AM      Finalized by (CST): Ash Adames MD on 12/05/2024 at 11:29 AM           Impression   CONCLUSION:     1. MODERATE TO SEVERE DEGENERATIVE CHANGES WITHIN THE RIGHT HIP.     2. MODERATE DEGENERATIVE CHANGES WITHIN THE LEFT HIP.     3. LARGE CALCIFIED STRUCTURE WITHIN THE LEFT JUSTICE PELVIS LIKELY REPRESENTING A CALCIFIED FIBROID.             Dictated by (CST): Franky Chawla MD on 11/13/2024 at 1:07 PM      Finalized by (CST): Franky Chawla MD on 11/13/2024 at 1:09 PM        Of note per note from Dr. Elder patient's gynecological oncologist dated July 21, 2023.  On exam patient had bimanual exam that there was a 5 to 6 cm mass left consistent with lymphocyst.  Mass is nontender.    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  Lymphocyst that is asymptomatic.  Return as needed.    I do have a CT scan report from December 3, 2019 showing ovoid 6.4 cm cyst in the left hemipelvis with stable mural calcifications that is  unchanged from 2012 consistent with a benign cyst, possibility of chronic postoperative hematoma or mesenteric inclusion cyst.    I told her that I will let  know this who may be able to amend report.    She does have dementia.  She is followed by Dr. Harrington.  I am thinking that she may be offered a total hip replacement after she sees orthopedics this Wednesday.    My concern is postop neurological recovery.  I asked her and  Basil to confer with Dr. Harrington regarding the risks of surgery with her dementia.  She will need DVT prophylaxis as she does have a history of DVT right lower extremity.  Will get updated EKG.  She is asymptomatic cardiac wise.    She follows with Dr. Healy for Prolia.    She had thyroid ultrasound October 19,022 showing relatively stable appearance of thyroid gland with small cysts/nodules    She follows Dr. Patton for breast exams.  She is known to have left breast pain that is stable with negative mammograms.  eRX it was felt she does not need any screening mammograms past age 78 but will continue with breast exams and yearly exams by Dr. Patton yearly.  Her last visit was September 20, 2024.      Current Outpatient Medications   Medication Sig Dispense Refill    citalopram 20 MG Oral Tab Take 1 tablet (20 mg total) by mouth daily.      donepezil 10 MG Oral Tab Take 1 tablet (10 mg total) by mouth daily. 90 tablet 3    gabapentin 300 MG Oral Cap Take 1 capsule (300 mg total) by mouth 2 (two) times daily. 180 capsule 3    memantine 10 MG Oral Tab Take 1 tablet (10 mg total) by mouth 2 (two) times daily. 180 tablet 3    telmisartan 80 MG Oral Tab TAKE ONE TABLET BY MOUTH IN THE MORNING 90 tablet 3    ALPRAZolam 0.25 MG Oral Tab Take 1 tablet (0.25 mg total) by mouth daily as needed.      atorvastatin 40 MG Oral Tab TAKE ONE TABLET BY MOUTH AT BEDTIME 90 tablet 3    metoprolol succinate ER 50 MG Oral Tablet 24 Hr TAKE ONE TABLET BY MOUTH ONE TIME DAILY 90 tablet 3     denosumab 60 MG/ML Subcutaneous Solution Prefilled Syringe Inject 1 mL (60 mg total) into the skin one time.      triamcinolone 0.1 % External Cream Apply topically 2 (two) times daily. 45 g 1    amLODIPine 10 MG Oral Tab TAKE ONE TABLET BY MOUTH ONE TIME DAILY 90 tablet 3    omeprazole 20 MG Oral Capsule Delayed Release Take 1 capsule (20 mg total) by mouth every morning.      SULFASALAZINE 500 MG Oral Tab TAKE 3 TABLETS BY MOUTH TWICE A  tablet 0    aspirin 81 MG Oral Tab EC Take 1 tablet (81 mg total) by mouth daily.  0    folic acid 1 MG Oral Tab Take 1 tablet (1 mg total) by mouth daily. 90 tablet 3    Calcium Citrate-Vitamin D 315-250 MG-UNIT Oral Tab Take 2 tablets by mouth 2 (two) times daily with meals.      cyanocobalamin (VITAMIN B12) 1000 MCG/ML Injection Solution Inject 1 mL (1,000 mcg total) into the muscle every 30 (thirty) days.      Vitamin D, Ergocalciferol, (DRISDOL) 55647 UNITS Oral Cap Take 1 capsule (50,000 Units total) by mouth As Directed. Every 5 days.      Multiple Vitamin (MULTI-VITAMINS) Oral Tab Take 1 tablet by mouth daily with breakfast.      LUTEIN OR Take by mouth As Directed. (Patient not taking: Reported on 2024)        Past Medical History:    Arthritis    Back pain    Cough    Deep vein thrombosis (HCC)    Dementia (HCC)    Essential hypertension    Family history of colon cancer     0    PARA ZERO    H/O foot surgery    LEFT FOOT SURGERY, PINS PLACED IN TOE    H/O oral surgery    GUM/MOUTH SURGERY    Hallux rigidus    LEFT FOOT, YURY PROCEDURE, 1ST LEFT METARSOPHALANGEAL JOINT    Heart palpitations    HEART MONITOR 2012    High blood pressure    High cholesterol    History of DVT (deep vein thrombosis)    Hypercholesterolemia    Hyperlipidemia    Hyperparathyroidism (HCC)    PAIR OF PARATHYROID REMOVED    Hyperparathyroidism (HCC)    PAIR OF PARATHYROID REMOVED     Hypertension    Left carotid artery stenosis    Migraine    OCCULAR MIGRAINE     Migraine    OCCULAR MIGRAINE     Neuroma    2 LT, HALLUX RIGIDUS LT, PER. NG.    Osteoarthritis    Other ill-defined conditions(799.89)    TAHBSO MGMT.    Other ill-defined conditions(799.89)    CHRONIC LEFT CYSTIC PELVIC MASS    Ovarian cyst    REMOVED PER NG.    Painful breasts    Pneumonia    HOSPITALIZED    Pneumonia due to organism    Spinal stenosis    Spinal stenosis    Tonsillitis    Ulcerative colitis (HCC)    Uterine cancer (HCC)      Social History:  Social History     Socioeconomic History    Marital status:    Tobacco Use    Smoking status: Never     Passive exposure: Never    Smokeless tobacco: Never   Vaping Use    Vaping status: Never Used   Substance and Sexual Activity    Alcohol use: Not Currently     Alcohol/week: 2.0 standard drinks of alcohol     Comment: socially    Drug use: Never   Other Topics Concern    Caffeine Concern Yes     Comment: SODA/TEA 1 CAN/DAY        REVIEW OF SYSTEMS:   GENERAL HEALTH:  feels well otherwise  RESPIRATORY:  Voices no shortness of breath with exertion or cough  CARDIOVASCULAR:  Voices no chest pain on exertion or shortness of breath  GI:   Voices no abdominal pain or changes of bowels   :Viices no urning or frequency of urination.  NEURO: No acute neurological complaints    EXAM:   /50   Pulse 74   Temp 98.4 °F (36.9 °C)   Ht 5' 2\" (1.575 m)   Wt 139 lb 3.2 oz (63.1 kg)   SpO2 94%   BMI 25.46 kg/m²     GENERAL:  well developed, well nourished, in no apparent distress  SKIN:  no rashes ,   HEENT: atraumatic.   Pharynx normal without exudate.  EYES:  PERRL. Sclera anicteric.  NECK:  Supple,  no adenopathy,   LUNGS:  clear to auscultation.  Effort normal  CARDIO:  RRR without murmur.   S1 and S2 normal  GI:  good BS's,  no masses,   HSM or tenderness  EXTREMITIES : no cyanosis, clubbing or edema  MS: Flexion of hip does not create any pain but she does have some limited rotation.    ASSESSMENT AND PLAN:     1. Essential hypertension  Blood  pressure under satisfactory control.  Weight stable.  - EKG 12 Lead to be performed at [facility]; Future    2. Memory deficit  Memory deficit.  She does know it is December 9, 2024.  However she cannot remember the day of the week.  She was able to remember 3 out of 3 words after a minute or so.    3. Pernicious anemia  Stable.    4. Family history of colon cancer  Stable.  She follows with Dr. López.  It was felt no more screening colonoscopies needed.  Last screening colonoscopy October 2022 of ulcerative colitis and family history of colon cancer.    5. Routine health maintenance  She did he have her flu vaccine and COVID booster.  She had RSV vaccine.  She has had Shingrix x 2.  PCV 20 May 2022    6. History of chronic ulcerative colitis  History of chronic ulcerative colitis.  Asymptomatic.  No more colonoscopies needed.  On sulfasalazine    7. History of uterine cancer  History of uterine cancer.  Followed by Dr. Elder.  Greater than 10 years now without evidence of disease per his note.    8. Other osteoporosis, unspecified pathological fracture presence  On Prolia.  With Dr. Healy.    9. Multiple thyroid nodules  See above thyroid ultrasound.  Followed by Dr. Healy    10. Pelvic cyst in female  Pelvic cyst.  I will confer with radiology.  She may not need any further    11. Avascular necrosis of bone of hip, right (HCC)  We talked about this.  I think the recommendations will be for a right total hip replacement with .  I discussed my concern will be how she responds to anesthesia with her dementia.  She and Basil verbalized understanding.  I asked her to connect with Dr. Harrington her neurologist regarding any potential upcoming surgery.  She will call me Thursday or Friday with what the recommendation is from orthopedics.  Will check EKG.    This visit was 40 minutes.  I spent 10 minutes before visit preparing and reviewing old records.  Greater than 50% of the visit was engaged  in counseling and review of past data.     The patient indicates understanding of these issues and agrees to the plan.    Pan Mansfield MD  12/9/2024  4:01 PM

## 2024-12-09 NOTE — TELEPHONE ENCOUNTER
Ayad Adames.  Thank you for reading Karyn's MRI scan of the hip joints December 5, 2024.  Noted was the 5.7 cm left pelvic cystic lesion partially seen and incompletely characterized.  Of note is the at back December 3, 2019 she had a CT scan of abdomen pelvis and noted to have a ovoid 6.4 cm cyst in the left hemipelvis with stable mural calcifications unchanged from 2012 consistent with a benign cyst, possibly a chronic postoperative hematoma or mesenteric inclusion cyst.    This was also known and followed by her gynecologist Dr. Elder who last saw her July 21, 2023 and on pelvic she was noted to have a 5 to 6 cm mass left consistent with lymphocyst.  She has a remote history of adenocarcinoma of the uterus with minimal ovation that is now greater than 10 years out with no evidence of metastases.    I thought I would forward this information form to you that would be of value as I am thinking that with this information she probably does not need the pelvic ultrasound.    Let me know your thoughts.  Thank you.  Juancarlos.

## 2024-12-09 NOTE — TELEPHONE ENCOUNTER
Please call Karyn.  We most likely will need to talk to her  Basil as she does have some memory and understanding issues.    Please let them know that EKG showed a mild abnormality which I think may be due to what is called \"lead placement\".  This is where one of the leads was just a couple millimeters off.    Before going on to any further testing I would just like to repeat the EKG.  Order in place.  She can have this done at the [facility].    No appointment needed.  If same abnormality shows up then we might need to do some simple heart testing.  If EKG looks good then we know that the original EKG was due to the \"replacement\".

## 2024-12-10 ENCOUNTER — LAB ENCOUNTER (OUTPATIENT)
Dept: LAB | Age: 79
End: 2024-12-10
Attending: INTERNAL MEDICINE
Payer: MEDICARE

## 2024-12-10 DIAGNOSIS — I10 HYPERTENSION, ESSENTIAL: ICD-10-CM

## 2024-12-10 DIAGNOSIS — Z01.818 PRE-OP EVALUATION: ICD-10-CM

## 2024-12-10 DIAGNOSIS — I10 ESSENTIAL HYPERTENSION: ICD-10-CM

## 2024-12-10 LAB
ANION GAP SERPL CALC-SCNC: 12 MMOL/L (ref 0–18)
ATRIAL RATE: 71 BPM
BUN BLD-MCNC: 32 MG/DL (ref 9–23)
BUN/CREAT SERPL: 29.6 (ref 10–20)
CALCIUM BLD-MCNC: 10.4 MG/DL (ref 8.7–10.4)
CHLORIDE SERPL-SCNC: 104 MMOL/L (ref 98–112)
CO2 SERPL-SCNC: 24 MMOL/L (ref 21–32)
CREAT BLD-MCNC: 1.08 MG/DL
EGFRCR SERPLBLD CKD-EPI 2021: 52 ML/MIN/1.73M2 (ref 60–?)
FASTING STATUS PATIENT QL REPORTED: NO
GLUCOSE BLD-MCNC: 102 MG/DL (ref 70–99)
OSMOLALITY SERPL CALC.SUM OF ELEC: 297 MOSM/KG (ref 275–295)
P-R INTERVAL: 152 MS
POTASSIUM SERPL-SCNC: 4.8 MMOL/L (ref 3.5–5.1)
Q-T INTERVAL: 368 MS
QRS DURATION: 70 MS
QTC CALCULATION (BEZET): 399 MS
R AXIS: 1 DEGREES
SODIUM SERPL-SCNC: 140 MMOL/L (ref 136–145)
T AXIS: 3 DEGREES
VENTRICULAR RATE: 71 BPM

## 2024-12-10 PROCEDURE — 93010 ELECTROCARDIOGRAM REPORT: CPT | Performed by: INTERNAL MEDICINE

## 2024-12-10 PROCEDURE — 80048 BASIC METABOLIC PNL TOTAL CA: CPT

## 2024-12-10 PROCEDURE — 36415 COLL VENOUS BLD VENIPUNCTURE: CPT

## 2024-12-10 PROCEDURE — 93005 ELECTROCARDIOGRAM TRACING: CPT

## 2024-12-10 NOTE — TELEPHONE ENCOUNTER
We can let patient and/or Basil patient's   (patient has memory difficulties) know the repeat the EKG came out good.  It was read as normal.    I think it might be of value just to do a simple ultrasound of the heart called an echocardiogram anticipating she may need to have surgery.  I left order for that.  It is a simple test with no injections.  She should tolerate it well.  It is done over at the hospital.  Order in place.

## 2024-12-10 NOTE — TELEPHONE ENCOUNTER
Called patient per HIPAA spoke to patients  Basil. Gave him Dr. Tian message. They are seeing ortho tomorrow and then need to see neurologist to help determine if patient should have surgery. If so then he will get the echo.

## 2024-12-10 NOTE — TELEPHONE ENCOUNTER
Ash Adames MD Kraman, David, MD  Caller: Unspecified (Yesterday,  4:19 PM)  Ayad Perla,    Thanks for the message. You are right it is unchanged since 2012. I addended my report.    Ash

## 2024-12-11 ENCOUNTER — OFFICE VISIT (OUTPATIENT)
Dept: ORTHOPEDICS CLINIC | Facility: CLINIC | Age: 79
End: 2024-12-11
Payer: MEDICARE

## 2024-12-11 ENCOUNTER — TELEPHONE (OUTPATIENT)
Dept: ORTHOPEDICS CLINIC | Facility: CLINIC | Age: 79
End: 2024-12-11

## 2024-12-11 DIAGNOSIS — M87.051 AVASCULAR NECROSIS OF BONE OF RIGHT HIP (HCC): Primary | ICD-10-CM

## 2024-12-11 PROCEDURE — 99214 OFFICE O/P EST MOD 30 MIN: CPT | Performed by: ORTHOPAEDIC SURGERY

## 2024-12-11 NOTE — PROGRESS NOTES
NURSING INTAKE COMMENTS:   Chief Complaint   Patient presents with    Hip Pain     R hip f/u- here for MRI result       HPI: This 79 year old female presents today with complaints of right hip pain.  The pain is in her groin and anterior thigh.  She awakens at night, and has difficulty standing and walking short distances.  She is here to review the results of her MRI scan.  She is here with her .    Past Medical History:    Arthritis    Back pain    Cough    Deep vein thrombosis (HCC)    Dementia (HCC)    Essential hypertension    Family history of colon cancer     0    PARA ZERO    H/O foot surgery    LEFT FOOT SURGERY, PINS PLACED IN TOE    H/O oral surgery    GUM/MOUTH SURGERY    Hallux rigidus    LEFT FOOT, YURY PROCEDURE, 1ST LEFT METARSOPHALANGEAL JOINT    Heart palpitations    HEART MONITOR 2012    High blood pressure    High cholesterol    History of DVT (deep vein thrombosis)    Hypercholesterolemia    Hyperlipidemia    Hyperparathyroidism (HCC)    PAIR OF PARATHYROID REMOVED    Hyperparathyroidism (HCC)    PAIR OF PARATHYROID REMOVED     Hypertension    Left carotid artery stenosis    Migraine    OCCULAR MIGRAINE    Migraine    OCCULAR MIGRAINE     Neuroma    2 LT, HALLUX RIGIDUS LT, PER. NG.    Osteoarthritis    Other ill-defined conditions(799.89)    TAHBSO MGMT.    Other ill-defined conditions(799.89)    CHRONIC LEFT CYSTIC PELVIC MASS    Ovarian cyst    REMOVED PER NG.    Painful breasts    Pneumonia    HOSPITALIZED    Pneumonia due to organism    Spinal stenosis    Spinal stenosis    Tonsillitis    Ulcerative colitis (HCC)    Uterine cancer (HCC)     Past Surgical History:   Procedure Laterality Date    Appendectomy      patient doenst rememeber    Cataract Bilateral     Left Eye: 23, Right Eye: 23    Colonoscopy N/A 2016    Procedure: COLONOSCOPY;  Surgeon: Velma Herrera MD;  Location: [facility] ENDOSCOPY    Colonoscopy N/A 2018    Procedure:  COLONOSCOPY;  Surgeon: Velma Herrera MD;  Location: [facility] ENDOSCOPY    Colonoscopy  10/2022    Colonoscopy N/A 10/14/2022    Procedure: COLONOSCOPY;  Surgeon: Michael López MD;  Location: [facility] ENDOSCOPY    Hysterectomy  2002    Other surgical history  2004?    Parathyroid removal    Other surgical history  08/2017    left foot surgery plate removed    Tonsillectomy       Current Outpatient Medications   Medication Sig Dispense Refill    citalopram 20 MG Oral Tab Take 1 tablet (20 mg total) by mouth daily.      LUTEIN OR Take by mouth As Directed. (Patient not taking: Reported on 12/9/2024)      donepezil 10 MG Oral Tab Take 1 tablet (10 mg total) by mouth daily. 90 tablet 3    gabapentin 300 MG Oral Cap Take 1 capsule (300 mg total) by mouth 2 (two) times daily. 180 capsule 3    memantine 10 MG Oral Tab Take 1 tablet (10 mg total) by mouth 2 (two) times daily. 180 tablet 3    telmisartan 80 MG Oral Tab TAKE ONE TABLET BY MOUTH IN THE MORNING 90 tablet 3    ALPRAZolam 0.25 MG Oral Tab Take 1 tablet (0.25 mg total) by mouth daily as needed.      atorvastatin 40 MG Oral Tab TAKE ONE TABLET BY MOUTH AT BEDTIME 90 tablet 3    metoprolol succinate ER 50 MG Oral Tablet 24 Hr TAKE ONE TABLET BY MOUTH ONE TIME DAILY 90 tablet 3    denosumab 60 MG/ML Subcutaneous Solution Prefilled Syringe Inject 1 mL (60 mg total) into the skin one time.      triamcinolone 0.1 % External Cream Apply topically 2 (two) times daily. 45 g 1    amLODIPine 10 MG Oral Tab TAKE ONE TABLET BY MOUTH ONE TIME DAILY 90 tablet 3    omeprazole 20 MG Oral Capsule Delayed Release Take 1 capsule (20 mg total) by mouth every morning.      SULFASALAZINE 500 MG Oral Tab TAKE 3 TABLETS BY MOUTH TWICE A  tablet 0    aspirin 81 MG Oral Tab EC Take 1 tablet (81 mg total) by mouth daily.  0    folic acid 1 MG Oral Tab Take 1 tablet (1 mg total) by mouth daily. 90 tablet 3    Calcium Citrate-Vitamin D 315-250 MG-UNIT Oral Tab Take 2 tablets by mouth 2  (two) times daily with meals.      cyanocobalamin (VITAMIN B12) 1000 MCG/ML Injection Solution Inject 1 mL (1,000 mcg total) into the muscle every 30 (thirty) days.      Vitamin D, Ergocalciferol, (DRISDOL) 81099 UNITS Oral Cap Take 1 capsule (50,000 Units total) by mouth As Directed. Every 5 days.      Multiple Vitamin (MULTI-VITAMINS) Oral Tab Take 1 tablet by mouth daily with breakfast.       Allergies[1]  Family History   Problem Relation Age of Onset    Cancer Father 73        COLON, CAUSE OF DEATH    Cancer Mother 64        COLON, 2nd primary 94    Cancer Maternal Cousin Male 60        prostate/patient denies     Cancer Self 58        uterine    Prostate Cancer Maternal Uncle         age unknown/ at 94    Kidney Disease Neg         UROLITHIASIS    Breast Cancer Neg     Ovarian Cancer Neg        Social History     Occupational History    Not on file   Tobacco Use    Smoking status: Never     Passive exposure: Never    Smokeless tobacco: Never   Vaping Use    Vaping status: Never Used   Substance and Sexual Activity    Alcohol use: Not Currently     Alcohol/week: 2.0 standard drinks of alcohol     Comment: socially    Drug use: Never    Sexual activity: Not on file        Review of Systems:  GENERAL: feels generally well, no significant weight loss or weight gain  SKIN: no ulcerated or worrisome skin lesions  EYES:denies blurred vision or double vision  HEENT: denies new nasal congestion, sinus pain or ST  LUNGS: denies shortness of breath  CARDIOVASCULAR: denies chest pain  GI: no hematemesis, no worsening heartburn, no diarrhea  : no dysuria, no blood in urine, no difficulty urinating, no incontinence  MUSCULOSKELETAL: no other musculoskeletal complaints other than in HPI  NEURO: no numbness or tingling, no weakness or balance disorder  PSYCHE: no depression or anxiety  HEMATOLOGIC: no hx of blood dyscrasia  ENDOCRINE: no thyroid or diabetes issues  ALL/ASTHMA: no new hx of severe allergy or  asthma    Physical Examination:    There were no vitals taken for this visit.  Constitutional: appears well hydrated, alert and responsive, no acute distress noted  Extremities: Antalgic gait.  Positive Stinchfield DANIELLE and FADIR tests.  Decreased range of motion of the right hip.  Lumbar degenerative scoliosis noted.    Imaging: MRI HIPS, RIGHT (CPT=73721)    Addendum Date: 12/9/2024    ADDENDUM:  COMPARISON: [facility], CT ABD & PELVIS WWO CON, 8/01/2012, 10:06 AM.  Upon further review, the cystic lesion within the left hemipelvis is unchanged since 8/1/2012 and likely benign given over 12 years of stability.   Dictated by (CST): Ash Adames MD on 12/09/2024 at 8:12 PM     Finalized by (CST): Ash Adames MD on 12/09/2024 at 8:12 PM             Result Date: 12/9/2024  PROCEDURE: MRI HIPS, RIGHT (CPT=73721)  COMPARISON: [facility] 2nd Floor, XR HIP W OR WO PELVIS 2 OR 3 VIEWS, RIGHT (CPT=73502), 11/13/2024, 11:52 AM.  INDICATIONS: S79.911A Injury of right hip, initial encounter M16.11 Primary osteoarthritis of right hip  TECHNIQUE: A comprehensive examination was performed utilizing a variety of imaging planes and imaging parameters to optimize visualization of suspected pathology.  Images were performed without contrast.  FINDINGS:  FEMORAL HEAD: Extensive full-thickness chondral loss seen throughout the entire femoral head.  There are lateral femoral head neck osteophytes.  There is bone-on-bone articulation between the femoral head and acetabulum.  There is a vascular necrosis involving approximately 25% of the articular surface involving the superior femoral head with marrow edema throughout the femoral head.  No collapse of the articular surface. ACETABULUM: Extensive full-thickness chondral loss within the acetabulum with lateral acetabular osteophytes.  There are subchondral cysts seen within the superior acetabulum. No acute fracture, dislocation or marrow  replacing lesion. OTHER BONES: Degenerative changes are seen within the sacroiliac joints. Degenerative disc and facet disease is partially visualized in the lower lumbar spine. Degenerative changes are seen at the pubic symphysis.  Degenerative changes are partially seen  in the left hip. LABRUM: Abnormal signal and morphology of the anterior and superior labrum compatible with degenerative changes. EFFUSIONS: Moderate right hip joint effusion with synovitis. BURSAE: There is thickening with increased fluid in the trochanteric bursa. TENDONS: Thickening with increased signal of the gluteus minimus and medius at the greater trochanter. No full-thickness or retracted tear. MUSCLES: No tear or strain.  No inappropriate atrophy.  OTHER: Visualized sciatic nerves are unremarkable.  Quadratus femoris spaces otherwise maintained.  5.7 cm cystic lesion seen within the left pelvis.         CONCLUSION:   Severe right hip osteoarthritis.  Avascular necrosis of the right femoral head without collapse of the articular surface.  Tendinosis of the right gluteus minimus and medius with trochanteric bursitis.  Moderate right hip joint effusion with synovitis.  5.7 cm left pelvic cystic lesion is partially seen and incompletely characterized.  Pelvic ultrasound recommended for further evaluation.    Dictated by (CST): Ash Adames MD on 12/05/2024 at 11:26 AM     Finalized by (CST): Ash Adames MD on 12/05/2024 at 11:29 AM          XR HIP W OR WO PELVIS 2 OR 3 VIEWS, RIGHT (CPT=73502)    Result Date: 11/13/2024  PROCEDURE: XR HIP W OR WO PELVIS 2 OR 3 VIEWS, RIGHT (CPT=73502)  COMPARISON: [facility], XR HIP W OR WO PELVIS 2 OR 3 VIEWS, RIGHT (CPT=73502), 11/06/2023, 10:49 AM.  INDICATIONS: chronic right hip pain. no recent trauma  TECHNIQUE: 3 views were obtained.   FINDINGS:   Bone mineralization is normal.  There are moderate to severe degenerative changes within the right hip manifested by articular  space narrowing/obliteration, and subarticular sclerosis.  There are moderate degenerative changes within the left hip manifested by slight articular space narrowing and subarticular sclerosis.  There is redemonstration of a 7.8 x 6.7 cm calcified structure within the left hemipelvis likely representing calcified fibroid.         CONCLUSION:   1. MODERATE TO SEVERE DEGENERATIVE CHANGES WITHIN THE RIGHT HIP.  2. MODERATE DEGENERATIVE CHANGES WITHIN THE LEFT HIP.  3. LARGE CALCIFIED STRUCTURE WITHIN THE LEFT JUSTICE PELVIS LIKELY REPRESENTING A CALCIFIED FIBROID.     Dictated by (CST): Franky Chawla MD on 11/13/2024 at 1:07 PM     Finalized by (CST): Franky Chawla MD on 11/13/2024 at 1:09 PM          XR KNEE (3 VIEWS), RIGHT (CPT=73562)    Result Date: 11/13/2024  PROCEDURE: XR KNEE ROUTINE (3 VIEWS), RIGHT (CPT=73562)  COMPARISON: None.  INDICATIONS: chronic right knee pain. no trauma  TECHNIQUE: 3 views were obtained.   FINDINGS:   Bone mineralization is normal.  There is no acute fracture/dislocation.  There are in slight to moderate symmetric degenerative changes within both knees manifested by minimal bony hypertrophy, articular space narrowing, subarticular sclerosis, and tibial spine sharpening.         CONCLUSION: SLIGHT TO MODERATE SYMMETRIC DEGENERATIVE CHANGES WITHIN BOTH KNEES.    Dictated by (CST): Franky Chawla MD on 11/13/2024 at 12:42 PM     Finalized by (CST): Franky Chawla MD on 11/13/2024 at 12:43 PM             Lab Results   Component Value Date    WBC 6.8 09/20/2024    HGB 11.4 (L) 09/20/2024    .0 09/20/2024      Lab Results   Component Value Date     (H) 12/10/2024    BUN 32 (H) 12/10/2024    CREATSERUM 1.08 (H) 12/10/2024    GFRNAA 53 (L) 05/23/2022    GFRAA 62 05/23/2022        Assessment and Plan:  Diagnoses and all orders for this visit:    Avascular necrosis of bone of right hip (HCC)        Assessment: She has avascular necrosis of the right hip in addition to her  advanced osteoarthritis.  I had a lengthy discussion with her and her  regarding the options for treatment.  She has advanced osteoarthritis of the right hip that has not responded to conservative management.  I discussed right total hip replacement.  We discussed the risks and indications for surgery as well as the common possible complications.  Our discussion included, but was not limited to the potential risks of anesthetic complication, infection, bleeding, DVT or PE, nerve or blood vessel injury, stiffness, the potential need for revision surgery, leg length inequality, as well as the potential risk of unanticipated perioperative medical or orthopedic complications. She would like to proceed.  Surgery has been scheduled pending medical clearance.     We specifically discussed the potential impact on her dementia in regards to anesthesia.  There is some risk of exacerbation of her underlying anesthesia from a general anesthetic.  My preference would be for a spinal anesthetic which is our usual protocol, however I made clear that if the anesthesiologist is technically unable to achieve a suitable spinal, that there is always the chance that general anesthesia may be necessary as a backup.  This risk has to be weighed against the potential benefit of total hip replacement in terms of relieving her pain, and the impact that pain relief may have on her mental status as well.  Unfortunately, the prognosis without surgery is for further deterioration of her hip and increasing pain.    Plan: They would like to proceed with total hip arthroplasty which I would perform with an anterior approach.  This has been scheduled pending medical and dental clearance.  We will see her about a week prior to surgery to answer any open questions and review her medical and dental clearance.  Next visit will be with my PA, Maximo Pierson.    The above note was creating using Dragon speech recognition technology. Please excuse any  typos.    LYNETTE MCPHERSON MD       [1]   Allergies  Allergen Reactions    Fentanyl     Levaquin [Levofloxacin] RASH     Patient developed diffuse rash macular in nature after about 5 days of Levaquin.  Suspect Levaquin causes a rash.    Meperidine NAUSEA AND VOMITING     Demerol    Morphine NAUSEA AND VOMITING    Zithromax [Azithromycin] RASH

## 2024-12-11 NOTE — TELEPHONE ENCOUNTER
Patient was given pre surgical papers today in clinic. Including Dental clearance form, [facility] Surgery Check List (Medical clearance), Pre Operative Education for Total Joint replacements and Medacta/Elizabeth booklet.

## 2024-12-12 NOTE — TELEPHONE ENCOUNTER
Patient's spouse Basil is calling with a condition update.  Patient saw the Ortho yesterday he recommends a total hip ortho plasty which he will perform with an anterior approach.    Basil would like to discuss the options with Dr Mansfield    Please call Basil [phone]

## 2024-12-13 NOTE — TELEPHONE ENCOUNTER
Discussed with Basil.  Karyn did see Dr. Lee.  Right total hip recommended.    The concern is Karyn's dementia.  Basil wished me to contact Dr. Harrington regarding risks of anesthesia whether spinal or general anesthesia in terms of her dementia and is there anything to do to decrease risk of postoperative neurological dementia related problems.    I told him I will try to communicate with Dr. Harrington and get back to him.  He verbalized understanding

## 2024-12-14 NOTE — TELEPHONE ENCOUNTER
Discussed with Basil.  Discussed with Dr. Harrington.  Discussed possibility of postop psychiatric problems with her underlying dementia and then postop delirium.  Also discussed possibility of permanent decline in her cognitive and executive function postop.  Basil verbalized understanding.  We both talked about Karyn being in significant pain in her right hip and she does have avascular necrosis.  I think she should move ahead with her hip replacement and except the possible postoperative problems with mentation.  Basil verbalized understanding and agreement.  I do not think Karyn should not be given the benefit of hip replacement with decreased pain and improved quality of life with decreased hip pain.  The hip pain is interfering with her activities of daily living and activities of enjoyment.    Will get echocardiogram.  He will keep me updated regarding any surgical dates.

## 2024-12-16 RX ORDER — AMLODIPINE BESYLATE 10 MG/1
TABLET ORAL
Qty: 90 TABLET | Refills: 3 | Status: SHIPPED | OUTPATIENT
Start: 2024-12-16

## 2024-12-16 NOTE — TELEPHONE ENCOUNTER
Refill request is for a maintenance medication and has met the criteria specified in the Ambulatory Medication Refill Standing Order for eligibility, visits, laboratory, alerts and was sent to the requested pharmacy.    Requested Prescriptions     Signed Prescriptions Disp Refills    amLODIPine 10 MG Oral Tab 90 tablet 3     Sig: TAKE ONE TABLET BY MOUTH ONE TIME DAILY     Authorizing Provider: NORIS MENDEZ     Ordering User: ROSSY HYMAN

## 2024-12-25 DIAGNOSIS — M79.2 NEUROPATHIC PAIN: ICD-10-CM

## 2024-12-26 RX ORDER — DULOXETIN HYDROCHLORIDE 30 MG/1
30 CAPSULE, DELAYED RELEASE ORAL 2 TIMES DAILY
Qty: 180 CAPSULE | Refills: 11 | Status: SHIPPED | OUTPATIENT
Start: 2024-12-26

## 2024-12-26 NOTE — TELEPHONE ENCOUNTER
Requested Prescriptions     Pending Prescriptions Disp Refills    DULOXETINE 30 MG Oral Cap DR Particles [Pharmacy Med Name: Duloxetine Hcl Ec 30 Mg Cap [place]] 180 capsule 0     Sig: TAKE ONE CAPSULE BY MOUTH TWICE DAILY     The original prescription was discontinued on 6/28/2024 by Trista Al, RN. Renewing this prescription may not be appropriate.     Last OV: 10/18/2024  Next OV:     [place]/;      Last office visit plan;   Plan  1. Mild dementia, unspecified dementia type, unspecified whether behavioral, psychotic, or mood disturbance or anxiety (HCC)  Diagnostics:  Repeat neuropsychological testing  pending        Therapeutics  Cont. cymbalta for her mood/anxiety and for neuropathic pain.   She will see geriatric psychiatry  Cont. namenda   - donepezil 10 MG Oral Tab; Take 0.5 tablets (5 mg total) by mouth daily for 30 days, THEN 1 tablet (10 mg total) daily.  Dispense: 75 tablet; Refill: 0  Treat contributing factors/comorbid conditions:  Visual impairment: Recently seen by ophtho. The American Academy of Ophthalmology recommends comprehensive eye exam every one to three years for those aged 55 to 64, and every year or two after the age of 65  Hearing loss: Referred to audiology.The American Speech-Language-Hearing-Association recommends a hearing test  q3 years after the age of 50  Hepatic impairment: Per primary  Renal impairment: Per primary  Depression: Denies.  Will monitor  Sleep disturbances: Denies.  Can be readdressed in the future     Avoid anticholinergic medications in patients with mild cognitive impairment.  In particular the following medications which have a high anticholinergic activity:  Antihistamines including doxylamine, hydroxyzine, meclizine.  TCAs: amitriptyline, clomipramine, desipramine, doxepin, imipramine, nortriptyline.  First generation antipsychotics and clozapine.  Muscle relaxants: Tizanidine (lower anticholinergic affect is seen w/ cyclobenzaprine, baclofen, and  methocarbamol.)  Antiemetics:  hydroxyzine, meclizine, promethazine, scopolamine.  Antispasmodics including atropine, clozapine, hycoasmine, glycopyrrolate.  Medications for overactive bladder including darifenacin oxybutynin, solifenacin (vesicare), tolterodine.  Consider stopping other neurologic agents that have low anticholinergic activity including carbamazepine, lithium, nefazodone, oxcarbazepine, phenelzine, and trazodone.     Non-medication management:  Any sudden changes in memory, thinking, or behavior warrant prompt follow up with a primary care provider to rule out any medical conditions.  Stroke prevention strategies are important for brain health and include: blood pressure control, diabetes management, cholesterol reduction, and smoking cessation.  Regular aerobic and anaerobic exercise and a balanced and nutritious diet such as the Mediterranean and the MIND diet  Social and cognitively engaging activities   Fall risk reduction  Good sleep hygiene   We encouraged completion of the documents power of  for health and financial decisions if not already done                  AAN dementia management quality measures set  quality measure addressed at today's visit    patient informed of diagnosis  Yes     education and support of caregivers provided  Yes     functional assessment  No     screening for behavioral and psychiatric symptoms of dementia  yes   screening for safety concerns  Yes     driving screening and follow up  no     advanced care planning  No     screening for pain  Yes     pharmacological treatment of dementia  Yes              Education Provided  Education/Instructions given to: patient   Barriers to Learning:  cognitive impairment for patient.  None for her .  Content: Care partner [OR spouse, informant, caregiver] educated on diagnosis, prognosis, warning signs, and treatment plan options.Refer to note above.  Evaluation/Outcome: Verbalized understanding     This  document is not intended to support charting by exception.  Sections left blank in a completed note should be presumed not to have been done.        Disclaimer:   This record was dictated using  Dragon software. There may be errors due to voice recognition problems that were not realized and corrected during the completion of the note.              Thank you for allowing me to participate in the care of your patient.     Antonio Harrington,   10/18/24                 Instructions      Return in about 1 year (around 10/18/2025).

## 2024-12-30 ENCOUNTER — NURSE ONLY (OUTPATIENT)
Dept: INTERNAL MEDICINE CLINIC | Facility: CLINIC | Age: 79
End: 2024-12-30

## 2024-12-30 DIAGNOSIS — E53.8 B12 DEFICIENCY: Primary | ICD-10-CM

## 2024-12-30 PROCEDURE — 96372 THER/PROPH/DIAG INJ SC/IM: CPT | Performed by: INTERNAL MEDICINE

## 2024-12-30 RX ADMIN — CYANOCOBALAMIN 1000 MCG: 1000 INJECTION, SOLUTION INTRAMUSCULAR; SUBCUTANEOUS at 10:00:00

## 2025-01-06 ENCOUNTER — HOSPITAL ENCOUNTER (OUTPATIENT)
Dept: CV DIAGNOSTICS | Facility: HOSPITAL | Age: 80
Discharge: HOME | End: 2025-01-06
Attending: INTERNAL MEDICINE
Payer: MEDICARE

## 2025-01-06 DIAGNOSIS — R94.31 ABNORMAL EKG: ICD-10-CM

## 2025-01-06 PROCEDURE — 93306 TTE W/DOPPLER COMPLETE: CPT | Performed by: INTERNAL MEDICINE

## 2025-01-10 ENCOUNTER — TELEPHONE (OUTPATIENT)
Dept: INTERNAL MEDICINE CLINIC | Facility: CLINIC | Age: 80
End: 2025-01-10

## 2025-01-10 DIAGNOSIS — Z01.810 PREOP CARDIOVASCULAR EXAM: ICD-10-CM

## 2025-01-10 DIAGNOSIS — I10 PRIMARY HYPERTENSION: ICD-10-CM

## 2025-01-10 DIAGNOSIS — I51.89 DIASTOLIC DYSFUNCTION: Primary | ICD-10-CM

## 2025-01-10 NOTE — TELEPHONE ENCOUNTER
Please call patient's  Basil.  (Karyn has some memory difficulties).    Please let him know that the echocardiogram came out fairly satisfactory.    It did show a abnormality called \"diastolic dysfunction\".    This is where the heart muscle gets \"stiff\".  Usually from longstanding histories of high blood pressure.    I do not think this is a major problem.  Usually people tolerate this well.  There is usually no specific medications to give other than medications to control blood pressure.    However since she is entertaining having a hip replacement and will go under general anesthesia it would be good that she gets cardiac clearance from her cardiologist before her surgery.    It looks like her surgery is scheduled for February 18.    I would like her to see either Dr. Keyes or Dr. Benavides.  They are cardiologist I referred to.  They are over by the hospital.  Phone number [phone].    I will place referral to Dr. Benavides but Christine can see either the cardiologist.    If he calls today hopefully she can be seen within the next 10 to 14 days so that she gets cardiac clearance.    I copied echocardiogram and placed in outbox if he wishes to have a copy for his review

## 2025-01-10 NOTE — TELEPHONE ENCOUNTER
Called spouse per HIPAA and relayed DR MARK message - verbalized understanding  Information for Cardiologist sent via [employer]

## 2025-01-20 ENCOUNTER — TELEPHONE (OUTPATIENT)
Dept: INTERNAL MEDICINE CLINIC | Facility: CLINIC | Age: 80
End: 2025-01-20

## 2025-01-20 ENCOUNTER — TELEPHONE (OUTPATIENT)
Dept: ORTHOPEDICS CLINIC | Facility: CLINIC | Age: 80
End: 2025-01-20

## 2025-01-20 NOTE — TELEPHONE ENCOUNTER
Please call  office today and at [phone] and ask for paperwork regarding patient's preop labs and any other requests by the doctor for her hip replacement February 18.

## 2025-01-20 NOTE — TELEPHONE ENCOUNTER
Dr. Mansfield's office asking for pre-op clearance papers/any labs needed for 2/18 surgery be faxed to their office at [phone]. Patient has appointment with them 1/21 in the AM . Please advise.

## 2025-01-21 ENCOUNTER — OFFICE VISIT (OUTPATIENT)
Dept: INTERNAL MEDICINE CLINIC | Facility: CLINIC | Age: 80
End: 2025-01-21

## 2025-01-21 VITALS
BODY MASS INDEX: 24.84 KG/M2 | TEMPERATURE: 98 F | OXYGEN SATURATION: 95 % | WEIGHT: 135 LBS | SYSTOLIC BLOOD PRESSURE: 132 MMHG | DIASTOLIC BLOOD PRESSURE: 56 MMHG | HEART RATE: 104 BPM | HEIGHT: 62 IN

## 2025-01-21 DIAGNOSIS — R82.90 ABNORMAL URINALYSIS: ICD-10-CM

## 2025-01-21 DIAGNOSIS — Z01.818 PRE-OP EVALUATION: Primary | ICD-10-CM

## 2025-01-21 DIAGNOSIS — R41.3 MEMORY DEFICIT: ICD-10-CM

## 2025-01-21 DIAGNOSIS — I10 PRIMARY HYPERTENSION: ICD-10-CM

## 2025-01-21 DIAGNOSIS — E55.9 VITAMIN D DEFICIENCY: ICD-10-CM

## 2025-01-21 DIAGNOSIS — I51.89 DIASTOLIC DYSFUNCTION: ICD-10-CM

## 2025-01-21 DIAGNOSIS — M81.8 OTHER OSTEOPOROSIS, UNSPECIFIED PATHOLOGICAL FRACTURE PRESENCE: ICD-10-CM

## 2025-01-21 PROCEDURE — 99214 OFFICE O/P EST MOD 30 MIN: CPT | Performed by: INTERNAL MEDICINE

## 2025-01-21 RX ORDER — HYDROCHLOROTHIAZIDE 12.5 MG/1
12.5 CAPSULE ORAL DAILY
COMMUNITY
Start: 2024-11-25

## 2025-01-21 RX ORDER — ESCITALOPRAM OXALATE 5 MG/1
5 TABLET ORAL DAILY
COMMUNITY
Start: 2025-01-14

## 2025-01-21 NOTE — PROGRESS NOTES
Karyn Cobos is a 79 year old female.  HPI:     Chief Complaint   Patient presents with    Surgical/procedure Clearance     Rt total hip replacement scheduled for 2/18 with Dr. Pearson      Karyn is with her  Basil    She will be having right total hip her surgery with .  I have spoken to Basil about the potential postoperative risks as it relates to her dementia.  He verbalized understanding.  I also in the past have spoken to Dr. Harrington about any prophylactic measures that can be taken preoperatively to limit postoperative complications neurologically in terms of her dementia.    Will get preoperative chest x-ray labs nasal smear and urinalysis as requested by .    She has no new complaints.  She has lost a little bit of weight.  Her weight today is 135 pounds.  In September she was 142 pounds.  She does admit to eating less sweets.  Andrea I do not feel there is any alarm symptoms to warrant any other investigations for now.  Her weight November 2023 was 128 pounds.    She will be coming next week for her vitamin B12 injection for pernicious anemia and we will get her labs at that time.  She will get a chest x-ray when she sees cardiology January 31.  She will be seeing Dr. Keyes.  I have her seen Dr. Keyes for preop cardiac clearance.  She has diastolic dysfunction.  EKG was unremarkable.  She is asymptomatic without any ischemic symptoms.    There was a question about vitamin D supplementation.  She has been on 50,000 units of vitamin D every 5 days under the direction of Dr. Healy her endocrinologist at [place].  Her last vitamin D level back in June of last year was 65.  Will update vitamin D level.    She does have a establishment with another primary care physician .      Current Outpatient Medications   Medication Sig Dispense Refill    escitalopram 5 MG Oral Tab Take 1 tablet (5 mg total) by mouth daily. Slowing tapering up dosage. Final dose of 10 mg, which will  then discontinue Citalopram      hydroCHLOROthiazide 12.5 MG Oral Cap Take 1 capsule (12.5 mg total) by mouth daily.      DULOXETINE 30 MG Oral Cap DR Particles TAKE ONE CAPSULE BY MOUTH TWICE DAILY 180 capsule 11    amLODIPine 10 MG Oral Tab TAKE ONE TABLET BY MOUTH ONE TIME DAILY 90 tablet 3    citalopram 20 MG Oral Tab Take 0.5 tablets (10 mg total) by mouth daily. Tapering off. Converting to Lexapro      LUTEIN OR Take by mouth As Directed.      donepezil 10 MG Oral Tab Take 1 tablet (10 mg total) by mouth daily. 90 tablet 3    gabapentin 300 MG Oral Cap Take 1 capsule (300 mg total) by mouth 2 (two) times daily. 180 capsule 3    memantine 10 MG Oral Tab Take 1 tablet (10 mg total) by mouth 2 (two) times daily. 180 tablet 3    telmisartan 80 MG Oral Tab TAKE ONE TABLET BY MOUTH IN THE MORNING 90 tablet 3    ALPRAZolam 0.25 MG Oral Tab Take 1 tablet (0.25 mg total) by mouth daily as needed.      atorvastatin 40 MG Oral Tab TAKE ONE TABLET BY MOUTH AT BEDTIME 90 tablet 3    denosumab 60 MG/ML Subcutaneous Solution Prefilled Syringe Inject 1 mL (60 mg total) into the skin one time.      triamcinolone 0.1 % External Cream Apply topically 2 (two) times daily. 45 g 1    omeprazole 20 MG Oral Capsule Delayed Release Take 1 capsule (20 mg total) by mouth every morning.      SULFASALAZINE 500 MG Oral Tab TAKE 3 TABLETS BY MOUTH TWICE A  tablet 0    aspirin 81 MG Oral Tab EC Take 1 tablet (81 mg total) by mouth daily.  0    folic acid 1 MG Oral Tab Take 1 tablet (1 mg total) by mouth daily. 90 tablet 3    Calcium Citrate-Vitamin D 315-250 MG-UNIT Oral Tab Take 2 tablets by mouth 2 (two) times daily with meals.      cyanocobalamin (VITAMIN B12) 1000 MCG/ML Injection Solution Inject 1 mL (1,000 mcg total) into the muscle every 30 (thirty) days.      Vitamin D, Ergocalciferol, (DRISDOL) 92261 UNITS Oral Cap Take 1 capsule (50,000 Units total) by mouth As Directed. Every 5 days.      Multiple Vitamin (MULTI-VITAMINS)  Oral Tab Take 1 tablet by mouth daily with breakfast.        Past Medical History:    Arthritis    Back pain    Cough    Deep vein thrombosis (HCC)    Dementia (HCC)    Essential hypertension    Family history of colon cancer     0    PARA ZERO    H/O foot surgery    LEFT FOOT SURGERY, PINS PLACED IN TOE    H/O oral surgery    GUM/MOUTH SURGERY    Hallux rigidus    LEFT FOOT, YURY PROCEDURE, 1ST LEFT METARSOPHALANGEAL JOINT    Heart palpitations    HEART MONITOR 2012    High blood pressure    High cholesterol    History of DVT (deep vein thrombosis)    Hypercholesterolemia    Hyperlipidemia    Hyperparathyroidism (HCC)    PAIR OF PARATHYROID REMOVED    Hyperparathyroidism (HCC)    PAIR OF PARATHYROID REMOVED     Hypertension    Left carotid artery stenosis    Migraine    OCCULAR MIGRAINE    Migraine    OCCULAR MIGRAINE     Neuroma    2 LT, HALLUX RIGIDUS LT, PER. NG.    Osteoarthritis    Other ill-defined conditions(799.89)    TAHBSO MGMT.    Other ill-defined conditions(799.89)    CHRONIC LEFT CYSTIC PELVIC MASS    Ovarian cyst    REMOVED PER NG.    Painful breasts    Pneumonia    HOSPITALIZED    Pneumonia due to organism    Spinal stenosis    Spinal stenosis    Tonsillitis    Ulcerative colitis (HCC)    Uterine cancer (HCC)      Social History:  Social History     Socioeconomic History    Marital status:    Tobacco Use    Smoking status: Never     Passive exposure: Never    Smokeless tobacco: Never   Vaping Use    Vaping status: Never Used   Substance and Sexual Activity    Alcohol use: Not Currently     Alcohol/week: 2.0 standard drinks of alcohol     Comment: socially    Drug use: Never   Other Topics Concern    Caffeine Concern Yes     Comment: SODA/TEA 1 CAN/DAY        REVIEW OF SYSTEMS:   GENERAL HEALTH:  feels well otherwise  RESPIRATORY:  Voices no shortness of breath with exertion or cough  CARDIOVASCULAR:  Voices no chest pain on exertion or shortness of breath  GI:   Voices no  abdominal pain or changes of bowels   :Viices no urning or frequency of urination.  NEURO:  Voices no  headaches or dizziness    EXAM:   /56   Pulse 104   Temp 97.8 °F (36.6 °C) (Oral)   Ht 5' 2\" (1.575 m)   Wt 135 lb (61.2 kg)   SpO2 95%   BMI 24.69 kg/m²     GENERAL:  well developed, well nourished, in no apparent distress  SKIN:  no rashes ,   HEENT: atraumatic.   Pharynx normal without exudate.  EYES:  PERRL. Sclera anicteric.  NECK:  Supple,  no adenopathy,   LUNGS:  clear to auscultation.  Effort normal  CARDIO:  RRR without murmur.   S1 and S2 normal  GI:  good BS's,  no masses,   HSM or tenderness  EXTREMITIES : no cyanosis, clubbing or edema    ASSESSMENT AND PLAN:     1. Pre-op evaluation  Karyn seems to be at acceptable condition for her upcoming hip surgery.  Basil her  knows about the potential possible postoperative risks concerning her dementia.  She will see Dr. Candy Cuadra January 31.  I did give her echocardiogram result to discuss with him.  I will see her back in 2 months.  - XR CHEST PA + LAT CHEST (CPT=71046); Future  - CBC With Differential With Platelet; Future  - Comp Metabolic Panel (14); Future  - MRSA Screen by PCR; Future  - Urinalysis, Routine; Future  - Urine Culture, Routine; Future    2. Primary hypertension  Blood pressure under satisfactory control.  Continue current medications.  - XR CHEST PA + LAT CHEST (CPT=71046); Future  - CBC With Differential With Platelet; Future  - Comp Metabolic Panel (14); Future  - MRSA Screen by PCR; Future  - Urinalysis, Routine; Future  - Urine Culture, Routine; Future    3. Abnormal urinalysis  Will update urinalysis as it relates to her upcoming orthopedic surgery as requested by orthopedics  - Urinalysis, Routine; Future  - Urine Culture, Routine; Future    4. Vitamin D deficiency  Vitamin D level  - Vitamin D; Future    5. Diastolic dysfunction  Asymptomatic.  Will follow-up with cardiology.    6. Memory deficit  She has  seen Dr. Harrington.  For now stable.    7. Other osteoporosis, unspecified pathological fracture presence  She is on vitamin D.  For now we will monitor.    This visit was 30 minutes.  I spent 10 minutes before visit preparing and reviewing old records.  Greater than 50% of the visit was engaged in counseling and review of past data.     The patient indicates understanding of these issues and agrees to the plan.    Pan Mansfield MD  1/21/2025  11:15 AM

## 2025-01-27 ENCOUNTER — HOSPITAL ENCOUNTER (OUTPATIENT)
Dept: GENERAL RADIOLOGY | Facility: HOSPITAL | Age: 80
Discharge: HOME | End: 2025-01-27
Attending: INTERNAL MEDICINE
Payer: MEDICARE

## 2025-01-27 DIAGNOSIS — Z01.818 PRE-OP EVALUATION: ICD-10-CM

## 2025-01-27 DIAGNOSIS — I10 PRIMARY HYPERTENSION: ICD-10-CM

## 2025-01-27 PROCEDURE — 71046 X-RAY EXAM CHEST 2 VIEWS: CPT | Performed by: INTERNAL MEDICINE

## 2025-01-28 ENCOUNTER — TELEPHONE (OUTPATIENT)
Dept: INTERNAL MEDICINE CLINIC | Facility: CLINIC | Age: 80
End: 2025-01-28

## 2025-01-28 ENCOUNTER — LAB ENCOUNTER (OUTPATIENT)
Dept: LAB | Age: 80
End: 2025-01-28
Attending: INTERNAL MEDICINE
Payer: MEDICARE

## 2025-01-28 ENCOUNTER — NURSE ONLY (OUTPATIENT)
Dept: INTERNAL MEDICINE CLINIC | Facility: CLINIC | Age: 80
End: 2025-01-28

## 2025-01-28 DIAGNOSIS — E87.1 HYPONATREMIA: Primary | ICD-10-CM

## 2025-01-28 DIAGNOSIS — I10 PRIMARY HYPERTENSION: ICD-10-CM

## 2025-01-28 DIAGNOSIS — E53.8 VITAMIN B12 DEFICIENCY: Primary | ICD-10-CM

## 2025-01-28 DIAGNOSIS — Z01.818 PRE-OP EVALUATION: ICD-10-CM

## 2025-01-28 DIAGNOSIS — R82.90 ABNORMAL URINALYSIS: ICD-10-CM

## 2025-01-28 DIAGNOSIS — E55.9 VITAMIN D DEFICIENCY: ICD-10-CM

## 2025-01-28 LAB
ALBUMIN SERPL-MCNC: 5 G/DL (ref 3.2–4.8)
ALBUMIN/GLOB SERPL: 2 {RATIO} (ref 1–2)
ALP LIVER SERPL-CCNC: 59 U/L
ALT SERPL-CCNC: 17 U/L
ANION GAP SERPL CALC-SCNC: 10 MMOL/L (ref 0–18)
AST SERPL-CCNC: 25 U/L (ref ?–34)
BASOPHILS # BLD AUTO: 0.06 X10(3) UL (ref 0–0.2)
BASOPHILS NFR BLD AUTO: 0.9 %
BILIRUB SERPL-MCNC: 0.3 MG/DL (ref 0.2–1.1)
BILIRUB UR QL: NEGATIVE
BUN BLD-MCNC: 31 MG/DL (ref 9–23)
BUN/CREAT SERPL: 28.7 (ref 10–20)
CALCIUM BLD-MCNC: 9.8 MG/DL (ref 8.7–10.4)
CHLORIDE SERPL-SCNC: 94 MMOL/L (ref 98–112)
CLARITY UR: CLEAR
CO2 SERPL-SCNC: 24 MMOL/L (ref 21–32)
COLOR UR: YELLOW
CREAT BLD-MCNC: 1.08 MG/DL
DEPRECATED RDW RBC AUTO: 44 FL (ref 35.1–46.3)
EGFRCR SERPLBLD CKD-EPI 2021: 52 ML/MIN/1.73M2 (ref 60–?)
EOSINOPHIL # BLD AUTO: 0.37 X10(3) UL (ref 0–0.7)
EOSINOPHIL NFR BLD AUTO: 5.3 %
ERYTHROCYTE [DISTWIDTH] IN BLOOD BY AUTOMATED COUNT: 12.4 % (ref 11–15)
FASTING STATUS PATIENT QL REPORTED: YES
GLOBULIN PLAS-MCNC: 2.5 G/DL (ref 2–3.5)
GLUCOSE BLD-MCNC: 101 MG/DL (ref 70–99)
GLUCOSE UR-MCNC: NORMAL MG/DL
HCT VFR BLD AUTO: 33.8 %
HGB BLD-MCNC: 11.5 G/DL
HGB UR QL STRIP.AUTO: NEGATIVE
IMM GRANULOCYTES # BLD AUTO: 0.02 X10(3) UL (ref 0–1)
IMM GRANULOCYTES NFR BLD: 0.3 %
KETONES UR-MCNC: NEGATIVE MG/DL
LEUKOCYTE ESTERASE UR QL STRIP.AUTO: NEGATIVE
LYMPHOCYTES # BLD AUTO: 2.21 X10(3) UL (ref 1–4)
LYMPHOCYTES NFR BLD AUTO: 31.7 %
MCH RBC QN AUTO: 32.9 PG (ref 26–34)
MCHC RBC AUTO-ENTMCNC: 34 G/DL (ref 31–37)
MCV RBC AUTO: 96.6 FL
MONOCYTES # BLD AUTO: 0.9 X10(3) UL (ref 0.1–1)
MONOCYTES NFR BLD AUTO: 12.9 %
NEUTROPHILS # BLD AUTO: 3.42 X10 (3) UL (ref 1.5–7.7)
NEUTROPHILS # BLD AUTO: 3.42 X10(3) UL (ref 1.5–7.7)
NEUTROPHILS NFR BLD AUTO: 48.9 %
NITRITE UR QL STRIP.AUTO: NEGATIVE
OSMOLALITY SERPL CALC.SUM OF ELEC: 273 MOSM/KG (ref 275–295)
PH UR: 6 [PH] (ref 5–8)
PLATELET # BLD AUTO: 224 10(3)UL (ref 150–450)
POTASSIUM SERPL-SCNC: 4.3 MMOL/L (ref 3.5–5.1)
PROT SERPL-MCNC: 7.5 G/DL (ref 5.7–8.2)
PROT UR-MCNC: NEGATIVE MG/DL
RBC # BLD AUTO: 3.5 X10(6)UL
SODIUM SERPL-SCNC: 128 MMOL/L (ref 136–145)
SP GR UR STRIP: 1.02 (ref 1–1.03)
UROBILINOGEN UR STRIP-ACNC: NORMAL
VIT D+METAB SERPL-MCNC: 94.9 NG/ML (ref 30–100)
WBC # BLD AUTO: 7 X10(3) UL (ref 4–11)

## 2025-01-28 PROCEDURE — 85025 COMPLETE CBC W/AUTO DIFF WBC: CPT

## 2025-01-28 PROCEDURE — 87086 URINE CULTURE/COLONY COUNT: CPT

## 2025-01-28 PROCEDURE — 81003 URINALYSIS AUTO W/O SCOPE: CPT

## 2025-01-28 PROCEDURE — 80053 COMPREHEN METABOLIC PANEL: CPT

## 2025-01-28 PROCEDURE — 87641 MR-STAPH DNA AMP PROBE: CPT

## 2025-01-28 PROCEDURE — 96372 THER/PROPH/DIAG INJ SC/IM: CPT | Performed by: INTERNAL MEDICINE

## 2025-01-28 PROCEDURE — 36415 COLL VENOUS BLD VENIPUNCTURE: CPT

## 2025-01-28 PROCEDURE — 82306 VITAMIN D 25 HYDROXY: CPT

## 2025-01-28 RX ADMIN — CYANOCOBALAMIN 1000 MCG: 1000 INJECTION, SOLUTION INTRAMUSCULAR; SUBCUTANEOUS at 09:58:00

## 2025-01-28 NOTE — PROGRESS NOTES
Patient is here today for Vitamin B12 Injection. Patient has verified purpose of visit, their name and . Injection was drawn up and administered by LILY FRAIRE. Patient tolerated IM injection (right deltoid muscle) well. Pt is aware they are to return in 1 month for next injection. See MAR Additional Details ie. NDC, LOT & EXP of single dose vial.

## 2025-01-29 LAB — MRSA DNA SPEC QL NAA+PROBE: NEGATIVE

## 2025-01-29 NOTE — TELEPHONE ENCOUNTER
Called and relayed Dr MARK message to both patient and spouse.Spouse voiced understanding of Dr MARK message. Spouse is currently driving and will call back to schedule appointment with Gretchen as advised bu Dr NARANJO

## 2025-01-29 NOTE — TELEPHONE ENCOUNTER
Please call patient's  Basil.  (Karyn has some memory issues)    Please let him know the following.    Karyn's electrolytes show that her sodium is somewhat \"diluted\".  This is most likely from the hydrochlorothiazide that she is on.  Please ask him to STOP hydrochlorothiazide.  This medicine sometimes alters the way the kidneys get rid of water and then the water stays in the system and dilutes the sodium.  He has nothing to do with the sodium in the diet.    2.   Also she should try and limit her water intake to no more than 50 or 60 ounces a day.  This can include all fluids such as tea coffee water or juice milk.    3.   I have to correct her sodium count before her surgery February 18.  I left order for her to get repeat lab in 10 days.  She does not have to fast.  Hopefully her sodium count will improve.\    4.   Her vitamin D level is at the high limit of normal.  Usually we one vitamin D between 30 and 100 and she is 94.  This is getting close to the toxic range.  Therefore I would like her to STOP the vitamin D supplement that she is on.    5.  Her kidney function is a little bit low but acceptable.  She has mild anemia but this is also acceptable.    6.   Her chest x-ray is mostly clear.  Okay for surgery.  There are some markings that could suggest some scarring but nothing to worry about.    My main concern is her sodium and we can follow the above advice.

## 2025-01-29 NOTE — TELEPHONE ENCOUNTER
To  - called spouse per HIPAA and relayed  message - verbalized understanding     He wants to know if patient should be back on Metoprolol which was stopped and changed to hydrochlorothiazide.Also he states patient doesn,t even drink 50-60 ounces daily. THey will see Dr. Mortensen cardiologist on Friday  He also wants to know if from this message if there is anything that could hold up surgery

## 2025-01-29 NOTE — TELEPHONE ENCOUNTER
For now lets just hold the hydrochlorothiazide.  For now lets not restart the metoprolol.  Dr. Benavides usually forwards his consult to me.    Jaron can call the office and let us know what Dr. Benavides says and recommends as it relates to Karyn surgery.    Please make an appointment for Karyn to see Gretchen on the day she goes for her blood test which is about 10 days.  That would be a good time to recheck her blood and have her blood pressure checked by Gretchen.

## 2025-01-29 NOTE — TELEPHONE ENCOUNTER
Patient is returning nurse call.  Please call and advise    Patient states if he does not answer it is Ok to leave a detailed message on my chart

## 2025-02-06 ENCOUNTER — TELEPHONE (OUTPATIENT)
Dept: ORTHOPEDICS CLINIC | Facility: CLINIC | Age: 80
End: 2025-02-06

## 2025-02-06 NOTE — TELEPHONE ENCOUNTER
Per patient's  calling requesting assistance scheduling post-op rehab for wife. Please call back.

## 2025-02-10 ENCOUNTER — LAB ENCOUNTER (OUTPATIENT)
Dept: LAB | Age: 80
End: 2025-02-10
Attending: INTERNAL MEDICINE
Payer: MEDICARE

## 2025-02-10 ENCOUNTER — NURSE ONLY (OUTPATIENT)
Dept: INTERNAL MEDICINE CLINIC | Facility: CLINIC | Age: 80
End: 2025-02-10

## 2025-02-10 ENCOUNTER — OFFICE VISIT (OUTPATIENT)
Dept: ORTHOPEDICS CLINIC | Facility: CLINIC | Age: 80
End: 2025-02-10
Payer: MEDICARE

## 2025-02-10 VITALS — DIASTOLIC BLOOD PRESSURE: 48 MMHG | SYSTOLIC BLOOD PRESSURE: 126 MMHG | HEART RATE: 70 BPM

## 2025-02-10 VITALS — BODY MASS INDEX: 23.55 KG/M2 | HEIGHT: 62 IN | WEIGHT: 128 LBS

## 2025-02-10 DIAGNOSIS — E87.1 HYPONATREMIA: ICD-10-CM

## 2025-02-10 DIAGNOSIS — M87.051 AVASCULAR NECROSIS OF BONE OF RIGHT HIP (HCC): Primary | ICD-10-CM

## 2025-02-10 LAB
ANION GAP SERPL CALC-SCNC: 9 MMOL/L (ref 0–18)
BUN BLD-MCNC: 37 MG/DL (ref 9–23)
BUN/CREAT SERPL: 35.6 (ref 10–20)
CALCIUM BLD-MCNC: 9.8 MG/DL (ref 8.7–10.4)
CHLORIDE SERPL-SCNC: 99 MMOL/L (ref 98–112)
CO2 SERPL-SCNC: 26 MMOL/L (ref 21–32)
CREAT BLD-MCNC: 1.04 MG/DL
EGFRCR SERPLBLD CKD-EPI 2021: 54 ML/MIN/1.73M2 (ref 60–?)
FASTING STATUS PATIENT QL REPORTED: NO
GLUCOSE BLD-MCNC: 118 MG/DL (ref 70–99)
OSMOLALITY SERPL CALC.SUM OF ELEC: 288 MOSM/KG (ref 275–295)
POTASSIUM SERPL-SCNC: 4.5 MMOL/L (ref 3.5–5.1)
SODIUM SERPL-SCNC: 134 MMOL/L (ref 136–145)

## 2025-02-10 PROCEDURE — 36415 COLL VENOUS BLD VENIPUNCTURE: CPT

## 2025-02-10 PROCEDURE — 80048 BASIC METABOLIC PNL TOTAL CA: CPT

## 2025-02-10 PROCEDURE — 99213 OFFICE O/P EST LOW 20 MIN: CPT | Performed by: PHYSICIAN ASSISTANT

## 2025-02-10 NOTE — PROGRESS NOTES
Karyn Cobos is a 79 year old female who has a THR scheduled 2/18/25.  Recently her hydrochlorothiazide was discontinued.  In [facility] pt was on hydrochlorothiazide years ago and it was discontinued and changed to metoprolol.   Now pt has a new provider that changed metoprolol back to hydrochlorothiazide.  She has been checking home blood pressures and it is undocumented since stopping the hydrochlorothiazide.   She denies chest pain, dizziness, edema, fatigue, headaches, and urinary frequency.  She is not exercising due to hip issues and does not restrict her sodium intake.       She reports good compliance with med;   is with pt but stayed in the waiting room.  He did confirm meds with me    Blood Pressure and Cardiac Medications            amLODIPine 10 MG Oral Tab    telmisartan 80 MG Oral Tab          Vitals:    02/10/25 1547   BP: 126/48   Pulse:             BP Readings from Last 3 Encounters:   01/21/25 132/56   12/09/24 108/50   10/18/24 (!) 164/73      There is no height or weight on file to calculate BMI.   ASSESSMENT:   HTN controlled    PLAN:   Continue Telmisartan 80 mg  and amlodipine 10 mg daily.  Continue home monitoring  Labs still in process for SNa+ check.      Radha Leone, PharmD, 2/10/2025, 2:31 PM

## 2025-02-10 NOTE — PROGRESS NOTES
NURSING INTAKE COMMENTS:   Chief Complaint   Patient presents with    Pre-Op     Right JOHN on 25, 3/10 pain scale       HPI: This 79 year old female presents today for preoperative consultation.  She is scheduled for a right total hip arthroplasty through an anterior approach next week.  She is here today with her .  She is anxious to proceed with surgery.  She continues to have pain and stiffness in the hip.  She is ambulating with a cane.  She has seen all of her providers.    Past Medical History:    Arthritis    Back pain    Cough    Deep vein thrombosis (HCC)    Dementia (HCC)    Essential hypertension    Family history of colon cancer     0    PARA ZERO    H/O foot surgery    LEFT FOOT SURGERY, PINS PLACED IN TOE    H/O oral surgery    GUM/MOUTH SURGERY    Hallux rigidus    LEFT FOOT, YURY PROCEDURE, 1ST LEFT METARSOPHALANGEAL JOINT    Heart palpitations    HEART MONITOR 2012    High blood pressure    High cholesterol    History of DVT (deep vein thrombosis)    Hypercholesterolemia    Hyperlipidemia    Hyperparathyroidism (HCC)    PAIR OF PARATHYROID REMOVED    Hyperparathyroidism (HCC)    PAIR OF PARATHYROID REMOVED     Hypertension    Left carotid artery stenosis    Migraine    OCCULAR MIGRAINE    Migraine    OCCULAR MIGRAINE     Neuroma    2 LT, HALLUX RIGIDUS LT, PER. NG.    Osteoarthritis    Other ill-defined conditions(799.89)    TAHBSO MGMT.    Other ill-defined conditions(799.89)    CHRONIC LEFT CYSTIC PELVIC MASS    Ovarian cyst    REMOVED PER NG.    Painful breasts    Pneumonia    HOSPITALIZED    Pneumonia due to organism    Spinal stenosis    Spinal stenosis    Tonsillitis    Ulcerative colitis (HCC)    Uterine cancer (HCC)     Past Surgical History:   Procedure Laterality Date    Appendectomy      patient doenst rememeber    Cataract Bilateral     Left Eye: 23, Right Eye: 23    Colonoscopy N/A 2016    Procedure: COLONOSCOPY;  Surgeon: Velma Herrera  MD Alexis;  Location: [facility] ENDOSCOPY    Colonoscopy N/A 05/24/2018    Procedure: COLONOSCOPY;  Surgeon: Velma Herrera MD;  Location: [facility] ENDOSCOPY    Colonoscopy  10/2022    Colonoscopy N/A 10/14/2022    Procedure: COLONOSCOPY;  Surgeon: Michael López MD;  Location: [facility] ENDOSCOPY    Hysterectomy  2002    Other surgical history  2004?    Parathyroid removal    Other surgical history  08/2017    left foot surgery plate removed    Tonsillectomy       Current Outpatient Medications   Medication Sig Dispense Refill    escitalopram 5 MG Oral Tab Take 1 tablet (5 mg total) by mouth daily. Slowing tapering up dosage. Final dose of 10 mg, which will then discontinue Citalopram      DULOXETINE 30 MG Oral Cap DR Particles TAKE ONE CAPSULE BY MOUTH TWICE DAILY 180 capsule 11    amLODIPine 10 MG Oral Tab TAKE ONE TABLET BY MOUTH ONE TIME DAILY 90 tablet 3    LUTEIN OR Take by mouth As Directed.      donepezil 10 MG Oral Tab Take 1 tablet (10 mg total) by mouth daily. 90 tablet 3    gabapentin 300 MG Oral Cap Take 1 capsule (300 mg total) by mouth 2 (two) times daily. 180 capsule 3    memantine 10 MG Oral Tab Take 1 tablet (10 mg total) by mouth 2 (two) times daily. 180 tablet 3    telmisartan 80 MG Oral Tab TAKE ONE TABLET BY MOUTH IN THE MORNING 90 tablet 3    ALPRAZolam 0.25 MG Oral Tab Take 1 tablet (0.25 mg total) by mouth daily as needed.      atorvastatin 40 MG Oral Tab TAKE ONE TABLET BY MOUTH AT BEDTIME 90 tablet 3    denosumab 60 MG/ML Subcutaneous Solution Prefilled Syringe Inject 1 mL (60 mg total) into the skin one time.      triamcinolone 0.1 % External Cream Apply topically 2 (two) times daily. 45 g 1    omeprazole 20 MG Oral Capsule Delayed Release Take 1 capsule (20 mg total) by mouth every morning.      SULFASALAZINE 500 MG Oral Tab TAKE 3 TABLETS BY MOUTH TWICE A  tablet 0    aspirin 81 MG Oral Tab EC Take 1 tablet (81 mg total) by mouth daily.  0    folic acid 1 MG Oral Tab Take 1 tablet (1 mg  total) by mouth daily. 90 tablet 3    Calcium Citrate-Vitamin D 315-250 MG-UNIT Oral Tab Take 2 tablets by mouth 2 (two) times daily with meals.      cyanocobalamin (VITAMIN B12) 1000 MCG/ML Injection Solution Inject 1 mL (1,000 mcg total) into the muscle every 30 (thirty) days.      Multiple Vitamin (MULTI-VITAMINS) Oral Tab Take 1 tablet by mouth daily with breakfast.      hydroCHLOROthiazide 12.5 MG Oral Cap Take 1 capsule (12.5 mg total) by mouth daily.      citalopram 20 MG Oral Tab Take 0.5 tablets (10 mg total) by mouth daily. Tapering off. Converting to Lexapro      Vitamin D, Ergocalciferol, (DRISDOL) 13447 UNITS Oral Cap Take 1 capsule (50,000 Units total) by mouth As Directed. Every 5 days.       Allergies[1]  Family History   Problem Relation Age of Onset    Cancer Father 73        COLON, CAUSE OF DEATH    Cancer Mother 64        COLON, 2nd primary 94    Cancer Maternal Cousin Male 60        prostate/patient denies     Cancer Self 58        uterine    Prostate Cancer Maternal Uncle         age unknown/ at 94    Kidney Disease Neg         UROLITHIASIS    Breast Cancer Neg     Ovarian Cancer Neg        Social History     Occupational History    Not on file   Tobacco Use    Smoking status: Never     Passive exposure: Never    Smokeless tobacco: Never   Vaping Use    Vaping status: Never Used   Substance and Sexual Activity    Alcohol use: Not Currently     Alcohol/week: 2.0 standard drinks of alcohol     Comment: socially    Drug use: Never    Sexual activity: Not on file        Review of Systems:  GENERAL: feels generally well, no significant weight loss or weight gain  SKIN: no ulcerated or worrisome skin lesions  EYES:denies blurred vision or double vision  HEENT: denies new nasal congestion, sinus pain or ST  LUNGS: denies shortness of breath  CARDIOVASCULAR: denies chest pain  GI: no hematemesis, no worsening heartburn, no diarrhea  : no dysuria, no blood in urine, no difficulty urinating, no  incontinence  MUSCULOSKELETAL: no other musculoskeletal complaints other than in HPI  NEURO: no numbness or tingling, no weakness or balance disorder  PSYCHE: no depression or anxiety  HEMATOLOGIC: no hx of blood dyscrasia  ENDOCRINE: no thyroid or diabetes issues  ALL/ASTHMA: no new hx of severe allergy or asthma    Physical Examination:    Ht 5' 2\" (1.575 m)   Wt 128 lb (58.1 kg)   BMI 23.41 kg/m²   Constitutional: appears well hydrated, alert and responsive, no acute distress noted  Extremities: Her exam is unchanged.      Imaging: XR CHEST PA + LAT CHEST (CPT=71046)    Result Date: 1/28/2025  PROCEDURE: XR CHEST PA + LAT CHEST (CPT=71046)  COMPARISON: [facility], XR CHEST PA + LAT CHEST (ZML=08083), 3/06/2018, 10:26 AM.  INDICATIONS: Preoperative examination for right TKA on 2/18/25.  TECHNIQUE:   Two views.   FINDINGS: CARDIAC/VASC: Normal cardiac silhouette.  MEDIAST/TIARA: Atherosclerotic aorta with no visible aneurysm.  LUNGS/PLEURA: Chronic bibasilar pulmonary markings have progressed.  No acute airspace consolidation or pleural effusion. BONES: Moderate scoliosis with degenerative disc disease and spondylosis.  OTHER: Negative.          CONCLUSION:  1. No acute cardiopulmonary disease. 2. Chronic bibasilar pulmonary markings have progressed.    Dictated by (CST): Pan Cardozo MD on 1/28/2025 at 12:52 PM     Finalized by (CST): Pan Cardozo MD on 1/28/2025 at 12:54 PM             Lab Results   Component Value Date    WBC 7.0 01/28/2025    HGB 11.5 (L) 01/28/2025    .0 01/28/2025      Lab Results   Component Value Date     (H) 01/28/2025    BUN 31 (H) 01/28/2025    CREATSERUM 1.08 (H) 01/28/2025    GFRNAA 53 (L) 05/23/2022    GFRAA 62 05/23/2022        Assessment and Plan:  Diagnoses and all orders for this visit:    Avascular necrosis of bone of right hip (HCC)      Plan: Mrs. Cobos is scheduled for right total hip arthroplasty through an anterior approach next week.  We discussed the  surgery and the expected risks and benefits.  I answered all of their questions.  We have received written confirmation of dental clearance.  We are awaiting confirmation of medical and cardiac clearance.  She completed her lab work and stress test last week.  We will proceed with surgery pending clearance.  They already have a postop visit scheduled.  Advised him to call if any questions or problems arise prior to her upcoming surgery.    The above note was creating using Dragon speech recognition technology. Please excuse any typos.    This visit was performed under the supervision of Dr. Brayan Pearson who formulated the treatment plan and decision making.        [1]   Allergies  Allergen Reactions    Fentanyl     Levaquin [Levofloxacin] RASH     Patient developed diffuse rash macular in nature after about 5 days of Levaquin.  Suspect Levaquin causes a rash.    Meperidine NAUSEA AND VOMITING     Demerol    Morphine NAUSEA AND VOMITING    Zithromax [Azithromycin] RASH

## 2025-02-10 NOTE — TELEPHONE ENCOUNTER
Spoke with Basil (). He informed me that they were here at the office for her pre surgical visit and they would speak with Maximo to answer all of their questions.

## 2025-02-11 ENCOUNTER — TELEPHONE (OUTPATIENT)
Dept: INTERNAL MEDICINE CLINIC | Facility: CLINIC | Age: 80
End: 2025-02-11

## 2025-02-11 NOTE — TELEPHONE ENCOUNTER
To Dr. MARK     Spoke to Gayathri ALBERTS- stated she will reach out to Dr. Keyes to get verbal clearance and will fax paperwork over to our office with stress test results as well.

## 2025-02-11 NOTE — TELEPHONE ENCOUNTER
Please call cardiology at [phone].    She has had her stress test and it looks like it has been normal.  I believe she now has cardiac clearance but just wanted to confirm with 's office if they agree that patient is cleared for her orthopedic surgery cardiac wise.

## 2025-02-12 ENCOUNTER — PATIENT MESSAGE (OUTPATIENT)
Dept: ORTHOPEDICS CLINIC | Facility: CLINIC | Age: 80
End: 2025-02-12

## 2025-02-13 ENCOUNTER — TELEPHONE (OUTPATIENT)
Dept: INTERNAL MEDICINE CLINIC | Facility: CLINIC | Age: 80
End: 2025-02-13

## 2025-02-13 ENCOUNTER — LAB ENCOUNTER (OUTPATIENT)
Dept: LAB | Facility: HOSPITAL | Age: 80
End: 2025-02-13
Attending: ORTHOPAEDIC SURGERY
Payer: MEDICARE

## 2025-02-13 DIAGNOSIS — Z01.818 PREOP TESTING: ICD-10-CM

## 2025-02-13 LAB
ANTIBODY SCREEN: NEGATIVE
RH BLOOD TYPE: POSITIVE

## 2025-02-13 PROCEDURE — 86850 RBC ANTIBODY SCREEN: CPT

## 2025-02-13 PROCEDURE — 86901 BLOOD TYPING SEROLOGIC RH(D): CPT

## 2025-02-13 PROCEDURE — 36415 COLL VENOUS BLD VENIPUNCTURE: CPT

## 2025-02-13 PROCEDURE — 86900 BLOOD TYPING SEROLOGIC ABO: CPT

## 2025-02-17 NOTE — H&P
[facility]  part of [facility]    History & Physical    Karyn Cobos Patient Status:  Outpatient in a Bed    1945 MRN [id number]   Location [facility] OPERATING ROOM Attending Brayan Pearson, *   Hosp Day # 0 PCP Pan Mansfield MD     Date:  2025    History provided by:patient  Chief Complaint:   Right hip pain    HPI:   Karyn Cobos is a(n) 79 year old female. She presents with complaints of right hip pain.  The pain is in her groin and anterior thigh.  She awakens at night, and has difficulty standing and walking short distances.  She is here to review the results of her MRI scan.  She is here with her .     History     Past Medical History:    Arthritis    Back pain    Cataract    Cough    Deep vein thrombosis (HCC)    Dementia (HCC)    Essential hypertension    Family history of colon cancer     0    PARA ZERO    H/O foot surgery    LEFT FOOT SURGERY, PINS PLACED IN TOE    H/O oral surgery    GUM/MOUTH SURGERY    Hallux rigidus    LEFT FOOT, YURY PROCEDURE, 1ST LEFT METARSOPHALANGEAL JOINT    Heart palpitations    HEART MONITOR 2012    High blood pressure    High cholesterol    History of DVT (deep vein thrombosis)    Hypercholesterolemia    Hyperlipidemia    Hyperparathyroidism (HCC)    PAIR OF PARATHYROID REMOVED    Hyperparathyroidism (HCC)    PAIR OF PARATHYROID REMOVED     Hypertension    Left carotid artery stenosis    Migraine    OCCULAR MIGRAINE    Migraine    OCCULAR MIGRAINE     Neuroma    2 LT, HALLUX RIGIDUS LT, PER. NG.    Osteoarthritis    Other ill-defined conditions(799.89)    TAHBSO MGMT.    Other ill-defined conditions(799.89)    CHRONIC LEFT CYSTIC PELVIC MASS    Ovarian cyst    REMOVED PER NG.    Painful breasts    Pneumonia    HOSPITALIZED    Pneumonia due to organism    Spinal stenosis    Spinal stenosis    Tonsillitis    Ulcerative colitis (HCC)    Uterine cancer (HCC)    Visual impairment    glasses     Past  Surgical History:   Procedure Laterality Date    Appendectomy      patient doenst rememeber    Cataract Bilateral     Left Eye: 23, Right Eye: 23    Colonoscopy N/A 2016    Procedure: COLONOSCOPY;  Surgeon: Velma Herrera MD;  Location: [facility] ENDOSCOPY    Colonoscopy N/A 2018    Procedure: COLONOSCOPY;  Surgeon: Velma Herrera MD;  Location: [facility] ENDOSCOPY    Colonoscopy  10/2022    Colonoscopy N/A 10/14/2022    Procedure: COLONOSCOPY;  Surgeon: Michael López MD;  Location: [facility] ENDOSCOPY    Hysterectomy      Other surgical history  ?    Parathyroid removal    Other surgical history  2017    left foot surgery plate removed    Tonsillectomy       Family History   Problem Relation Age of Onset    Cancer Father 73        COLON, CAUSE OF DEATH    Cancer Mother 64        COLON, 2nd primary 94    Cancer Maternal Cousin Male 60        prostate/patient denies     Cancer Self 58        uterine    Prostate Cancer Maternal Uncle         age unknown/ at 94    Kidney Disease Neg         UROLITHIASIS    Breast Cancer Neg     Ovarian Cancer Neg      Social History:  Social History     Socioeconomic History    Marital status:    Tobacco Use    Smoking status: Never     Passive exposure: Never    Smokeless tobacco: Never   Vaping Use    Vaping status: Never Used   Substance and Sexual Activity    Alcohol use: Not Currently     Alcohol/week: 2.0 standard drinks of alcohol     Comment: socially    Drug use: Never   Other Topics Concern    Caffeine Concern Yes     Comment: SODA/TEA 1 CAN/DAY     Allergies/Medications:   Allergies: Allergies[1]  No medications prior to admission.       Review of Systems:   Pertinent items are noted in HPI.    Physical Exam:   Vital Signs:  Height 5' 2\" (1.575 m), weight 128 lb (58.1 kg), not currently breastfeeding.     General appearance: alert, appears stated age and cooperative  Extremities:  Antalgic gait.  Positive Stinchfield DANIELLE and  FADIR tests.  Decreased range of motion of the right hip.  Lumbar degenerative scoliosis noted.   Pulses: 2+ and symmetric      Results:     Lab Results   Component Value Date    WBC 7.0 01/28/2025    HGB 11.5 (L) 01/28/2025    HCT 33.8 (L) 01/28/2025    .0 01/28/2025    CREATSERUM 1.04 (H) 02/10/2025    BUN 37 (H) 02/10/2025     (L) 02/10/2025    K 4.5 02/10/2025    CL 99 02/10/2025    CO2 26.0 02/10/2025     (H) 02/10/2025    CA 9.8 02/10/2025    ALB 5.0 (H) 01/28/2025    ALKPHO 59 01/28/2025    BILT 0.3 01/28/2025    TP 7.5 01/28/2025    AST 25 01/28/2025    ALT 17 01/28/2025    T4F 0.93 08/26/2011    TSH 1.240 04/19/2023    ESRML 11 01/05/2023    CRP <0.29 01/05/2023    B12 1,071 (H) 06/28/2024       No results found.        Assessment/Plan:     She has avascular necrosis of the right hip in addition to her advanced osteoarthritis.  We had a lengthy discussion with her and her  regarding the options for treatment.  She has advanced osteoarthritis of the right hip that has not responded to conservative management.  We discussed right total hip replacement.  We discussed the risks and indications for surgery as well as the common possible complications.  Our discussion included, but was not limited to the potential risks of anesthetic complication, infection, bleeding, DVT or PE, nerve or blood vessel injury, stiffness, the potential need for revision surgery, leg length inequality, as well as the potential risk of unanticipated perioperative medical or orthopedic complications.    We specifically discussed the potential impact on her dementia in regards to anesthesia.  There is some risk of exacerbation of her underlying anesthesia from a general anesthetic.  Our preference would be for a spinal anesthetic which is our usual protocol, however we made clear that if the anesthesiologist is technically unable to achieve a suitable spinal, that there is always the chance that general  anesthesia may be necessary as a backup.  This risk has to be weighed against the potential benefit of total hip replacement in terms of relieving her pain, and the impact that pain relief may have on her mental status as well.  Unfortunately, the prognosis without surgery is for further deterioration of her hip and increasing pain.     Plan: They would like to proceed with total hip arthroplasty which we will  perform with an anterior approach.        JHOANA MERCHANT PA-C  2/17/2025       [1]   Allergies  Allergen Reactions    Meperidine NAUSEA AND VOMITING     Demerol    Morphine NAUSEA AND VOMITING    Fentanyl OTHER (SEE COMMENTS)     She doesn't know    Levaquin [Levofloxacin] RASH     Patient developed diffuse rash macular in nature after about 5 days of Levaquin.  Suspect Levaquin causes a rash.    Zithromax [Azithromycin] RASH

## 2025-02-18 ENCOUNTER — APPOINTMENT (OUTPATIENT)
Dept: GENERAL RADIOLOGY | Facility: HOSPITAL | Age: 80
End: 2025-02-18
Attending: ORTHOPAEDIC SURGERY
Payer: MEDICARE

## 2025-02-18 ENCOUNTER — HOSPITAL ENCOUNTER (OUTPATIENT)
Facility: HOSPITAL | Age: 80
Discharge: HOME HEALTH | End: 2025-02-19
Attending: ORTHOPAEDIC SURGERY | Admitting: ORTHOPAEDIC SURGERY
Payer: MEDICARE

## 2025-02-18 ENCOUNTER — APPOINTMENT (OUTPATIENT)
Dept: GENERAL RADIOLOGY | Facility: HOSPITAL | Age: 80
End: 2025-02-18
Attending: PHYSICIAN ASSISTANT
Payer: MEDICARE

## 2025-02-18 ENCOUNTER — ANESTHESIA (OUTPATIENT)
Dept: SURGERY | Facility: HOSPITAL | Age: 80
End: 2025-02-18
Payer: MEDICARE

## 2025-02-18 ENCOUNTER — ANESTHESIA EVENT (OUTPATIENT)
Dept: SURGERY | Facility: HOSPITAL | Age: 80
End: 2025-02-18
Payer: MEDICARE

## 2025-02-18 DIAGNOSIS — Z01.818 PREOP TESTING: Primary | ICD-10-CM

## 2025-02-18 DIAGNOSIS — M16.11 PRIMARY OSTEOARTHRITIS OF RIGHT HIP: ICD-10-CM

## 2025-02-18 DIAGNOSIS — M87.051 AVASCULAR NECROSIS OF BONE OF RIGHT HIP (HCC): ICD-10-CM

## 2025-02-18 LAB
HCT VFR BLD AUTO: 26.3 %
HGB BLD-MCNC: 8.4 G/DL

## 2025-02-18 PROCEDURE — 0SR903A REPLACEMENT OF RIGHT HIP JOINT WITH CERAMIC SYNTHETIC SUBSTITUTE, UNCEMENTED, OPEN APPROACH: ICD-10-PCS | Performed by: ORTHOPAEDIC SURGERY

## 2025-02-18 PROCEDURE — 99222 1ST HOSP IP/OBS MODERATE 55: CPT | Performed by: HOSPITALIST

## 2025-02-18 PROCEDURE — 76000 FLUOROSCOPY <1 HR PHYS/QHP: CPT | Performed by: ORTHOPAEDIC SURGERY

## 2025-02-18 PROCEDURE — 73502 X-RAY EXAM HIP UNI 2-3 VIEWS: CPT | Performed by: PHYSICIAN ASSISTANT

## 2025-02-18 DEVICE — CANCELLOUS BONE SCREW Ø 6.5 L 30
Type: IMPLANTABLE DEVICE | Site: HIP | Status: FUNCTIONAL
Brand: MPACT EXTENSION

## 2025-02-18 DEVICE — FLAT PE  HC LINER Ø 36 / E
Type: IMPLANTABLE DEVICE | Site: HIP | Status: FUNCTIONAL
Brand: MPACT ACETABULAR SYSTEM

## 2025-02-18 DEVICE — HIP REPLACEMENT WITH CERAMIC HEAD: Type: IMPLANTABLE DEVICE | Site: HIP

## 2025-02-18 DEVICE — ACETABULAR SHELL Ø50 TWO-HOLES T
Type: IMPLANTABLE DEVICE | Site: HIP | Status: FUNCTIONAL
Brand: MPACT ACETABULAR SYSTEM

## 2025-02-18 DEVICE — AMISTEM-P STD STEM #2
Type: IMPLANTABLE DEVICE | Site: HIP | Status: FUNCTIONAL
Brand: AMISTEM-P

## 2025-02-18 DEVICE — FEMORAL HEAD Ø 36 SIZE S
Type: IMPLANTABLE DEVICE | Site: HIP | Status: FUNCTIONAL
Brand: MECTACER BIOLOX DELTA FEMORAL BALL HEAD

## 2025-02-18 RX ORDER — ACETAMINOPHEN 325 MG/1
650 TABLET ORAL EVERY 6 HOURS PRN
Status: ACTIVE | OUTPATIENT
Start: 2025-02-18 | End: 2025-02-19

## 2025-02-18 RX ORDER — LABETALOL HYDROCHLORIDE 5 MG/ML
5 INJECTION, SOLUTION INTRAVENOUS EVERY 5 MIN PRN
Status: DISCONTINUED | OUTPATIENT
Start: 2025-02-18 | End: 2025-02-18 | Stop reason: HOSPADM

## 2025-02-18 RX ORDER — PHENYLEPHRINE HCL 10 MG/ML
VIAL (ML) INJECTION AS NEEDED
Status: DISCONTINUED | OUTPATIENT
Start: 2025-02-18 | End: 2025-02-18 | Stop reason: SURG

## 2025-02-18 RX ORDER — BISACODYL 10 MG
10 SUPPOSITORY, RECTAL RECTAL
Status: DISCONTINUED | OUTPATIENT
Start: 2025-02-18 | End: 2025-02-19

## 2025-02-18 RX ORDER — HYDROMORPHONE HYDROCHLORIDE 1 MG/ML
0.6 INJECTION, SOLUTION INTRAMUSCULAR; INTRAVENOUS; SUBCUTANEOUS EVERY 5 MIN PRN
Status: DISCONTINUED | OUTPATIENT
Start: 2025-02-18 | End: 2025-02-18 | Stop reason: HOSPADM

## 2025-02-18 RX ORDER — DIPHENHYDRAMINE HCL 25 MG
25 CAPSULE ORAL EVERY 4 HOURS PRN
Status: DISCONTINUED | OUTPATIENT
Start: 2025-02-18 | End: 2025-02-19

## 2025-02-18 RX ORDER — ESCITALOPRAM OXALATE 5 MG/1
5 TABLET ORAL DAILY
Status: DISCONTINUED | OUTPATIENT
Start: 2025-02-19 | End: 2025-02-18

## 2025-02-18 RX ORDER — MORPHINE SULFATE 10 MG/ML
6 INJECTION, SOLUTION INTRAMUSCULAR; INTRAVENOUS EVERY 10 MIN PRN
Status: DISCONTINUED | OUTPATIENT
Start: 2025-02-18 | End: 2025-02-18 | Stop reason: HOSPADM

## 2025-02-18 RX ORDER — HYDROMORPHONE HYDROCHLORIDE 1 MG/ML
0.6 INJECTION, SOLUTION INTRAMUSCULAR; INTRAVENOUS; SUBCUTANEOUS
Status: ACTIVE | OUTPATIENT
Start: 2025-02-18 | End: 2025-02-19

## 2025-02-18 RX ORDER — ATORVASTATIN CALCIUM 40 MG/1
40 TABLET, FILM COATED ORAL NIGHTLY
Status: DISCONTINUED | OUTPATIENT
Start: 2025-02-18 | End: 2025-02-19

## 2025-02-18 RX ORDER — HYDROMORPHONE HYDROCHLORIDE 1 MG/ML
0.4 INJECTION, SOLUTION INTRAMUSCULAR; INTRAVENOUS; SUBCUTANEOUS
Status: ACTIVE | OUTPATIENT
Start: 2025-02-18 | End: 2025-02-19

## 2025-02-18 RX ORDER — FAMOTIDINE 20 MG/1
20 TABLET, FILM COATED ORAL 2 TIMES DAILY
Status: DISCONTINUED | OUTPATIENT
Start: 2025-02-18 | End: 2025-02-18

## 2025-02-18 RX ORDER — SODIUM CHLORIDE, SODIUM LACTATE, POTASSIUM CHLORIDE, CALCIUM CHLORIDE 600; 310; 30; 20 MG/100ML; MG/100ML; MG/100ML; MG/100ML
INJECTION, SOLUTION INTRAVENOUS CONTINUOUS
Status: DISCONTINUED | OUTPATIENT
Start: 2025-02-18 | End: 2025-02-18 | Stop reason: HOSPADM

## 2025-02-18 RX ORDER — SULFASALAZINE 500 MG/1
1500 TABLET ORAL 2 TIMES DAILY
Status: DISCONTINUED | OUTPATIENT
Start: 2025-02-18 | End: 2025-02-19

## 2025-02-18 RX ORDER — ONDANSETRON 2 MG/ML
4 INJECTION INTRAMUSCULAR; INTRAVENOUS EVERY 6 HOURS PRN
Status: DISCONTINUED | OUTPATIENT
Start: 2025-02-18 | End: 2025-02-18 | Stop reason: HOSPADM

## 2025-02-18 RX ORDER — FOLIC ACID 1 MG/1
1 TABLET ORAL DAILY
Status: DISCONTINUED | OUTPATIENT
Start: 2025-02-19 | End: 2025-02-19

## 2025-02-18 RX ORDER — DEXAMETHASONE SODIUM PHOSPHATE 4 MG/ML
VIAL (ML) INJECTION AS NEEDED
Status: DISCONTINUED | OUTPATIENT
Start: 2025-02-18 | End: 2025-02-18 | Stop reason: SURG

## 2025-02-18 RX ORDER — DIPHENHYDRAMINE HYDROCHLORIDE 50 MG/ML
25 INJECTION INTRAMUSCULAR; INTRAVENOUS ONCE AS NEEDED
Status: ACTIVE | OUTPATIENT
Start: 2025-02-18 | End: 2025-02-18

## 2025-02-18 RX ORDER — EPHEDRINE SULFATE 50 MG/ML
INJECTION INTRAVENOUS AS NEEDED
Status: DISCONTINUED | OUTPATIENT
Start: 2025-02-18 | End: 2025-02-18 | Stop reason: SURG

## 2025-02-18 RX ORDER — ONDANSETRON 2 MG/ML
INJECTION INTRAMUSCULAR; INTRAVENOUS AS NEEDED
Status: DISCONTINUED | OUTPATIENT
Start: 2025-02-18 | End: 2025-02-18 | Stop reason: SURG

## 2025-02-18 RX ORDER — HYDROCODONE BITARTRATE AND ACETAMINOPHEN 7.5; 325 MG/1; MG/1
2 TABLET ORAL EVERY 6 HOURS PRN
Status: ACTIVE | OUTPATIENT
Start: 2025-02-18 | End: 2025-02-19

## 2025-02-18 RX ORDER — CELECOXIB 200 MG/1
400 CAPSULE ORAL ONCE
Status: COMPLETED | OUTPATIENT
Start: 2025-02-18 | End: 2025-02-18

## 2025-02-18 RX ORDER — SODIUM PHOSPHATE, DIBASIC AND SODIUM PHOSPHATE, MONOBASIC 7; 19 G/230ML; G/230ML
1 ENEMA RECTAL ONCE AS NEEDED
Status: DISCONTINUED | OUTPATIENT
Start: 2025-02-18 | End: 2025-02-19

## 2025-02-18 RX ORDER — BUPIVACAINE HYDROCHLORIDE 7.5 MG/ML
INJECTION, SOLUTION INTRASPINAL AS NEEDED
Status: DISCONTINUED | OUTPATIENT
Start: 2025-02-18 | End: 2025-02-18 | Stop reason: SURG

## 2025-02-18 RX ORDER — METOCLOPRAMIDE HYDROCHLORIDE 5 MG/ML
10 INJECTION INTRAMUSCULAR; INTRAVENOUS EVERY 8 HOURS PRN
Status: DISCONTINUED | OUTPATIENT
Start: 2025-02-18 | End: 2025-02-18 | Stop reason: HOSPADM

## 2025-02-18 RX ORDER — DIPHENHYDRAMINE HYDROCHLORIDE 50 MG/ML
12.5 INJECTION INTRAMUSCULAR; INTRAVENOUS EVERY 4 HOURS PRN
Status: ACTIVE | OUTPATIENT
Start: 2025-02-18 | End: 2025-02-19

## 2025-02-18 RX ORDER — ONDANSETRON 2 MG/ML
4 INJECTION INTRAMUSCULAR; INTRAVENOUS EVERY 6 HOURS PRN
Status: DISCONTINUED | OUTPATIENT
Start: 2025-02-18 | End: 2025-02-19

## 2025-02-18 RX ORDER — DIPHENHYDRAMINE HYDROCHLORIDE 50 MG/ML
12.5 INJECTION INTRAMUSCULAR; INTRAVENOUS EVERY 4 HOURS PRN
Status: DISCONTINUED | OUTPATIENT
Start: 2025-02-18 | End: 2025-02-19

## 2025-02-18 RX ORDER — GABAPENTIN 300 MG/1
300 CAPSULE ORAL 2 TIMES DAILY
Status: DISCONTINUED | OUTPATIENT
Start: 2025-02-18 | End: 2025-02-19

## 2025-02-18 RX ORDER — FAMOTIDINE 10 MG/ML
20 INJECTION, SOLUTION INTRAVENOUS 2 TIMES DAILY
Status: DISCONTINUED | OUTPATIENT
Start: 2025-02-18 | End: 2025-02-18

## 2025-02-18 RX ORDER — LIDOCAINE HYDROCHLORIDE 10 MG/ML
INJECTION, SOLUTION EPIDURAL; INFILTRATION; INTRACAUDAL; PERINEURAL AS NEEDED
Status: DISCONTINUED | OUTPATIENT
Start: 2025-02-18 | End: 2025-02-18 | Stop reason: SURG

## 2025-02-18 RX ORDER — PROCHLORPERAZINE EDISYLATE 5 MG/ML
5 INJECTION INTRAMUSCULAR; INTRAVENOUS ONCE AS NEEDED
Status: ACTIVE | OUTPATIENT
Start: 2025-02-18 | End: 2025-02-18

## 2025-02-18 RX ORDER — MORPHINE SULFATE 4 MG/ML
2 INJECTION, SOLUTION INTRAMUSCULAR; INTRAVENOUS EVERY 10 MIN PRN
Status: DISCONTINUED | OUTPATIENT
Start: 2025-02-18 | End: 2025-02-18 | Stop reason: HOSPADM

## 2025-02-18 RX ORDER — POLYETHYLENE GLYCOL 3350 17 G/17G
17 POWDER, FOR SOLUTION ORAL DAILY PRN
Status: DISCONTINUED | OUTPATIENT
Start: 2025-02-18 | End: 2025-02-19

## 2025-02-18 RX ORDER — ALPRAZOLAM 0.25 MG
0.25 TABLET ORAL
Status: DISCONTINUED | OUTPATIENT
Start: 2025-02-18 | End: 2025-02-19

## 2025-02-18 RX ORDER — NALOXONE HYDROCHLORIDE 0.4 MG/ML
0.08 INJECTION, SOLUTION INTRAMUSCULAR; INTRAVENOUS; SUBCUTANEOUS AS NEEDED
Status: DISCONTINUED | OUTPATIENT
Start: 2025-02-18 | End: 2025-02-18 | Stop reason: HOSPADM

## 2025-02-18 RX ORDER — NALBUPHINE HYDROCHLORIDE 10 MG/ML
2.5 INJECTION INTRAMUSCULAR; INTRAVENOUS; SUBCUTANEOUS EVERY 4 HOURS PRN
Status: ACTIVE | OUTPATIENT
Start: 2025-02-18 | End: 2025-02-19

## 2025-02-18 RX ORDER — NALOXONE HYDROCHLORIDE 0.4 MG/ML
0.08 INJECTION, SOLUTION INTRAMUSCULAR; INTRAVENOUS; SUBCUTANEOUS
Status: ACTIVE | OUTPATIENT
Start: 2025-02-18 | End: 2025-02-19

## 2025-02-18 RX ORDER — DIPHENHYDRAMINE HCL 25 MG
25 CAPSULE ORAL EVERY 4 HOURS PRN
Status: ACTIVE | OUTPATIENT
Start: 2025-02-18 | End: 2025-02-19

## 2025-02-18 RX ORDER — ONDANSETRON 2 MG/ML
4 INJECTION INTRAMUSCULAR; INTRAVENOUS ONCE AS NEEDED
Status: ACTIVE | OUTPATIENT
Start: 2025-02-18 | End: 2025-02-18

## 2025-02-18 RX ORDER — SODIUM CHLORIDE, SODIUM LACTATE, POTASSIUM CHLORIDE, CALCIUM CHLORIDE 600; 310; 30; 20 MG/100ML; MG/100ML; MG/100ML; MG/100ML
INJECTION, SOLUTION INTRAVENOUS CONTINUOUS
Status: DISCONTINUED | OUTPATIENT
Start: 2025-02-18 | End: 2025-02-19

## 2025-02-18 RX ORDER — HALOPERIDOL 5 MG/ML
0.5 INJECTION INTRAMUSCULAR ONCE AS NEEDED
Status: ACTIVE | OUTPATIENT
Start: 2025-02-18 | End: 2025-02-18

## 2025-02-18 RX ORDER — ASPIRIN 325 MG
325 TABLET, DELAYED RELEASE (ENTERIC COATED) ORAL 2 TIMES DAILY
Status: DISCONTINUED | OUTPATIENT
Start: 2025-02-18 | End: 2025-02-19

## 2025-02-18 RX ORDER — HYDROMORPHONE HYDROCHLORIDE 1 MG/ML
0.4 INJECTION, SOLUTION INTRAMUSCULAR; INTRAVENOUS; SUBCUTANEOUS EVERY 5 MIN PRN
Status: DISCONTINUED | OUTPATIENT
Start: 2025-02-18 | End: 2025-02-18 | Stop reason: HOSPADM

## 2025-02-18 RX ORDER — DONEPEZIL HYDROCHLORIDE 10 MG/1
10 TABLET, FILM COATED ORAL EVERY MORNING
Status: DISCONTINUED | OUTPATIENT
Start: 2025-02-19 | End: 2025-02-19

## 2025-02-18 RX ORDER — FERROUS SULFATE 325(65) MG
325 TABLET, DELAYED RELEASE (ENTERIC COATED) ORAL
Status: DISCONTINUED | OUTPATIENT
Start: 2025-02-19 | End: 2025-02-19

## 2025-02-18 RX ORDER — ACETAMINOPHEN 500 MG
1000 TABLET ORAL ONCE
Status: COMPLETED | OUTPATIENT
Start: 2025-02-18 | End: 2025-02-18

## 2025-02-18 RX ORDER — DOCUSATE SODIUM 100 MG/1
100 CAPSULE, LIQUID FILLED ORAL 2 TIMES DAILY
Status: DISCONTINUED | OUTPATIENT
Start: 2025-02-18 | End: 2025-02-19

## 2025-02-18 RX ORDER — PANTOPRAZOLE SODIUM 40 MG/1
40 TABLET, DELAYED RELEASE ORAL
Status: DISCONTINUED | OUTPATIENT
Start: 2025-02-19 | End: 2025-02-19

## 2025-02-18 RX ORDER — SODIUM CHLORIDE 9 MG/ML
INJECTION, SOLUTION INTRAVENOUS CONTINUOUS
Status: DISCONTINUED | OUTPATIENT
Start: 2025-02-18 | End: 2025-02-19

## 2025-02-18 RX ORDER — ACETAMINOPHEN 325 MG/1
650 TABLET ORAL
Status: DISCONTINUED | OUTPATIENT
Start: 2025-02-18 | End: 2025-02-19

## 2025-02-18 RX ORDER — HYDROCODONE BITARTRATE AND ACETAMINOPHEN 7.5; 325 MG/1; MG/1
1 TABLET ORAL EVERY 6 HOURS PRN
Status: ACTIVE | OUTPATIENT
Start: 2025-02-18 | End: 2025-02-19

## 2025-02-18 RX ORDER — MORPHINE SULFATE 4 MG/ML
4 INJECTION, SOLUTION INTRAMUSCULAR; INTRAVENOUS EVERY 10 MIN PRN
Status: DISCONTINUED | OUTPATIENT
Start: 2025-02-18 | End: 2025-02-18 | Stop reason: HOSPADM

## 2025-02-18 RX ORDER — AMLODIPINE BESYLATE 10 MG/1
10 TABLET ORAL EVERY MORNING
Status: DISCONTINUED | OUTPATIENT
Start: 2025-02-19 | End: 2025-02-19

## 2025-02-18 RX ORDER — LOSARTAN POTASSIUM 100 MG/1
100 TABLET ORAL DAILY
Status: DISCONTINUED | OUTPATIENT
Start: 2025-02-19 | End: 2025-02-19

## 2025-02-18 RX ORDER — ESCITALOPRAM OXALATE 5 MG/1
10 TABLET ORAL DAILY
Status: DISCONTINUED | OUTPATIENT
Start: 2025-02-19 | End: 2025-02-19

## 2025-02-18 RX ORDER — MEMANTINE HYDROCHLORIDE 10 MG/1
10 TABLET ORAL 2 TIMES DAILY
Status: DISCONTINUED | OUTPATIENT
Start: 2025-02-18 | End: 2025-02-19

## 2025-02-18 RX ORDER — SENNOSIDES 8.6 MG
17.2 TABLET ORAL NIGHTLY
Status: DISCONTINUED | OUTPATIENT
Start: 2025-02-18 | End: 2025-02-19

## 2025-02-18 RX ORDER — HYDROMORPHONE HYDROCHLORIDE 1 MG/ML
0.2 INJECTION, SOLUTION INTRAMUSCULAR; INTRAVENOUS; SUBCUTANEOUS EVERY 5 MIN PRN
Status: DISCONTINUED | OUTPATIENT
Start: 2025-02-18 | End: 2025-02-18 | Stop reason: HOSPADM

## 2025-02-18 RX ORDER — MORPHINE SULFATE 1 MG/ML
INJECTION, SOLUTION EPIDURAL; INTRATHECAL; INTRAVENOUS AS NEEDED
Status: DISCONTINUED | OUTPATIENT
Start: 2025-02-18 | End: 2025-02-18 | Stop reason: SURG

## 2025-02-18 RX ADMIN — EPHEDRINE SULFATE 5 MG: 50 INJECTION INTRAVENOUS at 10:20:00

## 2025-02-18 RX ADMIN — EPHEDRINE SULFATE 10 MG: 50 INJECTION INTRAVENOUS at 09:25:00

## 2025-02-18 RX ADMIN — SODIUM CHLORIDE, SODIUM LACTATE, POTASSIUM CHLORIDE, CALCIUM CHLORIDE: 600; 310; 30; 20 INJECTION, SOLUTION INTRAVENOUS at 10:20:00

## 2025-02-18 RX ADMIN — PHENYLEPHRINE HCL 200 MCG: 10 MG/ML VIAL (ML) INJECTION at 10:20:00

## 2025-02-18 RX ADMIN — SODIUM CHLORIDE, SODIUM LACTATE, POTASSIUM CHLORIDE, CALCIUM CHLORIDE: 600; 310; 30; 20 INJECTION, SOLUTION INTRAVENOUS at 08:41:00

## 2025-02-18 RX ADMIN — DEXAMETHASONE SODIUM PHOSPHATE 4 MG: 4 MG/ML VIAL (ML) INJECTION at 08:56:00

## 2025-02-18 RX ADMIN — ONDANSETRON 4 MG: 2 INJECTION INTRAMUSCULAR; INTRAVENOUS at 08:56:00

## 2025-02-18 RX ADMIN — LIDOCAINE HYDROCHLORIDE 50 MG: 10 INJECTION, SOLUTION EPIDURAL; INFILTRATION; INTRACAUDAL; PERINEURAL at 08:54:00

## 2025-02-18 RX ADMIN — BUPIVACAINE HYDROCHLORIDE 1.4 ML: 7.5 INJECTION, SOLUTION INTRASPINAL at 08:52:00

## 2025-02-18 RX ADMIN — EPHEDRINE SULFATE 10 MG: 50 INJECTION INTRAVENOUS at 09:30:00

## 2025-02-18 RX ADMIN — LIDOCAINE HYDROCHLORIDE 3 ML: 10 INJECTION, SOLUTION EPIDURAL; INFILTRATION; INTRACAUDAL; PERINEURAL at 08:50:00

## 2025-02-18 RX ADMIN — PHENYLEPHRINE HCL 100 MCG: 10 MG/ML VIAL (ML) INJECTION at 10:00:00

## 2025-02-18 RX ADMIN — EPHEDRINE SULFATE 10 MG: 50 INJECTION INTRAVENOUS at 09:40:00

## 2025-02-18 RX ADMIN — PHENYLEPHRINE HCL 100 MCG: 10 MG/ML VIAL (ML) INJECTION at 09:40:00

## 2025-02-18 RX ADMIN — MORPHINE SULFATE 0.2 MG: 1 INJECTION, SOLUTION EPIDURAL; INTRATHECAL; INTRAVENOUS at 08:52:00

## 2025-02-18 RX ADMIN — EPHEDRINE SULFATE 10 MG: 50 INJECTION INTRAVENOUS at 10:00:00

## 2025-02-18 RX ADMIN — EPHEDRINE SULFATE 5 MG: 50 INJECTION INTRAVENOUS at 09:20:00

## 2025-02-18 NOTE — OR NURSING
Upon prepping perineal skin for pastor catheter placement, it was noted there was a perineal tear and bleeding. This was present before entering the OR.  DARCI Vale RN notified of skin condition. Md notified.

## 2025-02-18 NOTE — CONSULTS
[facility]    PATIENT'S NAME: MARYAN FLORES   ATTENDING PHYSICIAN: Brayan Pearson MD   CONSULTING PHYSICIAN: Loida Jorge MD   PATIENT ACCOUNT#:   [id number]    LOCATION:  [facility] PACU 3 [facility] 10  MEDICAL RECORD #:   [id number]       YOB: 1945  ADMISSION DATE:       02/18/2025      CONSULT DATE:  02/18/2025    REPORT OF CONSULTATION      REASON FOR ADMISSION:  Post right total hip arthroplasty.    HISTORY OF PRESENT ILLNESS:  Patient is an 79-year-old  female with chronic right hip pain and underlying severe primary osteoarthritis, failed outpatient conservative medical management options, scheduled today for above-mentioned procedure by her orthopedic surgeon, Dr. Pearson.  Preoperatively, she had spinal block.  Postoperatively, transferred to PACU for further monitoring.    PAST MEDICAL HISTORY:  Generalized osteoarthritis, hypertension, DVT, hyperlipidemia, migraines, degenerative joint disease of lumbar spine, mild dementia, ulcerative colitis.     PAST SURGICAL HISTORY:  Left foot surgery, parathyroidectomy, total abdominal hysterectomy and bilateral salpingo-oophorectomy for endometrial cancer, appendectomy, and tonsillectomy.    MEDICATIONS:  Please see medication reconciliation list.      ALLERGIES:  Levaquin and azithromycin.    SOCIAL HISTORY:  No tobacco or drug use.  Social alcohol.  Independent in her basic activities of daily living.     FAMILY HISTORY:  Both parents had colon cancer.    REVIEW OF SYSTEMS:  Currently resting in bed, no right hip pain.  No chest pain.  No shortness of breath.  Other 12-point review of systems is negative.        PHYSICAL EXAMINATION:    GENERAL:  Alert, oriented to time, place, and person.  No acute distress.   VITAL SIGNS:  Temperature 97.1, pulse 70, respiratory rate 25, blood pressure 109/45, pulse ox 96% on room air.    HEENT:  Atraumatic.  Oropharynx clear.  Moist mucous membranes.  Ears, nose normal.  Eyes:  Anicteric  sclerae.    NECK:  Supple.  No lymphadenopathy.  Trachea midline.  Full range of motion.    LUNGS:  Clear to auscultation bilaterally.  Normal respiratory effort.   HEART:  Regular rate and rhythm.  S1, S2 auscultated.  No murmur.    ABDOMEN:  Soft, nondistended.  No tenderness.  Positive bowel sounds.    EXTREMITIES:  Right hip anterior Prevena wound VAC dressing.  No leg edema, clubbing, or cyanosis.   NEUROLOGIC:  Decreased sensation and muscle movement of both lower extremities post spinal block.  No other focal findings.    ASSESSMENT AND PLAN:    1.   Right hip primary osteoarthritis, status post right total hip arthroplasty, anterior approach, spinal block.  Pain control.  Monitor surgical wound and drain.  Aspirin for DVT prophylaxis.  Physical and occupational therapy.  2.   Hyperlipidemia.  Continue home medication.  3.   Hypertension.  Continue home medication and monitor.  4.   Ulcerative colitis.  Continue home medication.  5.   Mild dementia.  Continue home medication.    Dictated By Loida Jorge MD  d: 02/18/2025 11:14:46  t: 02/18/2025 11:43:01  Job [id number]  FB/

## 2025-02-18 NOTE — OPERATIVE REPORT
OPERATIVE REPORT     PREOPERATIVE DIAGNOSIS:  Osteoarthritis, right hip.  POSTOPERATIVE DIAGNOSIS:  Osteoarthritis, right hip.    PROCEDURE:  right total hip arthroplasty via anterior approach with C-arm control.     ASSISTANT:  Joe Pierson PA-C.       INDICATIONS:  The patient is a 79 year old female with advanced osteoarthritis of the right hip that has not responded to conservative management.  After discussion of the options for treatment, he requested the above procedure.  Our discussion included, but was not limited to, the potential risks of anesthetic complication, infection, DVT or PE, leg length inequality, periprosthetic fracture, injuries to local nerves or blood vessels, bleeding requiring transfusion, periprosthetic fracture, as well as the risk of unanticipated perioperative medical or orthopedic complications.  Written consent was obtained.     OPERATIVE TECHNIQUE:  The patient was brought to the operating room and placed under spinal anesthesia.  Ancef, tranexamic acid, and vancomycin were administered prophylactically.  A time-out was performed.  The right hip and lower extremity were prepped and draped in the usual sterile fashion.  An oblique incision was made just distal and lateral to the ASIS and carried to the fascia over the TFL.  The fascia was divided in line with the skin incision.  The TFL was reflected posteriorly within its sheath, and the rectus was reflected anteriorly.  The anterior circumflex femoral vessels were identified, coagulated, and ligated, and an anterior capsulotomy was performed.  A femoral neck osteotomy was made at the appropriate level and orientation.  The femoral head was removed.  A Charnley retractor was placed within the hip capsule to facilitate exposure, and the acetabular labrum was excised.  The acetabulum was reamed sequentially to 50 mm, at which point good bleeding subchondral bone was obtained.  Depth of reaming as well as inclination and version  were verified with the C-arm.  A Medacta Mpact 2-hole acetabulum was inserted in the appropriate version and inclination and obtained a good press-fit.  A screw was placed in the ilium for supplementary fixation and obtained excellent purchase.  A highly cross-linked flat 36mm ID liner was locked into the shell, and attention was turned to the femur.       The pubofemoral and ischiofemoral ligaments were released, and the femur was externally rotated.  With no traction applied, the leg was extended externally, rotated, and adducted.  Standard retractors were placed around the proximal femur, and a box osteotome was used to open the femoral canal, followed by the canal opening broach.  The femur was then broached up to accept a Medacta AMIS size 2 broach, which obtained excellent press-fit with excellent rotational stability.  The standard trial neck was applied, and a trial reduction was performed with a 36 mm -4mm head.  Check x-rays were taken with the C-arm and overlaid with the preoperative views, verifying appropriate leg length and offset.  The trial components were then removed, and the actual #2 stem was inserted in the femur.  An excellent press-fit was obtained with good rotational stability.  The  ceramic head matching the trial size was applied to a clean dry trunnion, and the hip was reduced.       A final radiograph was taken and verified excellent re-creation of leg length and offset.  The wound was irrigated and closed in routine fashion in layers.  Sponge and instrument counts were correct at the end of the procedure.  Estimated blood loss was 150 mL.  There were no complications.  The routine specimens were sent to Pathology.  The patient was stable under the care of Anesthesia at the completion of the procedure.     LYNETTE MCPHERSON MD 2/18/2025

## 2025-02-18 NOTE — PHYSICAL THERAPY NOTE
PHYSICAL THERAPY HIP EVALUATION - INPATIENT     Room Number: Room 2/Room 2-A  Evaluation Date: 2/18/2025  Type of Evaluation: Initial  Physician Order: PT Eval and Treat    Presenting Problem: primary osteoarthritis of right hip, s/p R anterior JOHN  Co-Morbidities : Generalized osteoarthritis, hypertension, DVT, hyperlipidemia, migraines, DJD of lumbar spine, mild dementia, ulcerative colitis  Reason for Therapy: Mobility Dysfunction and Discharge Planning    PHYSICAL THERAPY ASSESSMENT   Patient is a 79 year old female admitted 2/18/2025 for primary osteoarthritis of right hip, s/p R anterior JOHN.  Prior to admission, patient's baseline is Mod I for functional mobility with use of rolling walker/cane/rollator/holding onto furniture. Spouse assists as needed with ADLs. Patient has a part-time caregiver from 9 am - 5 pm for 5 days/week to provide patient with supervision and assistance as needed. Patient is currently functioning below baseline with bed mobility, transfers, gait, stair negotiation, standing prolonged periods, and performing household tasks. Patient is requiring stand-by assist and contact guard assist as a result of the following impairments: decreased functional strength, decreased endurance/aerobic capacity, impaired dynamic standing balance, decreased muscular endurance, cognitive deficits (mild dementia), and medical status. Physical Therapy will continue to follow for duration of hospitalization.    Patient will benefit from continued skilled PT Services at discharge to promote prior level of function and safety with additional support and return home with home health PT.    PLAN DURING HOSPITALIZATION  Nursing Mobility Recommendation : 1 Assist  PT Device Recommendation: Rolling walker  PT Treatment Plan: Bed mobility;Body mechanics;Endurance;Energy conservation;Patient education;Gait training;Strengthening;Range of motion;Transfer training;Stair training;Balance training  Rehab Potential :  Good  Frequency (Obs): BID     PHYSICAL THERAPY MEDICAL/SOCIAL HISTORY     Problem List  Principal Problem:    Primary osteoarthritis of right hip  Active Problems:    Hypercholesterolemia    Hypertension    Ulcerative (chronic) proctosigmoiditis (HCC)    Mild dementia (HCC)    HOME SITUATION  Type of Home: House  Home Layout: Two level;Stairlift  Stairs to Enter : 2   Railing: No    Lives With: Spouse;Caregiver part-time (caregiver 5 days/week from 9 am - 5 pm)    Drives: No   Patient Regularly Uses: Rolling walker;Cane;Rollator     SUBJECTIVE  Agreeable     PHYSICAL THERAPY EXAMINATION   OBJECTIVE  Precautions: Limb alert - right (wound vac)  Fall Risk: Standard fall risk    WEIGHT BEARING RESTRICTION  R Lower Extremity: Weight Bearing as Tolerated    PAIN ASSESSMENT  Ratin    COGNITION  Memory:  decreased short term memory  Following Commands:  follows one step commands with increased time and follows one step commands with repetition    RANGE OF MOTION AND STRENGTH ASSESSMENT  Upper extremity ROM and strength are within functional limits   Lower extremity ROM is within functional limits   Lower extremity strength is within functional limits; RLE not formally assessed due to recent procedure, approx 3/5    BALANCE  Static Sitting: Good  Dynamic Sitting: Fair +  Static Standing: Fair  Dynamic Standing: Fair -    ACTIVITY TOLERANCE  Pulse: 93  Heart Rate Source: Monitor     BP: 135/65  BP Location: Right arm  BP Method: Automatic  Patient Position: Sitting    O2 WALK  Oxygen Therapy  SPO2% on Room Air at Rest: 96    AM-PAC '6-Clicks' INPATIENT SHORT FORM - BASIC MOBILITY  How much difficulty does the patient currently have...  Patient Difficulty: Turning over in bed (including adjusting bedclothes, sheets and blankets)?: A Little   Patient Difficulty: Sitting down on and standing up from a chair with arms (e.g., wheelchair, bedside commode, etc.): A Little   Patient Difficulty: Moving from lying on back to  sitting on the side of the bed?: A Little   How much help from another person does the patient currently need...   Help from Another: Moving to and from a bed to a chair (including a wheelchair)?: A Little   Help from Another: Need to walk in hospital room?: A Little   Help from Another: Climbing 3-5 steps with a railing?: A Little     AM-PAC Score:  Raw Score: 18   Approx Degree of Impairment: 46.58%   Standardized Score (AM-PAC Scale): 43.63   CMS Modifier (G-Code): CK    FUNCTIONAL ABILITY STATUS  Functional Mobility/Gait Assessment  Gait Assistance: Contact guard assist  Distance (ft): 100 ft  Assistive Device: Rolling walker  Pattern: R Decreased stance time;Shuffle (decreased anupam speed, decreased step length)  Supine to Sit: stand-by assist  Sit to Stand: contact guard assist; cues provided for appropriate hand placement during functional transfers    Exercise/Education Provided:  Bed mobility  Body mechanics  Energy conservation  Functional activity tolerated  Gait training  Posture  ROM  Strengthening  Lower therapeutic exercise:  Alternating marching  Ankle pumps  Hip AB/AD  LAQ  Quad sets  Transfer training    Skilled Therapy Provided: Patient is RLE WBAT. RN approved activity. Patient received supine in bed; agreeable to participate in therapy on this date. Patient educated on POC. Education on physiological benefits of mobility post operatively. Discussed anterior JOHN protocol, weight bearing status, precautions and education on therapeutic exercises - patient provided with handout to summarize. Patient reporting no pain. Patient benefits from increased time and cues in order to complete functional mobility tasks.    The patient's Approx Degree of Impairment: 46.58% has been calculated based on documentation in the [facility] '6 clicks' Inpatient Basic Mobility Short Form.  Research supports that patients with this level of impairment may benefit from HHPT.  Final disposition will be made by  interdisciplinary medical team.    Patient End of Session: Up in chair;Needs met;Call light within reach;RN aware of session/findings;All patient questions and concerns addressed;Hospital anti-slip socks;Family present    CURRENT GOALS  Goals to be met by: 2/25/25  Patient Goal Patient's self-stated goal is: none stated   Goal #1 Patient is able to demonstrate supine - sit EOB @ level: supervision   Goal #1   Current Status    Goal #2 Patient is able to demonstrate transfers Sit to/from Stand at assistance level: supervision   Goal #2  Current Status    Goal #3 Patient is able to ambulate 200 feet with assistive device at assistance level: supervision   Goal #3   Current Status    Goal #4 Patient will negotiate 2 stairs/one curb w/ assistive device and CGA   Goal #4   Current Status    Goal #5 Patient verbalizes and/or demonstrates all precautions and safety concerns independently   Goal #5   Current Status    Goal #6 Patient independently performs home exercise program for ROM/strengthening per the instructions provided in preparation for discharge.   Goal #6  Current Status      Patient Evaluation Complexity Level:  History Low - no personal factors and/or co-morbidities   Examination of body systems Low -  addressing 1-2 elements   Clinical Presentation Low- Stable   Clinical Decision Making  Low Complexity     Gait Training: 10 minutes  Therapeutic Activity:  10 minutes  Therapeutic Exercise: 5 minutes

## 2025-02-18 NOTE — CM/SW NOTE
Department  notified of request for HHC, aidin referrals started. Assigned CM/SW to follow up with pt/family on further discharge planning.     Rebeca Shelby, DSC

## 2025-02-18 NOTE — BRIEF OP NOTE
Pre-Operative Diagnosis: Avascular necrosis of bone of right hip (HCC) [M87.051]  Primary osteoarthritis of right hip [M16.11]     Post-Operative Diagnosis: Avascular necrosis of bone of right hip (HCC) [M87.051]Primary osteoarthritis of right hip [M16.11]      Procedure Performed:   Right total hip arthroplasty, anterior approach    Surgeons and Role:     * Lynette Pearson MD - Primary    Assistant(s):  Surgical Assistant.: Dalia Menjivar  PA: Joe Pierson PA-LEONID     Surgical Findings: OA     Specimen: path     Estimated Blood Loss: Blood Output: 150 mL (2/18/2025 10:19 AM)        LYNETTE PEARSON MD  2/18/2025  10:26 AM

## 2025-02-18 NOTE — ANESTHESIA POSTPROCEDURE EVALUATION
Patient: Karyn Cobos    Procedure Summary       Date: 02/18/25 Room / Location: [facility] MAIN OR  / [facility] MAIN OR    Anesthesia Start: 0841 Anesthesia Stop: 1048    Procedure: Right total hip arthroplasty, anterior approach (Right: Hip) Diagnosis:       Avascular necrosis of bone of right hip (HCC)      Primary osteoarthritis of right hip      (Avascular necrosis of bone of right hip (HCC) [M87.051]Primary osteoarthritis of right hip [M16.11])    Surgeons: Brayan Pearson MD Anesthesiologist: Noel Guzman DO    Anesthesia Type: MAC, spinal ASA Status: 2            Anesthesia Type: MAC, spinal    Vitals Value Taken Time   /44 02/18/25 1048   Temp 97.1 °F (36.2 °C) 02/18/25 1048   Pulse 75 02/18/25 1048   Resp 12 02/18/25 1048   SpO2 95 % 02/18/25 1048   Vitals shown include unfiled device data.    [facility] AN Post Evaluation:   Patient Evaluated in PACU  Patient Participation: complete - patient participated  Level of Consciousness: awake  Pain Management: adequate  Airway Patency:patent  Dental exam unchanged from preop  Yes    Nausea/Vomiting: none  Cardiovascular Status: acceptable  Respiratory Status: acceptable and room air  Postoperative Hydration acceptable      NOEL GUZMAN DO  2/18/2025 10:49 AM

## 2025-02-18 NOTE — ANESTHESIA PROCEDURE NOTES
Spinal Block    Date/Time: 2/18/2025 8:50 AM    Performed by: Markus Monsalve MD  Authorized by: Markus Monsalve MD      General Information and Staff    Start Time:  2/18/2025 8:50 AM  End Time:  2/18/2025 8:52 AM  Anesthesiologist:  Markus Monsalve MD  Performed by:  Anesthesiologist  Patient Location:  OR  Site identification: surface landmarks    Reason for Block: at surgeon's request, post-op pain management and surgical anesthesia    Preanesthetic Checklist: patient identified, IV checked, risks and benefits discussed, monitors and equipment checked, pre-op evaluation, timeout performed, anesthesia consent and sterile technique used      Procedure Details    Patient Position:  Sitting  Prep: ChloraPrep and patient draped    Monitoring:  Cardiac monitor, heart rate and continuous pulse ox  Approach:  Midline  Location:  L3-4  Injection Technique:  Single-shot    Needle    Needle Type:  Pencil-tip  Needle Gauge:  24 G  Needle Length:  3.5 in    Assessment    Sensory Level:  T8  Events: clear CSF, CSF aspirated, well tolerated and blood negative      Additional Comments

## 2025-02-18 NOTE — ANESTHESIA PREPROCEDURE EVALUATION
Anesthesia PreOp Note    HPI:     Karyn Cobos is a 79 year old female who presents for preoperative consultation requested by: Brayan Pearson MD    Date of Surgery: 2/18/2025    Procedure(s):  Right total hip arthroplasty, anterior approach  Indication: Avascular necrosis of bone of right hip (HCC) [M87.051]  Primary osteoarthritis of right hip [M16.11]    Relevant Problems   No relevant active problems       NPO:  Last Liquid Consumption Date: 02/17/25  Last Liquid Consumption Time: 1900  Last Solid Consumption Date: 02/17/25  Last Solid Consumption Time: 1900  Last Liquid Consumption Date: 02/17/25          History Review:  Patient Active Problem List    Diagnosis Date Noted    Avascular necrosis of bone of right hip (HCC) 02/10/2025    Vitamin B12 deficiency 01/12/2024    Primary osteoarthritis of both hips: mild 12/05/2023    Chronic right hip pain 10/25/2023    Mild dementia ([facility]) 06/09/2023    L5-S1 left severe/right moderate, L4-5 bilateral moderate, L3-4 left moderate-severe/right moderate, L2-3 right mderate-severe, L1-2 bilateral moderate-severe foraminal stenosis 06/03/2022    L4-5 unstable grade 2-3, L5-S1 stable grade 1 spondylolisthesis 05/26/2022    Other idiopathic scoliosis, thoracolumbar region 05/26/2022    right > left S1 radiculopathy with right L4 radiculitis 05/26/2022    L5-S1 left large far lateral & mild central, L4-5 large diffuse, L3-4 mild-mod diffuse & left mod far lateral, L2-3 mod diffuse, L1-2 mod diffuse & right mod-large foraminal bulging discs  05/26/2022    L4-5 severe, L3-4 mod, L2-3 mod, L1-2 mod-severe central stenosis 05/26/2022    DVT, lower extremity, distal, acute, right ([facility]) 05/20/2021    Closed trimalleolar fracture of right ankle with routine healing 04/19/2021    Family history of colon cancer 04/08/2020    Dorsalgia, unspecified 02/12/2020    History of falling 02/12/2020    Long term (current) use of anticoagulants 02/12/2020    Nondisp fx of lateral end  of right clavicle, init 2020    Personal history of pneumonia (recurrent) 2020    Personal history of pulmonary embolism 2020    Personal history of other venous thrombosis and embolism 2020    Primary osteoarthritis 2020    Raynaud's phenomenon without gangrene 2019    Multiple thyroid nodules 2018    Left carotid artery stenosis 2018    Agatston coronary artery calcium score less than 100 2018    Bronchiectasis without complication (HCC) 08/10/2017    Osteopenia 2016    Uterine cancer (HCC)     Hypercholesterolemia     Hypertension     Mastodynia, female 10/27/2015    History of endometrial cancer 10/21/2014    Headache 2013    B-complex deficiency 2013    Other specified symptom associated with female genital organs 2012    Ophthalmoplegic migraine, not intractable 2012    Nontoxic uninodular goiter 2011    Pernicious anemia 2011    Hypoparathyroidism (HCC) 2007    Spinal stenosis of lumbar region 11/15/2006    Generalized osteoarthrosis, unspecified site 2004    Ulcerative (chronic) proctosigmoiditis (HCC) 2004    Malignant neoplasm of corpus uteri, except isthmus (HCC) 2004       Past Medical History:    Arthritis    Back pain    Cataract    Cough    Deep vein thrombosis (HCC)    Dementia (HCC)    Essential hypertension    Family history of colon cancer     0    PARA ZERO    H/O foot surgery    LEFT FOOT SURGERY, PINS PLACED IN TOE    H/O oral surgery    GUM/MOUTH SURGERY    Hallux rigidus    LEFT FOOT, YURY PROCEDURE, 1ST LEFT METARSOPHALANGEAL JOINT    Heart palpitations    HEART MONITOR 2012    High blood pressure    High cholesterol    History of DVT (deep vein thrombosis)    Hypercholesterolemia    Hyperlipidemia    Hyperparathyroidism (HCC)    PAIR OF PARATHYROID REMOVED    Hyperparathyroidism (HCC)    PAIR OF PARATHYROID REMOVED     Hypertension    Left carotid artery  stenosis    Migraine    OCCULAR MIGRAINE    Migraine    OCCULAR MIGRAINE     Neuroma    2 LT, HALLUX RIGIDUS LT, PER. NG.    Osteoarthritis    Other ill-defined conditions(799.89)    TAHBSO MGMT.    Other ill-defined conditions(799.89)    CHRONIC LEFT CYSTIC PELVIC MASS    Ovarian cyst    REMOVED PER NG.    Painful breasts    Pneumonia    HOSPITALIZED    Pneumonia due to organism    Spinal stenosis    Spinal stenosis    Tonsillitis    Ulcerative colitis (HCC)    Uterine cancer (HCC)    Visual impairment    glasses       Past Surgical History:   Procedure Laterality Date    Appendectomy      patient doenst rememeber    Cataract Bilateral     Left Eye: 23, Right Eye: 23    Colonoscopy N/A 2016    Procedure: COLONOSCOPY;  Surgeon: Velma Herrera MD;  Location: [facility] ENDOSCOPY    Colonoscopy N/A 2018    Procedure: COLONOSCOPY;  Surgeon: Velma Herrera MD;  Location: [facility] ENDOSCOPY    Colonoscopy  10/2022    Colonoscopy N/A 10/14/2022    Procedure: COLONOSCOPY;  Surgeon: Michael López MD;  Location: [facility] ENDOSCOPY    Hysterectomy      Other surgical history  ?    Parathyroid removal    Other surgical history  2017    left foot surgery plate removed    Tonsillectomy         Prescriptions Prior to Admission[1]  Current Medications and Prescriptions Ordered in Epic[2]    Allergies[3]    Family History   Problem Relation Age of Onset    Cancer Father 73        COLON, CAUSE OF DEATH    Cancer Mother 64        COLON, 2nd primary 94    Cancer Maternal Cousin Male 60        prostate/patient denies     Cancer Self 56        uterine    Prostate Cancer Maternal Uncle         age unknown/ at 94    Kidney Disease Neg         UROLITHIASIS    Breast Cancer Neg     Ovarian Cancer Neg      Social History     Socioeconomic History    Marital status:    Tobacco Use    Smoking status: Never     Passive exposure: Never    Smokeless tobacco: Never   Vaping Use    Vaping status: Never  Used   Substance and Sexual Activity    Alcohol use: Not Currently     Alcohol/week: 2.0 standard drinks of alcohol     Comment: socially    Drug use: Never   Other Topics Concern    Caffeine Concern Yes     Comment: SODA/TEA 1 CAN/DAY       Available pre-op labs reviewed.  Lab Results   Component Value Date    WBC 7.0 01/28/2025    RBC 3.50 (L) 01/28/2025    HGB 11.5 (L) 01/28/2025    HCT 33.8 (L) 01/28/2025    MCV 96.6 01/28/2025    MCH 32.9 01/28/2025    MCHC 34.0 01/28/2025    RDW 12.4 01/28/2025    .0 01/28/2025     Lab Results   Component Value Date     (L) 02/10/2025    K 4.5 02/10/2025    CL 99 02/10/2025    CO2 26.0 02/10/2025    BUN 37 (H) 02/10/2025    CREATSERUM 1.04 (H) 02/10/2025     (H) 02/10/2025    CA 9.8 02/10/2025          Vital Signs:  Body mass index is 24.51 kg/m².   height is 1.575 m (5' 2\") and weight is 60.8 kg (134 lb). Her oral temperature is 98.1 °F (36.7 °C). Her blood pressure is 155/64 and her pulse is 72. Her respiration is 18 and oxygen saturation is 96%.   Vitals:    02/12/25 1143 02/18/25 0644   BP:  155/64   Pulse:  72   Resp:  18   Temp:  98.1 °F (36.7 °C)   TempSrc:  Oral   SpO2:  96%   Weight: 58.1 kg (128 lb) 60.8 kg (134 lb)   Height: 1.575 m (5' 2\")         Anesthesia Evaluation     Patient summary reviewed and Nursing notes reviewed    No history of anesthetic complications   Airway   Mallampati: II  TM distance: >3 FB  Neck ROM: full  Dental - Dentition appears grossly intact     Pulmonary - negative ROS and normal exam   Cardiovascular - normal exam  Exercise tolerance: good  (+) hypertension well controlled    Neuro/Psych    (+)  neuromuscular disease (dementia), headaches (migraine),        GI/Hepatic/Renal - negative ROS     Endo/Other - negative ROS   Abdominal  - normal exam                 Anesthesia Plan:   ASA:  2  Plan:   MAC and spinal  Post-op Pain Management: Intrathecal narcotics, Oral pain medication and IV analgesics  Informed Consent  Plan and Risks Discussed With:  Patient and spouse      I have informed Karyn Cobos of the nature of the anesthetic plan, benefits, risks including possible dental damage if relevant, major complications, and any alternative forms of anesthetic management.   All of the patient's questions were answered to the best of my ability. The patient desires the anesthetic management as planned.  I have informed Karyn Cobos of the risks of spinal anesthesia including, but not limited to: failure, headache, backache, difficulty breathing, infection, bleeding, nerve damage, paralysis, death. The patient desires the proposed anesthetic as planned.   EFRA FERGUSON MD  2/18/2025 8:35 AM  Present on Admission:  **None**           [1]   Facility-Administered Medications Prior to Admission   Medication Dose Route Frequency Provider Last Rate Last Admin    cyanocobalamin (VITAMIN B12) 1000 MCG/ML injection 1,000 mcg  1,000 mcg Intramuscular Q30 Days Pan Mansfield MD   1,000 mcg at 01/28/25 0958     Medications Prior to Admission   Medication Sig Dispense Refill Last Dose/Taking    escitalopram 5 MG Oral Tab Take 1 tablet (5 mg total) by mouth daily. Slowing tapering up dosage. Final dose of 10 mg, which will then discontinue Citalopram   2/18/2025 at  5:30 AM    amLODIPine 10 MG Oral Tab TAKE ONE TABLET BY MOUTH ONE TIME DAILY (Patient taking differently: Take 1 tablet (10 mg total) by mouth every morning.) 90 tablet 3 2/18/2025 at  5:30 AM    donepezil 10 MG Oral Tab Take 1 tablet (10 mg total) by mouth daily. (Patient taking differently: Take 1 tablet (10 mg total) by mouth every morning.) 90 tablet 3 2/18/2025 at  5:30 AM    gabapentin 300 MG Oral Cap Take 1 capsule (300 mg total) by mouth 2 (two) times daily. 180 capsule 3 2/18/2025 at  5:30 AM    memantine 10 MG Oral Tab Take 1 tablet (10 mg total) by mouth 2 (two) times daily. 180 tablet 3 2/18/2025 at  5:30 AM    telmisartan 80 MG Oral Tab TAKE ONE TABLET BY MOUTH  IN THE MORNING 90 tablet 3 2/17/2025 Morning    atorvastatin 40 MG Oral Tab TAKE ONE TABLET BY MOUTH AT BEDTIME 90 tablet 3 2/17/2025 at  9:00 PM    triamcinolone 0.1 % External Cream Apply topically 2 (two) times daily. 45 g 1 Taking    omeprazole 20 MG Oral Capsule Delayed Release Take 1 capsule (20 mg total) by mouth every morning.   2/18/2025 at  5:30 AM    SULFASALAZINE 500 MG Oral Tab TAKE 3 TABLETS BY MOUTH TWICE A  tablet 0 2/17/2025 Morning    aspirin 81 MG Oral Tab EC Take 1 tablet (81 mg total) by mouth daily.  0 2/13/2025    folic acid 1 MG Oral Tab Take 1 tablet (1 mg total) by mouth daily. 90 tablet 3 2/17/2025 Morning    Calcium Citrate-Vitamin D 315-250 MG-UNIT Oral Tab Take 2 tablets by mouth 2 (two) times daily with meals.   2/17/2025 Morning    cyanocobalamin (VITAMIN B12) 1000 MCG/ML Injection Solution Inject 1 mL (1,000 mcg total) into the muscle every 30 (thirty) days.   Past Month    Multiple Vitamin (MULTI-VITAMINS) Oral Tab Take 1 tablet by mouth daily with breakfast.   2/17/2025 Morning    ALPRAZolam 0.25 MG Oral Tab Take 1 tablet (0.25 mg total) by mouth daily as needed.   More than a month    denosumab 60 MG/ML Subcutaneous Solution Prefilled Syringe Inject 1 mL (60 mg total) into the skin one time.   More than a month   [2]   Current Facility-Administered Medications Ordered in Epic   Medication Dose Route Frequency Provider Last Rate Last Admin    lactated ringers infusion   Intravenous Continuous Brayan Pearson MD 20 mL/hr at 02/18/25 0719 New Bag at 02/18/25 0719    ceFAZolin (Ancef) 2g in 10mL IV syringe premix  2 g Intravenous Once Brayan Pearson MD         No current [facility]-ordered outpatient medications on file.   [3]   Allergies  Allergen Reactions    Meperidine NAUSEA AND VOMITING     Demerol    Morphine NAUSEA AND VOMITING    Fentanyl OTHER (SEE COMMENTS)     She doesn't know    Levaquin [Levofloxacin] RASH     Patient developed diffuse rash macular in  nature after about 5 days of Levaquin.  Suspect Levaquin causes a rash.    Zithromax [Azithromycin] RASH

## 2025-02-18 NOTE — INTERVAL H&P NOTE
Pre-op Diagnosis: Avascular necrosis of bone of right hip (HCC) [M87.051]  Primary osteoarthritis of right hip [M16.11]    The above referenced H&P was reviewed by LYNETTE MCPHERSON MD on 2/18/2025, the patient was examined and no significant changes have occurred in the patient's condition since the H&P was performed.  I discussed with the patient and/or legal representative the potential benefits, risks and side effects of this procedure; the likelihood of the patient achieving goals; and potential problems that might occur during recuperation.  I discussed reasonable alternatives to the procedure, including risks, benefits and side effects related to the alternatives and risks related to not receiving this procedure.  We will proceed with procedure as planned.

## 2025-02-18 NOTE — PROGRESS NOTES
[facility] note: Duplicate Proton Pump Inhibitor with Histamine 2 blocker.    Karyn Cobos is a 79 year old patient who has been prescribed both famotidine (Pepcid)  And pantoprazole (Protonix).  Pepcid was discontinued per P&T approved protocol for duplicate therapy in adult patients for medications not ordered by gastroenterology.    Thank you,  Janes Duncan, PharmD  2/18/2025

## 2025-02-19 VITALS
HEIGHT: 62 IN | RESPIRATION RATE: 16 BRPM | OXYGEN SATURATION: 99 % | HEART RATE: 78 BPM | SYSTOLIC BLOOD PRESSURE: 122 MMHG | BODY MASS INDEX: 24.66 KG/M2 | TEMPERATURE: 98 F | DIASTOLIC BLOOD PRESSURE: 43 MMHG | WEIGHT: 134 LBS

## 2025-02-19 LAB
DEPRECATED RDW RBC AUTO: 46.1 FL (ref 35.1–46.3)
ERYTHROCYTE [DISTWIDTH] IN BLOOD BY AUTOMATED COUNT: 12.9 % (ref 11–15)
HCT VFR BLD AUTO: 23.6 %
HGB BLD-MCNC: 7.4 G/DL
HGB BLD-MCNC: 8.3 G/DL
MCH RBC QN AUTO: 31.2 PG (ref 26–34)
MCHC RBC AUTO-ENTMCNC: 31.4 G/DL (ref 31–37)
MCV RBC AUTO: 99.6 FL
PLATELET # BLD AUTO: 139 10(3)UL (ref 150–450)
RBC # BLD AUTO: 2.37 X10(6)UL
WBC # BLD AUTO: 6 X10(3) UL (ref 4–11)

## 2025-02-19 PROCEDURE — 99214 OFFICE O/P EST MOD 30 MIN: CPT | Performed by: HOSPITALIST

## 2025-02-19 RX ORDER — PSEUDOEPHEDRINE HCL 30 MG
100 TABLET ORAL 2 TIMES DAILY
Qty: 60 CAPSULE | Refills: 0 | Status: SHIPPED | OUTPATIENT
Start: 2025-02-19

## 2025-02-19 RX ORDER — FERROUS SULFATE 325(65) MG
325 TABLET, DELAYED RELEASE (ENTERIC COATED) ORAL
Qty: 30 TABLET | Refills: 0 | Status: SHIPPED | OUTPATIENT
Start: 2025-02-19

## 2025-02-19 RX ORDER — ACETAMINOPHEN 325 MG/1
650 TABLET ORAL
Qty: 40 TABLET | Refills: 0 | Status: SHIPPED | OUTPATIENT
Start: 2025-02-19

## 2025-02-19 RX ORDER — ASPIRIN 325 MG
325 TABLET, DELAYED RELEASE (ENTERIC COATED) ORAL 2 TIMES DAILY
Qty: 60 TABLET | Refills: 0 | Status: SHIPPED | OUTPATIENT
Start: 2025-02-19

## 2025-02-19 RX ORDER — TRIAMCINOLONE ACETONIDE 1 MG/G
CREAM TOPICAL 2 TIMES DAILY PRN
Status: SHIPPED | COMMUNITY
Start: 2025-02-19

## 2025-02-19 NOTE — HOME CARE LIAISON
Met with patient and spouse at the bedside. Patient is agreeable to [facility]. Residential brochure provided with contact information. All questions addressed and answered.

## 2025-02-19 NOTE — CM/SW NOTE
02/18/25 1800   CM/SW Referral Data   Referral Source Physician;Social Work (self-referral)   Reason for Referral Discharge planning   Informant Patient   Medical Hx   Does patient have an established PCP? Yes   Patient Info   Patient's Current Mental Status at Time of Assessment Alert;Oriented   Patient's Home Environment House   Patient lives with Spouse/Significant other   Patient Status Prior to Admission   Independent with ADLs and Mobility Yes   Discharge Needs   Anticipated D/C needs Home health care   Choice of Post-Acute Provider   Informed patient of right to choose their preferred provider Yes   List of appropriate post-acute services provided to patient/family with quality data Yes     SW performed chart MD elver's preferred agency [facility] accepted - reserved in aidin. Pt is aware and agreeable     [facility] reserved - F2F entered    SW/CM to remain available for support and/or discharge planning.     PLAN: home with [facility] pending sadia VALENTINO LSW, MSW ext. [phone]

## 2025-02-19 NOTE — DISCHARGE INSTRUCTIONS
Home care nurse to remove wound vac dressing on Tuesday.  Apply Mepilex dressing.  Change dressing every 3-5 days.  Keep wound dry for 2 weeks.  Ambulate with walker and assist    Sometimes managing your health at home requires assistance.  The [facility] team has recognized your preference to use [facility].  They can be reached by phone at [phone].  The fax number for your reference is [phone].  A representative from the home health agency will contact you or your family to schedule your first visit.

## 2025-02-19 NOTE — PHYSICAL THERAPY NOTE
PHYSICAL THERAPY HIP TREATMENT NOTE - INPATIENT    Room Number: Room 2/Room 2-A            Presenting Problem: primary osteoarthritis of right hip, s/p R anterior JOHN  Co-Morbidities : Generalized osteoarthritis, hypertension, DVT, hyperlipidemia, migraines, DJD of lumbar spine, mild dementia, ulcerative colitis    Problem List  Principal Problem:    Primary osteoarthritis of right hip  Active Problems:    Hypercholesterolemia    Hypertension    Ulcerative (chronic) proctosigmoiditis (HCC)    Mild dementia (HCC)      PHYSICAL THERAPY ASSESSMENT   Patient demonstrates good  progress this session, goals  updated to reflect patient performance.      Patient is requiring contact guard assist as a result of the following impairments: decreased functional strength, decreased endurance/aerobic capacity, pain, impaired standing balance, decreased muscular endurance, and medical status.     Patient continues to function below baseline with bed mobility, transfers, gait, stair negotiation, maintaining seated position, standing prolonged periods, and performing household tasks.  Next session anticipate patient to progress bed mobility, transfers, gait, stair negotiation, maintaining seated position, standing prolonged periods, and performing household tasks.  Physical Therapy will continue to follow patient for duration of hospitalization.    Patient continues to benefit from continued skilled PT services: at discharge to promote prior level of function and safety with additional support and return home with home health PT.    PLAN DURING HOSPITALIZATION  Nursing Mobility Recommendation : 1 Assist  PT Device Recommendation: Rolling walker  PT Treatment Plan: Bed mobility;Body mechanics;Endurance;Energy conservation;Patient education;Family education;Gait training;Strengthening;Transfer training;Balance training;Range of motion;Stoop training;Stair training  Frequency (Obs): Daily     SUBJECTIVE  I feel pretty good no pain  really just a little sore. I plan to return home with my spouse today.     OBJECTIVE  Precautions: Limb alert - right (wound vac)    WEIGHT BEARING RESTRICTION  R Lower Extremity: Weight Bearing as Tolerated      PAIN ASSESSMENT   Ratin          BALANCE  Static Sitting: Good  Dynamic Sitting: Fair +  Static Standing: Fair -  Dynamic Standing: Fair -  ACTIVITY TOLERANCE  Pulse: 78                      O2 WALK  Oxygen Therapy  SPO2% on Room Air at Rest: 96  SPO2% Ambulation on Room Air: 95    AM-PAC '6-Clicks' INPATIENT SHORT FORM - BASIC MOBILITY  How much difficulty does the patient currently have...  Patient Difficulty: Turning over in bed (including adjusting bedclothes, sheets and blankets)?: None   Patient Difficulty: Sitting down on and standing up from a chair with arms (e.g., wheelchair, bedside commode, etc.): None   Patient Difficulty: Moving from lying on back to sitting on the side of the bed?: None   How much help from another person does the patient currently need...   Help from Another: Moving to and from a bed to a chair (including a wheelchair)?: A Little   Help from Another: Need to walk in hospital room?: A Little   Help from Another: Climbing 3-5 steps with a railing?: A Little     AM-PAC Score:  Raw Score: 21   Approx Degree of Impairment: 28.97%   Standardized Score (AM-PAC Scale): 50.25   CMS Modifier (G-Code): CJ    FUNCTIONAL ABILITY STATUS  Functional Mobility/Gait Assessment  Gait Assistance: Supervision  Distance (ft): 150  Assistive Device: Rolling walker  Pattern: R Decreased stance time (decreased step length and anupam)  Stairs: Stairs  How Many Stairs: 5  Device: 1 Rail  Assist: Contact guard assist  Pattern: Ascend and Descend  Ascend and Descend : Step to  Rolling: supervision  Supine to Sit: supervision  Sit to Supine: supervision  Sit to Stand: contact guard assist    Skilled Therapy Provided: Pt ed with bed mobility and transfers with SBA and with RW. Pt ed with gait  progression 150' with SBA with advancing to a step though gait pattern. Pt ed with stair mobility x 5 steps with 1 SR support with CGA step to gait. Pt is on track to return home with [facility] PT with spouse assist as medical progress allows.     The patient's Approx Degree of Impairment: 28.97% has been calculated based on documentation in the [facility] '6 clicks' Inpatient Basic Mobility Short Form.  Research supports that patients with this level of impairment may benefit from [facility] PT with spouse assist.     Final disposition will be made by interdisciplinary medical team.    HEP reviewed with ankle pumps, heel slides and quad sets x 10 reps. Ant THR handouts issued for HEP.     Patient End of Session: Up in chair;With  staff;Needs met;Call light within reach;RN aware of session/findings;All patient questions and concerns addressed;Ice applied;Family present    CURRENT GOALS   Goals to be met by: 2/25/25  Patient Goal Patient's self-stated goal is: none stated   Goal #1 Patient is able to demonstrate supine - sit EOB @ level: supervision   Goal #1   Current Status  SBA   Goal #2 Patient is able to demonstrate transfers Sit to/from Stand at assistance level: supervision   Goal #2  Current Status  SBA with RW   Goal #3 Patient is able to ambulate 200 feet with assistive device at assistance level: supervision   Goal #3   Current Status  CGA with ' advancing to step though gait   Goal #4 Patient will negotiate 2 stairs/one curb w/ assistive device and CGA   Goal #4   Current Status  CGA with RW 5   Goal #5 Patient verbalizes and/or demonstrates all precautions and safety concerns independently   Goal #5   Current Status  Ongoing     Gait Training: 15 minutes  Therapeutic Exercise: 10 minutes

## 2025-02-19 NOTE — ANESTHESIA POST-OP FOLLOW-UP NOTE
S/p spinal with Duramorph. Pain controlled. Strength and sensation back intact. Some itching. Denies headache, nausea.

## 2025-02-19 NOTE — OCCUPATIONAL THERAPY NOTE
OCCUPATIONAL THERAPY EVALUATION - INPATIENT     Room Number: Room 2/Room 2-A  Evaluation Date: 2/19/2025  Type of Evaluation: Quick Eval  Presenting Problem: s/p R JONH (anterior) by Dr. Pearson 2/18  Hx dementia, HTN, ulcerative colitis     Physician Order: IP Consult to Occupational Therapy  Reason for Therapy: ADL/IADL Dysfunction and Discharge Planning    OCCUPATIONAL THERAPY ASSESSMENT   Patient is a 79 year old female admitted 2/18/2025 for s/p R JOHN (anterior) by Dr. Pearson 2/18.  Prior to admission, patient's baseline is independent with I/ADLs, including driving, and fx mobility/transfers without a device.  Patient is currently functioning near baseline with  ADLs and fx mobility/transfers . Anticipate patient will be able to safely discharge home with HH and assist when medically cleared.    PLAN  Patient has been evaluated and presents with no skilled Occupational Therapy needs  at this time.  Patient will be discharged from Occupational Therapy services. Please re-order if a new functional limitation presents during this admission.    OCCUPATIONAL THERAPY MEDICAL/SOCIAL HISTORY   Problem List  Principal Problem:    Primary osteoarthritis of right hip  Active Problems:    Hypercholesterolemia    Hypertension    Ulcerative (chronic) proctosigmoiditis (HCC)    Mild dementia (HCC)    HOME SITUATION  Type of Home: House  Home Layout: Two level; Stairlift  Lives With: Spouse; Caregiver part-time (5 days/week, 9am-5pm)  Toilet and Equipment: Comfort height toilet; Toilet riser  Shower/Tub and Equipment: Walk-in shower; Shower chair  Drives: No  Patient Regularly Uses: -- (owns RW/cane/rollator)  Stairs in Home: 2 MAURICE without railing    SUBJECTIVE  Patient agreeable to OT evaluation.    OCCUPATIONAL THERAPY EXAMINATION    OBJECTIVE  Precautions: -- (wound vac)  Fall Risk: Standard fall risk    WEIGHT BEARING RESTRICTION  R Lower Extremity: Weight Bearing as Tolerated    PAIN ASSESSMENT  Rating:  0    COGNITION  Overall Cognitive Status:  WFL - within functional limits    SENSATION  Light touch:  intact    RANGE OF MOTION   Upper extremity ROM is within functional limits     STRENGTH ASSESSMENT  Upper extremity strength is within functional limits     COORDINATION  Gross Motor: WFL   Fine Motor: WFL     ACTIVITIES OF DAILY LIVING ASSESSMENT  AM-PAC ‘6-Clicks’ Inpatient Daily Activity Short Form  How much help from another person does the patient currently need…  -   Putting on and taking off regular lower body clothing?: A Little  -   Bathing (including washing, rinsing, drying)?: A Little  -   Toileting, which includes using toilet, bedpan or urinal? : A Little  -   Putting on and taking off regular upper body clothing?: None  -   Taking care of personal grooming such as brushing teeth?: A Little  -   Eating meals?: None    AM-PAC Score:  Score: 20  Approx Degree of Impairment: 38.32%  Standardized Score (AM-PAC Scale): 42.03  CMS Modifier (G-Code): CJ    BED MOBILITY  Not Tested - patient in chair upon therapist arrival    FUNCTIONAL TRANSFER ASSESSMENT  Sit to Stand from Chair: supervision  Toilet Transfer: supervision  Chair Transfer: supervision    FUNCTIONAL MOBILITY  supervision for in-room fx mobility using RW    ACTIVITIES OF DAILY LIVING  Eating: independent (per obs)   Grooming: stand-by assist standing at sink   UB Dressing: independent  LB Dressing: stand-by assist  Toileting: supervision seated    EDUCATION PROVIDED  Patient Education : Role of Occupational Therapy; Plan of Care; Discharge Recommendations; Functional Transfer Techniques; Fall Prevention; Weight Bear Status; Posture/Positioning; Proper Body Mechanics; Energy Conservation  Patient's Response to Education: Verbalized Understanding; Returned Demonstration  Family/Caregiver Education : Role of Occupational Therapy; Plan of Care; Discharge Recommendations; Functional Transfer Techniques; Fall Prevention; Weight Bear Status;  Posture/Positioning; Proper Body Mechanics; Energy Conservation  Family/Caregiver's Response to Education: Verbalized Understanding    The patient's Approx Degree of Impairment: 38.32% has been calculated based on documentation in the [facility] '6 clicks' Inpatient Daily Activity Short Form.  Research supports that patients with this level of impairment may benefit from  OT.  Final disposition will be made by interdisciplinary medical team.    Patient End of Session: Up in chair;Call light within reach;Needs met;RN aware of session/findings;All patient questions and concerns addressed;Hospital anti-slip socks    Patient was able to achieve the following ...   Patient able to toilet transfer   SUP    Patient able to dress lower extremities   SBA    Patient/Caregiver able to demonstrate safety with ADLS  at previous functional level     Patient Evaluation Complexity Level:   Occupational Profile/Medical History LOW - Brief history including review of medical or therapy records    Specific performance deficits impacting engagement in ADL/IADL MODERATE  3 - 5 performance deficits   Client Assessment/Performance Deficits MODERATE - Comorbidities and min to mod modifications of tasks    Clinical Decision Making LOW - Analysis of occupational profile, problem-focused assessments, limited treatment options    Overall Complexity LOW     OT Session Time  Self-Care Home Management: 26 minutes    CHIVO Liu/L  [facility]  #[id number]

## 2025-02-19 NOTE — DISCHARGE SUMMARY
[facility]  Discharge Summary     Karyn Cobos  : 1945    Status: Outpatient in a Bed  Day #: 0    Attending: Sathish Amaral MD  PCP: Pan Mansfield MD     Date of Admission:  2025  Date of Discharge:  2025     Hospital Discharge Diagnoses:     R hip primary OA  HL  HTN  Ulcerative colitis  Dementia      History of Present Illness:     Copied from Admission Consult:    Patient is an 79-year-old  female with chronic right hip pain and underlying severe primary osteoarthritis, failed outpatient conservative medical management options, scheduled today for above-mentioned procedure by her orthopedic surgeon, Dr. Pearson. Preoperatively, she had spinal block. Postoperatively, transferred to PACU for further monitoring.       Hospital Course:     The patient is doing well s/p R total hip arthroplasty. Pain is controlled and she ambulated well with PT. Hgb was lower post op, but improved upon repeat. She is stable for discharge home.      Consultants         Provider   Role Specialty     Loida Jorge MD      Consulting Physician HOSPITALIST          Surgical Procedures       Case IDs Date Procedure Surgeon Location Status    [id number] 25 Right total hip arthroplasty, anterior approach Brayan Pearson MD [facility] MAIN OR Comp           Physical Exam:   Blood pressure 122/43, pulse 78, temperature 98.4 °F (36.9 °C), temperature source Oral, resp. rate 16, height 5' 2\" (1.575 m), weight 134 lb (60.8 kg), SpO2 99%, not currently breastfeeding.  General:  Alert, no distress  HEENT:  Normocephalic, atraumatic  Cardiac:  Regular rate, regular rhythm  Pulmonary:  Clear to auscultation bilaterally, respirations unlabored  Gastrointestinal:  Soft, non-tender, normal bowel sounds  Musculoskeletal:  No joint swelling  Extremities:  No edema, no cyanosis, no clubbing  Neurologic:  nonfocal  Psychiatric:  Normal affect, calm and appropriate         Discharge Medications        START  taking these medications        Instructions Prescription details   acetaminophen 325 MG Tabs  Commonly known as: Tylenol      Take 2 tablets (650 mg total) by mouth every 4 (four) hours while awake.   Quantity: 40 tablet  Refills: 0     docusate sodium 100 MG Caps  Commonly known as: COLACE      Take 100 mg by mouth 2 (two) times daily.   Quantity: 60 capsule  Refills: 0     ferrous sulfate 325 (65 FE) MG Tbec      Take 1 tablet (325 mg total) by mouth daily with breakfast.   Quantity: 30 tablet  Refills: 0            CHANGE how you take these medications        Instructions Prescription details   amLODIPine 10 MG Tabs  Commonly known as: Norvasc  What changed: when to take this      TAKE ONE TABLET BY MOUTH ONE TIME DAILY   Quantity: 90 tablet  Refills: 3     aspirin 325 MG Tbec  What changed:   medication strength  how much to take  when to take this      Take 1 tablet (325 mg total) by mouth in the morning and 1 tablet (325 mg total) before bedtime.   Quantity: 60 tablet  Refills: 0     donepezil 10 MG Tabs  Commonly known as: Aricept  What changed: when to take this      Take 1 tablet (10 mg total) by mouth daily.   Quantity: 90 tablet  Refills: 3            CONTINUE taking these medications        Instructions Prescription details   ALPRAZolam 0.25 MG Tabs  Commonly known as: Xanax      Take 1 tablet (0.25 mg total) by mouth daily as needed.   Refills: 0     atorvastatin 40 MG Tabs  Commonly known as: Lipitor      TAKE ONE TABLET BY MOUTH AT BEDTIME   Quantity: 90 tablet  Refills: 3     calcium citrate-vitamin D 315-250 MG-UNIT Tabs  Commonly known as: CITRACAL-D      Take 2 tablets by mouth 2 (two) times daily with meals.   Refills: 0     cyanocobalamin 1000 MCG/ML Soln  Commonly known as: Vitamin B12      Inject 1 mL (1,000 mcg total) into the muscle every 30 (thirty) days.   Refills: 0     denosumab 60 MG/ML Sosy  Commonly known as: Prolia      Inject 1 mL (60 mg total) into the skin one time.   Refills:  0     escitalopram 5 MG Tabs  Commonly known as: Lexapro      Take 2 tablets (10 mg total) by mouth daily. Slowing tapering up dosage. Final dose of 10 mg, which will then discontinue Citalopram   Refills: 0     folic acid 1 MG Tabs  Commonly known as: Folvite      Take 1 tablet (1 mg total) by mouth daily.   Quantity: 90 tablet  Refills: 3     gabapentin 300 MG Caps  Commonly known as: Neurontin      Take 1 capsule (300 mg total) by mouth 2 (two) times daily.   Quantity: 180 capsule  Refills: 3     memantine 10 MG Tabs  Commonly known as: Namenda      Take 1 tablet (10 mg total) by mouth 2 (two) times daily.   Quantity: 180 tablet  Refills: 3     omeprazole 20 MG Cpdr  Commonly known as: PriLOSEC      Take 1 capsule (20 mg total) by mouth every morning.   Refills: 0     sulfaSALAzine 500 MG Tabs  Commonly known as: Azulfidine      TAKE 3 TABLETS BY MOUTH TWICE A DAY   Quantity: 180 tablet  Refills: 0     telmisartan 80 MG Tabs  Commonly known as: Micardis      TAKE ONE TABLET BY MOUTH IN THE MORNING   Quantity: 90 tablet  Refills: 3     THERA/BETA-CAROTENE Tabs      Take 1 tablet by mouth daily with breakfast.   Refills: 0     triamcinolone 0.1 % Crea  Commonly known as: Kenalog      Apply topically 2 (two) times daily as needed.   Refills: 0               Where to Get Your Medications        These medications were sent to [id number] - [place], [place] - [address] [facility] [phone], [phone]  [address] [place] [address], [facility] [address]      Phone: [phone]   acetaminophen 325 MG Tabs  aspirin 325 MG Tbec  docusate sodium 100 MG Caps  ferrous sulfate 325 (65 FE) MG Tbec        Follow-up Information       Joe Pierson PA-C Follow up on 3/5/2025.    Specialties: Physician Assistant, SURGERY, ORTHOPEDIC  Contact information:  [address]   [address]  [facility] [address] [phone]                             Hospital Discharge Diagnoses:  Right hip primary osteoarthritis    Lace+ Score: 54  59-90 High  Risk  29-58 Medium Risk  0-28   Low Risk.    TCM Follow-Up Recommendation:  LACE 29-58: Moderate Risk of readmission after discharge from the hospital.        I spent >30 minutes on this discharge. Discussed treatment and discharge plans.       Sathish Amaral MD

## 2025-02-20 ENCOUNTER — TELEPHONE (OUTPATIENT)
Dept: ORTHOPEDICS CLINIC | Facility: CLINIC | Age: 80
End: 2025-02-20

## 2025-02-21 ENCOUNTER — TELEPHONE (OUTPATIENT)
Dept: ORTHOPEDICS CLINIC | Facility: CLINIC | Age: 80
End: 2025-02-21

## 2025-02-21 NOTE — TELEPHONE ENCOUNTER
Post-op call for Dr. Pearson    Date: 2/21/2025      Time: 12:03 PM   Patient: Karyn Cobos      number: [id number]   [facility]  Procedure Laterality Anesthesia [facility]   Right total hip arthroplasty, anterior approach      rgery and surgery date:2-    Patient unavailable.  Left message on voicemail to call clinic with questions or concerns.  Number was provided./ SPOKE WITH SPOUSE  Patient's general feeling since discharge : Her  is her caretaker with private caretakers also. He was up set about so many people calling not understanding her dementia. She takes her pills without difficulty  Pain control (0-10):/ 3 while sitting  Medication Quantity Refills Start End   docusate sodium 100 MG Oral Cap         Medication Quantity Refills Start End   aspirin 325 MG Oral Tab EC       He  under stands the 4000mg in 24 hours of tylenol  Medication Quantity Refills Start End   acetaminophen 325 MG Oral Tab 40 tablet 0 2/19/2025 --   Sig:   Take 2 tablets (650 mg total) by       Pain Medication:  dose/strength  Fever:  no  Chills:  no  SOB:  No  (she uses her incentive device regularly)  Incision site appearance:  Redness no  Drainage no  Clean/dry/Intact yes   Calf pain, redness or warmth:  no    Bowel Regimen: yes   If not, what are you taking stool softener/laxative?  Confirmed appointment date for post op:3/5/2025  Other concerns patients may ask: We discussed placement of ice and 20min on 20min off multiple times a day. Her spouse was very upset at first about people calling the house not realizing his wives's dementia. We discussed her medical problems and I apologized which was reciprocated and he was pleased with the information that I gave him.  Bathing and bandages   Patient needs to keep incision clean and dry for the first week./DONE  Enforce rest, ice, compression elevation and use their pain medication accordingly.DONE  If the patient needs more pain medication, please put in a communication.

## 2025-02-23 ENCOUNTER — TELEPHONE (OUTPATIENT)
Dept: INTERNAL MEDICINE CLINIC | Facility: CLINIC | Age: 80
End: 2025-02-23

## 2025-02-23 DIAGNOSIS — D64.9 ANEMIA, UNSPECIFIED TYPE: Primary | ICD-10-CM

## 2025-02-23 NOTE — TELEPHONE ENCOUNTER
Discussed with Basil Boss's .  She is doing very well at home.  Her discharge hemoglobin was 8.3.  I discussed with him that I will try and get home health care to do a hemoglobin and BMP sometime this following week.  Also I said that she can skip her vitamin B12 injection due for Tuesday.  Home health care may be able to give that to her otherwise we can follow-up in the near future.  Skipping 1 month vitamin B12 injection will not be a problem.    She is on aspirin 325 mg twice a day for DVT prophylaxis.    She has an appointment March 5 with orthopedics.    Her pain is well-controlled with just Tylenol.  All in all she is had a very successful right hip replacement.  He indicates that he is very happy with Karyn's progress.

## 2025-02-24 NOTE — TELEPHONE ENCOUNTER
Please let Basil know that I did communicate with home health care and they will be able to do Karyn's labs tomorrow.    They are not able to do the vitamin B12 injection only if people have the supplies at home.  I do not think that is a major issue as she should have plenty of vitamin B12 in her system from the prior monthly shots.  We can do that sometime in the future for her.    I will let him know the results of tomorrow's labs when available.

## 2025-02-25 ENCOUNTER — LAB REQUISITION (OUTPATIENT)
Dept: LAB | Facility: HOSPITAL | Age: 80
End: 2025-02-25
Payer: MEDICARE

## 2025-02-25 DIAGNOSIS — Z47.1 AFTERCARE FOLLOWING JOINT REPLACEMENT SURGERY: ICD-10-CM

## 2025-02-25 LAB
ANION GAP SERPL CALC-SCNC: 13 MMOL/L (ref 0–18)
BASOPHILS # BLD AUTO: 0.01 X10(3) UL (ref 0–0.2)
BASOPHILS NFR BLD AUTO: 0.2 %
BUN BLD-MCNC: 19 MG/DL (ref 9–23)
BUN/CREAT SERPL: 20 (ref 10–20)
CALCIUM BLD-MCNC: 8.8 MG/DL (ref 8.7–10.4)
CHLORIDE SERPL-SCNC: 104 MMOL/L (ref 98–112)
CO2 SERPL-SCNC: 22 MMOL/L (ref 21–32)
CREAT BLD-MCNC: 0.95 MG/DL
DEPRECATED RDW RBC AUTO: 46.2 FL (ref 35.1–46.3)
EGFRCR SERPLBLD CKD-EPI 2021: 60 ML/MIN/1.73M2 (ref 60–?)
EOSINOPHIL # BLD AUTO: 0.32 X10(3) UL (ref 0–0.7)
EOSINOPHIL NFR BLD AUTO: 6 %
ERYTHROCYTE [DISTWIDTH] IN BLOOD BY AUTOMATED COUNT: 13.2 % (ref 11–15)
FASTING STATUS PATIENT QL REPORTED: YES
GLUCOSE BLD-MCNC: 106 MG/DL (ref 70–99)
HCT VFR BLD AUTO: 25 %
HGB BLD-MCNC: 8.3 G/DL
IMM GRANULOCYTES # BLD AUTO: 0.08 X10(3) UL (ref 0–1)
IMM GRANULOCYTES NFR BLD: 1.5 %
LYMPHOCYTES # BLD AUTO: 1.13 X10(3) UL (ref 1–4)
LYMPHOCYTES NFR BLD AUTO: 21.2 %
MCH RBC QN AUTO: 32 PG (ref 26–34)
MCHC RBC AUTO-ENTMCNC: 33.2 G/DL (ref 31–37)
MCV RBC AUTO: 96.5 FL
MONOCYTES # BLD AUTO: 0.97 X10(3) UL (ref 0.1–1)
MONOCYTES NFR BLD AUTO: 18.2 %
NEUTROPHILS # BLD AUTO: 2.81 X10 (3) UL (ref 1.5–7.7)
NEUTROPHILS # BLD AUTO: 2.81 X10(3) UL (ref 1.5–7.7)
NEUTROPHILS NFR BLD AUTO: 52.9 %
OSMOLALITY SERPL CALC.SUM OF ELEC: 291 MOSM/KG (ref 275–295)
PLATELET # BLD AUTO: 212 10(3)UL (ref 150–450)
POTASSIUM SERPL-SCNC: 3.8 MMOL/L (ref 3.5–5.1)
RBC # BLD AUTO: 2.59 X10(6)UL
SODIUM SERPL-SCNC: 139 MMOL/L (ref 136–145)
WBC # BLD AUTO: 5.3 X10(3) UL (ref 4–11)

## 2025-02-25 PROCEDURE — 80048 BASIC METABOLIC PNL TOTAL CA: CPT | Performed by: ORTHOPAEDIC SURGERY

## 2025-02-25 PROCEDURE — 85025 COMPLETE CBC W/AUTO DIFF WBC: CPT | Performed by: ORTHOPAEDIC SURGERY

## 2025-02-27 ENCOUNTER — TELEPHONE (OUTPATIENT)
Dept: ORTHOPEDICS CLINIC | Facility: CLINIC | Age: 80
End: 2025-02-27

## 2025-02-27 NOTE — TELEPHONE ENCOUNTER
Per Agueda patient is being discharged from skilled nursing and her wound is looking good and improved. Patient will be seeing Home Health physical therapy until next week. Thank you

## 2025-03-03 ENCOUNTER — TELEPHONE (OUTPATIENT)
Dept: INTERNAL MEDICINE CLINIC | Facility: CLINIC | Age: 80
End: 2025-03-03

## 2025-03-03 ENCOUNTER — OFFICE VISIT (OUTPATIENT)
Dept: INTERNAL MEDICINE CLINIC | Facility: CLINIC | Age: 80
End: 2025-03-03
Payer: MEDICARE

## 2025-03-03 ENCOUNTER — LAB ENCOUNTER (OUTPATIENT)
Dept: LAB | Age: 80
End: 2025-03-03
Attending: INTERNAL MEDICINE
Payer: MEDICARE

## 2025-03-03 VITALS
DIASTOLIC BLOOD PRESSURE: 56 MMHG | HEIGHT: 62 IN | SYSTOLIC BLOOD PRESSURE: 116 MMHG | HEART RATE: 84 BPM | WEIGHT: 139.63 LBS | BODY MASS INDEX: 25.7 KG/M2 | OXYGEN SATURATION: 98 % | TEMPERATURE: 98 F

## 2025-03-03 DIAGNOSIS — E87.1 HYPONATREMIA: ICD-10-CM

## 2025-03-03 DIAGNOSIS — D64.9 ANEMIA, UNSPECIFIED TYPE: ICD-10-CM

## 2025-03-03 DIAGNOSIS — I10 ESSENTIAL HYPERTENSION: ICD-10-CM

## 2025-03-03 DIAGNOSIS — I10 PRIMARY HYPERTENSION: ICD-10-CM

## 2025-03-03 DIAGNOSIS — E53.8 VITAMIN B12 DEFICIENCY: ICD-10-CM

## 2025-03-03 DIAGNOSIS — R41.3 MEMORY DEFICIT: ICD-10-CM

## 2025-03-03 DIAGNOSIS — Z96.641 STATUS POST TOTAL HIP REPLACEMENT, RIGHT: Primary | ICD-10-CM

## 2025-03-03 LAB
ANION GAP SERPL CALC-SCNC: 9 MMOL/L (ref 0–18)
BASOPHILS # BLD AUTO: 0.04 X10(3) UL (ref 0–0.2)
BASOPHILS NFR BLD AUTO: 0.5 %
BUN BLD-MCNC: 27 MG/DL (ref 9–23)
BUN/CREAT SERPL: 27 (ref 10–20)
CALCIUM BLD-MCNC: 9 MG/DL (ref 8.7–10.4)
CHLORIDE SERPL-SCNC: 104 MMOL/L (ref 98–112)
CO2 SERPL-SCNC: 25 MMOL/L (ref 21–32)
CREAT BLD-MCNC: 1 MG/DL
DEPRECATED RDW RBC AUTO: 48 FL (ref 35.1–46.3)
EGFRCR SERPLBLD CKD-EPI 2021: 57 ML/MIN/1.73M2 (ref 60–?)
EOSINOPHIL # BLD AUTO: 0.32 X10(3) UL (ref 0–0.7)
EOSINOPHIL NFR BLD AUTO: 4 %
ERYTHROCYTE [DISTWIDTH] IN BLOOD BY AUTOMATED COUNT: 13.5 % (ref 11–15)
FASTING STATUS PATIENT QL REPORTED: NO
GLUCOSE BLD-MCNC: 101 MG/DL (ref 70–99)
HCT VFR BLD AUTO: 27.1 %
HGB BLD-MCNC: 8.7 G/DL
IMM GRANULOCYTES # BLD AUTO: 0.08 X10(3) UL (ref 0–1)
IMM GRANULOCYTES NFR BLD: 1 %
LYMPHOCYTES # BLD AUTO: 1.23 X10(3) UL (ref 1–4)
LYMPHOCYTES NFR BLD AUTO: 15.3 %
MCH RBC QN AUTO: 32 PG (ref 26–34)
MCHC RBC AUTO-ENTMCNC: 32.1 G/DL (ref 31–37)
MCV RBC AUTO: 99.6 FL
MONOCYTES # BLD AUTO: 0.67 X10(3) UL (ref 0.1–1)
MONOCYTES NFR BLD AUTO: 8.3 %
NEUTROPHILS # BLD AUTO: 5.7 X10 (3) UL (ref 1.5–7.7)
NEUTROPHILS # BLD AUTO: 5.7 X10(3) UL (ref 1.5–7.7)
NEUTROPHILS NFR BLD AUTO: 70.9 %
OSMOLALITY SERPL CALC.SUM OF ELEC: 291 MOSM/KG (ref 275–295)
PLATELET # BLD AUTO: 304 10(3)UL (ref 150–450)
POTASSIUM SERPL-SCNC: 4.3 MMOL/L (ref 3.5–5.1)
RBC # BLD AUTO: 2.72 X10(6)UL
SODIUM SERPL-SCNC: 138 MMOL/L (ref 136–145)
WBC # BLD AUTO: 8 X10(3) UL (ref 4–11)

## 2025-03-03 PROCEDURE — 96372 THER/PROPH/DIAG INJ SC/IM: CPT | Performed by: INTERNAL MEDICINE

## 2025-03-03 PROCEDURE — 85025 COMPLETE CBC W/AUTO DIFF WBC: CPT

## 2025-03-03 PROCEDURE — 36415 COLL VENOUS BLD VENIPUNCTURE: CPT

## 2025-03-03 PROCEDURE — 99214 OFFICE O/P EST MOD 30 MIN: CPT | Performed by: INTERNAL MEDICINE

## 2025-03-03 PROCEDURE — 80048 BASIC METABOLIC PNL TOTAL CA: CPT

## 2025-03-03 RX ORDER — ESCITALOPRAM OXALATE 10 MG/1
10 TABLET ORAL DAILY
COMMUNITY

## 2025-03-03 RX ADMIN — CYANOCOBALAMIN 1000 MCG: 1000 INJECTION, SOLUTION INTRAMUSCULAR; SUBCUTANEOUS at 11:11:00

## 2025-03-03 NOTE — TELEPHONE ENCOUNTER
Basil called and relayed Dr Mansfield's message. Basil verbalized understanding with no further questions noted.

## 2025-03-03 NOTE — PROGRESS NOTES
Karyn Cobos is a 79 year old female.  HPI:     Chief Complaint   Patient presents with    Hospital F/U     2/18/25 right total hip arthroplasty      Karyn is here with her  Basil.    She is doing very well status post right hip replacement.  She has a bandage in place.  There is no significant edema right calf or right thigh.  Bandage looks clean.  She will have follow-up with orthopedics Wednesday.    History of hypertension.  Blood pressure under excellent control.  She is off her hydrochlorothiazide.    She has had hyponatremia but recent chemistries show resolution of hyponatremia.  Her renal function is acceptable    She did have a hemoglobin of 8.3 postoperatively.  Will update today.    She is on aspirin 325 mg twice a day for DVT prophylaxis and I thought it might be wise to continue that for 35 days after her hip surgery.  Her hip surgery was February 18.  Therefore aspirin twice a day can be until March 23.    All in all I feel she is doing well.  She has no cardiac pulmonary or  symptoms.  She does have a little constipation but that is being controlled according to Basil with [facility].    She has not had any major setbacks with her memory.  Does have a history of memory loss.  Current Outpatient Medications   Medication Sig Dispense Refill    escitalopram 10 MG Oral Tab Take 1 tablet (10 mg total) by mouth daily.      triamcinolone 0.1 % External Cream Apply topically 2 (two) times daily as needed.      ferrous sulfate 325 (65 FE) MG Oral Tab EC Take 1 tablet (325 mg total) by mouth daily with breakfast. 30 tablet 0    docusate sodium 100 MG Oral Cap Take 100 mg by mouth 2 (two) times daily. 60 capsule 0    aspirin 325 MG Oral Tab EC Take 1 tablet (325 mg total) by mouth in the morning and 1 tablet (325 mg total) before bedtime. 60 tablet 0    acetaminophen 325 MG Oral Tab Take 2 tablets (650 mg total) by mouth every 4 (four) hours while awake. 40 tablet 0    amLODIPine 10 MG Oral Tab TAKE ONE  TABLET BY MOUTH ONE TIME DAILY (Patient taking differently: Take 1 tablet (10 mg total) by mouth every morning.) 90 tablet 3    donepezil 10 MG Oral Tab Take 1 tablet (10 mg total) by mouth daily. (Patient taking differently: Take 1 tablet (10 mg total) by mouth every morning.) 90 tablet 3    gabapentin 300 MG Oral Cap Take 1 capsule (300 mg total) by mouth 2 (two) times daily. 180 capsule 3    memantine 10 MG Oral Tab Take 1 tablet (10 mg total) by mouth 2 (two) times daily. 180 tablet 3    telmisartan 80 MG Oral Tab TAKE ONE TABLET BY MOUTH IN THE MORNING 90 tablet 3    ALPRAZolam 0.25 MG Oral Tab Take 1 tablet (0.25 mg total) by mouth daily as needed.      atorvastatin 40 MG Oral Tab TAKE ONE TABLET BY MOUTH AT BEDTIME 90 tablet 3    denosumab 60 MG/ML Subcutaneous Solution Prefilled Syringe Inject 1 mL (60 mg total) into the skin one time.      omeprazole 20 MG Oral Capsule Delayed Release Take 1 capsule (20 mg total) by mouth every morning.      SULFASALAZINE 500 MG Oral Tab TAKE 3 TABLETS BY MOUTH TWICE A  tablet 0    folic acid 1 MG Oral Tab Take 1 tablet (1 mg total) by mouth daily. 90 tablet 3    Calcium Citrate-Vitamin D 315-250 MG-UNIT Oral Tab Take 2 tablets by mouth 2 (two) times daily with meals.      cyanocobalamin (VITAMIN B12) 1000 MCG/ML Injection Solution Inject 1 mL (1,000 mcg total) into the muscle every 30 (thirty) days.      Multiple Vitamin (MULTI-VITAMINS) Oral Tab Take 1 tablet by mouth daily with breakfast.        Past Medical History:    Arthritis    Back pain    Cataract    Cough    Deep vein thrombosis (HCC)    Dementia (HCC)    Essential hypertension    Family history of colon cancer     0    PARA ZERO    H/O foot surgery    LEFT FOOT SURGERY, PINS PLACED IN TOE    H/O oral surgery    GUM/MOUTH SURGERY    Hallux rigidus    LEFT FOOT, YURY PROCEDURE, 1ST LEFT METARSOPHALANGEAL JOINT    Heart palpitations    HEART MONITOR 2012    High blood pressure    High  cholesterol    History of DVT (deep vein thrombosis)    Hypercholesterolemia    Hyperlipidemia    Hyperparathyroidism (HCC)    PAIR OF PARATHYROID REMOVED    Hyperparathyroidism (HCC)    PAIR OF PARATHYROID REMOVED     Hypertension    Left carotid artery stenosis    Migraine    OCCULAR MIGRAINE    Migraine    OCCULAR MIGRAINE     Neuroma    2 LT, HALLUX RIGIDUS LT, PER. NG.    Osteoarthritis    Other ill-defined conditions(799.89)    TAHBSO MGMT.    Other ill-defined conditions(799.89)    CHRONIC LEFT CYSTIC PELVIC MASS    Ovarian cyst    REMOVED PER NG.    Painful breasts    Pneumonia    HOSPITALIZED    Pneumonia due to organism    Spinal stenosis    Spinal stenosis    Tonsillitis    Ulcerative colitis (HCC)    Uterine cancer (HCC)    Visual impairment    glasses      Social History:  Social History     Socioeconomic History    Marital status:    Tobacco Use    Smoking status: Never     Passive exposure: Never    Smokeless tobacco: Never   Vaping Use    Vaping status: Never Used   Substance and Sexual Activity    Alcohol use: Not Currently     Alcohol/week: 2.0 standard drinks of alcohol     Comment: socially    Drug use: Never   Other Topics Concern    Caffeine Concern Yes     Comment: SODA/TEA 1 CAN/DAY        REVIEW OF SYSTEMS:   GENERAL HEALTH:  feels well otherwise  RESPIRATORY:  Voices no shortness of breath with exertion or cough  CARDIOVASCULAR:  Voices no chest pain on exertion or shortness of breath  GI:   Voices no abdominal pain or changes of bowels   :Viices no urning or frequency of urination.  NEURO:  Voices no  headaches or dizziness    EXAM:   /56   Pulse 84   Temp 98 °F (36.7 °C)   Ht 5' 2\" (1.575 m)   Wt 139 lb 9.6 oz (63.3 kg)   SpO2 98%   BMI 25.53 kg/m²     GENERAL:  well developed, well nourished, in no apparent distress  LUNGS:  clear to auscultation.  Effort normal  CARDIO:  RRR without murmur.   S1 and S2 normal  GI:  good BS's,  no masses,   HSM or  tenderness  EXTREMITIES : no cyanosis, clubbing or edema.  Calves nontender bilaterally    ASSESSMENT AND PLAN:     1. Status post total hip replacement, right  Status post right total hip replacement February 18, 2025.  She seems to be doing well.  She is ambulatory.  She does have pain.  She takes Tylenol 500 mg 2 tablets up to 4 times a day but I did recommend just limiting it to no more than 6 tablets a day or 3000 mg a day.    2. Anemia, unspecified type  Date hemoglobin  - CBC With Differential With Platelet; Future    3. Hyponatremia  Resolved.  Off hydrochlorothiazide.      4. Vitamin B12 deficiency  B12 injection today.    5. Primary hypertension  Controlled.  Continue current medication.    6. Memory deficit  Stable.  Follows with neurology.    7. Essential hypertension  Stable.  Controlled.    This visit was 30 minutes.  I spent 10 minutes before visit preparing and reviewing old records.  Greater than 50% of the visit was engaged in counseling and review of past data.    She already has scheduled for 2 weeks.  I think this is appropriate.    The patient indicates understanding of these issues and agrees to the plan.    Pan Mansfield MD  3/3/2025  12:10 PM

## 2025-03-03 NOTE — TELEPHONE ENCOUNTER
Please let patient's  Basil know that Karyn's hemoglobin improved to 8.7.    So it is improving.    One thing that came to mind is that the omeprazole that she is on can interfere with iron absorption.    She is on the omeprazole I believe for heartburn but I do not think she has had any heartburn so I am wondering if we can take that out of her medication mix so that it may help the iron supplement that she is taking daily be better absorbed.    She can remain on the iron supplement once a day.    We can recheck her labs in 2 weeks with her next visit

## 2025-03-05 ENCOUNTER — HOSPITAL ENCOUNTER (OUTPATIENT)
Dept: GENERAL RADIOLOGY | Facility: HOSPITAL | Age: 80
Discharge: HOME | End: 2025-03-05
Attending: PHYSICIAN ASSISTANT
Payer: MEDICARE

## 2025-03-05 ENCOUNTER — OFFICE VISIT (OUTPATIENT)
Dept: ORTHOPEDICS CLINIC | Facility: CLINIC | Age: 80
End: 2025-03-05
Payer: MEDICARE

## 2025-03-05 DIAGNOSIS — Z47.89 ORTHOPEDIC AFTERCARE: Primary | ICD-10-CM

## 2025-03-05 DIAGNOSIS — Z96.641 STATUS POST TOTAL REPLACEMENT OF RIGHT HIP: ICD-10-CM

## 2025-03-05 DIAGNOSIS — Z47.89 ORTHOPEDIC AFTERCARE: ICD-10-CM

## 2025-03-05 PROCEDURE — 73502 X-RAY EXAM HIP UNI 2-3 VIEWS: CPT | Performed by: PHYSICIAN ASSISTANT

## 2025-03-05 PROCEDURE — 99024 POSTOP FOLLOW-UP VISIT: CPT | Performed by: PHYSICIAN ASSISTANT

## 2025-03-05 NOTE — PROGRESS NOTES
NURSING INTAKE COMMENTS:   Chief Complaint   Patient presents with    Post-Op     S/p right JOHN on 25- patient rates pain 8-9/10. Patient's spouse states pcp recommended continuing with aspirin 325mg until 3/23/25. Patient is taking tylenol 1,000mg every 8 hours.        HPI: This 79 year old female presents today for follow-up on her right hip.  She is here today with her .  She is just over 2 weeks status post right total hip arthroplasty through an anterior approach.  Overall she is doing well.  She is ambulating with a walker.  She is using Tylenol for pain management.  She completed her course of home therapy and is ready to start outpatient therapy.    Past Medical History:    Arthritis    Back pain    Cataract    Cough    Deep vein thrombosis (HCC)    Dementia (HCC)    Essential hypertension    Family history of colon cancer     0    PARA ZERO    H/O foot surgery    LEFT FOOT SURGERY, PINS PLACED IN TOE    H/O oral surgery    GUM/MOUTH SURGERY    Hallux rigidus    LEFT FOOT, YURY PROCEDURE, 1ST LEFT METARSOPHALANGEAL JOINT    Heart palpitations    HEART MONITOR 2012    High blood pressure    High cholesterol    History of DVT (deep vein thrombosis)    Hypercholesterolemia    Hyperlipidemia    Hyperparathyroidism (HCC)    PAIR OF PARATHYROID REMOVED    Hyperparathyroidism (HCC)    PAIR OF PARATHYROID REMOVED     Hypertension    Left carotid artery stenosis    Migraine    OCCULAR MIGRAINE    Migraine    OCCULAR MIGRAINE     Neuroma    2 LT, HALLUX RIGIDUS LT, PER. NG.    Osteoarthritis    Other ill-defined conditions(799.89)    TAHBSO MGMT.    Other ill-defined conditions(799.89)    CHRONIC LEFT CYSTIC PELVIC MASS    Ovarian cyst    REMOVED PER NG.    Painful breasts    Pneumonia    HOSPITALIZED    Pneumonia due to organism    Spinal stenosis    Spinal stenosis    Tonsillitis    Ulcerative colitis (HCC)    Uterine cancer (HCC)    Visual impairment    glasses     Past Surgical  History:   Procedure Laterality Date    Appendectomy      patient doenst rememeber    Cataract Bilateral     Left Eye: 03/21/23, Right Eye: 01/25/23    Colonoscopy N/A 12/05/2016    Procedure: COLONOSCOPY;  Surgeon: Velma Herrera MD;  Location: [facility] ENDOSCOPY    Colonoscopy N/A 05/24/2018    Procedure: COLONOSCOPY;  Surgeon: Velma Herrera MD;  Location: [facility] ENDOSCOPY    Colonoscopy  10/2022    Colonoscopy N/A 10/14/2022    Procedure: COLONOSCOPY;  Surgeon: Michael López MD;  Location: [facility] ENDOSCOPY    Hip replacement surgery Right     Hysterectomy  2002    Other surgical history  2004?    Parathyroid removal    Other surgical history  08/2017    left foot surgery plate removed    Tonsillectomy       Current Outpatient Medications   Medication Sig Dispense Refill    escitalopram 10 MG Oral Tab Take 1 tablet (10 mg total) by mouth daily.      triamcinolone 0.1 % External Cream Apply topically 2 (two) times daily as needed.      ferrous sulfate 325 (65 FE) MG Oral Tab EC Take 1 tablet (325 mg total) by mouth daily with breakfast. 30 tablet 0    docusate sodium 100 MG Oral Cap Take 100 mg by mouth 2 (two) times daily. 60 capsule 0    aspirin 325 MG Oral Tab EC Take 1 tablet (325 mg total) by mouth in the morning and 1 tablet (325 mg total) before bedtime. 60 tablet 0    acetaminophen 325 MG Oral Tab Take 2 tablets (650 mg total) by mouth every 4 (four) hours while awake. 40 tablet 0    amLODIPine 10 MG Oral Tab TAKE ONE TABLET BY MOUTH ONE TIME DAILY (Patient taking differently: Take 1 tablet (10 mg total) by mouth every morning.) 90 tablet 3    donepezil 10 MG Oral Tab Take 1 tablet (10 mg total) by mouth daily. (Patient taking differently: Take 1 tablet (10 mg total) by mouth every morning.) 90 tablet 3    gabapentin 300 MG Oral Cap Take 1 capsule (300 mg total) by mouth 2 (two) times daily. 180 capsule 3    memantine 10 MG Oral Tab Take 1 tablet (10 mg total) by mouth 2 (two) times daily. 180 tablet  3    telmisartan 80 MG Oral Tab TAKE ONE TABLET BY MOUTH IN THE MORNING 90 tablet 3    ALPRAZolam 0.25 MG Oral Tab Take 1 tablet (0.25 mg total) by mouth daily as needed.      atorvastatin 40 MG Oral Tab TAKE ONE TABLET BY MOUTH AT BEDTIME 90 tablet 3    denosumab 60 MG/ML Subcutaneous Solution Prefilled Syringe Inject 1 mL (60 mg total) into the skin one time.      SULFASALAZINE 500 MG Oral Tab TAKE 3 TABLETS BY MOUTH TWICE A  tablet 0    folic acid 1 MG Oral Tab Take 1 tablet (1 mg total) by mouth daily. 90 tablet 3    Calcium Citrate-Vitamin D 315-250 MG-UNIT Oral Tab Take 2 tablets by mouth 2 (two) times daily with meals.      cyanocobalamin (VITAMIN B12) 1000 MCG/ML Injection Solution Inject 1 mL (1,000 mcg total) into the muscle every 30 (thirty) days.      Multiple Vitamin (MULTI-VITAMINS) Oral Tab Take 1 tablet by mouth daily with breakfast.       Allergies[1]  Family History   Problem Relation Age of Onset    Cancer Father 73        COLON, CAUSE OF DEATH    Cancer Mother 64        COLON, 2nd primary 94    Cancer Maternal Cousin Male 60        prostate/patient denies     Cancer Self 58        uterine    Prostate Cancer Maternal Uncle         age unknown/ at 94    Kidney Disease Neg         UROLITHIASIS    Breast Cancer Neg     Ovarian Cancer Neg        Social History     Occupational History    Not on file   Tobacco Use    Smoking status: Never     Passive exposure: Never    Smokeless tobacco: Never   Vaping Use    Vaping status: Never Used   Substance and Sexual Activity    Alcohol use: Not Currently     Alcohol/week: 2.0 standard drinks of alcohol     Comment: socially    Drug use: Never    Sexual activity: Not on file        Review of Systems:  GENERAL: feels generally well, no significant weight loss or weight gain  SKIN: no ulcerated or worrisome skin lesions  EYES:denies blurred vision or double vision  HEENT: denies new nasal congestion, sinus pain or ST  LUNGS: denies shortness of  breath  CARDIOVASCULAR: denies chest pain  GI: no hematemesis, no worsening heartburn, no diarrhea  : no dysuria, no blood in urine, no difficulty urinating, no incontinence  MUSCULOSKELETAL: no other musculoskeletal complaints other than in HPI  NEURO: no numbness or tingling, no weakness or balance disorder  PSYCHE: no depression or anxiety  HEMATOLOGIC: no hx of blood dyscrasia  ENDOCRINE: no thyroid or diabetes issues  ALL/ASTHMA: no new hx of severe allergy or asthma    Physical Examination:    There were no vitals taken for this visit.  Constitutional: appears well hydrated, alert and responsive, no acute distress noted  Extremities: I removed the dressing.  The incision is healing well.  There are few remaining Steri-Strips in place.  Minimal discomfort with range of motion of the hip.  She was able to get up and ambulate without difficulty.  Her calf is soft and nontender.    Imaging:         XR HIP W OR WO PELVIS 2 OR 3 VIEWS, RIGHT (CPT=73502)    Result Date: 2/18/2025  PROCEDURE: XR HIP W OR WO PELVIS 2 OR 3 VIEWS, RIGHT (CPT=73502)  COMPARISON: [facility] 2nd Floor, XR HIP W OR WO PELVIS 2 OR 3 VIEWS, RIGHT (CPT=73502), 11/13/2024, 11:52 AM.  [facility], XR HIP W OR WO PELVIS 2 OR 3 VIEWS, RIGHT (CPT=73502), 11/06/2023, 10:49 AM.  INDICATIONS: S/P right total hip arthroplasty.  TECHNIQUE: 2 views were obtained.   FINDINGS:  BONES: Postoperative changes from right total hip arthroplasty.  The hardware is in anatomic alignment.  No evidence of hardware related complication.  No acute fracture.  No dislocation.  There is unchanged moderate left hip osteoarthrosis as well as degenerative change involving the bilateral sacroiliac joints.  There is levocurvature of the lumbar spine.  Multilevel degenerative changes of the visualized lower lumbar spine. SOFT TISSUES: There is subcutaneous edema and subcutaneous emphysema about the right hip. EFFUSION: There is  air and fluid in the right hip joint space. OTHER: Stable 7.8 cm calcified structure in the left hemipelvis.  There are phleboliths in the pelvis.  There are surgical clips in the pelvis.         CONCLUSION:   Expected postoperative changes from right total hip arthroplasty, which are described above.     Dictated by (CST): Eulalio Jackman MD on 2/18/2025 at 11:39 AM     Finalized by (CST): Eulalio Jackman MD on 2/18/2025 at 11:40 AM             Lab Results   Component Value Date    WBC 8.0 03/03/2025    HGB 8.7 (L) 03/03/2025    .0 03/03/2025      Lab Results   Component Value Date     (H) 03/03/2025    BUN 27 (H) 03/03/2025    CREATSERUM 1.00 03/03/2025    GFRNAA 53 (L) 05/23/2022    GFRAA 62 05/23/2022        Assessment and Plan:  Diagnoses and all orders for this visit:    Orthopedic aftercare  -     XR HIP W OR WO PELVIS 2 OR 3 VIEWS, RIGHT (CPT=73502) [[id number]] - Orthopedic providers only; Future    Status post total replacement of right hip  Comments:  Anterior approach  Orders:  -     PHYSICAL THERAPY EXTERNAL          Plan: Ms. Cobos is progressing well 2 weeks following her surgery.  I gave her an order to start outpatient therapy.  We discussed the expected recovery time following surgery.  We discussed wound care.  I will see her back in 4 to 6 weeks to assess her progress.  Advised her to call if any questions or problems arise in the meantime.    The above note was creating using Dragon speech recognition technology. Please excuse any typos.    This visit was performed under the supervision of Dr. Brayan Pearson who formulated the treatment plan and decision making.        [1]   Allergies  Allergen Reactions    Meperidine NAUSEA AND VOMITING     Demerol    Morphine NAUSEA AND VOMITING    Fentanyl OTHER (SEE COMMENTS)     She doesn't know    Levaquin [Levofloxacin] RASH     Patient developed diffuse rash macular in nature after about 5 days of Levaquin.  Suspect Levaquin causes a rash.     Zithromax [Azithromycin] RASH

## 2025-03-18 ENCOUNTER — TELEPHONE (OUTPATIENT)
Dept: INTERNAL MEDICINE CLINIC | Facility: CLINIC | Age: 80
End: 2025-03-18

## 2025-03-18 ENCOUNTER — LAB ENCOUNTER (OUTPATIENT)
Dept: LAB | Age: 80
End: 2025-03-18
Attending: INTERNAL MEDICINE
Payer: MEDICARE

## 2025-03-18 ENCOUNTER — OFFICE VISIT (OUTPATIENT)
Dept: INTERNAL MEDICINE CLINIC | Facility: CLINIC | Age: 80
End: 2025-03-18

## 2025-03-18 VITALS
HEIGHT: 62 IN | DIASTOLIC BLOOD PRESSURE: 54 MMHG | SYSTOLIC BLOOD PRESSURE: 132 MMHG | OXYGEN SATURATION: 99 % | TEMPERATURE: 98 F | BODY MASS INDEX: 24.48 KG/M2 | HEART RATE: 90 BPM | WEIGHT: 133 LBS

## 2025-03-18 DIAGNOSIS — D64.9 ANEMIA, UNSPECIFIED TYPE: ICD-10-CM

## 2025-03-18 DIAGNOSIS — R41.3 MEMORY DEFICIT: ICD-10-CM

## 2025-03-18 DIAGNOSIS — E53.8 VITAMIN B12 DEFICIENCY: ICD-10-CM

## 2025-03-18 DIAGNOSIS — I10 PRIMARY HYPERTENSION: ICD-10-CM

## 2025-03-18 DIAGNOSIS — I10 ESSENTIAL HYPERTENSION: ICD-10-CM

## 2025-03-18 DIAGNOSIS — E87.1 HYPONATREMIA: ICD-10-CM

## 2025-03-18 DIAGNOSIS — Z96.641 STATUS POST TOTAL HIP REPLACEMENT, RIGHT: Primary | ICD-10-CM

## 2025-03-18 LAB
ALBUMIN SERPL-MCNC: 4.6 G/DL (ref 3.2–4.8)
ALP LIVER SERPL-CCNC: 99 U/L
ALT SERPL-CCNC: 17 U/L
AST SERPL-CCNC: 24 U/L (ref ?–34)
BASOPHILS # BLD AUTO: 0.05 X10(3) UL (ref 0–0.2)
BASOPHILS NFR BLD AUTO: 0.8 %
BILIRUB DIRECT SERPL-MCNC: <0.1 MG/DL (ref ?–0.3)
BILIRUB SERPL-MCNC: 0.3 MG/DL (ref 0.2–1.1)
DEPRECATED HBV CORE AB SER IA-ACNC: 73 NG/ML
DEPRECATED RDW RBC AUTO: 50.1 FL (ref 35.1–46.3)
EOSINOPHIL # BLD AUTO: 0.37 X10(3) UL (ref 0–0.7)
EOSINOPHIL NFR BLD AUTO: 6.2 %
ERYTHROCYTE [DISTWIDTH] IN BLOOD BY AUTOMATED COUNT: 13.6 % (ref 11–15)
HCT VFR BLD AUTO: 31.4 %
HGB BLD-MCNC: 10.1 G/DL
IMM GRANULOCYTES # BLD AUTO: 0.03 X10(3) UL (ref 0–1)
IMM GRANULOCYTES NFR BLD: 0.5 %
IRON SATN MFR SERPL: 16 %
IRON SERPL-MCNC: 56 UG/DL
LYMPHOCYTES # BLD AUTO: 1.28 X10(3) UL (ref 1–4)
LYMPHOCYTES NFR BLD AUTO: 21.5 %
MCH RBC QN AUTO: 32 PG (ref 26–34)
MCHC RBC AUTO-ENTMCNC: 32.2 G/DL (ref 31–37)
MCV RBC AUTO: 99.4 FL
MONOCYTES # BLD AUTO: 0.67 X10(3) UL (ref 0.1–1)
MONOCYTES NFR BLD AUTO: 11.3 %
NEUTROPHILS # BLD AUTO: 3.54 X10 (3) UL (ref 1.5–7.7)
NEUTROPHILS # BLD AUTO: 3.54 X10(3) UL (ref 1.5–7.7)
NEUTROPHILS NFR BLD AUTO: 59.7 %
PLATELET # BLD AUTO: 250 10(3)UL (ref 150–450)
PROT SERPL-MCNC: 7.5 G/DL (ref 5.7–8.2)
RBC # BLD AUTO: 3.16 X10(6)UL
TOTAL IRON BINDING CAPACITY: 343 UG/DL (ref 250–425)
TRANSFERRIN SERPL-MCNC: 273 MG/DL (ref 250–380)
WBC # BLD AUTO: 5.9 X10(3) UL (ref 4–11)

## 2025-03-18 PROCEDURE — 84466 ASSAY OF TRANSFERRIN: CPT

## 2025-03-18 PROCEDURE — 82728 ASSAY OF FERRITIN: CPT

## 2025-03-18 PROCEDURE — 80076 HEPATIC FUNCTION PANEL: CPT

## 2025-03-18 PROCEDURE — 83540 ASSAY OF IRON: CPT

## 2025-03-18 PROCEDURE — 85025 COMPLETE CBC W/AUTO DIFF WBC: CPT

## 2025-03-18 PROCEDURE — G2211 COMPLEX E/M VISIT ADD ON: HCPCS | Performed by: INTERNAL MEDICINE

## 2025-03-18 PROCEDURE — 36415 COLL VENOUS BLD VENIPUNCTURE: CPT

## 2025-03-18 PROCEDURE — 99214 OFFICE O/P EST MOD 30 MIN: CPT | Performed by: INTERNAL MEDICINE

## 2025-03-18 NOTE — TELEPHONE ENCOUNTER
Ayad Weathers.  I want to thank you in advance for seeing Karyn for her primary care issues as I will be retiring June 13.    I saw her today for her last visit with me and she seems to be doing very well and appears to be stable.    Her postop anemia is improving with hemoglobin A1c up to 10.1 today from 8.3 February 25, 2025.    I will leave it up to your good judgment when neck CBC should be done.    Orthopedics have given her aspirin 325 mg twice a day for DVT prophylaxis so according to current guidelines I suggested she take the aspirin 325 mg for a total of 35 days.  Her surgery was February 18 so I messaged her to take it an additional 5 days and then transition back to her aspirin 81 mg daily    I did find a note from Dr. López that because of her ulcerative colitis and a discussion that Dr. López with Karyn during his office visit December 14, 2023 that because Karyn wished to continue colonoscopy surveillance a colonoscopy could be considered \"next year\" which would have been sometime in 2024.  However Karyn was daily with major hip pain that led to her hip surgery.  Her last colonoscopy was October 2022 with an 8 mm polyp.    Please let me know if I can be of any assistance to you with any questions thoughts or concerns you may have regarding her past care.    My cell phone number if you wish is [phone].    I will fax this note to Dr. Zoraida Weathers at fax : [phone]

## 2025-03-18 NOTE — TELEPHONE ENCOUNTER
We can let Karyn or Basil her  know the following regarding her test results. (Karyn does have a memory issue)    Her blood count is improving.  Hemoglobin up to 10.1.  This is a significant improvement from March 3.  I anticipate this will continue to improve.  I would think next blood count can be in about a month or so.  Pleased with the improvement.    I am anticipating that  follow-up with the future blood count for the anemia.    I forgot to discuss the aspirin 325 mg twice a day.  She is now 30 days after her hip surgery and we usually will prophylax for a total of 30 to 5 days so maybe another 5 days of aspirin 325 mg twice a day and then she can transition just to aspirin 81 mg a day.

## 2025-03-18 NOTE — PROGRESS NOTES
Karyn Cobos is a 79 year old female.  HPI:     Chief Complaint   Patient presents with    Follow - Up     2 month       Karyn is here with her  Basil    I think she is doing very well.  She is doing physical therapy.  She is getting around to local restaurants.  She is using a walker.  She does have pain in her right hip postoperatively but that seems to be controlled with Tylenol 500 mg 2 tablets 3 times a day.    Does have pain in her knee but it was discussed that Maximo Pierson her orthopedic PA knows about this.  For now we will monitor this.  She has been using Voltaren cream.    Her blood pressure is acceptable.    She follows with Dr. Patton for her breast exams.  Next visit with him July 17.    She follows with Ho Anderson from pulmonary.    I did let her know that the CDC recommends a second 2559-7647 COVID booster 6 months after last.  Her last COVID booster was September 2024.    She has had her PCV 20 vaccine May 2022.  Shingrix x 2.  Flu vaccine and RSV vaccine.    All in all I think she is doing very well.    Her memory deficit is stable.    She has a history of ulcerative colitis being followed by Dr. López.  She has continued with sulfasalazine which she has been on for years.  I did copy and paste note from Dr. López below for reference.  ASSESSMENT AND PLAN:  Chronic ulcerative colitis since 1970s in clinical remission on sulfasalazine.  Family history of colon cancer in both parents and personal history of adenomatous colon polyp last year.  We had a long discussion about the pros and cons of surveillance colonoscopy at her age and comorbid conditions including mild dementia. We discussed colonoscopy now, next year or never. She is currently not comfortable with no surveillance colonoscopy.  Agreed for now to plan colonoscopy next year unless she changes her mind.  Continue current therapy  There are no diagnoses linked to this encounter.    Michael López MD FACG    cc: No ref.  provider found    Electronically signed by Michael López MD at 12/14/2023 9:27 AM CST       Current Outpatient Medications   Medication Sig Dispense Refill    escitalopram 10 MG Oral Tab Take 1 tablet (10 mg total) by mouth daily.      triamcinolone 0.1 % External Cream Apply topically 2 (two) times daily as needed.      ferrous sulfate 325 (65 FE) MG Oral Tab EC Take 1 tablet (325 mg total) by mouth daily with breakfast. 30 tablet 0    docusate sodium 100 MG Oral Cap Take 100 mg by mouth 2 (two) times daily. 60 capsule 0    aspirin 325 MG Oral Tab EC Take 1 tablet (325 mg total) by mouth in the morning and 1 tablet (325 mg total) before bedtime. 60 tablet 0    acetaminophen 325 MG Oral Tab Take 2 tablets (650 mg total) by mouth every 4 (four) hours while awake. (Patient taking differently: Take 2 tablets (650 mg total) by mouth every 6 (six) hours as needed.) 40 tablet 0    amLODIPine 10 MG Oral Tab TAKE ONE TABLET BY MOUTH ONE TIME DAILY (Patient taking differently: Take 1 tablet (10 mg total) by mouth every morning.) 90 tablet 3    donepezil 10 MG Oral Tab Take 1 tablet (10 mg total) by mouth daily. 90 tablet 3    gabapentin 300 MG Oral Cap Take 1 capsule (300 mg total) by mouth 2 (two) times daily. 180 capsule 3    memantine 10 MG Oral Tab Take 1 tablet (10 mg total) by mouth 2 (two) times daily. 180 tablet 3    telmisartan 80 MG Oral Tab TAKE ONE TABLET BY MOUTH IN THE MORNING 90 tablet 3    ALPRAZolam 0.25 MG Oral Tab Take 1 tablet (0.25 mg total) by mouth daily as needed.      atorvastatin 40 MG Oral Tab TAKE ONE TABLET BY MOUTH AT BEDTIME 90 tablet 3    denosumab 60 MG/ML Subcutaneous Solution Prefilled Syringe Inject 1 mL (60 mg total) into the skin one time.      SULFASALAZINE 500 MG Oral Tab TAKE 3 TABLETS BY MOUTH TWICE A  tablet 0    folic acid 1 MG Oral Tab Take 1 tablet (1 mg total) by mouth daily. 90 tablet 3    Calcium Citrate-Vitamin D 315-250 MG-UNIT Oral Tab Take 2 tablets by mouth 2  (two) times daily with meals.      cyanocobalamin (VITAMIN B12) 1000 MCG/ML Injection Solution Inject 1 mL (1,000 mcg total) into the muscle every 30 (thirty) days.      Multiple Vitamin (MULTI-VITAMINS) Oral Tab Take 1 tablet by mouth daily with breakfast.        Past Medical History:    Arthritis    Back pain    Cataract    Cough    Deep vein thrombosis (HCC)    Dementia (HCC)    Essential hypertension    Family history of colon cancer     0    PARA ZERO    H/O foot surgery    LEFT FOOT SURGERY, PINS PLACED IN TOE    H/O oral surgery    GUM/MOUTH SURGERY    Hallux rigidus    LEFT FOOT, YURY PROCEDURE, 1ST LEFT METARSOPHALANGEAL JOINT    Heart palpitations    HEART MONITOR 2012    High blood pressure    High cholesterol    History of DVT (deep vein thrombosis)    Hypercholesterolemia    Hyperlipidemia    Hyperparathyroidism (HCC)    PAIR OF PARATHYROID REMOVED    Hyperparathyroidism (HCC)    PAIR OF PARATHYROID REMOVED     Hypertension    Left carotid artery stenosis    Migraine    OCCULAR MIGRAINE    Migraine    OCCULAR MIGRAINE     Neuroma    2 LT, HALLUX RIGIDUS LT, PER. NG.    Osteoarthritis    Other ill-defined conditions(799.89)    TAHBSO MGMT.    Other ill-defined conditions(799.89)    CHRONIC LEFT CYSTIC PELVIC MASS    Ovarian cyst    REMOVED PER NG.    Painful breasts    Pneumonia    HOSPITALIZED    Pneumonia due to organism    Spinal stenosis    Spinal stenosis    Tonsillitis    Ulcerative colitis (HCC)    Uterine cancer (HCC)    Visual impairment    glasses      Social History:  Social History     Socioeconomic History    Marital status:    Tobacco Use    Smoking status: Never     Passive exposure: Never    Smokeless tobacco: Never   Vaping Use    Vaping status: Never Used   Substance and Sexual Activity    Alcohol use: Not Currently     Alcohol/week: 2.0 standard drinks of alcohol     Comment: socially    Drug use: Never   Other Topics Concern    Caffeine Concern Yes     Comment:  SODA/TEA 1 CAN/DAY        REVIEW OF SYSTEMS:   GENERAL HEALTH:  feels well otherwise  RESPIRATORY:  Voices no shortness of breath with exertion or cough  CARDIOVASCULAR:  Voices no chest pain on exertion or shortness of breath  GI:   Voices no abdominal pain or changes of bowels.  Slight constipation but she is managing that we spoke about restarting her Colace and half dose MiraLAX.   :Viices no urning or frequency of urination.  NEURO:  Voices no  headaches or dizziness    EXAM:   /54   Pulse 90   Temp 97.6 °F (36.4 °C)   Ht 5' 2\" (1.575 m)   Wt 133 lb (60.3 kg)   SpO2 99%   BMI 24.33 kg/m²     GENERAL:  well developed, well nourished, in no apparent distress  SKIN:  no rashes , she has some actinic keratosis on her lower extremities and she follows with dermatology.  LUNGS:  clear to auscultation.  Effort normal  CARDIO:  RRR without murmur.   S1 and S2 normal  GI:  good BS's,  no masses,   HSM or tenderness  EXTREMITIES : no cyanosis, clubbing or edema  MS: Wound right hip is healed and looks excellent.    ASSESSMENT AND PLAN:     1. Status post total hip replacement, right  I think she is doing very well.  Status post right hip replacement.    2. Anemia, unspecified type  Anemia postoperatively.  Will update labs  - CBC With Differential With Platelet; Future  - Ferritin; Future  - Iron And Tibc; Future  - Hepatic Function Panel (7); Future    3. Hyponatremia  Resolved.  Off hydrochlorothiazide.  Blood pressure under satisfactory control    4. Vitamin B12 deficiency  B12 deficiency getting vitamin B12 monthly injections    5. Primary hypertension  Blood pressure under satisfactory control    6. Memory deficit  Stable.  She follows with neurology.    This visit was 30 minutes.  I spent 10 minutes before visit preparing and reviewing old records.  Greater than 50% of the visit was engaged in counseling and review of past data.    She will be following up with  as her primary care physician.   She knows I will be retiring June 13.    The patient indicates understanding of these issues and agrees to the plan.    Pan Mansfield MD  3/18/2025  10:31 AM

## 2025-06-18 RX ORDER — ATORVASTATIN CALCIUM 40 MG/1
40 TABLET, FILM COATED ORAL NIGHTLY
Qty: 90 TABLET | Refills: 3 | Status: SHIPPED | OUTPATIENT
Start: 2025-06-18

## 2025-06-18 NOTE — TELEPHONE ENCOUNTER
Refill request is for a maintenance medication and has met the criteria specified in the Ambulatory Medication Refill Standing Order for eligibility, visits, laboratory, alerts and was sent to the requested pharmacy.    Requested Prescriptions     Signed Prescriptions Disp Refills    atorvastatin 40 MG Oral Tab 90 tablet 3     Sig: TAKE ONE TABLET BY MOUTH AT BEDTIME     Authorizing Provider: NORIS MENDEZ     Ordering User: RAUL CRISTOBAL

## 2025-08-01 NOTE — PROGRESS NOTES
CALLED LVM OF APPT TIME BEING CHANGE ASKED THAT THE PATIENT TO CALL BACK IF THERE IS ANY PROBLEMS    NURSING INTAKE COMMENTS: Patient presents with: Ankle Pain: follow up right ankle fx      HPI: This 64year old female presents today 4-1/2 months status post right distal fibular fracture with delayed healing due to osteoporosis.   Since her last visit sh COLONOSCOPY;  Surgeon: Kori Chand MD;  Location: [facility] ENDOSCOPY   • COLONOSCOPY N/A 5/24/2018    Procedure: COLONOSCOPY;  Surgeon: Kori Chand MD;  Location: [facility] ENDOSCOPY   • HYSTERECTOMY  2002   • OTHER SURGICAL HISTORY  2004?     Jordin Cochran muscle every 30 (thirty) days. • Vitamin D, Ergocalciferol, (DRISDOL) 71552 UNITS Oral Cap Take 1 capsule by mouth As Directed. Every 5 days.      • Multiple Vitamin (MULTI-VITAMINS) Oral Tab Take 1 tablet by mouth daily with breakfast.         Fentanyl than in HPI  NEURO: no numbness or tingling, no weakness or balance disorder  PSYCHE: no depression or anxiety  HEMATOLOGIC: no hx of blood dyscrasia, no Hx DVT/PE  ENDOCRINE: no thyroid or diabetes issues  ALL/ASTHMA: no new hx of severe allergy or asthma follow-up in the office in 6 weeks with repeat x-rays of the right ankle. If doing well will likely be her last visit. Follow Up: No follow-ups on file.     Patient seen and evaluated initially by Sumeet Lopez PA-C and then with Monet Wolff M.D.

## 2025-08-15 ENCOUNTER — OFFICE VISIT (OUTPATIENT)
Dept: OTOLARYNGOLOGY | Facility: CLINIC | Age: 80
End: 2025-08-15

## 2025-08-15 VITALS — BODY MASS INDEX: 24.84 KG/M2 | HEIGHT: 62 IN | WEIGHT: 135 LBS

## 2025-08-15 DIAGNOSIS — H61.21 CERUMEN DEBRIS ON TYMPANIC MEMBRANE OF RIGHT EAR: ICD-10-CM

## 2025-08-15 DIAGNOSIS — H90.3 BILATERAL SENSORINEURAL HEARING LOSS: Primary | ICD-10-CM

## 2025-08-15 PROCEDURE — 99213 OFFICE O/P EST LOW 20 MIN: CPT | Performed by: SPECIALIST

## 2025-08-15 RX ORDER — MELOXICAM 15 MG/1
15 TABLET ORAL
COMMUNITY
Start: 2025-07-21

## 2025-08-15 RX ORDER — ESCITALOPRAM OXALATE 20 MG/1
20 TABLET ORAL DAILY
COMMUNITY
Start: 2025-08-05

## 2025-08-25 ENCOUNTER — MED REC SCAN ONLY (OUTPATIENT)
Dept: INTERNAL MEDICINE CLINIC | Facility: CLINIC | Age: 80
End: 2025-08-25

## (undated) DIAGNOSIS — I10 ESSENTIAL HYPERTENSION: Primary | ICD-10-CM

## (undated) DEVICE — GAMMEX® PI HYBRID SIZE 8.5, STERILE POWDER-FREE SURGICAL GLOVE, POLYISOPRENE AND NEOPRENE BLEND: Brand: GAMMEX

## (undated) DEVICE — Device: Brand: STABLECUT®

## (undated) DEVICE — KIT ENDO ORCAPOD 160/180/190

## (undated) DEVICE — TRAY EPI NDL 18GA L3.5IN 5ML GLS LUERLOCK LOR

## (undated) DEVICE — TRAY CATH FOLEY 16FR INCLUDE BARDX IC COMPLT CARE

## (undated) DEVICE — STERILE LATEX POWDER-FREE SURGICAL GLOVESWITH NITRILE COATING: Brand: PROTEXIS

## (undated) DEVICE — LINE MNTR ADLT SET O2 INTMD

## (undated) DEVICE — 2T11 #2 PDO 36 X 36: Brand: 2T11 #2 PDO 36 X 36

## (undated) DEVICE — MINI VERSALIGHT W/ EXT FRAZ TIP 4.5MM

## (undated) DEVICE — APPLICATOR PREP 26ML CHG 2% ISO ALC 70%

## (undated) DEVICE — SET EXTN 2.7ML TBNG L20IN DIA0.100IN MACBOR

## (undated) DEVICE — 2DE14 2-0 PDO 24 X 24: Brand: 2DE14 2-0 PDO 24 X 24

## (undated) DEVICE — NEEDLE HYPO 16GA L1.5IN PUR REG BVL WRLD

## (undated) DEVICE — GAMMEX® NON-LATEX PI ORTHO SIZE 9, STERILE POLYISOPRENE POWDER-FREE SURGICAL GLOVE: Brand: GAMMEX

## (undated) DEVICE — SOLUTION IV 1000ML 0.9% NACL PRESERVATIVE

## (undated) DEVICE — APPLICATOR PREP 10.5ML ORNG CHG 2% ISO ALC

## (undated) DEVICE — PEN 6" SURGICAL MARKING PURPL

## (undated) DEVICE — ADHESIVE LIQ 2/3ML VI MASTISOL

## (undated) DEVICE — 6 ML SYRINGE LUER-LOCK TIP: Brand: MONOJECT

## (undated) DEVICE — 35 ML SYRINGE REGULAR TIP: Brand: MONOJECT

## (undated) DEVICE — Device: Brand: DEFENDO AIR/WATER/SUCTION AND BIOPSY VALVE

## (undated) DEVICE — GAMMEX® PI HYBRID SIZE 7, STERILE POWDER-FREE SURGICAL GLOVE, POLYISOPRENE AND NEOPRENE BLEND: Brand: GAMMEX

## (undated) DEVICE — SUT COAT VCRL+ 1 18IN CTX ABSRB VLT ANTIBACT

## (undated) DEVICE — PACK CDS ANTERIOR HIP

## (undated) DEVICE — 3 ML SYRINGE LUER-LOCK TIP: Brand: MONOJECT

## (undated) DEVICE — NEEDLE HYPO 27GA L1.5IN REG BVL W/O SFTY

## (undated) DEVICE — BIPOLAR SEALER 23-112-1 AQM 6.0: Brand: AQUAMANTYS™

## (undated) DEVICE — SHEET,DRAPE,53X77,STERILE: Brand: MEDLINE

## (undated) DEVICE — FORCEP RADIAL JAW 4

## (undated) DEVICE — 3M™ STERI-STRIP™ REINFORCED ADHESIVE SKIN CLOSURES, R1547, 1/2 IN X 4 IN (12 MM X 100 MM), 6 STRIPS/ENVELOPE: Brand: 3M™ STERI-STRIP™

## (undated) DEVICE — SYRINGE MED 10ML LL TIP W/O SFTY DISP

## (undated) DEVICE — MEDI-VAC NON-CONDUCTIVE SUCTION TUBING 6MM X 1.8M (6FT.) L: Brand: CARDINAL HEALTH

## (undated) DEVICE — NEEDLE SPNL 22GA L3.5IN BLK QNCKE STYL DISP

## (undated) DEVICE — SOLUTION IRRIG 1000ML 0.9% NACL USP BTL

## (undated) DEVICE — SNARE ENDOSCOPIC 10MM ROUND

## (undated) DEVICE — GAMMEX® PI HYBRID SIZE 9, STERILE POWDER-FREE SURGICAL GLOVE, POLYISOPRENE AND NEOPRENE BLEND: Brand: GAMMEX

## (undated) DEVICE — GAMMEX® NON-LATEX PI ORTHO SIZE 8.5, STERILE POLYISOPRENE POWDER-FREE SURGICAL GLOVE: Brand: GAMMEX

## (undated) DEVICE — HOOD: Brand: FLYTE

## (undated) DEVICE — 450 ML BOTTLE OF 0.05% CHLORHEXIDINE GLUCONATE IN 99.95% STERILE WATER FOR IRRIGATION, USP AND APPLICATOR.: Brand: IRRISEPT ANTIMICROBIAL WOUND LAVAGE

## (undated) DEVICE — ZZ-CONVERTED-TO-522442- SPONGE 4X4 10PK

## (undated) DEVICE — ANTIBACTERIAL UNDYED BRAIDED (POLYGLACTIN 910), SYNTHETIC ABSORBABLE SUTURE: Brand: COATED VICRYL

## (undated) DEVICE — KIT CLEAN ENDOKIT 1.1OZ GOWNX2

## (undated) DEVICE — ENDOSCOPY PACK - LOWER: Brand: MEDLINE INDUSTRIES, INC.

## (undated) DEVICE — KIT INCIS MGMT PREVENA DRAIN 13CM PEEL AND PLC DISP

## (undated) DEVICE — SNARE OPTMZ PLPCTM TRP

## (undated) NOTE — LETTER
22        To Whom It May Concern:      Jasmina Griffin  :  1945    []  Patient has been cleared to hold Aspirin 81mg 5 days for Bilateral L5 Transforaminal Epidural Steroid Injection. Holding the medication(s) may put the patient at an increased risk for stroke, heart attack, or neurologic or cardiac events. []  Patient has not been cleared to hold Aspiring 81mg for procedure. If this office may be of further assistance, please do not hesitate to contact us. Sincerely,    Danielle Leary.  [phone]

## (undated) NOTE — LETTER
10/27/23        To Whom It May Concern: Dr Elvia Collins has to give Left L4 and Left L5 Transforaminal Epidural Steroid Injections for the patient and needs clearance to hold Aspirin 81 mg for 5 days prior to the procedure. [facility]  :  1945    []  Patient has been cleared to hold aspirin 81 mg for 5 days prior to the procedure. Holding the medication(s) may put the patient at an increased risk for stroke, heart attack, or neurologic or cardiac events. []  Patient has NOT been cleared to hold aspirin 81 mg for 5 days prior to the procedure. X_________________________________________  (Provider signature)                                     (Date)      Please make a selection, sign and fax this letter back to our office    If this office may be of further assistance, please do not hesitate to contact us. Sincerely,    Faisal Sepulveda.  Elvia Collins MD    Phone #: [phone]  Fax #: [phone]

## (undated) NOTE — LETTER
AUTHORIZATION FOR SURGICAL OPERATION OR OTHER PROCEDURE    1.  I hereby authorize Dr. Prince Seaman  and [facility] staff assigned to my case to perform the following operation and/or procedure at the [facility]:    Cortisone injection in [place] Time:  ________ A. M.  P.M.        Patient Name:  ______________________________________________________  (please print)      Patient signature:  ___________________________________________________             Relationship to Patient:           []  Nazanin Moss

## (undated) NOTE — LETTER
No referring provider defined for this encounter. 08/15/23        Patient: Med Haskins   YOB: 1945   Date of Visit: 8/15/2023       Dear  Dr. Fausto Mcneil Recipients,      Thank you for referring Med Haskins to my practice. Please find my assessment and plan below. Med Haskins is a [de-identified]year old w/ a pmhx of   Hypertension, HLD, COVID-19 infection, Hx of DVTm multilevel lumbosacral spondylosis, B12 deficiency from pernicious anemia, ulcerative colitis, occular migraine,   who presents w/ her  to review her neuropsychological testing and the mri of her brain. Kuldip Orr has dementia. Greatest suspicion is for Alzheimer's dementia. Her neuropsychological report suggests she may primarily have vascular dementia. While almost all types dementia have a component of cerebrovascular disease, do not suspect that this is the main  of her neurodegenerative disease. Will evaluate for any other causes of cognitive impairment including hearing loss. Both she and her  deny any symptoms concerning for an untreated mood disorder. They deny symptoms concerning for sleep apnea. We will start her on donepezil. Counseled on the potential adverse effects. Her  is doing most of the driving. Advised her that she could study for her written and on the road driving test.  If she passes both of these then she still is not cleared to drive independently, but can go for an on the road driving test.  I also advised her and her  that since her cognitive impairment is due to dementia, if she fails her on the road driving test she should not take any classes to remediate, because she ultimately  will not retain what she learns. Will add Cymbalta to help w/ neuropathic pain and her anxiety/depression.            Functional Assessment Staging Test  The stages of Alzheimer's disease as defined by FAST are:      Stage   Stage Name  Characteristic    Pt's  Current Stage  Expected   Untreated   AD   Duration   (months)  Mental Age   (years)  MMSE   (score)    1  Normal Aging  No deficits whatsoever   --   Adult  29-30    2  Possible Mild Cognitive   Impairment  Subjective functional deficit   --      28-29    3  Mild Cognitive Impairment  Objective functional deficit interferes with a person's most complex tasks   84  12+  24-28    4  Mild Dementia  IADLs become affected, such as bill paying, cooking, cleaning, traveling  x 24  8-12  19-20    5  Moderate Dementia  Needs help selecting proper attire   18  5-7  15    6a  Moderately Severe Dementia  Needs help putting on clothes   4.8  5  9    6b  Moderately Severe Dementia  Needs help bathing   4.8  4  8    6c  Moderately Severe Dementia  Needs help toileting   4.8  4  5    6d  Moderately Severe Dementia  Urinary incontinence   3.6  3-4  3    6e  Moderately Severe Dementia  Fecal incontinence   9.6  2-3  1    7a  Severe Dementia  Speaks 5-6 words during day   12  1.25  0    7b  Severe Dementia  Speaks only 1 word clearly   18  1  0    7c  Severe Dementia  Can no longer walk   12  1  0    7d  Severe Dementia  Can no longer sit up   12  0.5-0.8  0    7e  Severe Dementia  Can no longer smile   18  0.2-0.4  0    7f  Severe Dementia  Can no longer hold up head   12+  0-0.2  0                       Sincerely,   Nissa Perez DO   [facility], [address] [address]    Document electronically generated by:  Nissa Perez DO

## (undated) NOTE — LETTER
2/1/2024      Tristan Adrian MD  Physical Medicine and Rehabilitation  [address] [place] [address], [address]  [facility] [address]  Dept: [phone]  Dept Fax: [phone]        RE: Consultation for Karyn Cobos        Dear Pan Mansfield MD,    Thank you very much for the opportunity to see your patient.  Attached please find a summary from your patient's recent visit.     I appreciate the chance to take care of your patient with you.  Please feel free to call me with any questions or concerns.    Sincerely,        Tristan Adrian MD  Electronically Signed on 2/1/2024

## (undated) NOTE — LETTER
[address]  Authorization for Invasive Procedure                                                                                           1. I hereby Jazmin Dai MD, my physician and his/her assistants (if applicable), which may include medical students, residents, and/or fellows, to perform the following surgical operation/ procedure and administer such anesthesia as may be determined necessary by my physician: Operation/Procedure name (s) COLONOSCOPY on [address]   2. I recognize that during the surgical operation/procedure, unforeseen conditions may necessitate additional or different procedures than those listed above. I, therefore, further authorize and request that the above-named surgeon, assistants, or designees perform such procedures as are, in their judgment, necessary and desirable. 3.   My surgeon/physician has discussed prior to my surgery the potential benefits, risks and side effects of this procedure; the likelihood of achieving goals; and potential problems that might occur during recuperation. They also discussed reasonable alternatives to the procedure, including risks, benefits, and side effects related to the alternatives and risks related to not receiving this procedure. I have had all my questions answered and I acknowledge that no guarantee has been made as to the result that may be obtained. 4.   Should the need arise during my operation/procedure, which includes change of level of care prior to discharge, I also consent to the administration of blood and/or blood products. Further, I understand that despite careful testing and screening of blood or blood products by collecting agencies, I may still be subject to ill effects as a result of receiving a blood transfusion and/or blood products.   The following are some, but not all, of the potential risks that can occur: fever and allergic reactions, hemolytic reactions, transmission of diseases such as Hepatitis, AIDS and Cytomegalovirus (CMV) and fluid overload. In the event that I wish to have an autologous transfusion of my own blood, or a directed donor transfusion, I will discuss this with my physician. Check only if Refusing Blood or Blood Products  I understand refusal of blood or blood products as deemed necessary by my physician may have serious consequences to my condition to include possible death. I hereby assume responsibility for my refusal and release the hospital, its personnel, and my physicians from any responsibility for the consequences of my refusal.           ____ Refuse      5. I authorize the use of any specimen, organs, tissues, body parts or foreign objects that may be removed from my body during the operation/procedure for diagnosis, research or teaching purposes and their subsequent disposal by hospital authorities. I also authorize the release of specimen test results and/or written reports to my treating physician on the hospital medical staff or other referring or consulting physicians involved in my care, at the discretion of the Pathologist or my treating physician. 6.   I consent to the photographing or videotaping of the operations or procedures to be performed, including appropriate portions of my body for medical, scientific, or educational purposes, provided my identity is not revealed by the pictures or by descriptive texts accompanying them. If the procedure has been photographed/videotaped, the surgeon will obtain the original picture, image, videotape or CD. The hospital will not be responsible for storage, release or maintenance of the picture, image, tape or CD.    7.   I consent to the presence of a  or observers in the operating room as deemed necessary by my physician or their designees.     8.   I recognize that in the event my procedure results in extended X-Ray/fluoroscopy time, I may develop a skin reaction. 9. If I have a Do Not Attempt Resuscitation (DNAR) order in place, that status will be suspended while in the operating room, procedural suite, and during the recovery period unless otherwise explicitly stated by me (or a person authorized to consent on my behalf). The surgeon or my attending physician will determine when the applicable recovery period ends for purposes of reinstating the DNAR order. 10. Patients having a sterilization procedure: I understand that if the procedure is successful the results will be permanent and it will therefore be impossible for me to inseminate, conceive, or bear children. I also understand that the procedure is intended to result in sterility, although the result has not been guaranteed. 11. I acknowledge that my physician has explained sedation/analgesia administration to me including the risk and benefits I consent to the administration of sedation/analgesia as may be necessary or desirable in the judgment of my physician. I CERTIFY THAT I HAVE READ AND FULLY UNDERSTAND THE ABOVE CONSENT TO OPERATION and/or OTHER PROCEDURE.     _________________________________________ _________________________________     ___________________________________  Signature of Patient     Signature of Responsible Person                   Printed Name of Responsible Person                              _________________________________________ ______________________________        ___________________________________  Signature of Witness         Date  Time         Relationship to Patient    STATEMENT OF PHYSICIAN My signature below affirms that prior to the time of the procedure; I have explained to the patient and/or his/her legal representative, the risks and benefits involved in the proposed treatment and any reasonable alternative to the proposed treatment.  I have also explained the risks and benefits involved in refusal of the proposed treatment and alternatives to the proposed treatment and have answered the patient's questions.  If I have a significant financial interest in a co-management agreement or a significant financial interest in any product or implant, or other significant relationship used in this procedure/surgery, I have disclosed this and had a discussion with my patient.     _______________________________________________________________ _____________________________  Sarah Dear of Physician)                                                                                         (Date)                                   (Time)  Patient Name: Pete Gonzáles    : 1945   Printed: 10/11/2022      Medical Record #: [id number]                                              Page 1 of 1

## (undated) NOTE — LETTER
No referring provider defined for this encounter. 12/21/23        Patient: Adam Liz   YOB: 1945   Date of Visit: 12/21/2023       Dear  Dr. Jared Schwartz MD,      Thank you for referring Adam Liz to my practice. Please find my assessment and plan below. Adam Liz is a [de-identified]year old w/ a pmhx of   Hypertension, HLD, COVID-19 infection, Hx of DVTm multilevel lumbosacral spondylosis, B12 deficiency from pernicious anemia, ulcerative colitis, occular migraine,   who presents w/ her  to review her neuropsychological testing and the mri of her brain. Maximo Damian has dementia. Greatest suspicion is for Alzheimer's dementia. Her neuropsychological report suggests she may primarily have vascular dementia. While almost all types dementia have a component of cerebrovascular disease, do not suspect that this is the main  of her neurodegenerative disease. Will evaluate for any other causes of cognitive impairment including hearing loss. Both she and her  deny any symptoms concerning for an untreated mood disorder. They deny symptoms concerning for sleep apnea. We will start her on donepezil. Counseled on the potential adverse effects. Her  is doing most of the driving. Advised her that she could study for her written and on the road driving test.  If she passes both of these then she still is not cleared to drive independently, but can go for an on the road driving test.  I also advised her and her  that since her cognitive impairment is due to dementia, if she fails her on the road driving test she should not take any classes to remediate, because she ultimately  will not retain what she learns. Will add Cymbalta to help w/ neuropathic pain and her anxiety/depression.            Functional Assessment Staging Test  The stages of Alzheimer's disease as defined by FAST are:      Stage   Stage Name  Characteristic    Pt's  Current   Stage Expected   Untreated   AD   Duration   (months)  Mental Age   (years)  MMSE   (score)    1  Normal Aging  No deficits whatsoever   --   Adult  29-30    2  Possible Mild Cognitive   Impairment  Subjective functional deficit   --      28-29    3  Mild Cognitive Impairment  Objective functional deficit interferes with a person's most complex tasks   84  12+  24-28    4  Mild Dementia  IADLs become affected, such as bill paying, cooking, cleaning, traveling  x 24  8-12  19-20    5  Moderate Dementia  Needs help selecting proper attire   18  5-7  15    6a  Moderately Severe Dementia  Needs help putting on clothes   4.8  5  9    6b  Moderately Severe Dementia  Needs help bathing   4.8  4  8    6c  Moderately Severe Dementia  Needs help toileting   4.8  4  5    6d  Moderately Severe Dementia  Urinary incontinence   3.6  3-4  3    6e  Moderately Severe Dementia  Fecal incontinence   9.6  2-3  1    7a  Severe Dementia  Speaks 5-6 words during day   12  1.25  0    7b  Severe Dementia  Speaks only 1 word clearly   18  1  0    7c  Severe Dementia  Can no longer walk   12  1  0    7d  Severe Dementia  Can no longer sit up   12  0.5-0.8  0    7e  Severe Dementia  Can no longer smile   18  0.2-0.4  0    7f  Severe Dementia  Can no longer hold up head   12+  0-0.2  0               Will add namenda and gabapentin. Refer to geriatric psychiatry.          Sincerely,   French Levin DO   [facility], [address],  [address] [place] [address] [address] [address]    Document electronically generated by:  French Levin DO

## (undated) NOTE — LETTER
Patient Name: Adams Reid  YOB: 1945          MRN :  [id number]  Date:  11/14/2020  Referring Physician:  Bubba Villavicencio  Pt has attended 45 visits in Physical Therapy.      Dx: Closed nondisplaced transverse fracture o Assessment: Pt demonstrates good progress with L shoulder pain. Continues to have with more lifting tasks, however is able to manage with massage. Pt continues to have pain in R side lumbar spine and posterior hip.  Some increase in functional ability prior

## (undated) NOTE — LETTER
Hospital Discharge Documentation    From: [facility] Hospitalist's Office  Phone: [phone]    Patient discharged time/date: 2025  1:53 PM  Patient discharge disposition:  [facility]       Discharge Summary - D/C Summary        Discharge Summary signed by Sathish Amaral MD at 2025  2:44 PM  Version 1 of 1      Author: Sathish Amarla MD Service: Hospitalist Author Type: Physician    Filed: 2025  2:44 PM Date of Service: 2025  2:40 PM Status: Signed    : Sathish Amaral MD (Physician)         [facility]  Discharge Summary     Karyn Cobos  : 1945    Status: Outpatient in a Bed  Day #: 0    Attending: Sathish Amaral MD  PCP: Pan Mansfield MD     Date of Admission:  2025  Date of Discharge:  2025     Hospital Discharge Diagnoses:     R hip primary OA  HL  HTN  Ulcerative colitis  Dementia      History of Present Illness:     Copied from Admission Consult:    Patient is an 79-year-old  female with chronic right hip pain and underlying severe primary osteoarthritis, failed outpatient conservative medical management options, scheduled today for above-mentioned procedure by her orthopedic surgeon, Dr. Pearson. Preoperatively, she had spinal block. Postoperatively, transferred to PACU for further monitoring.       Hospital Course:     The patient is doing well s/p R total hip arthroplasty. Pain is controlled and she ambulated well with PT. Hgb was lower post op, but improved upon repeat. She is stable for discharge home.      Consultants         Provider   Role Specialty     Loida Jorge MD      Consulting Physician HOSPITALIST          Surgical Procedures       Case IDs Date Procedure Surgeon Location Status    [id number] 25 Right total hip arthroplasty, anterior approach Brayan Pearson MD [facility] MAIN OR Comp           Physical Exam:   Blood pressure 122/43, pulse 78, temperature 98.4 °F (36.9 °C), temperature source  Oral, resp. rate 16, height 5' 2\" (1.575 m), weight 134 lb (60.8 kg), SpO2 99%, not currently breastfeeding.  General:  Alert, no distress  HEENT:  Normocephalic, atraumatic  Cardiac:  Regular rate, regular rhythm  Pulmonary:  Clear to auscultation bilaterally, respirations unlabored  Gastrointestinal:  Soft, non-tender, normal bowel sounds  Musculoskeletal:  No joint swelling  Extremities:  No edema, no cyanosis, no clubbing  Neurologic:  nonfocal  Psychiatric:  Normal affect, calm and appropriate         Discharge Medications        START taking these medications        Instructions Prescription details   acetaminophen 325 MG Tabs  Commonly known as: Tylenol      Take 2 tablets (650 mg total) by mouth every 4 (four) hours while awake.   Quantity: 40 tablet  Refills: 0     docusate sodium 100 MG Caps  Commonly known as: COLACE      Take 100 mg by mouth 2 (two) times daily.   Quantity: 60 capsule  Refills: 0     ferrous sulfate 325 (65 FE) MG Tbec      Take 1 tablet (325 mg total) by mouth daily with breakfast.   Quantity: 30 tablet  Refills: 0            CHANGE how you take these medications        Instructions Prescription details   amLODIPine 10 MG Tabs  Commonly known as: Norvasc  What changed: when to take this      TAKE ONE TABLET BY MOUTH ONE TIME DAILY   Quantity: 90 tablet  Refills: 3     aspirin 325 MG Tbec  What changed:   medication strength  how much to take  when to take this      Take 1 tablet (325 mg total) by mouth in the morning and 1 tablet (325 mg total) before bedtime.   Quantity: 60 tablet  Refills: 0     donepezil 10 MG Tabs  Commonly known as: Aricept  What changed: when to take this      Take 1 tablet (10 mg total) by mouth daily.   Quantity: 90 tablet  Refills: 3            CONTINUE taking these medications        Instructions Prescription details   ALPRAZolam 0.25 MG Tabs  Commonly known as: Xanax      Take 1 tablet (0.25 mg total) by mouth daily as needed.   Refills: 0     atorvastatin 40  MG Tabs  Commonly known as: Lipitor      TAKE ONE TABLET BY MOUTH AT BEDTIME   Quantity: 90 tablet  Refills: 3     calcium citrate-vitamin D 315-250 MG-UNIT Tabs  Commonly known as: CITRACAL-D      Take 2 tablets by mouth 2 (two) times daily with meals.   Refills: 0     cyanocobalamin 1000 MCG/ML Soln  Commonly known as: Vitamin B12      Inject 1 mL (1,000 mcg total) into the muscle every 30 (thirty) days.   Refills: 0     denosumab 60 MG/ML Sosy  Commonly known as: Prolia      Inject 1 mL (60 mg total) into the skin one time.   Refills: 0     escitalopram 5 MG Tabs  Commonly known as: Lexapro      Take 2 tablets (10 mg total) by mouth daily. Slowing tapering up dosage. Final dose of 10 mg, which will then discontinue Citalopram   Refills: 0     folic acid 1 MG Tabs  Commonly known as: Folvite      Take 1 tablet (1 mg total) by mouth daily.   Quantity: 90 tablet  Refills: 3     gabapentin 300 MG Caps  Commonly known as: Neurontin      Take 1 capsule (300 mg total) by mouth 2 (two) times daily.   Quantity: 180 capsule  Refills: 3     memantine 10 MG Tabs  Commonly known as: Namenda      Take 1 tablet (10 mg total) by mouth 2 (two) times daily.   Quantity: 180 tablet  Refills: 3     omeprazole 20 MG Cpdr  Commonly known as: PriLOSEC      Take 1 capsule (20 mg total) by mouth every morning.   Refills: 0     sulfaSALAzine 500 MG Tabs  Commonly known as: Azulfidine      TAKE 3 TABLETS BY MOUTH TWICE A DAY   Quantity: 180 tablet  Refills: 0     telmisartan 80 MG Tabs  Commonly known as: Micardis      TAKE ONE TABLET BY MOUTH IN THE MORNING   Quantity: 90 tablet  Refills: 3     THERA/BETA-CAROTENE Tabs      Take 1 tablet by mouth daily with breakfast.   Refills: 0     triamcinolone 0.1 % Crea  Commonly known as: Kenalog      Apply topically 2 (two) times daily as needed.   Refills: 0               Where to Get Your Medications        These medications were sent to [id number] - [place], [place] - [address]  [phone],  [phone]  [address] [facility] [address]      Phone: [phone]   acetaminophen 325 MG Tabs  aspirin 325 MG Tbec  docusate sodium 100 MG Caps  ferrous sulfate 325 (65 FE) MG Tbec        Follow-up Information       Joe Pierson PA-C Follow up on 3/5/2025.    Specialties: Physician Assistant, SURGERY, ORTHOPEDIC  Contact information:  [address]  [address]  [facility] [address] [phone]                             Hospital Discharge Diagnoses:  Right hip primary osteoarthritis    Lace+ Score: 54  59-90 High Risk  29-58 Medium Risk  0-28   Low Risk.    TCM Follow-Up Recommendation:  LACE 29-58: Moderate Risk of readmission after discharge from the hospital.        I spent >30 minutes on this discharge. Discussed treatment and discharge plans.       Sathish Amaral MD      Electronically signed by Sathish Amaral MD on 2/19/2025  2:44 PM

## (undated) NOTE — LETTER
10/25/2023      Deatra Linette Potter MD  Physical Medicine and Rehabilitation  [facility], [address] [place] [address]  Dept: [phone]  Dept Fax: [phone]        RE: Consultation for Carmen Connell        Dear Nanda Zapata MD,    Thank you very much for the opportunity to see your patient. Attached please find a summary from your patient's recent visit. I appreciate the chance to take care of your patient with you. Please feel free to call me with any questions or concerns. Sincerely,        Hanh More.  MD Kaylah  Electronically Signed on 10/25/2023

## (undated) NOTE — MR AVS SNAPSHOT
[address] [place] [id number] [phone]               Thank you for choosing us for your health care visit with Nurse. We are glad to serve you and happy to provide you with this summary of your visit.   Please help us to Take 1 tablet (0.25 mg total) by mouth daily as needed. Commonly known as:  XANAX           aspirin 81 MG Tabs   Take 1 tablet by mouth daily. atorvastatin 40 MG Tabs   TAKE 1 TABLET BY MOUTH NIGHTLY.    Commonly known as:  [address] cyanocobalamin (VITAMIN B12) 1000 MCG/ML injection 1,000 mcg                    MyChart     Visit MyChart  You can access your MyChart to more actively manage your health care and view more details from this visit by going to [email or web address].

## (undated) NOTE — MR AVS SNAPSHOT
[place] [place]  [address] [address]  [phone]               Thank you for choosing us for your health care visit with Nestor Goodpasture, MD.  We are glad to serve you and happy to provide you with this summary of your visit.   Please Follow-up Instructions     Return in about 6 months (around 11/18/2017).          Reason for Today's Visit     Physical-Other     Cough           Medical Issues Discussed Today     Osteopenia    Ulcerative colitis    Medicare annual wellness visit, initial cyanocobalamin 1000 MCG/ML Soln   Inject 1 mL into the muscle every 30 (thirty) days. Commonly known as:  VITAMIN B12           ergocalciferol 34153 units Caps   Take 1 capsule by mouth As Directed. Every 5 days.    Commonly known as:  DRISDOL/VITAMIN D2 Visit [facility] online at  [email or web address]

## (undated) NOTE — LETTER
No referring provider defined for this encounter. 06/09/23        Patient: Adam Liz   YOB: 1945   Date of Visit: 6/9/2023       Dear  Dr. Mari Martini Recipients,      Thank you for referring Adam Liz to my practice. Please find my assessment and plan below. Adam Liz is a 76year old  female w/ a pmhx of   Hypertension, HLD, COVID-19 infection, Hx of DVTm multilevel lumbosacral spondylosis, B12 deficiency from pernicious anemia, ulcerative colitis, occular migraine,   who presents w/ her  to review her neuropsychological testing and the mri of her brain. Maximo Damian has dementia. Greatest suspicion is for Alzheimer's dementia. Her neuropsychological report suggests she may primarily have vascular dementia. While almost all types dementia have a component of cerebrovascular disease, do not suspect that this is the main  of her neurodegenerative disease. Will evaluate for any other causes of cognitive impairment including hearing loss. Both she and her  deny any symptoms concerning for an untreated mood disorder. They deny symptoms concerning for sleep apnea. We will start her on donepezil. Counseled on the potential adverse effects. Her  is doing most of the driving. Advised her that she could study for her written and on the road driving test.  If she passes both of these then she still is not cleared to drive independently, but can go for an on the road driving test.  I also advised her and her  that since her cognitive impairment is due to dementia, if she fails her on the road driving test she should not take any classes to remediate, because she ultimately  will not retain what she learns. They will follow-up with Dr. Mesa Seen in 2 months.       Functional Assessment Staging Test  The stages of Alzheimer's disease as defined by FAST are:      Stage   Stage Name  Characteristic    Pt's  Current   Stage  Expected Untreated   AD   Duration   (months)  Mental Age   (years)  MMSE   (score)    1  Normal Aging  No deficits whatsoever   --   Adult  29-30    2  Possible Mild Cognitive   Impairment  Subjective functional deficit   --      28-29    3  Mild Cognitive Impairment  Objective functional deficit interferes with a person's most complex tasks   84  12+  24-28    4  Mild Dementia  IADLs become affected, such as bill paying, cooking, cleaning, traveling  x 24  8-12  19-20    5  Moderate Dementia  Needs help selecting proper attire   18  5-7  15    6a  Moderately Severe Dementia  Needs help putting on clothes   4.8  5  9    6b  Moderately Severe Dementia  Needs help bathing   4.8  4  8    6c  Moderately Severe Dementia  Needs help toileting   4.8  4  5    6d  Moderately Severe Dementia  Urinary incontinence   3.6  3-4  3    6e  Moderately Severe Dementia  Fecal incontinence   9.6  2-3  1    7a  Severe Dementia  Speaks 5-6 words during day   12  1.25  0    7b  Severe Dementia  Speaks only 1 word clearly   18  1  0    7c  Severe Dementia  Can no longer walk   12  1  0    7d  Severe Dementia  Can no longer sit up   12  0.5-0.8  0    7e  Severe Dementia  Can no longer smile   18  0.2-0.4  0    7f  Severe Dementia  Can no longer hold up head   12+  0-0.2  0                       Sincerely,   Tanya Liu DO   [facility] [address]    Document electronically generated by:  Tanya Liu DO

## (undated) NOTE — LETTER
AUTHORIZATION FOR SURGICAL OPERATION OR OTHER PROCEDURE    1. I hereby authorize Dr. Tristan Adrian and the [facility] Office staff assigned to my case to perform the following operation and/or procedure at the [facility] Office:    Right hip injection under ultrasound guidance       2.  My physician has explained the nature and purpose of the operation or other procedure, possible alternative methods of treatment, the risks involved, and the possibility of complication to me.  I acknowledge that no guarantee has been made as to the result that may be obtained.  3.  I recognize that, during the course of this operation, or other procedure, unforseen conditions may necessitate additional or different procedure than those listed above.  I, therefore, further authorize and request that the above named physician, his/her physician assistants or designees perform such procedures as are, in his/her professional opinion, necessary and desirable.  4.  Any tissue or organs removed in the operation or other procedure may be disposed of by and at the discretion of the [facility] staff and [facility].  5.  I understand that in the event of a medical emergency, I will be transported by local paramedics to [facility] or other hospital emergency department.  6.  I certify that I have read and fully understand the above consent to operation and/or other procedure.    7.  I acknowledge that my physician has explained sedation/analgesia administration to me including the risks and benefits.  I consent to the administration of sedation/analgesia as may be necessary or desirable in the judgement of my physician.    Witness signature: ___________________________________________________ Date:  ______/______/_____                    Time:  ________ A.M.  P.M.       Patient Name: Karyn Cobos  6/14/1945  [id number]         Patient signature:   ___________________________________________________                     Statement of Physician  My signature below affirms that prior to the time of the procedure, I have explained to the patient and/or his/her guardian, the risks and benefits involved in the proposed treatment and any reasonable alternative to the proposed treatment.  I have also explained the risks and benefits involved in the refusal of the proposed treatment and have answered the patient's questions.                        Date:  ______/______/_______  Provider                      Signature:  __________________________________________________________       Time:  ___________ AWILLIAN LOPEZ

## (undated) NOTE — ED AVS SNAPSHOT
Joselito Iglesias   MRN: [id number]    Department:  [facility] Emergency Department   Date of Visit:  2/10/2020           Disclosure     Insurance plans vary and the physician(s) referred by the ER may not be covered by your plan.  Please contact within the next three months to obtain basic health screening including reassessment of your blood pressure.     IF THERE IS ANY CHANGE OR WORSENING OF YOUR CONDITION, CALL YOUR PRIMARY CARE PHYSICIAN AT ONCE OR RETURN IMMEDIATELY TO THE EMERGENCY DEPARTMEN

## (undated) NOTE — LETTER
10/24/2024      Tristan Adrian MD  Physical Medicine and Rehabilitation  [address] [place] [address], [address]  [facility] [address]  Dept: [phone]  Dept Fax: [phone]        RE: Consultation for Karyn Cobos        Dear Pan Mansfield MD,    Thank you very much for the opportunity to see your patient.  Attached please find a summary from your patient's recent visit.     I appreciate the chance to take care of your patient with you.  Please feel free to call me with any questions or concerns.    Sincerely,        Tristan Adrian MD  Electronically Signed on 10/24/2024

## (undated) NOTE — LETTER
[place] [address], [place], [place]  Authorization for Surgical Operation and Procedure                                                                                           I hereby authorize Tristan Adrian MD, my physician and his/her assistants (if applicable), which may include medical students, residents, and/or fellows, to perform the following surgical operation/ procedure and administer such anesthesia as may be determined necessary by my physician: Operation/Procedure name (s) TRANSFORAMINAL LUMBAR EPIDURAL STEROID INJECTION MULTIPLE LEVEL/ Right L4 and Right L5 Transforaminal Epidural Steroid injection on Karyn Cobos   2.   I recognize that during the surgical operation/procedure, unforeseen conditions may necessitate additional or different procedures than those listed above.  I, therefore, further authorize and request that the above-named surgeon, assistants, or designees perform such procedures as are, in their judgment, necessary and desirable.    3.   My surgeon/physician has discussed prior to my surgery the potential benefits, risks and side effects of this procedure; the likelihood of achieving goals; and potential problems that might occur during recuperation.  They also discussed reasonable alternatives to the procedure, including risks, benefits, and side effects related to the alternatives and risks related to not receiving this procedure.  I have had all my questions answered and I acknowledge that no guarantee has been made as to the result that may be obtained.    4.   Should the need arise during my operation/procedure, which includes change of level of care prior to discharge, I also consent to the administration of blood and/or blood products.  Further, I understand that despite careful testing and screening of blood or blood products by collecting agencies, I may still be subject to ill effects as a result of receiving a blood transfusion and/or  blood products.  The following are some, but not all, of the potential risks that can occur: fever and allergic reactions, hemolytic reactions, transmission of diseases such as Hepatitis, AIDS and Cytomegalovirus (CMV) and fluid overload.  In the event that I wish to have an autologous transfusion of my own blood, or a directed donor transfusion, I will discuss this with my physician.  Check only if Refusing Blood or Blood Products  I understand refusal of blood or blood products as deemed necessary by my physician may have serious consequences to my condition to include possible death. I hereby assume responsibility for my refusal and release the hospital, its personnel, and my physicians from any responsibility for the consequences of my refusal.    o  Refuse   5.   I authorize the use of any specimen, organs, tissues, body parts or foreign objects that may be removed from my body during the operation/procedure for diagnosis, research or teaching purposes and their subsequent disposal by hospital authorities.  I also authorize the release of specimen test results and/or written reports to my treating physician on the hospital medical staff or other referring or consulting physicians involved in my care, at the discretion of the Pathologist or my treating physician.    6.   I consent to the photographing or videotaping of the operations or procedures to be performed, including appropriate portions of my body for medical, scientific, or educational purposes, provided my identity is not revealed by the pictures or by descriptive texts accompanying them.  If the procedure has been photographed/videotaped, the surgeon will obtain the original picture, image, videotape or CD.  The hospital will not be responsible for storage, release or maintenance of the picture, image, tape or CD.    7.   I consent to the presence of a  or observers in the operating room as deemed necessary by my physician or their  designees.    8.   I recognize that in the event my procedure results in extended X-Ray/fluoroscopy time, I may develop a skin reaction.    9. If I have a Do Not Attempt Resuscitation (DNAR) order in place, that status will be suspended while in the operating room, procedural suite, and during the recovery period unless otherwise explicitly stated by me (or a person authorized to consent on my behalf). The surgeon or my attending physician will determine when the applicable recovery period ends for purposes of reinstating the DNAR order.  10. Patients having a sterilization procedure: I understand that if the procedure is successful the results will be permanent and it will therefore be impossible for me to inseminate, conceive, or bear children.  I also understand that the procedure is intended to result in sterility, although the result has not been guaranteed.   11. I acknowledge that my physician has explained sedation/analgesia administration to me including the risk and benefits I consent to the administration of sedation/analgesia as may be necessary or desirable in the judgment of my physician.    I CERTIFY THAT I HAVE READ AND FULLY UNDERSTAND THE ABOVE CONSENT TO OPERATION and/or OTHER PROCEDURE.     _________________________________________ _________________________________     ___________________________________  Signature of Patient     Signature of Responsible Person                   Printed Name of Responsible Person                              _________________________________________ ______________________________        ___________________________________  Signature of Witness         Date  Time         Relationship to Patient    STATEMENT OF PHYSICIAN My signature below affirms that prior to the time of the procedure; I have explained to the patient and/or his/her legal representative, the risks and benefits involved in the proposed treatment and any reasonable alternative to the proposed  treatment. I have also explained the risks and benefits involved in refusal of the proposed treatment and alternatives to the proposed treatment and have answered the patient's questions. If I have a significant financial interest in a co-management agreement or a significant financial interest in any product or implant, or other significant relationship used in this procedure/surgery, I have disclosed this and had a discussion with my patient.     _______________________________________________________________ _____________________________  (Signature of Physician)                                                                                         (Date)                                   (Time)  Patient Name: Karyn Cobos    : 1945   Printed: 3/15/2024      Medical Record #: [id number]                                              Page 1 of 1

## (undated) NOTE — LETTER
Patient Name: Rayne Gustafson  YOB: 1945          MRN :  [id number]  Date:  10/14/2021  Referring Physician:  Bakari Villavicencio  Pt has attended 18 visits in Physical Therapy.      Dx: Age-related osteoporosis with curren [phone].     Sincerely,  Electronically signed by therapist: Anthony Manriquez, PT

## (undated) NOTE — MR AVS SNAPSHOT
[facility] [address] [address]  [phone]               Thank you for choosing us for your health care visit with Nurse. We are glad to serve you and happy to provide you with this summary of your visit.   Please help us to Take 1 tablet by mouth once a week. Commonly known as:  FOSAMAX           aspirin 81 MG Tabs   Take 1 tablet by mouth daily. Atorvastatin Calcium 40 MG Tabs   TAKE 1 TABLET BY MOUTH NIGHTLY.    Commonly known as:  LIPITOR           AZULFIDINE 50 Take 1 tablet by mouth daily as needed.                    Medications Administered in the Office Today     cyanocobalamin (VITAMIN B12) 1000 MCG/ML injection 1,000 mcg                    MyChart     Visit MyChart  You can access your MyChart to more active

## (undated) NOTE — LETTER
24        To Whom It May Concern:      Karyn Cobos  :  1945    []  Patient has been cleared to hold ASA 81mg for 5 days prior to Right L4 and L5 transforaminal epidural steroid injection.  Holding the medication(s) may put the patient at an increased risk for stroke, heart attack, or neurologic or cardiac events.    []  Patient has NOT been cleared to hold ASA 81mg for procedure.        X_________________________________________  (Provider signature)                                     (Date)      Please make a selection, sign and fax this letter back to our office    If this office may be of further assistance, please do not hesitate to contact us.      Sincerely,    Tristan Adrian MD    Phone #: [phone]  Fax #: [phone]

## (undated) NOTE — LETTER
7/10/2023      Jose Dumont MD  Physical Medicine and Rehabilitation  [facility], [address] [place] [address]  Dept: [phone]  Dept Fax: [phone]        RE: Consultation for Antonio Costa        Dear Gloria Hairston MD,    Thank you very much for the opportunity to see your patient. Attached please find a summary from your patient's recent visit. I appreciate the chance to take care of your patient with you. Please feel free to call me with any questions or concerns. Sincerely,        Vicky Calle.  Greta Dumont MD  Electronically Signed on 7/10/2023

## (undated) NOTE — LETTER
HORACIO Notifier: [employer]. Patient Name: Karyn Cobos Identification Number: [id number]                          Advance Beneficiary Notice of Noncoverage (ABN)   NOTE:  If Medicare doesn’t pay for D. item/service(s) below, you may have to pay.  Medicare does not pay for everything, even some care that you or your health care provider have good reason to think you need. We expect Medicare may not pay for the D. item/service(s) below.  D. Items or Services  Right hip injection under ultrasound guidance    E. Reason Medicare May Not Pay: F. Estimated Cost   __ EKG ($87.00)  __ Pap smear ($101) __Pelvic/Breast ($147.00)  __ Ear Irrigation ($138)  _x_ Injection(s)  ___ Tdap ($181)       ___ Meningitis ($290)   __Prevnar ($555)  ___ Td ($66)              ___ Prevnar 20 ($549)  ___ Hep A ($152)     ___ Prolia ($1827)         __ Xiaflex ($            )   ___ Hep B ($150)     ___ Pneumovax ($287)                                         ___ Vaccine Administration ($65)   __ Medicare does not cover this service      __ Medicare may not pay for this   item/service for your condition     __ Medicare may not pay for this item/service as often as this        WHAT YOU NEED TO DO NOW:  Read this notice, so you can make an informed decision about your care.  Ask us any questions that you may have after you finish reading.  Choose an option below about whether to receive the D. item/service(s) listed above.  Note: If you choose Option 1 or 2, we may help you to use any other insurance that you might have, but Medicare cannot require us to do this.  G. OPTIONS: Check only one box.  We cannot choose a box for you.   OPTION 1. I want the D. item/service(s) listed above. You may ask to be paid now, but I also want Medicare billed for an official decision on payment, which is sent to me on a Medicare Summary Notice (MSN). I understand that if Medicare doesn’t pay, I am responsible for payment, but I can appeal to  Medicare by following the directions on the MSN. If Medicare does pay, you will refund any payments I made to you, less co-pays or deductibles.  OPTION 2. I want the D. item/service(s) listed above, but do not bill Medicare. You may ask to be paid now as I am responsible for payment. I cannot appeal if Medicare is not billed.  OPTION 3. I don't want the D. item/service(s) listed above. I understand with this choice I am not responsible for payment, and I cannot appeal to see if Medicare would pay.    H. Additional Information:    This notice gives our opinion, not an official Medicare decision. If you have other questions on this notice or Medicare billing, call 1-800-MEDICARE (1-631.508.5564/TTY: 1-437.641.8356). Signing below means that you have received and understand this notice. You also receive a copy.  I. Signature: J. Date:       You have the right to get Medicare information in an accessible format, like large print, Braille, or audio. You also have the right to file a complaint if you feel you’ve been discriminated against. Visit Medicare.gov/about- us/mthugidunjfam-dfcktlbgmexjoxhcf-njssfg.  According to the Paperwork Reduction Act of 1995, no persons are required to respond to a collection of information unless it displays a valid OMB control number. The valid OMB control number for this information collection is 3879-3437. The time required to complete this information collection is estimated to average 7 minutes per response, including the time to review instructions, search existing data resources, gather the data needed, and complete and review the information collection. If you have comments concerning the accuracy of the time estimate or suggestions for improving this form, please write to: CMS, [facility] [employer]     [place], Attn: DOMONIQUE Reports Clearance Officer, [place], [place] [address].  Form CMS-R-131 (Exp. 1/31/2026) Form Approved OMB No. [id number]